# Patient Record
Sex: FEMALE | Race: WHITE | Employment: FULL TIME | URBAN - METROPOLITAN AREA
[De-identification: names, ages, dates, MRNs, and addresses within clinical notes are randomized per-mention and may not be internally consistent; named-entity substitution may affect disease eponyms.]

---

## 2017-01-02 ENCOUNTER — APPOINTMENT (OUTPATIENT)
Dept: PHYSICAL THERAPY | Age: 62
End: 2017-01-02
Attending: INTERNAL MEDICINE
Payer: COMMERCIAL

## 2017-01-03 ENCOUNTER — TELEPHONE (OUTPATIENT)
Dept: INTERNAL MEDICINE CLINIC | Facility: CLINIC | Age: 62
End: 2017-01-03

## 2017-01-03 NOTE — TELEPHONE ENCOUNTER
[facility] PT is requesting to have a referral for the pt for 2 more visits  Please advise

## 2017-01-05 ENCOUNTER — OFFICE VISIT (OUTPATIENT)
Dept: PHYSICAL THERAPY | Age: 62
End: 2017-01-05
Attending: INTERNAL MEDICINE
Payer: COMMERCIAL

## 2017-01-05 PROCEDURE — 97110 THERAPEUTIC EXERCISES: CPT

## 2017-01-05 PROCEDURE — 97112 NEUROMUSCULAR REEDUCATION: CPT

## 2017-01-05 PROCEDURE — 97530 THERAPEUTIC ACTIVITIES: CPT

## 2017-01-05 NOTE — PROGRESS NOTES
Dx:  GUSTAVO NAVA 10/14/2016        Authorized # of Visits:  12 authorized         Next MD visit: none scheduled  Fall Risk: standard         Precautions: posterior precautions            Physical therapy progress/discharge report  11 of 12 sessions completed    S seconds, demonstrating improved gait speed for improved participation in ADL such as community ambulation (met)  · Pt will be able to squat to  light objects around the house with <2/10 hip pain  (met)  · Pt will improve functional hip strength to r sheet  Hold 15 seconds x 5 reps   On L side:  R hip ER with \"clam\" AAROM x 10 2 sets     R ABD SLR AAROM x 10 2 sets In doorway  -hips shifted from L to center.  -L and R hip/knee flex maintain neutral pelvis x 15 each leg ( min B UE support) On L side, symmetry and stance time  5 min Gait traing in clinic cues for hip symmetry and stance time  5 min  TUG test: 8 seconds (WNL's)    On L side:  R hip ER with \"clam\" AAROM x 10 3 sets     R ABD SLR AAROM x 10 3 sets

## 2017-01-05 NOTE — TELEPHONE ENCOUNTER
[employer] calling to inform us that patient's appt is scheduled for today. Must have referral request entered into a pending status in order for patient to keep their appt for today.

## 2017-01-05 NOTE — TELEPHONE ENCOUNTER
Referral # Creation Date Referral Status Status Update      [id number] 01/04/2017 Pending Review 01/05/2017:Status History.                  Status Reason Referral Type Referral Reasons Referral Class     Health Plan Review Rehab Services none Internal

## 2017-01-12 ENCOUNTER — HOSPITAL ENCOUNTER (OUTPATIENT)
Dept: MAMMOGRAPHY | Facility: HOSPITAL | Age: 62
Discharge: HOME | End: 2017-01-12
Attending: INTERNAL MEDICINE
Payer: COMMERCIAL

## 2017-01-12 DIAGNOSIS — Z12.31 VISIT FOR SCREENING MAMMOGRAM: ICD-10-CM

## 2017-01-12 PROCEDURE — 77067 SCR MAMMO BI INCL CAD: CPT

## 2017-01-15 DIAGNOSIS — I10 ESSENTIAL HYPERTENSION WITH GOAL BLOOD PRESSURE LESS THAN 130/80: ICD-10-CM

## 2017-01-16 ENCOUNTER — OFFICE VISIT (OUTPATIENT)
Dept: PHYSICAL THERAPY | Age: 62
End: 2017-01-16
Attending: INTERNAL MEDICINE
Payer: COMMERCIAL

## 2017-01-16 PROCEDURE — 97112 NEUROMUSCULAR REEDUCATION: CPT

## 2017-01-16 PROCEDURE — 97530 THERAPEUTIC ACTIVITIES: CPT

## 2017-01-16 NOTE — PROGRESS NOTES
Dx:  GUSTAVO NAVA 10/14/2016        Authorized # of Visits:  12 authorized         Next MD visit: none scheduled  Fall Risk: standard         Precautions: posterior precautions            Physical therapy discharge report  12 of 12 sessions completed    Subjective compliant with comprehensive HEP to maintain progress achieved in PT (met)      Plan:d/c to HEP.       Charges:  NM re ed 1 there act 2    Total Timed Treatment: 45 min  Total Treatment Time: 45 min    Date: 11/28  Tx#: 5/ Date: 11/30  Tx#: 6/ Date: 12/6  T pelvis x 15 each leg ( min B UE support) On L side, roll under hips to mobilize to R  5 min (add HEP)    -Lumbar spine mobes grade 3,4 for L side bend, all segments  5 min In p bars, fwd and bwwd walk, cues hips neutral, slight UE assist balance, symmetric clinic cues for hip symmetry and stance time  5 min  TUG test: 8 seconds (WNL's)     On L side:  R hip ER with \"clam\" AAROM x 10 3 sets     R ABD SLR AAROM x 10 3 sets

## 2017-01-17 ENCOUNTER — OFFICE VISIT (OUTPATIENT)
Dept: INTERNAL MEDICINE CLINIC | Facility: CLINIC | Age: 62
End: 2017-01-17

## 2017-01-17 VITALS
TEMPERATURE: 98 F | WEIGHT: 226 LBS | HEIGHT: 67 IN | RESPIRATION RATE: 18 BRPM | BODY MASS INDEX: 35.47 KG/M2 | HEART RATE: 80 BPM | DIASTOLIC BLOOD PRESSURE: 80 MMHG | SYSTOLIC BLOOD PRESSURE: 136 MMHG

## 2017-01-17 DIAGNOSIS — I10 ESSENTIAL HYPERTENSION: ICD-10-CM

## 2017-01-17 DIAGNOSIS — M77.8 SHOULDER TENDINITIS, LEFT: ICD-10-CM

## 2017-01-17 DIAGNOSIS — I10 ESSENTIAL HYPERTENSION WITH GOAL BLOOD PRESSURE LESS THAN 130/80: Primary | ICD-10-CM

## 2017-01-17 PROCEDURE — 99212 OFFICE O/P EST SF 10 MIN: CPT | Performed by: INTERNAL MEDICINE

## 2017-01-17 PROCEDURE — 99214 OFFICE O/P EST MOD 30 MIN: CPT | Performed by: INTERNAL MEDICINE

## 2017-01-17 RX ORDER — TRIAMTERENE AND HYDROCHLOROTHIAZIDE 37.5; 25 MG/1; MG/1
CAPSULE ORAL
Qty: 20 CAPSULE | Refills: 2 | Status: SHIPPED | OUTPATIENT
Start: 2017-01-17 | End: 2017-04-18

## 2017-01-17 NOTE — PROGRESS NOTES
HPI:    Patient ID: Mirza Hebert is a 63year old female. Hypertension  This is a chronic problem. The current episode started more than 1 year ago. The problem has been gradually improving (has been on dyazide and amlodipine,tolerated well. bp stable,s ER, XL, (WELLBUTRIN XL) 300 MG Oral Tablet 24 Hr Take 600 mg by mouth daily. take 2 tablets by oral route once in a.m. Disp:  Rfl:    Fexofenadine HCl (ALLEGRA) 60 MG Oral Tab Take 60 mg by mouth daily.  take 1 tablet (60MG)  by ORAL route 2 times every da rebound. Musculoskeletal: She exhibits no edema. Right shoulder: Normal. She exhibits normal strength. Left shoulder: She exhibits decreased range of motion, pain and spasm. Lumbar back: She exhibits decreased range of motion.  She ex Relevant Medications     Triamterene-HCTZ 37.5-25 MG Oral Cap     Other Relevant Orders     CBC WITH DIFFERENTIAL WITH PLATELET     COMP METABOLIC PANEL (14)     LIPID PANEL     ASSAY, THYROID STIM HORMONE          Return in about 3 months (around 4/17

## 2017-01-17 NOTE — PATIENT INSTRUCTIONS
Problem List Items Addressed This Visit        Unprioritized    Essential hypertension     Blood pressure 136/80, pulse 80, temperature 98 °F (36.7 °C), temperature source Oral, resp. rate 18, height 5' 7\" (1.702 m), weight 226 lb (102.513 kg).    The bloo

## 2017-01-17 NOTE — ASSESSMENT & PLAN NOTE
Gradually worsening pain with stiffness in the left shoulder. No recent injuries to explain the pain. Patient does lay on the left side crunched up. Most likely the cause for pain at this time.   Advised to use a rolled up pillow for better support of th

## 2017-01-17 NOTE — ASSESSMENT & PLAN NOTE
Blood pressure 136/80, pulse 80, temperature 98 °F (36.7 °C), temperature source Oral, resp. rate 18, height 5' 7\" (1.702 m), weight 226 lb (102.513 kg). The blood pressure looks well controlled at this time.   Patient is currently off the amlodipine as

## 2017-01-18 RX ORDER — TRIAMTERENE AND HYDROCHLOROTHIAZIDE 37.5; 25 MG/1; MG/1
CAPSULE ORAL
Qty: 20 CAPSULE | Refills: 2 | Status: CANCELLED | OUTPATIENT
Start: 2017-01-18

## 2017-01-18 NOTE — TELEPHONE ENCOUNTER
From: Yoana Navarro  To: Melissa Schultz MD  Sent: 1/15/2017 1:52 PM CST  Subject: Medication Renewal Request    Original authorizing provider: MD Yoana Gastelum would like a refill of the following medications:  Triamterene-HCTZ 37.5-25 MG

## 2017-01-25 ENCOUNTER — APPOINTMENT (OUTPATIENT)
Dept: PHYSICAL THERAPY | Age: 62
End: 2017-01-25
Attending: INTERNAL MEDICINE
Payer: COMMERCIAL

## 2017-04-10 ENCOUNTER — LAB ENCOUNTER (OUTPATIENT)
Dept: LAB | Age: 62
End: 2017-04-10
Attending: INTERNAL MEDICINE
Payer: COMMERCIAL

## 2017-04-10 DIAGNOSIS — I10 ESSENTIAL HYPERTENSION WITH GOAL BLOOD PRESSURE LESS THAN 130/80: ICD-10-CM

## 2017-04-10 PROCEDURE — 85025 COMPLETE CBC W/AUTO DIFF WBC: CPT

## 2017-04-10 PROCEDURE — 84443 ASSAY THYROID STIM HORMONE: CPT

## 2017-04-10 PROCEDURE — 80053 COMPREHEN METABOLIC PANEL: CPT

## 2017-04-10 PROCEDURE — 36415 COLL VENOUS BLD VENIPUNCTURE: CPT

## 2017-04-10 PROCEDURE — 80061 LIPID PANEL: CPT

## 2017-04-17 ENCOUNTER — TELEPHONE (OUTPATIENT)
Dept: INTERNAL MEDICINE CLINIC | Facility: CLINIC | Age: 62
End: 2017-04-17

## 2017-04-17 DIAGNOSIS — Z98.890 HISTORY OF ARTHROPLASTY OF RIGHT HIP: Primary | ICD-10-CM

## 2017-04-17 NOTE — TELEPHONE ENCOUNTER
Pt requesting referral to Dr Carolina Grace at [facility]  Had appt today 4/17 for 6 mo f/u     FAX# [phone], Yari Koroma

## 2017-04-18 ENCOUNTER — OFFICE VISIT (OUTPATIENT)
Dept: INTERNAL MEDICINE CLINIC | Facility: CLINIC | Age: 62
End: 2017-04-18

## 2017-04-18 VITALS
DIASTOLIC BLOOD PRESSURE: 74 MMHG | SYSTOLIC BLOOD PRESSURE: 116 MMHG | BODY MASS INDEX: 35.79 KG/M2 | HEART RATE: 76 BPM | HEIGHT: 67 IN | WEIGHT: 228 LBS | RESPIRATION RATE: 16 BRPM

## 2017-04-18 DIAGNOSIS — I10 ESSENTIAL HYPERTENSION: ICD-10-CM

## 2017-04-18 DIAGNOSIS — E03.9 HYPOTHYROIDISM, UNSPECIFIED TYPE: ICD-10-CM

## 2017-04-18 DIAGNOSIS — I10 ESSENTIAL HYPERTENSION WITH GOAL BLOOD PRESSURE LESS THAN 130/80: Primary | ICD-10-CM

## 2017-04-18 PROCEDURE — 99214 OFFICE O/P EST MOD 30 MIN: CPT | Performed by: INTERNAL MEDICINE

## 2017-04-18 PROCEDURE — 99212 OFFICE O/P EST SF 10 MIN: CPT | Performed by: INTERNAL MEDICINE

## 2017-04-18 RX ORDER — AMOXICILLIN 500 MG/1
CAPSULE ORAL AS NEEDED
Refills: 3 | COMMUNITY
Start: 2017-02-03 | End: 2018-10-02

## 2017-04-18 RX ORDER — TRIAMTERENE AND HYDROCHLOROTHIAZIDE 37.5; 25 MG/1; MG/1
CAPSULE ORAL
Qty: 26 CAPSULE | Refills: 2 | Status: SHIPPED | OUTPATIENT
Start: 2017-04-18 | End: 2017-10-17 | Stop reason: ALTCHOICE

## 2017-04-18 RX ORDER — TIZANIDINE HYDROCHLORIDE 4 MG/1
CAPSULE, GELATIN COATED ORAL
Qty: 90 CAPSULE | Refills: 1 | Status: SHIPPED | OUTPATIENT
Start: 2017-04-18 | End: 2017-12-29

## 2017-04-18 RX ORDER — LEVOTHYROXINE SODIUM 0.15 MG/1
150 TABLET ORAL
Qty: 90 TABLET | Refills: 2 | Status: SHIPPED | OUTPATIENT
Start: 2017-04-18 | End: 2017-12-29

## 2017-04-18 NOTE — ASSESSMENT & PLAN NOTE
Blood pressure 116/74, pulse 76, resp. rate 16, height 5' 7\" (1.702 m), weight 228 lb (103.42 kg). Blood pressure upon recheck is 136/78. Patient has been off the amlodipine. This has been since her last surgery.   She is currently on triamterene hydro

## 2017-04-18 NOTE — ASSESSMENT & PLAN NOTE
Thyroid functions look stable on levothyroxine at 150 µg 1 tablet once daily. Continue the same dose of medications and recheck labs prior to the next office visit.

## 2017-04-18 NOTE — PATIENT INSTRUCTIONS
Problem List Items Addressed This Visit        Unprioritized    Essential hypertension     Blood pressure 116/74, pulse 76, resp. rate 16, height 5' 7\" (1.702 m), weight 228 lb (103.42 kg). Blood pressure upon recheck is 136/78.   Patient has been off th

## 2017-04-18 NOTE — PROGRESS NOTES
HPI:    Patient ID: Wiliam Pierce is a 63year old female. HPI Comments: Notes Recorded by Marva Subramanian MD on 4/10/2017 at 4:11 PM  The blood counts look normal–no anemia. The thyroid function tests look stable.   The metabolic panel shows normal suga tablet Rfl: 2   TiZANidine HCl 4 MG Oral Cap 1 cap po q hs Disp: 90 capsule Rfl: 1   ARIPiprazole (ABILIFY) 10 MG Oral Tab Take  by mouth.  take 1 tablet (10MG)  by ORAL route  every day Disp:  Rfl:    BuPROPion HCl ER, XL, (WELLBUTRIN XL) 300 MG Oral Table cranial nerve deficit. She exhibits normal muscle tone. Coordination normal.   Skin: No rash noted. No erythema. Psychiatric: She has a normal mood and affect. Her behavior is normal. Thought content normal.   Nursing note and vitals reviewed. Encounter  CBC W Differential W Platelet [E]  Comp Metabolic Panel (14) [E]  Lipid Panel [E]  TSH [E]  Urinalysis, Routine [E]    Meds This Visit:  Signed Prescriptions Disp Refills    Triamterene-HCTZ 37.5-25 MG Oral Cap 26 capsule 2      Si capsule 2

## 2017-04-21 NOTE — TELEPHONE ENCOUNTER
Referral was faxed to Jonn Amezcua [facility] at [phone], w/confirmation. Pt was called and notified.

## 2017-10-15 ENCOUNTER — LAB ENCOUNTER (OUTPATIENT)
Dept: LAB | Facility: HOSPITAL | Age: 62
End: 2017-10-15
Attending: INTERNAL MEDICINE
Payer: COMMERCIAL

## 2017-10-15 DIAGNOSIS — I10 ESSENTIAL HYPERTENSION: ICD-10-CM

## 2017-10-15 PROCEDURE — 80053 COMPREHEN METABOLIC PANEL: CPT

## 2017-10-15 PROCEDURE — 36415 COLL VENOUS BLD VENIPUNCTURE: CPT

## 2017-10-15 PROCEDURE — 81001 URINALYSIS AUTO W/SCOPE: CPT

## 2017-10-15 PROCEDURE — 80061 LIPID PANEL: CPT

## 2017-10-15 PROCEDURE — 85025 COMPLETE CBC W/AUTO DIFF WBC: CPT

## 2017-10-15 PROCEDURE — 84443 ASSAY THYROID STIM HORMONE: CPT

## 2017-10-17 ENCOUNTER — OFFICE VISIT (OUTPATIENT)
Dept: INTERNAL MEDICINE CLINIC | Facility: CLINIC | Age: 62
End: 2017-10-17

## 2017-10-17 VITALS
DIASTOLIC BLOOD PRESSURE: 75 MMHG | HEART RATE: 76 BPM | BODY MASS INDEX: 37.83 KG/M2 | HEIGHT: 67 IN | SYSTOLIC BLOOD PRESSURE: 121 MMHG | WEIGHT: 241 LBS

## 2017-10-17 DIAGNOSIS — Z78.0 MENOPAUSE: ICD-10-CM

## 2017-10-17 DIAGNOSIS — I10 ESSENTIAL HYPERTENSION: Primary | ICD-10-CM

## 2017-10-17 DIAGNOSIS — E03.9 HYPOTHYROIDISM, UNSPECIFIED TYPE: ICD-10-CM

## 2017-10-17 DIAGNOSIS — Z12.31 VISIT FOR SCREENING MAMMOGRAM: ICD-10-CM

## 2017-10-17 DIAGNOSIS — Z12.11 COLON CANCER SCREENING: ICD-10-CM

## 2017-10-17 PROCEDURE — 90686 IIV4 VACC NO PRSV 0.5 ML IM: CPT | Performed by: INTERNAL MEDICINE

## 2017-10-17 PROCEDURE — 90471 IMMUNIZATION ADMIN: CPT | Performed by: INTERNAL MEDICINE

## 2017-10-17 PROCEDURE — 99212 OFFICE O/P EST SF 10 MIN: CPT | Performed by: INTERNAL MEDICINE

## 2017-10-17 PROCEDURE — 99214 OFFICE O/P EST MOD 30 MIN: CPT | Performed by: INTERNAL MEDICINE

## 2017-10-17 NOTE — PROGRESS NOTES
Pt presents for FLU vaccine, pt name and  verified, screening done, pt administered flu vaccine IM in the left deltoid, pt tolerated injection well, no adverse reaction note  VIS provided

## 2017-10-17 NOTE — PROGRESS NOTES
HPI:    Patient ID: Debbie Stanley is a 63year old female. Written by Stephen Root MD on 10/17/2017  4:20 PM   The blood counts look normal-no anemia.    The urine test looks normal excepting for slight pus in the urine- as you are symptomatic we will Psychiatric/Behavioral: Negative.                Current Outpatient Prescriptions:  Ciprofloxacin HCl (CIPRO) 500 MG Oral Tab 1 tablet by mouth twice a day Disp: 10 tablet Rfl: 0   Levothyroxine Sodium 150 MCG Oral Tab Take 1 tablet (150 mcg total) by lb tenderness. There is no rebound. Musculoskeletal: Normal range of motion. She exhibits no edema or tenderness. Lymphadenopathy:     She has no cervical adenopathy. Neurological: She is alert and oriented to person, place, and time.  She has normal ref Disp Refills    Ciprofloxacin HCl (CIPRO) 500 MG Oral Tab 10 tablet 0      Si tablet by mouth twice a day           Imaging & Referrals:  FLULAVAL INFLUENZA VACCINE QUAD PRESERVATIVE FREE 0.5 ML  GASTRO - INTERNAL  YOLI SCREENING BILAT (CPT=77067)  XR D

## 2017-10-17 NOTE — ASSESSMENT & PLAN NOTE
Blood pressure 121/75, pulse 76, height 5' 7\" (1.702 m), weight 241 lb (109.3 kg). Blood pressure seems stable and well controlled. She has been off the amlodipine at this time.   She is currently on triamterene hydrochlorothiazide 1 capsule 2 times a

## 2017-10-17 NOTE — ASSESSMENT & PLAN NOTE
Thyroid function tests look stable at this time. Continue on levothyroxine at 150 mcg 1 tablet once daily.   Recheck labs in about 4-6 months

## 2017-10-17 NOTE — PATIENT INSTRUCTIONS
Problem List Items Addressed This Visit        Unprioritized    Essential hypertension - Primary     Blood pressure 121/75, pulse 76, height 5' 7\" (1.702 m), weight 241 lb (109.3 kg). Blood pressure seems stable and well controlled.   She has been off

## 2017-10-18 ENCOUNTER — TELEPHONE (OUTPATIENT)
Dept: OTHER | Age: 62
End: 2017-10-18

## 2017-10-18 RX ORDER — CIPROFLOXACIN 500 MG/1
TABLET, FILM COATED ORAL
Qty: 10 TABLET | Refills: 0 | Status: SHIPPED | OUTPATIENT
Start: 2017-10-18 | End: 2017-12-29

## 2017-10-18 NOTE — TELEPHONE ENCOUNTER
Pt advised of message below and verbalized understanding and agreement with the plan:    Saintclair Ivy, MD Physician Signed  Date of Service: 10/18/2017 11:38 AM         tx faxed-stop tizanidine when on cipro

## 2017-10-20 ENCOUNTER — TELEPHONE (OUTPATIENT)
Dept: GASTROENTEROLOGY | Facility: CLINIC | Age: 62
End: 2017-10-20

## 2017-10-20 DIAGNOSIS — Z12.11 COLON CANCER SCREENING: Primary | ICD-10-CM

## 2017-10-20 NOTE — TELEPHONE ENCOUNTER
Pt called to schedule 10 yr repeat colonoscopy. Pt would like to know if there is any availability the first part of December. Please call.

## 2017-10-23 NOTE — TELEPHONE ENCOUNTER
Okay to schedule screening colonoscopy, split dose MiraLAX preparation, MAC, at hospital due to BMI greater than 35.

## 2017-10-24 NOTE — TELEPHONE ENCOUNTER
Scheduled for:  Colonoscopy - [address]8075  Provider Name:  Dr. Jahaira Elkins  Date:  12/15/17  Location:  [facility]  Sedation:  MAC  Time:  12:30 pm (pt is aware to arrive at 11:30 am)  Prep:  Miralax/Gatorade, Prep instructions were given to pt over the phone, pt verbalized u

## 2017-12-15 ENCOUNTER — ANESTHESIA (OUTPATIENT)
Dept: ENDOSCOPY | Facility: HOSPITAL | Age: 62
End: 2017-12-15
Payer: COMMERCIAL

## 2017-12-15 ENCOUNTER — ANESTHESIA EVENT (OUTPATIENT)
Dept: ENDOSCOPY | Facility: HOSPITAL | Age: 62
End: 2017-12-15
Payer: COMMERCIAL

## 2017-12-15 ENCOUNTER — SURGERY (OUTPATIENT)
Age: 62
End: 2017-12-15

## 2017-12-15 ENCOUNTER — HOSPITAL ENCOUNTER (OUTPATIENT)
Facility: HOSPITAL | Age: 62
Setting detail: HOSPITAL OUTPATIENT SURGERY
Discharge: HOME | End: 2017-12-15
Attending: INTERNAL MEDICINE | Admitting: INTERNAL MEDICINE
Payer: COMMERCIAL

## 2017-12-15 DIAGNOSIS — Z12.11 COLON CANCER SCREENING: ICD-10-CM

## 2017-12-15 PROBLEM — K57.90 DIVERTICULOSIS: Status: ACTIVE | Noted: 2017-12-15

## 2017-12-15 PROBLEM — K64.8 INTERNAL HEMORRHOIDS: Status: ACTIVE | Noted: 2017-12-15

## 2017-12-15 PROCEDURE — 45378 DIAGNOSTIC COLONOSCOPY: CPT | Performed by: INTERNAL MEDICINE

## 2017-12-15 PROCEDURE — 0DJD8ZZ INSPECTION OF LOWER INTESTINAL TRACT, VIA NATURAL OR ARTIFICIAL OPENING ENDOSCOPIC: ICD-10-PCS | Performed by: INTERNAL MEDICINE

## 2017-12-15 RX ORDER — SODIUM CHLORIDE, SODIUM LACTATE, POTASSIUM CHLORIDE, CALCIUM CHLORIDE 600; 310; 30; 20 MG/100ML; MG/100ML; MG/100ML; MG/100ML
INJECTION, SOLUTION INTRAVENOUS CONTINUOUS
Status: DISCONTINUED | OUTPATIENT
Start: 2017-12-15 | End: 2017-12-15

## 2017-12-15 RX ORDER — NALOXONE HYDROCHLORIDE 0.4 MG/ML
80 INJECTION, SOLUTION INTRAMUSCULAR; INTRAVENOUS; SUBCUTANEOUS AS NEEDED
Status: DISCONTINUED | OUTPATIENT
Start: 2017-12-15 | End: 2017-12-15

## 2017-12-15 NOTE — H&P
History & Physical Examination    Patient Name: Sandro Curtis  MRN: [id number]  [facility]: [id number]  YOB: 1955    Diagnosis: colorectal screening      Prescriptions Prior to Admission:  Levothyroxine Sodium 150 MCG Oral Tab Take 1 tablet (150 mcg Disease Mother      CVA   • Depression Mother    • Hypertension Mother    • Colonic polyps [OTHER] Mother    • Depression Sister    • Hypertension Sister    • acromegaly from a pituitary tumor [OTHER] Sister         Smoking status: Never Smoker    [place]

## 2017-12-15 NOTE — BRIEF OP NOTE
Pre-Operative Diagnosis: Colon cancer screening     Post-Operative Diagnosis: Diverticulosis, internal hemorrhoids     Procedure Performed:   Procedure(s):  COLONOSCOPY    Surgeon(s) and Role:     * Eugenia De Los Santos MD - Primary    Assistant(

## 2017-12-15 NOTE — ANESTHESIA PREPROCEDURE EVALUATION
Anesthesia PreOp Note    HPI:     Wiliam Pierce is a 63year old female who presents for preoperative consultation requested by: Mel Theodore MD    Date of Surgery: 12/15/2017    Procedure(s):  COLONOSCOPY  Indication: Colon cancer screening Comment: 1998  No date: REDUCTION LEFT      Comment: 2000 Bilateral  No date: REDUCTION RIGHT  No date: SPINE SURGERY PROCEDURE UNLISTED  No date: TOTAL ABDOM HYSTERECTOMY  10/14/16: TOTAL HIP REPLACEMENT Right      Prescriptions Prior to Admission:  Priya Rodrigez or equivalent per week         Comment: MIXED DRINKS WEEKLY    Drug use: No    Sexual activity: Not on file     Other Topics Concern    Caffeine Concern Yes    Comment: 2 cups tea/coffee daily    Pt has a pacemaker No    Pt has a defibrillator No    Reacti my ability. The patient desires the anesthetic management as planned.   Reza Quiroz  12/15/2017 1:16 PM

## 2017-12-15 NOTE — ANESTHESIA POSTPROCEDURE EVALUATION
Patient: Tia Junior    Procedure Summary     Date:  12/15/17 Room / Location:  [facility] ENDOSCOPY 01 / [facility] ENDOSCOPY    Anesthesia Start:  9369 Anesthesia Stop:      Procedure:  COLONOSCOPY (N/A ) Diagnosis:       Colon cancer screening      (diverticulosis,

## 2017-12-15 NOTE — ANESTHESIA POSTPROCEDURE EVALUATION
Patient: Modesto Dunne    Procedure Summary     Date:  12/15/17 Room / Location:  [facility] ENDOSCOPY 01 / [facility] ENDOSCOPY    Anesthesia Start:  9064 Anesthesia Stop:      Procedure:  COLONOSCOPY (N/A ) Diagnosis:       Colon cancer screening      (diverticulosis,

## 2017-12-16 VITALS
HEART RATE: 81 BPM | HEIGHT: 67 IN | RESPIRATION RATE: 18 BRPM | SYSTOLIC BLOOD PRESSURE: 124 MMHG | BODY MASS INDEX: 37.67 KG/M2 | DIASTOLIC BLOOD PRESSURE: 95 MMHG | WEIGHT: 240 LBS

## 2017-12-16 NOTE — OPERATIVE REPORT
[facility]    PATIENT'S NAME: Richar Rivera   ATTENDING PHYSICIAN: Jaspal Cannon MD   OPERATING PHYSICIAN: Jaspal Cannon MD   PATIENT ACCOUNT#:   [id number]    LOCATION:  [facility] ROOM 13 [address] 14:11:13  t: 12/15/2017 20:35:51  Job [id number]  [facility]/

## 2017-12-18 ENCOUNTER — TELEPHONE (OUTPATIENT)
Dept: GASTROENTEROLOGY | Facility: CLINIC | Age: 62
End: 2017-12-18

## 2017-12-19 ENCOUNTER — APPOINTMENT (OUTPATIENT)
Dept: LAB | Facility: HOSPITAL | Age: 62
End: 2017-12-19
Attending: INTERNAL MEDICINE
Payer: COMMERCIAL

## 2017-12-19 DIAGNOSIS — I10 ESSENTIAL HYPERTENSION: ICD-10-CM

## 2017-12-19 PROCEDURE — 36415 COLL VENOUS BLD VENIPUNCTURE: CPT

## 2017-12-19 PROCEDURE — 80048 BASIC METABOLIC PNL TOTAL CA: CPT

## 2017-12-29 ENCOUNTER — OFFICE VISIT (OUTPATIENT)
Dept: INTERNAL MEDICINE CLINIC | Facility: CLINIC | Age: 62
End: 2017-12-29

## 2017-12-29 VITALS
SYSTOLIC BLOOD PRESSURE: 132 MMHG | DIASTOLIC BLOOD PRESSURE: 85 MMHG | HEIGHT: 67 IN | RESPIRATION RATE: 16 BRPM | BODY MASS INDEX: 36.88 KG/M2 | WEIGHT: 235 LBS | HEART RATE: 75 BPM

## 2017-12-29 DIAGNOSIS — G47.33 OBSTRUCTIVE SLEEP APNEA SYNDROME: ICD-10-CM

## 2017-12-29 DIAGNOSIS — E03.9 HYPOTHYROIDISM, UNSPECIFIED TYPE: ICD-10-CM

## 2017-12-29 DIAGNOSIS — Z00.00 ROUTINE GENERAL MEDICAL EXAMINATION AT A HEALTH CARE FACILITY: ICD-10-CM

## 2017-12-29 DIAGNOSIS — Z96.641 STATUS POST TOTAL REPLACEMENT OF RIGHT HIP: ICD-10-CM

## 2017-12-29 DIAGNOSIS — Z01.419 ENCOUNTER FOR GYNECOLOGICAL EXAMINATION WITHOUT ABNORMAL FINDING: ICD-10-CM

## 2017-12-29 DIAGNOSIS — F32.A DEPRESSIVE DISORDER: ICD-10-CM

## 2017-12-29 DIAGNOSIS — I10 ESSENTIAL HYPERTENSION: Primary | ICD-10-CM

## 2017-12-29 DIAGNOSIS — G47.30 INSOMNIA WITH SLEEP APNEA: ICD-10-CM

## 2017-12-29 DIAGNOSIS — N18.30 CKD (CHRONIC KIDNEY DISEASE) STAGE 3, GFR 30-59 ML/MIN (HCC): ICD-10-CM

## 2017-12-29 DIAGNOSIS — G47.00 INSOMNIA WITH SLEEP APNEA: ICD-10-CM

## 2017-12-29 DIAGNOSIS — E55.9 VITAMIN D DEFICIENCY: ICD-10-CM

## 2017-12-29 PROBLEM — K64.8 INTERNAL HEMORRHOIDS: Status: RESOLVED | Noted: 2017-12-15 | Resolved: 2017-12-29

## 2017-12-29 PROBLEM — M77.8 SHOULDER TENDINITIS: Status: RESOLVED | Noted: 2017-01-17 | Resolved: 2017-12-29

## 2017-12-29 PROCEDURE — 99212 OFFICE O/P EST SF 10 MIN: CPT | Performed by: INTERNAL MEDICINE

## 2017-12-29 PROCEDURE — 99396 PREV VISIT EST AGE 40-64: CPT | Performed by: INTERNAL MEDICINE

## 2017-12-29 PROCEDURE — 99213 OFFICE O/P EST LOW 20 MIN: CPT | Performed by: INTERNAL MEDICINE

## 2017-12-29 RX ORDER — LEVOTHYROXINE SODIUM 0.15 MG/1
150 TABLET ORAL
Qty: 90 TABLET | Refills: 2 | Status: SHIPPED | OUTPATIENT
Start: 2017-12-29 | End: 2018-04-27

## 2017-12-29 NOTE — PATIENT INSTRUCTIONS
Problem List Items Addressed This Visit        Unprioritized    CKD (chronic kidney disease) stage 3, GFR 30-59 ml/min     EGFR remains low at about 50. Blood pressures seem well controlled.   Patient is advised to drink plenty of fluids and avoid over-the surgeries and hence has some scar tissue to palpation. Follow-up mammogram has been ordered and scheduled–pending at this time. DEXA scan is pending. No cervical or inguinal lymphadenopathy. Hernial orifices intact.     Pelvic exam completed–no cervical

## 2017-12-29 NOTE — ASSESSMENT & PLAN NOTE
Normal exam.  Labs as ordered. Skin check normal.  No significant abnormal nevi. Breast exam completed–no palpable abnormalities, discharge from the nipples or axillary adenopathy.   Patient has status post bilateral breast reduction surgeries and hence h

## 2017-12-29 NOTE — PROGRESS NOTES
REASON FOR VISIT:    Juan Frost is a 63year old female who presents for an [address]. G0,P0  S/P CARMELA-CERVIX IN PLACE FOR  FIBROIDS.   Pap Smear,3 Years due on 12/29/2020     Colonoscopy,10 Years due on 12/15/2027   ADMISSION DATE: Depressive disorder     Essential hypertension     Hypothyroidism     Insomnia with sleep apnea     Morbid obesity ([facility] [address]     Vitamin D deficiency     Sleep apnea     Routine general medical examination at a health care facility     Encounter for routine gy 04/10/2017 80         Gonorrhea Screening if high risk No results found for: GONOCOCCUS    HIV Screening For all adults age 22-65, older adults at increased risk No results found for: HIV    Syphilis Screening Screen if pregnant or high risk No results f by mouth. take 1 tablet (10MG)  by ORAL route  every day Disp:  Rfl:    BuPROPion HCl ER, XL, (WELLBUTRIN XL) 300 MG Oral Tablet 24 Hr Take 600 mg by mouth daily. take 2 tablets by oral route once in a.m.   Disp:  Rfl:    Fexofenadine HCl (ALLEGRA) 60 MG O well otherwise  SKIN: denies any unusual skin lesions  EYES: denies blurred vision or double vision  HEENT: denies nasal congestion, sinus pain or ST  LUNGS: denies shortness of breath with exertion  CARDIOVASCULAR: denies chest pain on exertion  GI: denie medications. Recheck labs with the next blood draw in about 3-4 months         Depressive disorder     Major depressive disorder which has been stable on current medications–Abilify and Wellbutrin. Being managed per psychiatry.   Referral has been provide scan is pending. No cervical or inguinal lymphadenopathy. Hernial orifices intact. Pelvic exam completed–no cervical movement tenderness, adnexal palpable abnormalities. No cystocele, rectocele noted.   Patient is status post supracervical hysterectom REFERRAL TO PSYCHIATRY    Obstructive sleep apnea syndrome    Other orders  -     Levothyroxine Sodium 150 MCG Oral Tab; Take 1 tablet (150 mcg total) by mouth once daily. The patient indicates understanding of these issues and agrees to the plan.   R

## 2017-12-29 NOTE — ASSESSMENT & PLAN NOTE
Patient has been doing very well since hip surgery about a year back  She has not needed much pain medications at this time.

## 2017-12-29 NOTE — ASSESSMENT & PLAN NOTE
Blood pressure 132/85, pulse 75, resp. rate 16, height 5' 7\" (1.702 m), weight 235 lb (106.6 kg).      Wt Readings from Last 6 Encounters:  12/29/17 : 235 lb (106.6 kg)  12/15/17 : 240 lb (108.9 kg)  10/17/17 : 241 lb (109.3 kg)  04/18/17 : 228 lb (103.4 k

## 2017-12-29 NOTE — ASSESSMENT & PLAN NOTE
Thyroid function tests have been stable on levothyroxine at 150 mcg 1 tablet once daily. Recheck labs have been ordered.

## 2017-12-29 NOTE — ASSESSMENT & PLAN NOTE
Patient is status post supracervical hysterectomy. Last Pap done about 3 years back. No history of abnormal Paps. Pap repeated today.

## 2017-12-29 NOTE — ASSESSMENT & PLAN NOTE
History of sleep apnea but unable to tolerate the CPAP. Diet controlled to lose weight discussed. Lay on the side as discussed.

## 2018-01-01 LAB — HPV I/H RISK 1 DNA SPEC QL NAA+PROBE: NEGATIVE

## 2018-01-29 ENCOUNTER — HOSPITAL ENCOUNTER (OUTPATIENT)
Dept: MAMMOGRAPHY | Facility: HOSPITAL | Age: 63
Discharge: HOME | End: 2018-01-29
Attending: INTERNAL MEDICINE
Payer: COMMERCIAL

## 2018-01-29 ENCOUNTER — HOSPITAL ENCOUNTER (OUTPATIENT)
Dept: BONE DENSITY | Facility: HOSPITAL | Age: 63
Discharge: HOME | End: 2018-01-29
Attending: INTERNAL MEDICINE
Payer: COMMERCIAL

## 2018-01-29 DIAGNOSIS — Z78.0 MENOPAUSE: ICD-10-CM

## 2018-01-29 DIAGNOSIS — Z12.31 VISIT FOR SCREENING MAMMOGRAM: ICD-10-CM

## 2018-01-29 PROCEDURE — 77080 DXA BONE DENSITY AXIAL: CPT | Performed by: INTERNAL MEDICINE

## 2018-01-29 PROCEDURE — 77067 SCR MAMMO BI INCL CAD: CPT | Performed by: INTERNAL MEDICINE

## 2018-03-29 ENCOUNTER — HOSPITAL (OUTPATIENT)
Dept: OTHER | Age: 63
End: 2018-03-29
Attending: RADIOLOGY

## 2018-04-14 ENCOUNTER — LAB ENCOUNTER (OUTPATIENT)
Dept: LAB | Facility: HOSPITAL | Age: 63
End: 2018-04-14
Attending: INTERNAL MEDICINE
Payer: COMMERCIAL

## 2018-04-14 DIAGNOSIS — Z00.00 ROUTINE GENERAL MEDICAL EXAMINATION AT A HEALTH CARE FACILITY: ICD-10-CM

## 2018-04-14 PROCEDURE — 82607 VITAMIN B-12: CPT

## 2018-04-14 PROCEDURE — 80061 LIPID PANEL: CPT

## 2018-04-14 PROCEDURE — 80050 GENERAL HEALTH PANEL: CPT

## 2018-04-14 PROCEDURE — 36415 COLL VENOUS BLD VENIPUNCTURE: CPT

## 2018-04-14 PROCEDURE — 82306 VITAMIN D 25 HYDROXY: CPT

## 2018-04-14 PROCEDURE — 81001 URINALYSIS AUTO W/SCOPE: CPT

## 2018-04-14 PROCEDURE — 80053 COMPREHEN METABOLIC PANEL: CPT

## 2018-04-14 PROCEDURE — 85025 COMPLETE CBC W/AUTO DIFF WBC: CPT

## 2018-04-27 ENCOUNTER — OFFICE VISIT (OUTPATIENT)
Dept: INTERNAL MEDICINE CLINIC | Facility: CLINIC | Age: 63
End: 2018-04-27

## 2018-04-27 VITALS
TEMPERATURE: 98 F | DIASTOLIC BLOOD PRESSURE: 80 MMHG | HEART RATE: 71 BPM | WEIGHT: 243 LBS | BODY MASS INDEX: 38.14 KG/M2 | SYSTOLIC BLOOD PRESSURE: 136 MMHG | HEIGHT: 67 IN

## 2018-04-27 DIAGNOSIS — R25.1 TREMOR: ICD-10-CM

## 2018-04-27 DIAGNOSIS — E55.9 VITAMIN D DEFICIENCY: ICD-10-CM

## 2018-04-27 DIAGNOSIS — N18.30 CKD (CHRONIC KIDNEY DISEASE) STAGE 3, GFR 30-59 ML/MIN (HCC): Primary | ICD-10-CM

## 2018-04-27 DIAGNOSIS — E03.9 HYPOTHYROIDISM, UNSPECIFIED TYPE: ICD-10-CM

## 2018-04-27 DIAGNOSIS — I10 ESSENTIAL HYPERTENSION: ICD-10-CM

## 2018-04-27 PROCEDURE — 99214 OFFICE O/P EST MOD 30 MIN: CPT | Performed by: INTERNAL MEDICINE

## 2018-04-27 PROCEDURE — 99212 OFFICE O/P EST SF 10 MIN: CPT | Performed by: INTERNAL MEDICINE

## 2018-04-27 RX ORDER — LEVOTHYROXINE SODIUM 0.12 MG/1
125 TABLET ORAL
Qty: 90 TABLET | Refills: 1 | Status: SHIPPED | OUTPATIENT
Start: 2018-04-27 | End: 2018-10-02

## 2018-04-27 RX ORDER — ERGOCALCIFEROL 1.25 MG/1
50000 CAPSULE ORAL WEEKLY
Qty: 12 CAPSULE | Refills: 0 | Status: SHIPPED | OUTPATIENT
Start: 2018-04-27 | End: 2018-05-27

## 2018-04-27 NOTE — ASSESSMENT & PLAN NOTE
Gradually worsening intention tremor bilateral hands. Gait seems normal.  No hyper reflexia. Does not seem to be parkinsonian at this time. Most likely related to the use of Abilify. Advised to follow-up with neurology–Dr. Ashlie Carrasquillo –[phone].

## 2018-04-27 NOTE — ASSESSMENT & PLAN NOTE
EGFR much better at this time. Advised to continue to drink plenty of fluids and will monitor. Not on any nephrotoxic agents at this time. Advised to avoid ibuprofen like medications.

## 2018-04-27 NOTE — PATIENT INSTRUCTIONS
Problem List Items Addressed This Visit        Unprioritized    CKD (chronic kidney disease) stage 3, GFR 30-59 ml/min - Primary     EGFR much better at this time. Advised to continue to drink plenty of fluids and will monitor.   Not on any nephrotoxic age at 50,000 units once a week for the next 12 weeks and will recheck labs after.            Relevant Medications    ergocalciferol 38711 units Oral Cap

## 2018-04-27 NOTE — ASSESSMENT & PLAN NOTE
Patient has been on levothyroxine at 150 mcg once daily. Because the tremors and slightly suppressed TSH advised to reduce the levothyroxine to 125 mcg once daily, new prescription has been provided. Recheck labs in about 4 months.

## 2018-04-27 NOTE — PROGRESS NOTES
HPI:    Patient ID: Ruiz Belle is a 66year old female. Hypertension   This is a chronic problem. The current episode started more than 1 year ago.  The problem has been resolved (HAS BEEN ON DYAZIDE 2 TIMES WEEK MORE SO FOR LE SWELING,BP HAVE BEEN ST mouth once a week. Disp: 12 capsule Rfl: 0   Levothyroxine Sodium 125 MCG Oral Tab Take 1 tablet (125 mcg total) by mouth before breakfast. Disp: 90 tablet Rfl: 1   ARIPiprazole (ABILIFY) 10 MG Oral Tab Take  by mouth.  take 1 tablet (10MG)  by ORAL route Neurological: She is alert and oriented to person, place, and time. She has normal reflexes. She displays normal reflexes. No cranial nerve deficit. She exhibits normal muscle tone. Coordination normal.   Skin: No rash noted. No erythema.    Psychiatric: 125 MCG Oral Tab    Tremor     Gradually worsening intention tremor bilateral hands. Gait seems normal.  No hyper reflexia. Does not seem to be parkinsonian at this time. Most likely related to the use of Abilify.   Advised to follow-up with neurology–

## 2018-04-27 NOTE — ASSESSMENT & PLAN NOTE
Vitamin D levels remain low, restart on vitamin D at 50,000 units once a week for the next 12 weeks and will recheck labs after.

## 2018-04-27 NOTE — ASSESSMENT & PLAN NOTE
Blood pressure 136/80, pulse 71, temperature 97.9 °F (36.6 °C), temperature source Oral, height 5' 7\" (1.702 m), weight 243 lb (110.2 kg). Body mass index is 38.06 kg/m².    Wt Readings from Last 6 Encounters:  04/27/18 : 243 lb (110.2 kg)  12/29/17 :

## 2018-05-15 ENCOUNTER — OFFICE VISIT (OUTPATIENT)
Dept: NEUROLOGY | Facility: CLINIC | Age: 63
End: 2018-05-15

## 2018-05-15 VITALS
BODY MASS INDEX: 38.14 KG/M2 | DIASTOLIC BLOOD PRESSURE: 84 MMHG | WEIGHT: 243 LBS | HEART RATE: 83 BPM | SYSTOLIC BLOOD PRESSURE: 144 MMHG | HEIGHT: 67 IN

## 2018-05-15 DIAGNOSIS — R25.1 TREMOR: Primary | ICD-10-CM

## 2018-05-15 PROCEDURE — 99204 OFFICE O/P NEW MOD 45 MIN: CPT | Performed by: OTHER

## 2018-05-15 NOTE — PROGRESS NOTES
Neurology Initial Visit     Referred By: Dr. Sen ref. provider found    Chief Complaint: Patient presents with:  Tremors: referred by Dr. Heriberto Zuluaga. pt presents to clinic for B hands for 1 year, not taking meds for tremors. first time seeing a neurology. or equivalent per week   Comment: MIXED DRINKS WEEKLY        Drug use: No       Family History   Problem Relation Age of Onset   • Cancer Father      prostate   • Heart Disease Mother      CVA   • Depression Mother    • Hypertension Mother    • Colonic karena concentration  Thought process intact  Memory intact- recent and remote  Mood intact  Fund of knowledge appropriate for education and age    Language intact including: comprehension, naming, repetition, vocabulary    Cranial Nerves:  II.- Visual fields ful 04/14/2018      I have reviewed labs. Assessment   1. Tremor  Very mild tremor at this point, exam and history is somewhat contradicting. There are some features of essential tremor, but also parkinsonian tremors could be considered.   It is too early t

## 2018-06-05 ENCOUNTER — TELEPHONE (OUTPATIENT)
Dept: NEUROLOGY | Facility: CLINIC | Age: 63
End: 2018-06-05

## 2018-06-12 ENCOUNTER — HOSPITAL ENCOUNTER (OUTPATIENT)
Dept: MRI IMAGING | Facility: HOSPITAL | Age: 63
Discharge: HOME | End: 2018-06-12
Attending: Other
Payer: COMMERCIAL

## 2018-06-12 DIAGNOSIS — R25.1 TREMOR: ICD-10-CM

## 2018-06-12 PROCEDURE — 70551 MRI BRAIN STEM W/O DYE: CPT | Performed by: OTHER

## 2018-06-12 RX ORDER — DIAZEPAM 5 MG/1
TABLET ORAL
Qty: 1 TABLET | Refills: 0 | Status: SHIPPED | OUTPATIENT
Start: 2018-06-12 | End: 2018-08-16 | Stop reason: ALTCHOICE

## 2018-06-12 NOTE — TELEPHONE ENCOUNTER
Called in RX to [facility] for Diazepam 5mg tablet 1 tablet 15 minutes prior to MRI today. Called patient to inform of the above.  Left message at [phone]

## 2018-06-13 ENCOUNTER — TELEPHONE (OUTPATIENT)
Dept: NEUROLOGY | Facility: CLINIC | Age: 63
End: 2018-06-13

## 2018-06-13 NOTE — TELEPHONE ENCOUNTER
----- Message from Hetal Ross MD sent at 6/13/2018  7:34 AM CDT -----  Please let patient know that MRI brain didn't show significant abnormalities.

## 2018-08-11 ENCOUNTER — APPOINTMENT (OUTPATIENT)
Dept: LAB | Facility: HOSPITAL | Age: 63
End: 2018-08-11
Attending: INTERNAL MEDICINE
Payer: COMMERCIAL

## 2018-08-11 DIAGNOSIS — I10 ESSENTIAL HYPERTENSION: ICD-10-CM

## 2018-08-11 LAB
ALBUMIN SERPL BCP-MCNC: 4.1 G/DL (ref 3.5–4.8)
ALBUMIN/GLOB SERPL: 1.6 {RATIO} (ref 1–2)
ALP SERPL-CCNC: 65 U/L (ref 32–100)
ALT SERPL-CCNC: 25 U/L (ref 14–54)
ANION GAP SERPL CALC-SCNC: 7 MMOL/L (ref 0–18)
AST SERPL-CCNC: 25 U/L (ref 15–41)
BILIRUB SERPL-MCNC: 0.6 MG/DL (ref 0.3–1.2)
BUN SERPL-MCNC: 8 MG/DL (ref 8–20)
BUN/CREAT SERPL: 7 (ref 10–20)
CALCIUM SERPL-MCNC: 9.1 MG/DL (ref 8.5–10.5)
CHLORIDE SERPL-SCNC: 102 MMOL/L (ref 95–110)
CHOLEST SERPL-MCNC: 218 MG/DL (ref 110–200)
CO2 SERPL-SCNC: 26 MMOL/L (ref 22–32)
CREAT SERPL-MCNC: 1.15 MG/DL (ref 0.5–1.5)
GLOBULIN PLAS-MCNC: 2.5 G/DL (ref 2.5–3.7)
GLUCOSE SERPL-MCNC: 93 MG/DL (ref 70–99)
HDLC SERPL-MCNC: 96 MG/DL
LDLC SERPL CALC-MCNC: 115 MG/DL (ref 0–99)
NONHDLC SERPL-MCNC: 122 MG/DL
OSMOLALITY UR CALC.SUM OF ELEC: 278 MOSM/KG (ref 275–295)
PATIENT FASTING: YES
POTASSIUM SERPL-SCNC: 4.1 MMOL/L (ref 3.3–5.1)
PROT SERPL-MCNC: 6.6 G/DL (ref 5.9–8.4)
SODIUM SERPL-SCNC: 135 MMOL/L (ref 136–144)
TRIGL SERPL-MCNC: 37 MG/DL (ref 1–149)
TSH SERPL-ACNC: 1.54 UIU/ML (ref 0.45–5.33)

## 2018-08-11 PROCEDURE — 82306 VITAMIN D 25 HYDROXY: CPT

## 2018-08-11 PROCEDURE — 36415 COLL VENOUS BLD VENIPUNCTURE: CPT

## 2018-08-11 PROCEDURE — 80061 LIPID PANEL: CPT

## 2018-08-11 PROCEDURE — 84443 ASSAY THYROID STIM HORMONE: CPT

## 2018-08-11 PROCEDURE — 80053 COMPREHEN METABOLIC PANEL: CPT

## 2018-08-13 LAB — 25(OH)D3 SERPL-MCNC: 26.2 NG/ML

## 2018-08-16 ENCOUNTER — OFFICE VISIT (OUTPATIENT)
Dept: NEUROLOGY | Facility: CLINIC | Age: 63
End: 2018-08-16

## 2018-08-16 VITALS
WEIGHT: 245 LBS | DIASTOLIC BLOOD PRESSURE: 76 MMHG | SYSTOLIC BLOOD PRESSURE: 122 MMHG | BODY MASS INDEX: 38.45 KG/M2 | HEART RATE: 74 BPM | HEIGHT: 67 IN

## 2018-08-16 DIAGNOSIS — R25.1 TREMOR: Primary | ICD-10-CM

## 2018-08-16 PROCEDURE — 99214 OFFICE O/P EST MOD 30 MIN: CPT | Performed by: OTHER

## 2018-08-16 RX ORDER — PRIMIDONE 50 MG/1
TABLET ORAL
Qty: 90 TABLET | Refills: 1 | Status: SHIPPED | OUTPATIENT
Start: 2018-08-16 | End: 2018-09-19

## 2018-09-19 ENCOUNTER — OFFICE VISIT (OUTPATIENT)
Dept: NEUROLOGY | Facility: CLINIC | Age: 63
End: 2018-09-19

## 2018-09-19 VITALS
WEIGHT: 245 LBS | DIASTOLIC BLOOD PRESSURE: 78 MMHG | RESPIRATION RATE: 20 BRPM | HEART RATE: 58 BPM | BODY MASS INDEX: 38.45 KG/M2 | SYSTOLIC BLOOD PRESSURE: 126 MMHG | HEIGHT: 67 IN

## 2018-09-19 DIAGNOSIS — R25.1 TREMOR: Primary | ICD-10-CM

## 2018-09-19 PROCEDURE — 99213 OFFICE O/P EST LOW 20 MIN: CPT | Performed by: OTHER

## 2018-09-19 RX ORDER — PRIMIDONE 50 MG/1
TABLET ORAL
Qty: 270 TABLET | Refills: 1 | Status: SHIPPED | OUTPATIENT
Start: 2018-09-19 | End: 2019-03-26

## 2018-09-19 RX ORDER — CHOLECALCIFEROL (VITAMIN D3) 25 MCG
2500 TABLET,CHEWABLE ORAL DAILY
COMMUNITY
End: 2021-03-04

## 2018-09-19 NOTE — PROGRESS NOTES
Neurology Initial Visit     Referred By: Dr. Irvin Candelario    Chief Complaint: Patient presents with:  Tremors: Patient presents for follow up on tremors, states they have improved, taking primidone 50 mg, 3 times daily. Denied issues with medication.        HPI History:  No date: ABDOMINOPLASTY;  Bilateral  12/15/2017: COLONOSCOPY; N/A      Comment:  Procedure: COLONOSCOPY;  Surgeon: Martine Aburto MD;  Location: [facility] ENDOSCOPY  12/15/2017: COLONOSCOPY; N/A      Comment:  Performed by Rheba Babinski Take 5 mg by mouth daily. take 1 tablet (10MG)  by ORAL route  every day , Disp: , Rfl:   •  BuPROPion HCl ER, XL, (WELLBUTRIN XL) 300 MG Oral Tablet 24 Hr, Take 600 mg by mouth daily.  take 2 tablets by oral route once in a.m. , Disp: , Rfl:   •  Fexofenad 1.54 08/11/2018     Lab Results   Component Value Date    HDL 96 08/11/2018     (H) 08/11/2018    TRIG 37 08/11/2018     Lab Results   Component Value Date    HGB 12.9 04/14/2018    HCT 38.0 04/14/2018    MCV 94.2 04/14/2018    WBC 6.2 04/14/2018

## 2018-10-02 ENCOUNTER — APPOINTMENT (OUTPATIENT)
Dept: LAB | Facility: HOSPITAL | Age: 63
End: 2018-10-02
Attending: INTERNAL MEDICINE
Payer: COMMERCIAL

## 2018-10-02 ENCOUNTER — OFFICE VISIT (OUTPATIENT)
Dept: INTERNAL MEDICINE CLINIC | Facility: CLINIC | Age: 63
End: 2018-10-02

## 2018-10-02 VITALS
BODY MASS INDEX: 38.77 KG/M2 | SYSTOLIC BLOOD PRESSURE: 140 MMHG | HEART RATE: 64 BPM | WEIGHT: 247 LBS | RESPIRATION RATE: 16 BRPM | HEIGHT: 67 IN | DIASTOLIC BLOOD PRESSURE: 90 MMHG

## 2018-10-02 DIAGNOSIS — I10 ESSENTIAL HYPERTENSION: Primary | ICD-10-CM

## 2018-10-02 DIAGNOSIS — N18.30 CKD (CHRONIC KIDNEY DISEASE) STAGE 3, GFR 30-59 ML/MIN (HCC): ICD-10-CM

## 2018-10-02 DIAGNOSIS — E55.9 VITAMIN D DEFICIENCY: ICD-10-CM

## 2018-10-02 DIAGNOSIS — E03.9 HYPOTHYROIDISM, UNSPECIFIED TYPE: ICD-10-CM

## 2018-10-02 DIAGNOSIS — Q65.89 HIP DYSPLASIA: ICD-10-CM

## 2018-10-02 PROCEDURE — 99214 OFFICE O/P EST MOD 30 MIN: CPT | Performed by: INTERNAL MEDICINE

## 2018-10-02 PROCEDURE — 82306 VITAMIN D 25 HYDROXY: CPT

## 2018-10-02 PROCEDURE — 36415 COLL VENOUS BLD VENIPUNCTURE: CPT

## 2018-10-02 PROCEDURE — 80048 BASIC METABOLIC PNL TOTAL CA: CPT

## 2018-10-02 PROCEDURE — 99212 OFFICE O/P EST SF 10 MIN: CPT | Performed by: INTERNAL MEDICINE

## 2018-10-02 RX ORDER — LEVOTHYROXINE SODIUM 0.12 MG/1
125 TABLET ORAL
Qty: 90 TABLET | Refills: 2 | Status: SHIPPED | OUTPATIENT
Start: 2018-10-02 | End: 2019-06-25

## 2018-10-02 RX ORDER — AMLODIPINE BESYLATE 2.5 MG/1
2.5 TABLET ORAL DAILY
Qty: 30 TABLET | Refills: 3 | Status: SHIPPED | OUTPATIENT
Start: 2018-10-02 | End: 2018-11-17

## 2018-10-02 NOTE — ASSESSMENT & PLAN NOTE
Thyroid function tests have been stable on levothyroxine at 125 mcg daily. Recheck labs with the next blood draw.

## 2018-10-02 NOTE — ASSESSMENT & PLAN NOTE
Wt Readings from Last 6 Encounters:  10/02/18 : 247 lb (112 kg)  09/19/18 : 245 lb (111.1 kg)  08/16/18 : 245 lb (111.1 kg)  05/15/18 : 243 lb (110.2 kg)  04/27/18 : 243 lb (110.2 kg)  12/29/17 : 235 lb (106.6 kg)  Weight has gradually been going up.   [facility]

## 2018-10-02 NOTE — PROGRESS NOTES
HPI:    Patient ID: Julián Gutierrez is a 66year old female.     Viewed by Julián Gutierrez on 8/13/2018 11:36 AM   Written by Janet Mathis MD on 8/13/2018  9:59 AM   Cholesterol panel shows borderline elevation in the LDL cholesterol-please watch the diet for Endocrine: Negative. Genitourinary: Negative. Musculoskeletal: Negative. Skin: Negative. Allergic/Immunologic: Negative. Neurological: Negative. Negative for weakness and numbness. Hematological: Negative.     Psychiatric/Behavioral: Ne present. No thyromegaly present. Cardiovascular: Normal rate, regular rhythm, normal heart sounds and intact distal pulses. No murmur heard. Pulmonary/Chest: Effort normal and breath sounds normal. She has no wheezes. She has no rales.  She exhibits no next blood draw. Relevant Medications    Levothyroxine Sodium 125 MCG Oral Tab    Vitamin D deficiency     Advised to take an over-the-counter vitamin D at 2000 units daily.   Recheck labs with the next blood draw         Relevant Orders    VITAMIN

## 2018-10-02 NOTE — PATIENT INSTRUCTIONS
Problem List Items Addressed This Visit        Unprioritized    CKD (chronic kidney disease) stage 3, GFR 30-59 ml/min ([facility])     Pressure rechecked was 142/90. Patient has been off all her blood pressure medications after surgery.   Given gradual worsening INTERNAL

## 2018-10-02 NOTE — ASSESSMENT & PLAN NOTE
Advised to take an over-the-counter vitamin D at 2000 units daily.   Recheck labs with the next blood draw

## 2018-10-04 ENCOUNTER — HOSPITAL ENCOUNTER (OUTPATIENT)
Dept: ULTRASOUND IMAGING | Age: 63
Discharge: HOME | End: 2018-10-04
Attending: INTERNAL MEDICINE
Payer: COMMERCIAL

## 2018-10-04 DIAGNOSIS — I10 ESSENTIAL HYPERTENSION: ICD-10-CM

## 2018-10-04 DIAGNOSIS — N18.30 CKD (CHRONIC KIDNEY DISEASE) STAGE 3, GFR 30-59 ML/MIN (HCC): ICD-10-CM

## 2018-10-04 PROCEDURE — 76775 US EXAM ABDO BACK WALL LIM: CPT | Performed by: INTERNAL MEDICINE

## 2018-11-10 ENCOUNTER — APPOINTMENT (OUTPATIENT)
Dept: LAB | Age: 63
End: 2018-11-10
Attending: INTERNAL MEDICINE
Payer: COMMERCIAL

## 2018-11-10 DIAGNOSIS — I10 ESSENTIAL HYPERTENSION: ICD-10-CM

## 2018-11-10 PROCEDURE — 80061 LIPID PANEL: CPT

## 2018-11-10 PROCEDURE — 36415 COLL VENOUS BLD VENIPUNCTURE: CPT

## 2018-11-10 PROCEDURE — 80053 COMPREHEN METABOLIC PANEL: CPT

## 2018-11-16 ENCOUNTER — OFFICE VISIT (OUTPATIENT)
Dept: INTERNAL MEDICINE CLINIC | Facility: CLINIC | Age: 63
End: 2018-11-16

## 2018-11-16 VITALS
RESPIRATION RATE: 16 BRPM | SYSTOLIC BLOOD PRESSURE: 124 MMHG | DIASTOLIC BLOOD PRESSURE: 83 MMHG | HEIGHT: 67 IN | HEART RATE: 71 BPM | BODY MASS INDEX: 38.77 KG/M2 | WEIGHT: 247 LBS

## 2018-11-16 DIAGNOSIS — I10 ESSENTIAL HYPERTENSION: ICD-10-CM

## 2018-11-16 DIAGNOSIS — N18.30 CKD (CHRONIC KIDNEY DISEASE) STAGE 3, GFR 30-59 ML/MIN (HCC): ICD-10-CM

## 2018-11-16 DIAGNOSIS — E55.9 VITAMIN D DEFICIENCY: Primary | ICD-10-CM

## 2018-11-16 PROCEDURE — 99214 OFFICE O/P EST MOD 30 MIN: CPT | Performed by: INTERNAL MEDICINE

## 2018-11-16 PROCEDURE — 99212 OFFICE O/P EST SF 10 MIN: CPT | Performed by: INTERNAL MEDICINE

## 2018-11-16 RX ORDER — ERGOCALCIFEROL 1.25 MG/1
50000 CAPSULE ORAL WEEKLY
Qty: 12 CAPSULE | Refills: 1 | Status: SHIPPED | OUTPATIENT
Start: 2018-11-16 | End: 2018-12-16

## 2018-11-16 NOTE — PATIENT INSTRUCTIONS
Problem List Items Addressed This Visit        Unprioritized    CKD (chronic kidney disease) stage 3, GFR 30-59 ml/min ([facility])     Blood pressure 124/83, pulse 71, resp. rate 16, height 5' 7\" (1.702 m), weight 247 lb (112 kg).      Blood pressures look mu

## 2018-11-16 NOTE — ASSESSMENT & PLAN NOTE
Blood pressure 124/83, pulse 71, resp. rate 16, height 5' 7\" (1.702 m), weight 247 lb (112 kg). Blood pressures look much improved on Norvasc at 2.5 mg daily. The renal functions seem stable–better than in the past–EGFR is at 59.   Creatinine seems sl

## 2018-11-16 NOTE — PROGRESS NOTES
HPI:    Patient ID: Martín Griffiths is a 66year old female. Labs and us kidneys discussed      Hypertension   This is a chronic problem. The current episode started more than 1 year ago.  The problem has been resolved (Has been off all blood pressure medi mg by mouth daily. take 2 tablets by oral route once in a.m. Disp:  Rfl:    Fexofenadine HCl (ALLEGRA) 60 MG Oral Tab Take 60 mg by mouth daily.  take 1 tablet (60MG)  by ORAL route 2 times every day  Disp:  Rfl:    AmLODIPine Besylate 2.5 MG Oral Tab Take mood and affect. Her behavior is normal. Thought content normal.   Nursing note and vitals reviewed.              ASSESSMENT/PLAN:     Problem List Items Addressed This Visit        Unprioritized    Essential hypertension     Wt Readings from Last 6 Encount AGREES TO FOLLOW DIRECTIONS AND ADVICE    Orders Placed This Encounter      Comp Metabolic Panel (14) [E]      Microalb/Creat Ratio, Random Urine [E]      Urinalysis, Routine [E]      Meds This Visit:  Requested Prescriptions     Signed Prescriptions Disp

## 2018-11-16 NOTE — ASSESSMENT & PLAN NOTE
Started on vitamin D 50,000 units once a week for the next 6 months. Will recheck labs at the end of supplementation.

## 2018-11-16 NOTE — ASSESSMENT & PLAN NOTE
Wt Readings from Last 6 Encounters:  11/16/18 : 247 lb (112 kg)  10/02/18 : 247 lb (112 kg)  09/19/18 : 245 lb (111.1 kg)  08/16/18 : 245 lb (111.1 kg)  05/15/18 : 243 lb (110.2 kg)  04/27/18 : 243 lb (110.2 kg)    Blood pressure 124/83, pulse 71, resp.  ra

## 2018-11-17 ENCOUNTER — PATIENT MESSAGE (OUTPATIENT)
Dept: INTERNAL MEDICINE CLINIC | Facility: CLINIC | Age: 63
End: 2018-11-17

## 2018-11-17 RX ORDER — AMLODIPINE BESYLATE 2.5 MG/1
2.5 TABLET ORAL DAILY
Qty: 90 TABLET | Refills: 0 | Status: SHIPPED | OUTPATIENT
Start: 2018-11-17 | End: 2019-02-27

## 2018-11-18 NOTE — TELEPHONE ENCOUNTER
From: Kali Lebron  To: Marcus Carney MD  Sent: 11/17/2018 2:23 PM CST  Subject: Prescription Question    Can I get a 90 day prescription for amlodipine besylate instead of a 30 day prescription?    Thanks, [facility]

## 2019-02-23 ENCOUNTER — APPOINTMENT (OUTPATIENT)
Dept: LAB | Facility: HOSPITAL | Age: 64
End: 2019-02-23
Attending: INTERNAL MEDICINE
Payer: COMMERCIAL

## 2019-02-23 DIAGNOSIS — I10 ESSENTIAL HYPERTENSION: ICD-10-CM

## 2019-02-23 DIAGNOSIS — N18.30 CKD (CHRONIC KIDNEY DISEASE) STAGE 3, GFR 30-59 ML/MIN (HCC): ICD-10-CM

## 2019-02-23 LAB
ALBUMIN SERPL-MCNC: 4 G/DL (ref 3.4–5)
ALBUMIN/GLOB SERPL: 1.2 {RATIO} (ref 1–2)
ALP LIVER SERPL-CCNC: 83 U/L (ref 50–130)
ALT SERPL-CCNC: 38 U/L (ref 13–56)
ANION GAP SERPL CALC-SCNC: 8 MMOL/L (ref 0–18)
AST SERPL-CCNC: 24 U/L (ref 15–37)
BILIRUB SERPL-MCNC: 0.3 MG/DL (ref 0.1–2)
BILIRUB UR QL: NEGATIVE
BUN BLD-MCNC: 19 MG/DL (ref 7–18)
BUN/CREAT SERPL: 20.7 (ref 10–20)
CALCIUM BLD-MCNC: 8.9 MG/DL (ref 8.5–10.1)
CHLORIDE SERPL-SCNC: 100 MMOL/L (ref 98–107)
CLARITY UR: CLEAR
CO2 SERPL-SCNC: 24 MMOL/L (ref 21–32)
COLOR UR: COLORLESS
CREAT BLD-MCNC: 0.92 MG/DL (ref 0.55–1.02)
CREAT UR-SCNC: <13 MG/DL
GLOBULIN PLAS-MCNC: 3.3 G/DL (ref 2.8–4.4)
GLUCOSE BLD-MCNC: 81 MG/DL (ref 70–99)
GLUCOSE UR-MCNC: NEGATIVE MG/DL
HGB UR QL STRIP.AUTO: NEGATIVE
KETONES UR-MCNC: NEGATIVE MG/DL
LEUKOCYTE ESTERASE UR QL STRIP.AUTO: NEGATIVE
M PROTEIN MFR SERPL ELPH: 7.3 G/DL (ref 6.4–8.2)
MICROALBUMIN UR-MCNC: <0.5 MG/DL
NITRITE UR QL STRIP.AUTO: NEGATIVE
OSMOLALITY SERPL CALC.SUM OF ELEC: 275 MOSM/KG (ref 275–295)
PH UR: 7 [PH] (ref 5–8)
POTASSIUM SERPL-SCNC: 4.1 MMOL/L (ref 3.5–5.1)
PROT UR-MCNC: NEGATIVE MG/DL
SODIUM SERPL-SCNC: 132 MMOL/L (ref 136–145)
SP GR UR STRIP: 1 (ref 1–1.03)
UROBILINOGEN UR STRIP-ACNC: <2
VIT C UR-MCNC: NEGATIVE MG/DL

## 2019-02-23 PROCEDURE — 82570 ASSAY OF URINE CREATININE: CPT

## 2019-02-23 PROCEDURE — 36415 COLL VENOUS BLD VENIPUNCTURE: CPT

## 2019-02-23 PROCEDURE — 82043 UR ALBUMIN QUANTITATIVE: CPT

## 2019-02-23 PROCEDURE — 80053 COMPREHEN METABOLIC PANEL: CPT

## 2019-02-23 PROCEDURE — 81001 URINALYSIS AUTO W/SCOPE: CPT

## 2019-02-27 ENCOUNTER — OFFICE VISIT (OUTPATIENT)
Dept: INTERNAL MEDICINE CLINIC | Facility: CLINIC | Age: 64
End: 2019-02-27

## 2019-02-27 VITALS
BODY MASS INDEX: 41.61 KG/M2 | DIASTOLIC BLOOD PRESSURE: 79 MMHG | HEART RATE: 80 BPM | RESPIRATION RATE: 18 BRPM | TEMPERATURE: 98 F | SYSTOLIC BLOOD PRESSURE: 118 MMHG | WEIGHT: 265.13 LBS | HEIGHT: 67 IN

## 2019-02-27 DIAGNOSIS — N18.30 CKD (CHRONIC KIDNEY DISEASE) STAGE 3, GFR 30-59 ML/MIN (HCC): ICD-10-CM

## 2019-02-27 DIAGNOSIS — Z12.31 VISIT FOR SCREENING MAMMOGRAM: ICD-10-CM

## 2019-02-27 DIAGNOSIS — R25.1 TREMOR: ICD-10-CM

## 2019-02-27 DIAGNOSIS — I10 ESSENTIAL HYPERTENSION: Primary | ICD-10-CM

## 2019-02-27 PROCEDURE — 99214 OFFICE O/P EST MOD 30 MIN: CPT | Performed by: INTERNAL MEDICINE

## 2019-02-27 PROCEDURE — 99212 OFFICE O/P EST SF 10 MIN: CPT | Performed by: INTERNAL MEDICINE

## 2019-02-27 RX ORDER — AMLODIPINE BESYLATE 2.5 MG/1
2.5 TABLET ORAL DAILY
Qty: 90 TABLET | Refills: 1 | Status: SHIPPED | OUTPATIENT
Start: 2019-02-27 | End: 2019-06-24

## 2019-02-27 NOTE — PATIENT INSTRUCTIONS
Problem List Items Addressed This Visit        Unprioritized    CKD (chronic kidney disease) stage 3, GFR 30-59 ml/min ([facility])     Blood pressures look stable. Patient is currently on Norvasc which she has tolerated well.   Renal functions much improved–EGFR

## 2019-02-27 NOTE — ASSESSMENT & PLAN NOTE
Blood pressures look stable. Patient is currently on Norvasc which she has tolerated well. Renal functions much improved–EGFR normal and creatinine normal.  Ultrasound of the kidneys without any significant abnormalities.   Repeat labs prior to the next o

## 2019-02-27 NOTE — ASSESSMENT & PLAN NOTE
Blood pressure 118/79, pulse 80, temperature 98.2 °F (36.8 °C), temperature source Oral, resp. rate 18, height 5' 7\" (1.702 m), weight 265 lb 1.6 oz (120.2 kg), not currently breastfeeding.      Wt Readings from Last 6 Encounters:  02/27/19 : 265 lb 1.6 oz

## 2019-02-27 NOTE — PROGRESS NOTES
HPI:    Patient ID: Debbie Stanley is a 66year old female. Labs looked normal.      Hypertension   This is a chronic problem. The current episode started more than 1 year ago.  The problem has been resolved (Has been off all blood pressure medications at Allergies:   Iodides (Topical)       HIVES    Comment:IVP DYE  Sulfa Antibiotics       HIVES  Bactrim [Sulfametho*    RASH  Benadryl [Diphenhyd*    NAUSEA AND VOMITING  Radiology Contrast *        Comment:Other reaction(s): Hives/Skin Rash  Trintellix [ pulse 80, temperature 98.2 °F (36.8 °C), temperature source Oral, resp. rate 18, height 5' 7\" (1.702 m), weight 265 lb 1.6 oz (120.2 kg), not currently breastfeeding.      Wt Readings from Last 6 Encounters:  02/27/19 : 265 lb 1.6 oz (120.2 kg)  11/16/18 : - INTERNAL       [id number]

## 2019-03-02 ENCOUNTER — HOSPITAL ENCOUNTER (OUTPATIENT)
Dept: MAMMOGRAPHY | Age: 64
Discharge: HOME | End: 2019-03-02
Attending: INTERNAL MEDICINE
Payer: COMMERCIAL

## 2019-03-02 DIAGNOSIS — Z12.31 VISIT FOR SCREENING MAMMOGRAM: ICD-10-CM

## 2019-03-02 PROCEDURE — 77067 SCR MAMMO BI INCL CAD: CPT | Performed by: INTERNAL MEDICINE

## 2019-03-02 PROCEDURE — 77063 BREAST TOMOSYNTHESIS BI: CPT | Performed by: INTERNAL MEDICINE

## 2019-03-26 ENCOUNTER — OFFICE VISIT (OUTPATIENT)
Dept: NEUROLOGY | Facility: CLINIC | Age: 64
End: 2019-03-26

## 2019-03-26 VITALS — HEART RATE: 78 BPM | DIASTOLIC BLOOD PRESSURE: 76 MMHG | RESPIRATION RATE: 17 BRPM | SYSTOLIC BLOOD PRESSURE: 124 MMHG

## 2019-03-26 DIAGNOSIS — R25.1 TREMOR: Primary | ICD-10-CM

## 2019-03-26 PROCEDURE — 99213 OFFICE O/P EST LOW 20 MIN: CPT | Performed by: OTHER

## 2019-03-26 RX ORDER — PRIMIDONE 50 MG/1
TABLET ORAL
Qty: 270 TABLET | Refills: 1 | Status: SHIPPED | OUTPATIENT
Start: 2019-03-26 | End: 2019-10-01

## 2019-03-26 NOTE — PROGRESS NOTES
Neurology follow-up visit     Referred By: Dr. Carlitos Zamora    Chief Complaint: Patient presents with:  Seizure Disorder (neurologic): KFA61/78/56 Pt states that she still has the tremorsthey're a little less since the last visit.         HPI:     [place] of thyroid     hypothyroidism   • Diverticulosis 12/15/2017   • High blood pressure    • History of blood transfusion    • Internal hemorrhoids 12/15/2017   • Osteoarthritis    • Renal disorder    • Unspecified sleep apnea        Past Surgical History:   P Rfl:   •  Fexofenadine HCl (ALLEGRA) 60 MG Oral Tab, Take 60 mg by mouth daily.  take 1 tablet (60MG)  by ORAL route 2 times every day , Disp: , Rfl:       Iodides (Topical)       HIVES    Comment:IVP DYE  Sulfa Antibiotics       HIVES  Bactrim [Sulfametho* 6.2 04/14/2018     04/14/2018      Lab Results   Component Value Date    BUN 19 (H) 02/23/2019    CA 8.9 02/23/2019    ALT 38 02/23/2019    AST 24 02/23/2019    ALKPHOS 83 09/07/2016    ALB 4.0 02/23/2019     (L) 02/23/2019    K 4.1 02/23/2019

## 2019-06-03 ENCOUNTER — TELEPHONE (OUTPATIENT)
Dept: INTERNAL MEDICINE CLINIC | Facility: CLINIC | Age: 64
End: 2019-06-03

## 2019-06-03 NOTE — TELEPHONE ENCOUNTER
Patient requesting referral for Dr. Susana Mistry. Patient is scheduled with Dr. Antone Olszewski on 06/24/2019 and again in October.

## 2019-06-23 ENCOUNTER — LAB ENCOUNTER (OUTPATIENT)
Dept: LAB | Facility: HOSPITAL | Age: 64
End: 2019-06-23
Attending: INTERNAL MEDICINE
Payer: COMMERCIAL

## 2019-06-23 DIAGNOSIS — I10 ESSENTIAL HYPERTENSION: ICD-10-CM

## 2019-06-23 PROCEDURE — 85025 COMPLETE CBC W/AUTO DIFF WBC: CPT

## 2019-06-23 PROCEDURE — 36415 COLL VENOUS BLD VENIPUNCTURE: CPT

## 2019-06-23 PROCEDURE — 81003 URINALYSIS AUTO W/O SCOPE: CPT

## 2019-06-23 PROCEDURE — 80053 COMPREHEN METABOLIC PANEL: CPT

## 2019-06-23 PROCEDURE — 80061 LIPID PANEL: CPT

## 2019-06-23 PROCEDURE — 84443 ASSAY THYROID STIM HORMONE: CPT

## 2019-06-24 DIAGNOSIS — I10 ESSENTIAL HYPERTENSION: ICD-10-CM

## 2019-06-24 RX ORDER — AMLODIPINE BESYLATE 2.5 MG/1
TABLET ORAL
Qty: 90 TABLET | Refills: 1 | Status: SHIPPED | OUTPATIENT
Start: 2019-06-24 | End: 2019-10-18

## 2019-06-25 ENCOUNTER — OFFICE VISIT (OUTPATIENT)
Dept: INTERNAL MEDICINE CLINIC | Facility: CLINIC | Age: 64
End: 2019-06-25

## 2019-06-25 VITALS
BODY MASS INDEX: 42.38 KG/M2 | DIASTOLIC BLOOD PRESSURE: 88 MMHG | RESPIRATION RATE: 18 BRPM | HEART RATE: 79 BPM | HEIGHT: 67 IN | SYSTOLIC BLOOD PRESSURE: 145 MMHG | WEIGHT: 270 LBS

## 2019-06-25 DIAGNOSIS — E55.9 VITAMIN D DEFICIENCY: ICD-10-CM

## 2019-06-25 DIAGNOSIS — G89.29 CHRONIC BILATERAL LOW BACK PAIN WITHOUT SCIATICA: ICD-10-CM

## 2019-06-25 DIAGNOSIS — M16.11 UNILATERAL PRIMARY OSTEOARTHRITIS, RIGHT HIP: Chronic | ICD-10-CM

## 2019-06-25 DIAGNOSIS — I10 ESSENTIAL HYPERTENSION: ICD-10-CM

## 2019-06-25 DIAGNOSIS — M54.50 CHRONIC BILATERAL LOW BACK PAIN WITHOUT SCIATICA: ICD-10-CM

## 2019-06-25 DIAGNOSIS — E03.9 HYPOTHYROIDISM, UNSPECIFIED TYPE: ICD-10-CM

## 2019-06-25 DIAGNOSIS — Z00.00 ROUTINE GENERAL MEDICAL EXAMINATION AT A HEALTH CARE FACILITY: Primary | ICD-10-CM

## 2019-06-25 PROCEDURE — 99396 PREV VISIT EST AGE 40-64: CPT | Performed by: INTERNAL MEDICINE

## 2019-06-25 RX ORDER — BUPROPION HYDROCHLORIDE 300 MG/1
600 TABLET ORAL EVERY MORNING
COMMUNITY

## 2019-06-25 RX ORDER — LEVOTHYROXINE SODIUM 0.12 MG/1
125 TABLET ORAL
Qty: 90 TABLET | Refills: 2 | Status: SHIPPED | OUTPATIENT
Start: 2019-06-25 | End: 2020-02-06

## 2019-06-25 NOTE — ASSESSMENT & PLAN NOTE
Thyroid function test look stable on levothyroxine at 125 mcg daily.   Follow-up labs in about 6 months

## 2019-06-25 NOTE — TELEPHONE ENCOUNTER
Refill passed per [facility] protocol.   Hypertensive Medications  Protocol Criteria:  · Appointment scheduled in the past 6 months or in the next 3 months  · BMP or CMP in the past 12 months  · Creatinine result < 2  Recent Outpatient Visits

## 2019-06-25 NOTE — ASSESSMENT & PLAN NOTE
Patient does have arthritis bilateral hips and is status post right total hip arthroplasty. She has been seen by Dr. Lian Estrada for a follow-up. X-rays at the office did show some lumbar spinal arthritis. She has been recommended physical therapy.   She has

## 2019-06-25 NOTE — PROGRESS NOTES
REASON FOR VISIT:    Debbie Stanley is a 60year old female who presents for an [address]         Patient Active Problem List:     Depressive disorder     Essential hypertension     Hypothyroidism     Insomnia with sleep apnea     Morbid obes other  risk factors No results found for: A1C  Glucose (mg/dL)   Date Value   06/23/2019 95         Gonorrhea Screening if high risk No results found for: GONOCOCCUS    HIV Screening For all adults age 22-65, older adults at increased risk No results found Tab Take by mouth daily. Disp:  Rfl:    buPROPion HCl ER, XL, 300 MG Oral Tablet 24 Hr Take 300 mg by mouth daily. Take 2 pills to equal 600mg.  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 No palpable adnexal abnormalities noted. No cystocele or rectocele noted.       RECTAL: good rectal tone, stool is OB negative  MUSCULOSKELETAL: back is not tender, FROM of the back  EXTREMITIES: no cyanosis, clubbing or edema  NEURO: Oriented t ordered. Skin check normal.  No significant abnormal nevi. Breast exam completed–no palpable abnormalities, discharge from the nipples or axillary adenopathy.   Patient has status post bilateral breast reduction surgeries and hence has some scar tissue to plan.  Return in about 2 months (around 8/25/2019), or if symptoms worsen or fail to improve.   .  Diet counseling perfomed  Exercise counseling perfomed    SUGGESTED VACCINATIONS - Influenza, Pneumococcal, Zoster, Tetanus     Immunization History   Adminis

## 2019-06-25 NOTE — ASSESSMENT & PLAN NOTE
Blood pressure 145/88, pulse 79, resp. rate 18, height 5' 7\" (1.702 m), weight 270 lb (122.5 kg), not currently breastfeeding. Blood pressure borderline elevated today. Patient has been on Norvasc 2.5 mg daily. Renal functions have been stable.   Advise

## 2019-06-25 NOTE — PATIENT INSTRUCTIONS
Problem List Items Addressed This Visit        Unprioritized    Chronic bilateral low back pain     Patient does have arthritis bilateral hips and is status post right total hip arthroplasty. She has been seen by Dr. Lawrence Lagunas for a follow-up.   X-rays at the No cystocele, rectocele noted. Patient is status post supracervical hysterectomy. Rectal exam completed–brown stools, guaiac negative.     Colonoscopy due on 12/15/2027      Immunization History   Administered Date(s) Administered   • FLULAVAL 6 months &

## 2019-07-17 ENCOUNTER — TELEPHONE (OUTPATIENT)
Dept: INTERNAL MEDICINE CLINIC | Facility: CLINIC | Age: 64
End: 2019-07-17

## 2019-07-17 NOTE — TELEPHONE ENCOUNTER
[facility] called in stating that Pt would like to go to their facility. Omid Lira is asking if the current PT referral can be switched to Adelia Simmons. Please advise.

## 2019-07-22 ENCOUNTER — PATIENT MESSAGE (OUTPATIENT)
Dept: INTERNAL MEDICINE CLINIC | Facility: CLINIC | Age: 64
End: 2019-07-22

## 2019-07-22 ENCOUNTER — TELEPHONE (OUTPATIENT)
Dept: OTHER | Age: 64
End: 2019-07-22

## 2019-07-22 DIAGNOSIS — G47.33 OSA ON CPAP: Primary | ICD-10-CM

## 2019-07-22 DIAGNOSIS — Z99.89 OSA ON CPAP: Primary | ICD-10-CM

## 2019-07-22 NOTE — TELEPHONE ENCOUNTER
Dr Higginbotham=see message below and advise.    ----- Message from Suzanne.  Chayito Cardozo sent at 7/22/2019  7:10 AM CDT -----  Regarding: Non-Urgent Medical Question  Contact: [phone]  I am using my C-Pap machine and I need a new mask and would like to get fitted

## 2019-07-22 NOTE — TELEPHONE ENCOUNTER
See TE DME 7/22/19.       From: Jose Mishra  To: Radha Reynolds MD  Sent: 7/22/2019  7:10 AM CDT  Subject: Non-Urgent Medical Question    I am using my C-Pap machine and I need a new mask and would like to get fitted for a different mask than I am current

## 2019-07-25 NOTE — TELEPHONE ENCOUNTER
Referral for fitting for new face mask faxed to [facility], w/confirmation. CHAO - [facility] needs copy of progress note from the last 6 months to mention EDWAR on CPAP. Would you be willing to addend your last progress from 06/25 to include EDWAR?

## 2019-07-30 ENCOUNTER — OFFICE VISIT (OUTPATIENT)
Dept: PHYSICAL THERAPY | Age: 64
End: 2019-07-30
Attending: INTERNAL MEDICINE
Payer: COMMERCIAL

## 2019-07-30 DIAGNOSIS — M54.50 CHRONIC BILATERAL LOW BACK PAIN WITHOUT SCIATICA: ICD-10-CM

## 2019-07-30 DIAGNOSIS — G89.29 CHRONIC BILATERAL LOW BACK PAIN WITHOUT SCIATICA: ICD-10-CM

## 2019-07-30 PROCEDURE — 97110 THERAPEUTIC EXERCISES: CPT | Performed by: PHYSICAL THERAPIST

## 2019-07-30 PROCEDURE — 97161 PT EVAL LOW COMPLEX 20 MIN: CPT | Performed by: PHYSICAL THERAPIST

## 2019-07-30 NOTE — PROGRESS NOTES
BACK EVALUATION:    Referring Physician: Paris Shoulder    DX Code: M54.5, G89.29 (ICD-10-CM) - 724.2, 338.29 (ICD-9-CM) - Chronic bilateral low back pain without sciatica  Procedures:     PT DX: M54.5, G89.29 (ICD-10-CM) - 724.2, 338.29 (ICD-9-CM) - Chronic resume normal activities.   Postural Education  (Please close note by pressing F9 and then reopen by clicking on 'Edit'  before going further)   ASSESSMENT & PLAN OF CARE:     Sandro Curtis is a 60year old female referred to physical therapy with diagnosis Pt. was advised regarding the findings of this evaluation and agrees to the plan of care.    Therapeutic Exercise (15 min ea)  62715: 12 minutes  Physical Therapy Eval.  Low complexity      Current Medications:   Current Outpatient Medic

## 2019-07-31 ENCOUNTER — PATIENT MESSAGE (OUTPATIENT)
Dept: INTERNAL MEDICINE CLINIC | Facility: CLINIC | Age: 64
End: 2019-07-31

## 2019-08-02 NOTE — TELEPHONE ENCOUNTER
Upon chart review, authorization is still pending and notification sent to provider re clinical notes. Runner message sent to patient to inform her.

## 2019-08-02 NOTE — TELEPHONE ENCOUNTER
From: Yaquelin Rousseau  To: Alina Ho MD  Sent: 7/31/2019 7:40 AM CDT  Subject: Non-Urgent Medical Question    I haven't heard anything about getting a new C Pap mask since 7/22. Is it being worked on?  Thanks, [employer]

## 2019-08-06 ENCOUNTER — OFFICE VISIT (OUTPATIENT)
Dept: PHYSICAL THERAPY | Age: 64
End: 2019-08-06
Attending: INTERNAL MEDICINE
Payer: COMMERCIAL

## 2019-08-06 PROCEDURE — 97110 THERAPEUTIC EXERCISES: CPT | Performed by: PHYSICAL THERAPIST

## 2019-08-06 PROCEDURE — 97140 MANUAL THERAPY 1/> REGIONS: CPT | Performed by: PHYSICAL THERAPIST

## 2019-08-06 NOTE — PROGRESS NOTES
Dx:  M54.5, G89.29 (ICD-10-CM) - 724.2, 338.29 (ICD-9-CM) - Chronic bilateral low back pain without sciatica         Authorized # of Visits:  8         Next MD visit: none scheduled  Fall Risk: standard         Precautions: n/a           Medication Changes

## 2019-08-08 ENCOUNTER — OFFICE VISIT (OUTPATIENT)
Dept: PHYSICAL THERAPY | Age: 64
End: 2019-08-08
Attending: INTERNAL MEDICINE
Payer: COMMERCIAL

## 2019-08-08 PROCEDURE — 97140 MANUAL THERAPY 1/> REGIONS: CPT | Performed by: PHYSICAL THERAPIST

## 2019-08-08 PROCEDURE — 97110 THERAPEUTIC EXERCISES: CPT | Performed by: PHYSICAL THERAPIST

## 2019-08-08 NOTE — PROGRESS NOTES
Dx:  M54.5, G89.29 (ICD-10-CM) - 724.2, 338.29 (ICD-9-CM) - Chronic bilateral low back pain without sciatica         Authorized # of Visits:  8         Next MD visit: none scheduled  Fall Risk: standard         Precautions: n/a           Medication Changes Treatment: 44 min  Total Treatment Time: 46 min

## 2019-08-13 ENCOUNTER — TELEPHONE (OUTPATIENT)
Dept: INTERNAL MEDICINE CLINIC | Facility: CLINIC | Age: 64
End: 2019-08-13

## 2019-08-13 ENCOUNTER — OFFICE VISIT (OUTPATIENT)
Dept: PHYSICAL THERAPY | Age: 64
End: 2019-08-13
Attending: INTERNAL MEDICINE
Payer: COMMERCIAL

## 2019-08-13 DIAGNOSIS — G47.33 OSA ON CPAP: Primary | ICD-10-CM

## 2019-08-13 DIAGNOSIS — Z99.89 OSA ON CPAP: Primary | ICD-10-CM

## 2019-08-13 PROCEDURE — 97110 THERAPEUTIC EXERCISES: CPT | Performed by: PHYSICAL THERAPIST

## 2019-08-13 PROCEDURE — 97140 MANUAL THERAPY 1/> REGIONS: CPT | Performed by: PHYSICAL THERAPIST

## 2019-08-13 NOTE — TELEPHONE ENCOUNTER
Patient called back and asked her what vendor she used for cpap machine, mask?     Stated she will check at home on the machine and call me back    Thanks,    [address] [facility]

## 2019-08-13 NOTE — PROGRESS NOTES
Dx:  M54.5, G89.29 (ICD-10-CM) - 724.2, 338.29 (ICD-9-CM) - Chronic bilateral low back pain without sciatica         Authorized # of Visits:  8         Next MD visit: none scheduled  Fall Risk: standard         Precautions: n/a           Medication Changes PKB.      Goals:   to be reached in 8 visits. · Pt. will report decreased pain from 8/10 to 2/10 at worst.  · LEISURE:  Pt will be able to return to previous level of function for leisure activities  · Pt. will be able to perform ADLs with no pain.   · Pt

## 2019-08-13 NOTE — TELEPHONE ENCOUNTER
Contacted patient left message ( regarding dme referral for new cpap mask fitting.)    When patient calls back, need [facility] to fax referral request for CPT code    We do not have cpt for fitting. Unless patient has or I can ask- provider.  I contacted [facility] spok

## 2019-08-14 NOTE — TELEPHONE ENCOUNTER
Patient called stating [facility] informed her cpt codes  [phone]     Pending referral request from [facility]

## 2019-08-14 NOTE — TELEPHONE ENCOUNTER
Called patient and informed her number to [facility] not working., asked her to check with E for order /cpt code for cpap mask fitting so I can proceed with referral, and she will call me back.     [address] [facility]

## 2019-08-15 ENCOUNTER — OFFICE VISIT (OUTPATIENT)
Dept: PHYSICAL THERAPY | Age: 64
End: 2019-08-15
Attending: INTERNAL MEDICINE
Payer: COMMERCIAL

## 2019-08-15 PROCEDURE — 97110 THERAPEUTIC EXERCISES: CPT | Performed by: PHYSICAL THERAPIST

## 2019-08-15 PROCEDURE — 97140 MANUAL THERAPY 1/> REGIONS: CPT | Performed by: PHYSICAL THERAPIST

## 2019-08-15 NOTE — PROGRESS NOTES
Dx:  M54.5, G89.29 (ICD-10-CM) - 724.2, 338.29 (ICD-9-CM) - Chronic bilateral low back pain without sciatica         Authorized # of Visits:  8         Next MD visit: none scheduled  Fall Risk: standard         Precautions: n/a           Medication Changes Man knee flex mob, hip ER/  Ir mobs gr 3 x 8 min Man knee flex mob, hip ER/  Ir mobs gr 3 x 8 min Man knee flex mob, hip ER/  Ir mobs gr 3 x 8 min        Man lumbar rot mobs in ext gr 3 x 2 min, nil Man lumbar rot mobs in ext gr 3 x 4 min, nil

## 2019-08-16 NOTE — TELEPHONE ENCOUNTER
Called patient, she left message on my voicemail stating she spoke with Cristobal Martinez from [address] [place] [address] regarding referral for cpap mask.        Contacted E regarding referral request. Spoke with George Craft and transferred my to sleep compliance line, left message to return

## 2019-08-17 ENCOUNTER — LAB ENCOUNTER (OUTPATIENT)
Dept: LAB | Age: 64
End: 2019-08-17
Attending: INTERNAL MEDICINE
Payer: COMMERCIAL

## 2019-08-17 DIAGNOSIS — I10 ESSENTIAL HYPERTENSION: ICD-10-CM

## 2019-08-17 LAB
ALBUMIN SERPL-MCNC: 3.7 G/DL (ref 3.4–5)
ALBUMIN/GLOB SERPL: 1.1 {RATIO} (ref 1–2)
ALP LIVER SERPL-CCNC: 86 U/L (ref 50–130)
ALT SERPL-CCNC: 33 U/L (ref 13–56)
ANION GAP SERPL CALC-SCNC: 7 MMOL/L (ref 0–18)
AST SERPL-CCNC: 23 U/L (ref 15–37)
BASOPHILS # BLD AUTO: 0.06 X10(3) UL (ref 0–0.2)
BASOPHILS NFR BLD AUTO: 0.9 %
BILIRUB SERPL-MCNC: 0.5 MG/DL (ref 0.1–2)
BILIRUB UR QL STRIP.AUTO: NEGATIVE
BUN BLD-MCNC: 15 MG/DL (ref 7–18)
BUN/CREAT SERPL: 13.4 (ref 10–20)
CALCIUM BLD-MCNC: 8.3 MG/DL (ref 8.5–10.1)
CHLORIDE SERPL-SCNC: 103 MMOL/L (ref 98–112)
CHOLEST SMN-MCNC: 198 MG/DL (ref ?–200)
CLARITY UR REFRACT.AUTO: CLEAR
CO2 SERPL-SCNC: 27 MMOL/L (ref 21–32)
COLOR UR AUTO: YELLOW
CREAT BLD-MCNC: 1.12 MG/DL (ref 0.55–1.02)
CREAT UR-SCNC: 130 MG/DL
DEPRECATED RDW RBC AUTO: 45.5 FL (ref 35.1–46.3)
EOSINOPHIL # BLD AUTO: 0.2 X10(3) UL (ref 0–0.7)
EOSINOPHIL NFR BLD AUTO: 3 %
ERYTHROCYTE [DISTWIDTH] IN BLOOD BY AUTOMATED COUNT: 13.3 % (ref 11–15)
GLOBULIN PLAS-MCNC: 3.4 G/DL (ref 2.8–4.4)
GLUCOSE BLD-MCNC: 91 MG/DL (ref 70–99)
GLUCOSE UR STRIP.AUTO-MCNC: NEGATIVE MG/DL
HCT VFR BLD AUTO: 39.5 % (ref 35–48)
HDLC SERPL-MCNC: 93 MG/DL (ref 40–59)
HGB BLD-MCNC: 13.2 G/DL (ref 12–16)
IMM GRANULOCYTES # BLD AUTO: 0.02 X10(3) UL (ref 0–1)
IMM GRANULOCYTES NFR BLD: 0.3 %
KETONES UR STRIP.AUTO-MCNC: NEGATIVE MG/DL
LDLC SERPL CALC-MCNC: 94 MG/DL (ref ?–100)
LYMPHOCYTES # BLD AUTO: 2.26 X10(3) UL (ref 1–4)
LYMPHOCYTES NFR BLD AUTO: 33.9 %
M PROTEIN MFR SERPL ELPH: 7.1 G/DL (ref 6.4–8.2)
MCH RBC QN AUTO: 31.4 PG (ref 26–34)
MCHC RBC AUTO-ENTMCNC: 33.4 G/DL (ref 31–37)
MCV RBC AUTO: 93.8 FL (ref 80–100)
MICROALBUMIN UR-MCNC: 1.71 MG/DL
MICROALBUMIN/CREAT 24H UR-RTO: 13.2 UG/MG (ref ?–30)
MONOCYTES # BLD AUTO: 0.64 X10(3) UL (ref 0.1–1)
MONOCYTES NFR BLD AUTO: 9.6 %
NEUTROPHILS # BLD AUTO: 3.49 X10 (3) UL (ref 1.5–7.7)
NEUTROPHILS # BLD AUTO: 3.49 X10(3) UL (ref 1.5–7.7)
NEUTROPHILS NFR BLD AUTO: 52.3 %
NITRITE UR QL STRIP.AUTO: NEGATIVE
NONHDLC SERPL-MCNC: 105 MG/DL (ref ?–130)
OSMOLALITY SERPL CALC.SUM OF ELEC: 284 MOSM/KG (ref 275–295)
PH UR STRIP.AUTO: 7 [PH] (ref 4.5–8)
PLATELET # BLD AUTO: 335 10(3)UL (ref 150–450)
POTASSIUM SERPL-SCNC: 4.1 MMOL/L (ref 3.5–5.1)
PROT UR STRIP.AUTO-MCNC: NEGATIVE MG/DL
RBC # BLD AUTO: 4.21 X10(6)UL (ref 3.8–5.3)
RBC UR QL AUTO: NEGATIVE
SODIUM SERPL-SCNC: 137 MMOL/L (ref 136–145)
SP GR UR STRIP.AUTO: 1.01 (ref 1–1.03)
TRIGL SERPL-MCNC: 54 MG/DL (ref 30–149)
TSI SER-ACNC: 1.9 MIU/ML (ref 0.36–3.74)
UROBILINOGEN UR STRIP.AUTO-MCNC: <2 MG/DL
VLDLC SERPL CALC-MCNC: 11 MG/DL (ref 0–30)
WBC # BLD AUTO: 6.7 X10(3) UL (ref 4–11)

## 2019-08-17 PROCEDURE — 84443 ASSAY THYROID STIM HORMONE: CPT

## 2019-08-17 PROCEDURE — 82043 UR ALBUMIN QUANTITATIVE: CPT

## 2019-08-17 PROCEDURE — 82570 ASSAY OF URINE CREATININE: CPT

## 2019-08-17 PROCEDURE — 80053 COMPREHEN METABOLIC PANEL: CPT

## 2019-08-17 PROCEDURE — 36415 COLL VENOUS BLD VENIPUNCTURE: CPT

## 2019-08-17 PROCEDURE — 85025 COMPLETE CBC W/AUTO DIFF WBC: CPT

## 2019-08-17 PROCEDURE — 81001 URINALYSIS AUTO W/SCOPE: CPT

## 2019-08-17 PROCEDURE — 80061 LIPID PANEL: CPT

## 2019-08-19 ENCOUNTER — TELEPHONE (OUTPATIENT)
Dept: INTERNAL MEDICINE CLINIC | Facility: CLINIC | Age: 64
End: 2019-08-19

## 2019-08-19 NOTE — TELEPHONE ENCOUNTER
I called [facility] and was told that notes need to say that pt. Is starting to use CPAP again and any other information that can be provided. I left a message at the sleep center [facility] [facility] that we need sleep study  from 8-10 yrs. Ago.   Also this referral is sti

## 2019-08-19 NOTE — TELEPHONE ENCOUNTER
There are multiple messages on this patient that has gone through managed care–Lisandra riggins-  Staff please assist, please let me know what is needed if I need to do something here.

## 2019-08-19 NOTE — TELEPHONE ENCOUNTER
Deborra Jerrica calling and states she is a new pt need more documentation. Need Sleep study  Need referral  Need face to face notes.  Did receive order but need more documentation to service her      Please advise

## 2019-08-20 ENCOUNTER — OFFICE VISIT (OUTPATIENT)
Dept: PHYSICAL THERAPY | Age: 64
End: 2019-08-20
Attending: INTERNAL MEDICINE
Payer: COMMERCIAL

## 2019-08-20 PROCEDURE — 97110 THERAPEUTIC EXERCISES: CPT | Performed by: PHYSICAL THERAPIST

## 2019-08-20 PROCEDURE — 97140 MANUAL THERAPY 1/> REGIONS: CPT | Performed by: PHYSICAL THERAPIST

## 2019-08-20 NOTE — PROGRESS NOTES
Dx:  M54.5, G89.29 (ICD-10-CM) - 724.2, 338.29 (ICD-9-CM) - Chronic bilateral low back pain without sciatica         Authorized # of Visits:  8         Next MD visit: none scheduled  Fall Risk: standard         Precautions: n/a           Medication Changes 30 ea PKB x 30 ea PKB x 30 ea     Prone hip ext x 10 ea Prone hip ext x 10 ea Prone hip ext x 10 ea Prone hip ext x 10 ea Prone hip ext x 10 ea     REIL x 10:  REIL x 10, nil REIL x 10, nil REIL x 10, nil REIL x 10, nil     Man knee flex mob, hip ER/  Ir m

## 2019-08-21 NOTE — TELEPHONE ENCOUNTER
Called E spoke with Lazara Delarosa at [phone]     He is faxing referral request with cpt codes to [phone] and to Dr. Uriel Up office at [phone], [phone]    Left message with patient to return my call- carmen - managed care    Elinor Villarreal requested

## 2019-08-22 ENCOUNTER — OFFICE VISIT (OUTPATIENT)
Dept: INTERNAL MEDICINE CLINIC | Facility: CLINIC | Age: 64
End: 2019-08-22

## 2019-08-22 ENCOUNTER — APPOINTMENT (OUTPATIENT)
Dept: PHYSICAL THERAPY | Age: 64
End: 2019-08-22
Attending: INTERNAL MEDICINE
Payer: COMMERCIAL

## 2019-08-22 VITALS
WEIGHT: 269.19 LBS | SYSTOLIC BLOOD PRESSURE: 125 MMHG | BODY MASS INDEX: 42.25 KG/M2 | RESPIRATION RATE: 22 BRPM | DIASTOLIC BLOOD PRESSURE: 83 MMHG | HEIGHT: 67 IN | OXYGEN SATURATION: 98 % | HEART RATE: 77 BPM

## 2019-08-22 DIAGNOSIS — G89.29 CHRONIC BILATERAL LOW BACK PAIN WITHOUT SCIATICA: ICD-10-CM

## 2019-08-22 DIAGNOSIS — G47.33 OBSTRUCTIVE SLEEP APNEA SYNDROME: ICD-10-CM

## 2019-08-22 DIAGNOSIS — Z99.89 OSA ON CPAP: Primary | ICD-10-CM

## 2019-08-22 DIAGNOSIS — N18.30 CKD (CHRONIC KIDNEY DISEASE) STAGE 3, GFR 30-59 ML/MIN (HCC): ICD-10-CM

## 2019-08-22 DIAGNOSIS — I10 ESSENTIAL HYPERTENSION: ICD-10-CM

## 2019-08-22 DIAGNOSIS — M54.50 CHRONIC BILATERAL LOW BACK PAIN WITHOUT SCIATICA: ICD-10-CM

## 2019-08-22 DIAGNOSIS — G47.33 OSA ON CPAP: Primary | ICD-10-CM

## 2019-08-22 PROCEDURE — 99214 OFFICE O/P EST MOD 30 MIN: CPT | Performed by: INTERNAL MEDICINE

## 2019-08-22 RX ORDER — GABAPENTIN 100 MG/1
100 CAPSULE ORAL 3 TIMES DAILY
Qty: 30 CAPSULE | Refills: 3 | Status: SHIPPED | OUTPATIENT
Start: 2019-08-22 | End: 2019-08-26

## 2019-08-22 RX ORDER — LORATADINE 10 MG/1
10 TABLET ORAL NIGHTLY
COMMUNITY
End: 2021-04-16

## 2019-08-22 NOTE — TELEPHONE ENCOUNTER
Pt seen for f/u OV w/CHAO today. Referral generated and faxed (along with today's progress note, sleep study reports and demo/insurance info) to [facility] at [phone], w/confirmation.

## 2019-08-22 NOTE — ASSESSMENT & PLAN NOTE
Blood pressure 125/83, pulse 77, resp. rate 22, height 5' 7\" (1.702 m), weight 269 lb 3.2 oz (122.1 kg), SpO2 98 %, not currently breastfeeding. Stable blood pressure with chronic kidney disease–the EGFR remains low, dropped from 58-53.   Creatinine is up

## 2019-08-22 NOTE — TELEPHONE ENCOUNTER
CHAO - [facility] is requiring F2F notes to include information about pt's EDWAR and CPAP usage. Please advise if you would be willing to addend your progress note from 06/25/19 physical visit to include mention of EDWAR.

## 2019-08-22 NOTE — ASSESSMENT & PLAN NOTE
History of sleep apnea–has not been back on her CPAP as her mask has not been delivered yet. This may be contributing to the blood pressures being fluctuant as well as the kidney dysfunction. Reordered the CPAP mask.   Will follow-up after evaluation is c

## 2019-08-22 NOTE — TELEPHONE ENCOUNTER
Pt seen for f/u OV w/CHAO today. Referral generated and faxed (along with progress note, sleep study reports and demo/insurance info) to [facility] at [phone], w/confirmation.

## 2019-08-22 NOTE — TELEPHONE ENCOUNTER
Clinical site staff please f/u on this fax if it has been received. If you have not received this paperwork please contact [facility] [phone]. Thank you.  Please respond to pool: EM IM [facility] LPN/KRYSTA

## 2019-08-22 NOTE — PATIENT INSTRUCTIONS
Problem List Items Addressed This Visit        Unprioritized    Chronic bilateral low back pain     Patient has had chronic low back pain which has improved with physical therapy but she has now noticed significant numbness and pain in the toes usually wor

## 2019-08-22 NOTE — TELEPHONE ENCOUNTER
Please disregard request below to addend last progress note. Pt is scheduled to see you for f/u this afternoon.

## 2019-08-22 NOTE — TELEPHONE ENCOUNTER
[facility] [address] [place] [address] requesting referral for the following supplies:   - Full Face Mask   - Full Face Cushion   - Nasal Mask Cushion   - Nasal Pillows   - Nasal Mask Chamber   Thelma Anguiano   - Tubing  [id number]

## 2019-08-22 NOTE — PROGRESS NOTES
HPI:    Patient ID: Griffin Fleischer is a 60year old female. FINDINGS:      RIGHT KIDNEY  MEASUREMENTS:  9.5 x 4.9 x 4.0 cm  ECHOGENICITY:  Increased cortical echogenicity. HYDRONEPHROSIS:  None. CYSTS/STONES/MASSES:  None.      LEFT KIDNEY   MEASU artery disease include dyslipidemia, obesity and sedentary lifestyle. Past treatments include lifestyle changes and calcium channel blockers. The current treatment provides significant improvement. Compliance problems include diet and exercise.   Hypertensi Right Ear: External ear normal.   Left Ear: External ear normal.   Nose: Nose normal.   Mouth/Throat: Oropharynx is clear and moist. No oropharyngeal exudate. Eyes: Pupils are equal, round, and reactive to light.  Conjunctivae and EOM are normal. Right the CPAP mask. Will follow-up after evaluation is completed. Recheck labs have been ordered          CKD (chronic kidney disease) stage 3, GFR 30-59 ml/min ([facility])     Blood pressure 125/83, pulse 77, resp.  rate 22, height 5' 7\" (1.702 m), weight 269 lb 3

## 2019-08-22 NOTE — ASSESSMENT & PLAN NOTE
Patient has had chronic low back pain which has improved with physical therapy but she has now noticed significant numbness and pain in the toes usually worse at night. Symptoms worse on the right side.   She has a history of a laminectomy in the past.  La

## 2019-08-26 ENCOUNTER — TELEPHONE (OUTPATIENT)
Dept: INTERNAL MEDICINE CLINIC | Facility: CLINIC | Age: 64
End: 2019-08-26

## 2019-08-26 RX ORDER — GABAPENTIN 100 MG/1
100 CAPSULE ORAL NIGHTLY
Qty: 30 CAPSULE | Refills: 5 | Status: SHIPPED | OUTPATIENT
Start: 2019-08-26 | End: 2019-10-01

## 2019-08-26 NOTE — TELEPHONE ENCOUNTER
Murali Lemus at [facility] is calling states she needs clarification on medication   gabapentin 100 MG Oral Cap.

## 2019-08-26 NOTE — TELEPHONE ENCOUNTER
Pollo King spoke with pharmacist Sharlene Venegas and they need a clarification in the sig as pharmacist was told by patient she is to take one tab at bedtime. Please advise.

## 2019-08-28 ENCOUNTER — OFFICE VISIT (OUTPATIENT)
Dept: PHYSICAL THERAPY | Age: 64
End: 2019-08-28
Attending: INTERNAL MEDICINE
Payer: COMMERCIAL

## 2019-08-28 PROCEDURE — 97140 MANUAL THERAPY 1/> REGIONS: CPT | Performed by: PHYSICAL THERAPIST

## 2019-08-28 PROCEDURE — 97110 THERAPEUTIC EXERCISES: CPT | Performed by: PHYSICAL THERAPIST

## 2019-08-28 NOTE — PROGRESS NOTES
Dx:  M54.5, G89.29 (ICD-10-CM) - 724.2, 338.29 (ICD-9-CM) - Chronic bilateral low back pain without sciatica         Authorized # of Visits:  8         Next MD visit: none scheduled  Fall Risk: standard         Precautions: n/a           Medication Changes hip ext x 10 ea Prone hip ext x 10 ea Prone hip ext x 10 ea Prone hip ext x 10 ea Prone hip ext x 10 ea Prone hip ext 2 x 10 ea    REIL x 10:  REIL x 10, nil REIL x 10, nil REIL x 10, nil REIL x 10, nil REIL x 10, nil    Man knee flex mob, hip ER/  Ir mobs

## 2019-08-29 ENCOUNTER — TELEPHONE (OUTPATIENT)
Dept: INTERNAL MEDICINE CLINIC | Facility: CLINIC | Age: 64
End: 2019-08-29

## 2019-08-29 NOTE — TELEPHONE ENCOUNTER
Called patient back and informed her called Vicky Sin  and left message to call me back . If he is awaiting for the sleep study, that would be Dr. Skip Block clinical staff.  I do not handle that request.     There are two different fax numbers for

## 2019-08-29 NOTE — TELEPHONE ENCOUNTER
Called patient back and informed her called Pr[facility] [phone]  And left message to call me back.

## 2019-08-29 NOTE — TELEPHONE ENCOUNTER
Patient left message on my internal vm to call her back that [facility] did not get sleep study    [address] [facility]

## 2019-08-29 NOTE — TELEPHONE ENCOUNTER
Patient called , we discussed referral was approved for cpap supplies, informed her if she has any issues to call me back.     [address] [facility]

## 2019-08-29 NOTE — TELEPHONE ENCOUNTER
All required records, including prior sleep study and titration study report were faxed to [facility] at [employer] on 08/22/19 (W/CONFIRMATION). I LM for Milton at [employer] to call me back, [id number]    I also LM for pt - informing her of all of the above.

## 2019-08-30 ENCOUNTER — HOSPITAL ENCOUNTER (OUTPATIENT)
Dept: MRI IMAGING | Facility: HOSPITAL | Age: 64
Discharge: HOME | End: 2019-08-30
Attending: INTERNAL MEDICINE
Payer: COMMERCIAL

## 2019-08-30 DIAGNOSIS — M54.50 CHRONIC BILATERAL LOW BACK PAIN WITHOUT SCIATICA: ICD-10-CM

## 2019-08-30 DIAGNOSIS — G89.29 CHRONIC BILATERAL LOW BACK PAIN WITHOUT SCIATICA: ICD-10-CM

## 2019-08-30 PROCEDURE — 72148 MRI LUMBAR SPINE W/O DYE: CPT | Performed by: INTERNAL MEDICINE

## 2019-08-30 NOTE — TELEPHONE ENCOUNTER
I don't see that the sleep study has been scanned yet that medical records faxed to us. Any possibility you have with your papers?

## 2019-08-30 NOTE — TELEPHONE ENCOUNTER
Please scroll all the way to the bottom with reading messages so you get the whole picture before sending back to me. This message has been sent back to me by everybody without reading it well. Catie Reyes has taken care of this–please call them and check.

## 2019-08-30 NOTE — TELEPHONE ENCOUNTER
I called Elinor Villarreal again and I said I will refax everything again with his attention. I called pt. And informed her of this. [facility] is closing at 4 and I said I would f/u on Tues.

## 2019-08-30 NOTE — TELEPHONE ENCOUNTER
[facility] and he did not receive fax. Please fax to [phone] tried to contact clinical office, line busy or got fax number of Dr. Salazar Vega.     Routed to Dr. Salazar Vega    Thanks,  [facility]

## 2019-09-03 NOTE — ASSESSMENT & PLAN NOTE
Blood pressure 125/83, pulse 77, resp. rate 22, height 5' 7\" (1.702 m), weight 269 lb 3.2 oz (122.1 kg), SpO2 98 %, not currently breastfeeding. Blood pressure looks stable. Patient has been on Norvasc 2.5 mg daily. Renal functions have been stable.   A

## 2019-09-03 NOTE — TELEPHONE ENCOUNTER
I called Yasmine De Leon and he said he received everything and also made an appmt for pt. To be seen tomorrow. I called pt. To confirm and she said yes she did have an appmt.

## 2019-09-03 NOTE — TELEPHONE ENCOUNTER
I got another copy of sleep study and faxed everything again to [facility] and will f/u shortly to make sure he has received it and then will call pt.

## 2019-09-09 ENCOUNTER — TELEPHONE (OUTPATIENT)
Dept: INTERNAL MEDICINE CLINIC | Facility: CLINIC | Age: 64
End: 2019-09-09

## 2019-09-09 NOTE — TELEPHONE ENCOUNTER
Called patient and left message to return my call-     Following up on status of telephone encounter 8/29/19

## 2019-09-10 ENCOUNTER — TELEPHONE (OUTPATIENT)
Dept: INTERNAL MEDICINE CLINIC | Facility: CLINIC | Age: 64
End: 2019-09-10

## 2019-09-10 RX ORDER — MOMETASONE FUROATE 1 MG/G
CREAM TOPICAL
Qty: 45 G | Refills: 1 | Status: SHIPPED | OUTPATIENT
Start: 2019-09-10 | End: 2019-10-01

## 2019-09-10 NOTE — TELEPHONE ENCOUNTER
----- Message from Reddy Brian sent at 9/10/2019  3:58 PM CDT -----  Regarding: Non-Urgent Medical Question  Contact: [phone]  This rash appears on my legs when it gets humid. What is it and how do I treat it?  I've been using Aloe Vera which reliev

## 2019-09-10 NOTE — TELEPHONE ENCOUNTER
Spoke with patient ( verified) RE: MyChart symptoms below:    Patient states rash started yesterday when it started getting humid--rash is mostly on bilateral inner ankles above her socks. Denies rash on calf or thigh area.     \"It's just splotchy red--

## 2019-09-11 ENCOUNTER — TELEPHONE (OUTPATIENT)
Dept: DERMATOLOGY CLINIC | Facility: CLINIC | Age: 64
End: 2019-09-11

## 2019-09-11 NOTE — TELEPHONE ENCOUNTER
Patient contacted. Advised to see dermatology. Referral done. Local mometasone application until seen by dermatology.

## 2019-09-11 NOTE — TELEPHONE ENCOUNTER
LOV 11/12/14 pt with new onset of rash to BLE, referred by PCP, describes rash as red, splotchy and itchy. \"Worse with humidity\". Appt made for next week.

## 2019-09-11 NOTE — TELEPHONE ENCOUNTER
Pt is having a bad rash on lower legs and has been advised by pcp to be seen in derm asap. pls advise

## 2019-09-12 ENCOUNTER — OFFICE VISIT (OUTPATIENT)
Dept: PHYSICAL THERAPY | Age: 64
End: 2019-09-12
Attending: INTERNAL MEDICINE
Payer: COMMERCIAL

## 2019-09-12 PROCEDURE — 97110 THERAPEUTIC EXERCISES: CPT | Performed by: PHYSICAL THERAPIST

## 2019-09-12 PROCEDURE — 97140 MANUAL THERAPY 1/> REGIONS: CPT | Performed by: PHYSICAL THERAPIST

## 2019-09-16 ENCOUNTER — OFFICE VISIT (OUTPATIENT)
Dept: DERMATOLOGY CLINIC | Facility: CLINIC | Age: 64
End: 2019-09-16

## 2019-09-16 DIAGNOSIS — L30.9 DERMATITIS: Primary | ICD-10-CM

## 2019-09-16 PROCEDURE — 99203 OFFICE O/P NEW LOW 30 MIN: CPT | Performed by: DERMATOLOGY

## 2019-09-18 ENCOUNTER — TELEPHONE (OUTPATIENT)
Dept: INTERNAL MEDICINE CLINIC | Facility: CLINIC | Age: 64
End: 2019-09-18

## 2019-09-18 DIAGNOSIS — R25.1 TREMOR: Primary | ICD-10-CM

## 2019-09-19 NOTE — TELEPHONE ENCOUNTER
Margarita Philippe, [facility]; Jennifer Jordan MD   Caller: Unspecified Watson Jaquez,  2:46 PM)             Good Morning Dr. Sana Fung,     Referral is authorized. I sent referral to patients mychart.      Thanks,     Asia [phone]

## 2019-09-20 ENCOUNTER — APPOINTMENT (OUTPATIENT)
Dept: LAB | Age: 64
End: 2019-09-20
Attending: INTERNAL MEDICINE
Payer: COMMERCIAL

## 2019-09-20 DIAGNOSIS — I10 ESSENTIAL HYPERTENSION: ICD-10-CM

## 2019-09-20 DIAGNOSIS — N18.30 CKD (CHRONIC KIDNEY DISEASE) STAGE 3, GFR 30-59 ML/MIN (HCC): ICD-10-CM

## 2019-09-20 LAB
ANION GAP SERPL CALC-SCNC: 6 MMOL/L (ref 0–18)
BUN BLD-MCNC: 19 MG/DL (ref 7–18)
BUN/CREAT SERPL: 16.1 (ref 10–20)
CALCIUM BLD-MCNC: 8.6 MG/DL (ref 8.5–10.1)
CHLORIDE SERPL-SCNC: 106 MMOL/L (ref 98–112)
CO2 SERPL-SCNC: 26 MMOL/L (ref 21–32)
CREAT BLD-MCNC: 1.18 MG/DL (ref 0.55–1.02)
GLUCOSE BLD-MCNC: 86 MG/DL (ref 70–99)
OSMOLALITY SERPL CALC.SUM OF ELEC: 288 MOSM/KG (ref 275–295)
POTASSIUM SERPL-SCNC: 4 MMOL/L (ref 3.5–5.1)
SODIUM SERPL-SCNC: 138 MMOL/L (ref 136–145)

## 2019-09-20 PROCEDURE — 36415 COLL VENOUS BLD VENIPUNCTURE: CPT

## 2019-09-20 PROCEDURE — 80048 BASIC METABOLIC PNL TOTAL CA: CPT

## 2019-09-29 NOTE — PROGRESS NOTES
Griffin Fleischer is a 60year old female. Patient presents with:  Derm Problem: LOV 11/12/2014, C/o new rash on left leg, states itchy and no other complaints            Iodides (Topical); Sulfa Antibiotics;  Bactrim [Sulfamethoxazole W/Trimethoprim]; Alcohol/week: 1.0 - 2.0 standard drinks      Types: 1 - 2 Standard drinks or equivalent per week      Comment: MIXED DRINKS WEEKLY    Drug use:  No                  Current Outpatient Medications:  triamcinolone acetonide 0.1 % External Cream Apply topicall ABDOMINOPLASTY Bilateral    • COLONOSCOPY N/A 12/15/2017    Performed by Babita Ta MD at [address] Right 11/22/2016    Performed by Jozef Mirza MD at [address] partner: Not on file        Emotionally abused: Not on file        Physically abused: Not on file        Forced sexual activity: Not on file    Other Topics      Concerns:         Service: Not Asked        Blood Transfusions: Not Asked        Caffe exposures. No changes in soaps, detergents, skin care products. No recent travel. No else at home itching. No recent illnesses. ROS:   Denies any other systemic complaints. 10 point review of systems was completed.   Pertinent positives and negativ reviewed as well. Pathophysiology reviewed. Consider Contact allergy in differential.  Consider patch testing. Patient will let us know how they are doing over the next several weeks. Await clinical response to above therapy.         No other susupiciou

## 2019-10-01 ENCOUNTER — OFFICE VISIT (OUTPATIENT)
Dept: NEUROLOGY | Facility: CLINIC | Age: 64
End: 2019-10-01

## 2019-10-01 ENCOUNTER — LAB ENCOUNTER (OUTPATIENT)
Dept: LAB | Facility: HOSPITAL | Age: 64
End: 2019-10-01
Attending: Other
Payer: COMMERCIAL

## 2019-10-01 VITALS
WEIGHT: 270 LBS | HEIGHT: 67 IN | DIASTOLIC BLOOD PRESSURE: 88 MMHG | HEART RATE: 68 BPM | SYSTOLIC BLOOD PRESSURE: 140 MMHG | BODY MASS INDEX: 42.38 KG/M2 | RESPIRATION RATE: 16 BRPM

## 2019-10-01 DIAGNOSIS — R25.1 TREMOR: Primary | ICD-10-CM

## 2019-10-01 DIAGNOSIS — R20.2 PARESTHESIA: ICD-10-CM

## 2019-10-01 PROCEDURE — 36415 COLL VENOUS BLD VENIPUNCTURE: CPT

## 2019-10-01 PROCEDURE — 99214 OFFICE O/P EST MOD 30 MIN: CPT | Performed by: OTHER

## 2019-10-01 PROCEDURE — 82607 VITAMIN B-12: CPT | Performed by: OTHER

## 2019-10-01 PROCEDURE — 87389 HIV-1 AG W/HIV-1&-2 AB AG IA: CPT

## 2019-10-01 PROCEDURE — 86334 IMMUNOFIX E-PHORESIS SERUM: CPT

## 2019-10-01 PROCEDURE — 86038 ANTINUCLEAR ANTIBODIES: CPT

## 2019-10-01 RX ORDER — GABAPENTIN 300 MG/1
300 CAPSULE ORAL NIGHTLY
Qty: 90 CAPSULE | Refills: 3 | Status: SHIPPED | OUTPATIENT
Start: 2019-10-01 | End: 2020-04-23

## 2019-10-01 RX ORDER — PRIMIDONE 50 MG/1
TABLET ORAL
Qty: 270 TABLET | Refills: 3 | Status: SHIPPED | OUTPATIENT
Start: 2019-10-01 | End: 2020-07-07

## 2019-10-01 NOTE — PROGRESS NOTES
Neurology follow-up visit     Referred By: Dr. Levon Galvan    Chief Complaint: Patient presents with:  Seizure Disorder (neurologic): Patient here for follow up for tremors. Since LOV on 3/26/2019 patient reports 60% improvement.  States left hand tremor worse ago.  We will continue the same dose of primidone 150 mg at night. Patient came back to the follow-up in October 2019, she was mentioned development of some burning paresthesias in her feet. Especially at night.   She was started on 100 mg of gabapentin tumor) Sister          Current Outpatient Medications:   •  primidone 50 MG Oral Tab, 3 pills qhs, Disp: 270 tablet, Rfl: 3  •  gabapentin 300 MG Oral Cap, Take 1 capsule (300 mg total) by mouth nightly., Disp: 90 capsule, Rfl: 3  •  loratadine 10 MG Oral knowledge appropriate for education and age    Language intact including: comprehension, naming, repetition, vocabulary    Cranial Nerves:    VII. Face symmetric, no facial weakness  VIII. Hearing intact to whisper, tuning fork or finger rub. IX.  Pallet e given. All questions were answered. All side effects of drugs were discussed. Return to clinic in: Return in about 6 months (around 4/1/2020).     Armenta Canavan, MD

## 2019-10-03 ENCOUNTER — TELEPHONE (OUTPATIENT)
Dept: NEUROLOGY | Facility: CLINIC | Age: 64
End: 2019-10-03

## 2019-10-03 NOTE — TELEPHONE ENCOUNTER
----- Message from Carine Guzman MD sent at 10/3/2019  3:34 PM CDT -----  Please let the patient know that results of these particular lab tests so far were normal.    Thank you

## 2019-10-18 ENCOUNTER — OFFICE VISIT (OUTPATIENT)
Dept: INTERNAL MEDICINE CLINIC | Facility: CLINIC | Age: 64
End: 2019-10-18

## 2019-10-18 VITALS
DIASTOLIC BLOOD PRESSURE: 77 MMHG | RESPIRATION RATE: 18 BRPM | SYSTOLIC BLOOD PRESSURE: 124 MMHG | WEIGHT: 269 LBS | HEART RATE: 83 BPM | BODY MASS INDEX: 42.22 KG/M2 | HEIGHT: 67 IN

## 2019-10-18 DIAGNOSIS — R13.14 PHARYNGOESOPHAGEAL DYSPHAGIA: Primary | ICD-10-CM

## 2019-10-18 DIAGNOSIS — I10 ESSENTIAL HYPERTENSION: ICD-10-CM

## 2019-10-18 PROCEDURE — 99214 OFFICE O/P EST MOD 30 MIN: CPT | Performed by: INTERNAL MEDICINE

## 2019-10-18 RX ORDER — TRIAMTERENE AND HYDROCHLOROTHIAZIDE 37.5; 25 MG/1; MG/1
TABLET ORAL
Qty: 24 TABLET | Refills: 2 | Status: SHIPPED | OUTPATIENT
Start: 2019-10-18 | End: 2020-07-03

## 2019-10-18 RX ORDER — AMLODIPINE BESYLATE 2.5 MG/1
TABLET ORAL
Qty: 90 TABLET | Refills: 2 | Status: SHIPPED | OUTPATIENT
Start: 2019-10-18 | End: 2020-07-03

## 2019-10-18 NOTE — ASSESSMENT & PLAN NOTE
Blood pressure 124/77, pulse 83, resp. rate 18, height 5' 7\" (1.702 m), weight 269 lb (122 kg), not currently breastfeeding. Stable blood pressure, well controlled at this time.   She has been having some lower extremity swelling and redness most likely r

## 2019-10-18 NOTE — PROGRESS NOTES
HPI:    Patient ID: Marsha Chaney is a 60year old female. Assessment   1.  Tremor  Tremors have significantly improved, therefore we will continue with primidone at the current dose.     However since patient also developed paresthesias will check ad More with solid foods). Pertinent negatives include no neck pain or numbness. Associated symptoms comments: 1 episode of throwing up as had not drunk fluids.     Wt Readings from Last 6 Encounters:  10/18/19 : 269 lb (122 kg)  10/01/19 : 270 lb (122.5 kg) 2  Cyanocobalamin (B-12) 2500 MCG Oral Tab, Take 2,500 mcg by mouth daily. , Disp: , Rfl:   Fexofenadine HCl (ALLEGRA) 60 MG Oral Tab, Take 60 mg by mouth every morning.  , Disp: , Rfl:       Allergies:   Iodides (Topical)       HIVES    Comment:IVP DYE  [facility] reviewed. ASSESSMENT/PLAN:     Problem List Items Addressed This Visit        Unprioritized    Essential hypertension     Blood pressure 124/77, pulse 83, resp.  rate 18, height 5' 7\" (1.702 m), weight 269 lb (122 kg), not currently breastfeedi times a week on Monday and Friday       Imaging & Referrals:  [facility] - INTERNAL       #[id number]

## 2019-10-18 NOTE — PATIENT INSTRUCTIONS
Problem List Items Addressed This Visit        Unprioritized    Essential hypertension     Blood pressure 124/77, pulse 83, resp. rate 18, height 5' 7\" (1.702 m), weight 269 lb (122 kg), not currently breastfeeding.   Stable blood pressure, well controlled

## 2019-12-02 ENCOUNTER — LAB ENCOUNTER (OUTPATIENT)
Dept: LAB | Age: 64
End: 2019-12-02
Attending: INTERNAL MEDICINE
Payer: COMMERCIAL

## 2019-12-02 DIAGNOSIS — I10 ESSENTIAL HYPERTENSION: ICD-10-CM

## 2019-12-02 PROCEDURE — 85025 COMPLETE CBC W/AUTO DIFF WBC: CPT

## 2019-12-02 PROCEDURE — 36415 COLL VENOUS BLD VENIPUNCTURE: CPT

## 2019-12-02 PROCEDURE — 80053 COMPREHEN METABOLIC PANEL: CPT

## 2019-12-05 ENCOUNTER — OFFICE VISIT (OUTPATIENT)
Dept: INTERNAL MEDICINE CLINIC | Facility: CLINIC | Age: 64
End: 2019-12-05

## 2019-12-05 VITALS
WEIGHT: 275.38 LBS | RESPIRATION RATE: 24 BRPM | HEART RATE: 76 BPM | SYSTOLIC BLOOD PRESSURE: 131 MMHG | OXYGEN SATURATION: 97 % | TEMPERATURE: 98 F | BODY MASS INDEX: 43.22 KG/M2 | DIASTOLIC BLOOD PRESSURE: 82 MMHG | HEIGHT: 67 IN

## 2019-12-05 DIAGNOSIS — M48.061 SPINAL STENOSIS OF LUMBAR REGION WITHOUT NEUROGENIC CLAUDICATION: ICD-10-CM

## 2019-12-05 DIAGNOSIS — M21.531: ICD-10-CM

## 2019-12-05 DIAGNOSIS — M21.961 DEFORMITY OF RIGHT FOOT: ICD-10-CM

## 2019-12-05 DIAGNOSIS — E03.9 HYPOTHYROIDISM, UNSPECIFIED TYPE: Primary | ICD-10-CM

## 2019-12-05 DIAGNOSIS — I10 ESSENTIAL HYPERTENSION: ICD-10-CM

## 2019-12-05 PROCEDURE — 99214 OFFICE O/P EST MOD 30 MIN: CPT | Performed by: INTERNAL MEDICINE

## 2019-12-05 NOTE — ASSESSMENT & PLAN NOTE
Blood pressure 131/82, pulse 76, temperature 97.9 °F (36.6 °C), temperature source Oral, resp. rate 24, height 5' 7\" (1.702 m), weight 275 lb 6.4 oz (124.9 kg), SpO2 97 %, not currently breastfeeding.   Blood pressure stable, quite well controlled at this

## 2019-12-05 NOTE — PROGRESS NOTES
HPI:    Patient ID: Deisy Davenport is a 60year old female. Labs discussed– BUN, creatinine, EGFR much improved. Blood counts look normal.    Hypertension   This is a chronic problem. The current episode started more than 1 year ago.  The problem has pain is present in the right foot. This is a chronic problem. The current episode started more than 1 month ago. The problem occurs constantly.  The problem has been gradually worsening (c/o right heel pain and pain to ball of foot - worse since hip replace Oral Tab Take 1 tablet (125 mcg total) by mouth before breakfast. 90 tablet 2   • Cyanocobalamin (B-12) 2500 MCG Oral Tab Take 2,500 mcg by mouth daily. • Fexofenadine HCl (ALLEGRA) 60 MG Oral Tab Take 60 mg by mouth every morning.        • Pantoprazole She is alert and oriented to person, place, and time. She has normal reflexes. No cranial nerve deficit. She exhibits normal muscle tone. Coordination normal.   Skin: No rash noted. No erythema. Psychiatric: She has a normal mood and affect.  Her behavior claw foot that has been developing on the right side. She has also had associated numbness and tingling in the foot. No foot drop noted. Will have neurology evaluation to rule out lumbar radiculopathy as a cause for the foot deformity and numbness.   Julianne Casey

## 2019-12-05 NOTE — PATIENT INSTRUCTIONS
Problem List Items Addressed This Visit        Unprioritized    Deformity of right foot     Hammertoes, claw feet as well as calluses were the heads of the metatarsals of the right foot. This seems to be gradually worse after her hip surgery.   She does ha will follow-up.          Relevant Orders    ORTHOPEDIC - INTERNAL    NEURO - INTERNAL      Other Visit Diagnoses     Claw foot, acquired, right        Relevant Orders    ORTHOPEDIC - INTERNAL    NEURO - INTERNAL

## 2019-12-05 NOTE — ASSESSMENT & PLAN NOTE
Moderate to severe spinal canal stenosis as well as neuroforaminal stenosis noted on her last MRI of the lumbar spine in August 2019. She is status post laminectomy about 20 years back. No recent injuries.   Currently concerned about the claw foot that ha

## 2019-12-05 NOTE — ASSESSMENT & PLAN NOTE
Hammertoes, claw feet as well as calluses were the heads of the metatarsals of the right foot. This seems to be gradually worse after her hip surgery.   She does have a history of spinal stenosis–she has also had problems with numbness and tingling in the

## 2019-12-05 NOTE — ASSESSMENT & PLAN NOTE
Thyroid function test have been stable on levothyroxine at 125 mcg daily. Continue on the same dose of medication.

## 2019-12-11 ENCOUNTER — TELEPHONE (OUTPATIENT)
Dept: GASTROENTEROLOGY | Facility: CLINIC | Age: 64
End: 2019-12-11

## 2019-12-11 ENCOUNTER — OFFICE VISIT (OUTPATIENT)
Dept: GASTROENTEROLOGY | Facility: CLINIC | Age: 64
End: 2019-12-11

## 2019-12-11 VITALS
BODY MASS INDEX: 43.07 KG/M2 | HEART RATE: 80 BPM | WEIGHT: 274.38 LBS | SYSTOLIC BLOOD PRESSURE: 130 MMHG | DIASTOLIC BLOOD PRESSURE: 80 MMHG | HEIGHT: 67 IN

## 2019-12-11 DIAGNOSIS — R13.10 DYSPHAGIA, UNSPECIFIED TYPE: Primary | ICD-10-CM

## 2019-12-11 DIAGNOSIS — R68.81 EARLY SATIETY: ICD-10-CM

## 2019-12-11 DIAGNOSIS — R11.0 NAUSEA: ICD-10-CM

## 2019-12-11 PROCEDURE — 99244 OFF/OP CNSLTJ NEW/EST MOD 40: CPT | Performed by: INTERNAL MEDICINE

## 2019-12-11 NOTE — H&P (VIEW-ONLY)
[facility], [facility] - Gastroenterology  Clinic History and Physical     Patient presents with:  Dysphagia      HPI:   Josh Treviño is a 60year old year-old female with history of sleep apnea, depression, hypothyroidism, diverticulosis, HTN who presents Allergies, ROS:      Past Medical History:   Diagnosis Date   • Colon polyp 12/2017   • Depression    • Disorder of thyroid     hypothyroidism   • Diverticulosis 12/15/2017   • High blood pressure    • History of blood transfusion    • Internal hemorrhoids tablet 2   • primidone 50 MG Oral Tab 3 pills qhs 270 tablet 3   • gabapentin 300 MG Oral Cap Take 1 capsule (300 mg total) by mouth nightly. 90 capsule 3   • triamcinolone acetonide 0.1 % External Cream Apply topically 2 (two) times daily.  Apply bid to lo sclera appears anicteric, oropharynx clear, mucus membranes appear moist  CV: regular rate and rhythm, the extremities are warm and well perfused   Lung: Moves air well;  No labored breathing  Abdomen: soft, non-tender exam in all quadrants without rigidity biopsies  DDx is H pylori, reflux, stricture, cancer    Recommend:  -Schedule EGD w/ possible biopsy/dilation w/MAC anesthesia    ** If MAC @ [facility]:    - HOLD ACE/ARBs the night before and/or the day of the procedure(s) - N/A   - NO alcohol, recreational

## 2019-12-11 NOTE — PATIENT INSTRUCTIONS
1. Dysphagia, unspecified type  Solid food with early satiety     Recommend:  -Schedule EGD w/ possible biopsy/dilation w/MAC anesthesia     ** If MAC @ [facility]:                 - HOLD ACE/ARBs the night before and/or the day of the procedure(s) - N/A

## 2019-12-11 NOTE — TELEPHONE ENCOUNTER
Scheduled for:  Egd with possible dilation /biopsy 54088  Provider Name: Maryana Rizzo  Date:  12/13/19  Location:  [facility]  Sedation:  Mac  Time:  11:00 Am - Pt is aware to arrive at 10 Am  Prep: Npo 3 hrs before procedure prior to arrival  Meds/Allergies Reconcil

## 2019-12-11 NOTE — H&P
[facility], [facility] - Gastroenterology  Clinic History and Physical     Patient presents with:  Dysphagia      HPI:   Veronica Yeboah is a 60year old year-old female with history of sleep apnea, depression, hypothyroidism, diverticulosis, HTN who presents Allergies, ROS:      Past Medical History:   Diagnosis Date   • Colon polyp 12/2017   • Depression    • Disorder of thyroid     hypothyroidism   • Diverticulosis 12/15/2017   • High blood pressure    • History of blood transfusion    • Internal hemorrhoids tablet 2   • primidone 50 MG Oral Tab 3 pills qhs 270 tablet 3   • gabapentin 300 MG Oral Cap Take 1 capsule (300 mg total) by mouth nightly. 90 capsule 3   • triamcinolone acetonide 0.1 % External Cream Apply topically 2 (two) times daily.  Apply bid to lo sclera appears anicteric, oropharynx clear, mucus membranes appear moist  CV: regular rate and rhythm, the extremities are warm and well perfused   Lung: Moves air well;  No labored breathing  Abdomen: soft, non-tender exam in all quadrants without rigidity biopsies  DDx is H pylori, reflux, stricture, cancer    Recommend:  -Schedule EGD w/ possible biopsy/dilation w/MAC anesthesia    ** If MAC @ [facility]:    - HOLD ACE/ARBs the night before and/or the day of the procedure(s) - N/A   - NO alcohol, recreational

## 2019-12-13 ENCOUNTER — HOSPITAL ENCOUNTER (OUTPATIENT)
Facility: HOSPITAL | Age: 64
Setting detail: HOSPITAL OUTPATIENT SURGERY
Discharge: HOME | End: 2019-12-13
Attending: INTERNAL MEDICINE | Admitting: INTERNAL MEDICINE
Payer: COMMERCIAL

## 2019-12-13 ENCOUNTER — ANESTHESIA (OUTPATIENT)
Dept: ENDOSCOPY | Facility: HOSPITAL | Age: 64
End: 2019-12-13
Payer: COMMERCIAL

## 2019-12-13 ENCOUNTER — ANESTHESIA EVENT (OUTPATIENT)
Dept: ENDOSCOPY | Facility: HOSPITAL | Age: 64
End: 2019-12-13
Payer: COMMERCIAL

## 2019-12-13 ENCOUNTER — TELEPHONE (OUTPATIENT)
Dept: GASTROENTEROLOGY | Facility: CLINIC | Age: 64
End: 2019-12-13

## 2019-12-13 DIAGNOSIS — K20.90 ESOPHAGITIS: ICD-10-CM

## 2019-12-13 DIAGNOSIS — R13.10 DYSPHAGIA, UNSPECIFIED TYPE: Primary | ICD-10-CM

## 2019-12-13 DIAGNOSIS — R13.10 DYSPHAGIA, UNSPECIFIED TYPE: ICD-10-CM

## 2019-12-13 PROCEDURE — 87070 CULTURE OTHR SPECIMN AEROBIC: CPT | Performed by: INTERNAL MEDICINE

## 2019-12-13 PROCEDURE — 88312 SPECIAL STAINS GROUP 1: CPT | Performed by: INTERNAL MEDICINE

## 2019-12-13 PROCEDURE — 88305 TISSUE EXAM BY PATHOLOGIST: CPT | Performed by: INTERNAL MEDICINE

## 2019-12-13 PROCEDURE — 87205 SMEAR GRAM STAIN: CPT | Performed by: INTERNAL MEDICINE

## 2019-12-13 PROCEDURE — 0DB68ZX EXCISION OF STOMACH, VIA NATURAL OR ARTIFICIAL OPENING ENDOSCOPIC, DIAGNOSTIC: ICD-10-PCS | Performed by: INTERNAL MEDICINE

## 2019-12-13 RX ORDER — NALOXONE HYDROCHLORIDE 0.4 MG/ML
80 INJECTION, SOLUTION INTRAMUSCULAR; INTRAVENOUS; SUBCUTANEOUS AS NEEDED
Status: DISCONTINUED | OUTPATIENT
Start: 2019-12-13 | End: 2019-12-13

## 2019-12-13 RX ORDER — PANTOPRAZOLE SODIUM 40 MG/1
40 TABLET, DELAYED RELEASE ORAL
Qty: 180 TABLET | Refills: 3 | Status: SHIPPED | OUTPATIENT
Start: 2019-12-13 | End: 2019-12-27

## 2019-12-13 RX ORDER — SODIUM CHLORIDE, SODIUM LACTATE, POTASSIUM CHLORIDE, CALCIUM CHLORIDE 600; 310; 30; 20 MG/100ML; MG/100ML; MG/100ML; MG/100ML
INJECTION, SOLUTION INTRAVENOUS CONTINUOUS PRN
Status: DISCONTINUED | OUTPATIENT
Start: 2019-12-13 | End: 2019-12-13 | Stop reason: SURG

## 2019-12-13 RX ORDER — LIDOCAINE HYDROCHLORIDE 10 MG/ML
INJECTION, SOLUTION EPIDURAL; INFILTRATION; INTRACAUDAL; PERINEURAL AS NEEDED
Status: DISCONTINUED | OUTPATIENT
Start: 2019-12-13 | End: 2019-12-13 | Stop reason: SURG

## 2019-12-13 RX ORDER — SODIUM CHLORIDE, SODIUM LACTATE, POTASSIUM CHLORIDE, CALCIUM CHLORIDE 600; 310; 30; 20 MG/100ML; MG/100ML; MG/100ML; MG/100ML
INJECTION, SOLUTION INTRAVENOUS CONTINUOUS
Status: DISCONTINUED | OUTPATIENT
Start: 2019-12-13 | End: 2019-12-13

## 2019-12-13 RX ADMIN — LIDOCAINE HYDROCHLORIDE 20 MG: 10 INJECTION, SOLUTION EPIDURAL; INFILTRATION; INTRACAUDAL; PERINEURAL at 11:01:00

## 2019-12-13 RX ADMIN — SODIUM CHLORIDE, SODIUM LACTATE, POTASSIUM CHLORIDE, CALCIUM CHLORIDE: 600; 310; 30; 20 INJECTION, SOLUTION INTRAVENOUS at 11:11:00

## 2019-12-13 RX ADMIN — SODIUM CHLORIDE, SODIUM LACTATE, POTASSIUM CHLORIDE, CALCIUM CHLORIDE: 600; 310; 30; 20 INJECTION, SOLUTION INTRAVENOUS at 10:59:00

## 2019-12-13 NOTE — ANESTHESIA PREPROCEDURE EVALUATION
Anesthesia PreOp Note    HPI:     Ryan Leroy is a 60year old female who presents for preoperative consultation requested by: Maria R Montague MD    Date of Surgery: 12/13/2019    Procedure(s):  ESOPHAGOGASTRODUODENOSCOPY (EGD)  Indication: Dysphagia, u Depression    • Disorder of thyroid     hypothyroidism   • Diverticulosis 12/15/2017   • High blood pressure    • History of blood transfusion    • Internal hemorrhoids 12/15/2017   • Osteoarthritis    • Renal disorder    • Unspecified sleep apnea     CPAP Take 2,500 mcg by mouth daily. , Disp: , Rfl: , Taking  Fexofenadine HCl (ALLEGRA) 60 MG Oral Tab, Take 60 mg by mouth every morning.  , Disp: , Rfl: , 12/12/2019 at 0645  triamcinolone acetonide 0.1 % External Cream, Apply topically 2 (two) times daily.  Ap file    Lifestyle      Physical activity:        Days per week: Not on file        Minutes per session: Not on file      Stress: Not on file    Relationships      Social connections:        Talks on phone: Not on file        Gets together: Not on file 12/02/2019          Vital Signs: Body mass index is 42.91 kg/m². height is 1.702 m (5' 7\") and weight is 124.3 kg (274 lb).     12/12/19  1042   Weight: 124.3 kg (274 lb)   Height: 1.702 m (5' 7\")        Anesthesia Evaluation     Patient summary review

## 2019-12-13 NOTE — TELEPHONE ENCOUNTER
Repeat EGD with biopsies and possible dilation with MAC in 6-8 weeks at the hospital for esophagitis and dysphagia    ** If MAC @ [facility]:                 - HOLD ACE/ARBs the night before and/or the day of the procedure(s) - N/A                - NO alcohol,

## 2019-12-13 NOTE — INTERVAL H&P NOTE
Pre-op Diagnosis: Dysphagia, unspecified type    The above referenced H&P was reviewed by Ernesto Boo MD on 12/13/2019, the patient was examined and no significant changes have occurred in the patient's condition since the H&P was performed.   I discussed

## 2019-12-13 NOTE — OPERATIVE REPORT
ESOPHAGOGASTRODUODENOSCOPY (EGD) REPORT    Josh LOMBARDI 3/15/1955 Age 60year old   PCP Mehran Lincoln MD Endoscopist Nelia Castleman, MD       Date of procedure: 19    Procedure: EGD w/biopsies    Pre-operative diagnosis: dysphagia and reflux bacterial overgrowth    Impression:  1. LA grade B-C esophagitis    Recommend:  1. Start pantoprazole 40 mg BID 30 min before breakfast and 30 min before dinner  2. Await biopsies  3.  Repeat EGD in 6-8 weeks    >>>If tissue was sampled/removed and you have

## 2019-12-13 NOTE — ANESTHESIA POSTPROCEDURE EVALUATION
Patient: Denise Sommer    Procedure Summary     Date:  12/13/19 Room / Location:  [address] [place] [address] ENDOSCOPY 04 / [address] [place] [address] ENDOSCOPY    Anesthesia Start:  1612 Anesthesia Stop:  4643    Procedure:  ESOPHAGOGASTRODUODENOSCOPY (EGD) (N/A ) Diagnosis:       Dysphagia, unspe

## 2019-12-14 VITALS
HEART RATE: 71 BPM | HEIGHT: 67 IN | SYSTOLIC BLOOD PRESSURE: 127 MMHG | DIASTOLIC BLOOD PRESSURE: 86 MMHG | OXYGEN SATURATION: 99 % | WEIGHT: 274 LBS | RESPIRATION RATE: 21 BRPM | BODY MASS INDEX: 43 KG/M2

## 2019-12-17 NOTE — TELEPHONE ENCOUNTER
Scheduled for:  EGD - 38977  Provider Name:  Dr. Ash Nickerson  Date:  2/13/20  Location:  [facility]  Sedation:  MAC  Time:  10:15 am (pt is aware to arrive at 9:15 am)  Prep:  NPO after midnight, Prep instructions were given to pt over the phone, pt verbalized Sadia

## 2020-01-03 ENCOUNTER — APPOINTMENT (OUTPATIENT)
Dept: CT IMAGING | Age: 65
End: 2020-01-03
Attending: FAMILY MEDICINE
Payer: COMMERCIAL

## 2020-01-03 ENCOUNTER — HOSPITAL ENCOUNTER (INPATIENT)
Facility: HOSPITAL | Age: 65
LOS: 1 days | Discharge: HOME | DRG: 392 | End: 2020-01-04
Attending: EMERGENCY MEDICINE | Admitting: INTERNAL MEDICINE
Payer: COMMERCIAL

## 2020-01-03 ENCOUNTER — NURSE TRIAGE (OUTPATIENT)
Dept: INTERNAL MEDICINE CLINIC | Facility: CLINIC | Age: 65
End: 2020-01-03

## 2020-01-03 ENCOUNTER — HOSPITAL ENCOUNTER (OUTPATIENT)
Age: 65
Discharge: ANOTHER HOSPITAL | End: 2020-01-03
Attending: FAMILY MEDICINE
Payer: COMMERCIAL

## 2020-01-03 VITALS
SYSTOLIC BLOOD PRESSURE: 141 MMHG | TEMPERATURE: 98 F | DIASTOLIC BLOOD PRESSURE: 93 MMHG | RESPIRATION RATE: 20 BRPM | OXYGEN SATURATION: 97 % | HEART RATE: 82 BPM

## 2020-01-03 DIAGNOSIS — K57.80 PERFORATED DIVERTICULUM: ICD-10-CM

## 2020-01-03 DIAGNOSIS — K57.92 ACUTE DIVERTICULITIS: Primary | ICD-10-CM

## 2020-01-03 DIAGNOSIS — K63.1 BOWEL PERFORATION (HCC): ICD-10-CM

## 2020-01-03 LAB
#MXD IC: 1.1 X10ˆ3/UL (ref 0.1–1)
ANTIBODY SCREEN: NEGATIVE
CREAT BLD-MCNC: 1 MG/DL (ref 0.55–1.02)
GLUCOSE BLD-MCNC: 101 MG/DL (ref 70–99)
HCT VFR BLD AUTO: 35.8 % (ref 35–48)
HGB BLD-MCNC: 12 G/DL (ref 12–16)
ISTAT BUN: 12 MG/DL (ref 8–20)
ISTAT CHLORIDE: 92 MMOL/L (ref 101–111)
ISTAT HEMATOCRIT: 38 % (ref 34–50)
ISTAT IONIZED CALCIUM FOR CHEM 8: 1.13 MMOL/L (ref 1.12–1.32)
ISTAT POTASSIUM: 4 MMOL/L (ref 3.6–5.1)
ISTAT SODIUM: 130 MMOL/L (ref 136–145)
LYMPHOCYTES # BLD AUTO: 2.4 X10ˆ3/UL (ref 1–4)
LYMPHOCYTES NFR BLD AUTO: 28.6 %
MCH RBC QN AUTO: 30 PG (ref 26–34)
MCHC RBC AUTO-ENTMCNC: 33.5 G/DL (ref 31–37)
MCV RBC AUTO: 89.5 FL (ref 80–100)
MIXED CELL %: 12.9 %
NEUTROPHILS # BLD AUTO: 4.8 X10ˆ3/UL (ref 1.5–7.7)
NEUTROPHILS NFR BLD AUTO: 58.5 %
PLATELET # BLD AUTO: 333 X10ˆ3/UL (ref 150–450)
POCT BILIRUBIN URINE: NEGATIVE
POCT GLUCOSE URINE: NEGATIVE MG/DL
POCT KETONE URINE: NEGATIVE MG/DL
POCT LEUKOCYTE ESTERASE URINE: NEGATIVE
POCT NITRITE URINE: NEGATIVE
POCT PH URINE: 7 (ref 5–8)
POCT PROTEIN URINE: NEGATIVE MG/DL
POCT SPECIFIC GRAVITY URINE: 1.01
POCT URINE CLARITY: CLEAR
POCT URINE COLOR: YELLOW
POCT UROBILINOGEN URINE: 0.2 MG/DL
RBC # BLD AUTO: 4 X10ˆ6/UL (ref 3.8–5.3)
RH BLOOD TYPE: POSITIVE
WBC # BLD AUTO: 8.3 X10ˆ3/UL (ref 4–11)

## 2020-01-03 PROCEDURE — 99214 OFFICE O/P EST MOD 30 MIN: CPT

## 2020-01-03 PROCEDURE — 81002 URINALYSIS NONAUTO W/O SCOPE: CPT | Performed by: FAMILY MEDICINE

## 2020-01-03 PROCEDURE — 99204 OFFICE O/P NEW MOD 45 MIN: CPT

## 2020-01-03 PROCEDURE — 99223 1ST HOSP IP/OBS HIGH 75: CPT | Performed by: INTERNAL MEDICINE

## 2020-01-03 PROCEDURE — 36415 COLL VENOUS BLD VENIPUNCTURE: CPT

## 2020-01-03 PROCEDURE — 80047 BASIC METABLC PNL IONIZED CA: CPT

## 2020-01-03 PROCEDURE — 85025 COMPLETE CBC W/AUTO DIFF WBC: CPT | Performed by: FAMILY MEDICINE

## 2020-01-03 PROCEDURE — 81002 URINALYSIS NONAUTO W/O SCOPE: CPT

## 2020-01-03 PROCEDURE — 74176 CT ABD & PELVIS W/O CONTRAST: CPT | Performed by: FAMILY MEDICINE

## 2020-01-03 RX ORDER — SODIUM CHLORIDE 9 MG/ML
1000 INJECTION, SOLUTION INTRAVENOUS ONCE
Status: DISCONTINUED | OUTPATIENT
Start: 2020-01-03 | End: 2020-01-03

## 2020-01-03 NOTE — TELEPHONE ENCOUNTER
Action Requested: Summary for Provider     []  Critical Lab, Recommendations Needed  [] Need Additional Advice  [x]   FYI    []   Need Orders  [] Need Medications Sent to Pharmacy  []  Other     SUMMARY: Patient calling with left lower quadrant abdominal p

## 2020-01-04 VITALS
DIASTOLIC BLOOD PRESSURE: 71 MMHG | SYSTOLIC BLOOD PRESSURE: 125 MMHG | HEART RATE: 69 BPM | RESPIRATION RATE: 20 BRPM | TEMPERATURE: 98 F | OXYGEN SATURATION: 97 % | BODY MASS INDEX: 42 KG/M2 | WEIGHT: 271.19 LBS

## 2020-01-04 PROBLEM — K57.80 PERFORATED DIVERTICULUM: Status: ACTIVE | Noted: 2020-01-04

## 2020-01-04 LAB
ANION GAP SERPL CALC-SCNC: 5 MMOL/L (ref 0–18)
BUN BLD-MCNC: 11 MG/DL (ref 7–18)
BUN/CREAT SERPL: 10.9 (ref 10–20)
CALCIUM BLD-MCNC: 8.8 MG/DL (ref 8.5–10.1)
CHLORIDE SERPL-SCNC: 104 MMOL/L (ref 98–112)
CO2 SERPL-SCNC: 28 MMOL/L (ref 21–32)
CREAT BLD-MCNC: 1.01 MG/DL (ref 0.55–1.02)
DEPRECATED RDW RBC AUTO: 41.7 FL (ref 35.1–46.3)
ERYTHROCYTE [DISTWIDTH] IN BLOOD BY AUTOMATED COUNT: 12.6 % (ref 11–15)
GLUCOSE BLD-MCNC: 97 MG/DL (ref 70–99)
HCT VFR BLD AUTO: 34.2 % (ref 35–48)
HGB BLD-MCNC: 11.6 G/DL (ref 12–16)
MCH RBC QN AUTO: 30.2 PG (ref 26–34)
MCHC RBC AUTO-ENTMCNC: 33.9 G/DL (ref 31–37)
MCV RBC AUTO: 89.1 FL (ref 80–100)
OSMOLALITY SERPL CALC.SUM OF ELEC: 283 MOSM/KG (ref 275–295)
PLATELET # BLD AUTO: 322 10(3)UL (ref 150–450)
POTASSIUM SERPL-SCNC: 3.7 MMOL/L (ref 3.5–5.1)
RBC # BLD AUTO: 3.84 X10(6)UL (ref 3.8–5.3)
SODIUM SERPL-SCNC: 137 MMOL/L (ref 136–145)
WBC # BLD AUTO: 6.1 X10(3) UL (ref 4–11)

## 2020-01-04 PROCEDURE — 99232 SBSQ HOSP IP/OBS MODERATE 35: CPT | Performed by: HOSPITALIST

## 2020-01-04 PROCEDURE — 99243 OFF/OP CNSLTJ NEW/EST LOW 30: CPT | Performed by: COLON & RECTAL SURGERY

## 2020-01-04 PROCEDURE — 5A09357 ASSISTANCE WITH RESPIRATORY VENTILATION, LESS THAN 24 CONSECUTIVE HOURS, CONTINUOUS POSITIVE AIRWAY PRESSURE: ICD-10-PCS | Performed by: HOSPITALIST

## 2020-01-04 RX ORDER — METOCLOPRAMIDE HYDROCHLORIDE 5 MG/ML
10 INJECTION INTRAMUSCULAR; INTRAVENOUS EVERY 8 HOURS PRN
Status: DISCONTINUED | OUTPATIENT
Start: 2020-01-04 | End: 2020-01-04

## 2020-01-04 RX ORDER — HYDROMORPHONE HYDROCHLORIDE 1 MG/ML
0.5 INJECTION, SOLUTION INTRAMUSCULAR; INTRAVENOUS; SUBCUTANEOUS EVERY 30 MIN PRN
Status: ACTIVE | OUTPATIENT
Start: 2020-01-04 | End: 2020-01-04

## 2020-01-04 RX ORDER — ONDANSETRON 2 MG/ML
4 INJECTION INTRAMUSCULAR; INTRAVENOUS EVERY 6 HOURS PRN
Status: DISCONTINUED | OUTPATIENT
Start: 2020-01-04 | End: 2020-01-04

## 2020-01-04 RX ORDER — BUPROPION HYDROCHLORIDE 300 MG/1
300 TABLET ORAL DAILY
Status: DISCONTINUED | OUTPATIENT
Start: 2020-01-04 | End: 2020-01-04

## 2020-01-04 RX ORDER — LEVOTHYROXINE SODIUM 0.12 MG/1
125 TABLET ORAL
Status: DISCONTINUED | OUTPATIENT
Start: 2020-01-04 | End: 2020-01-04

## 2020-01-04 RX ORDER — ENOXAPARIN SODIUM 100 MG/ML
0.5 INJECTION SUBCUTANEOUS DAILY
Status: DISCONTINUED | OUTPATIENT
Start: 2020-01-04 | End: 2020-01-04

## 2020-01-04 RX ORDER — SODIUM CHLORIDE 9 MG/ML
INJECTION, SOLUTION INTRAVENOUS CONTINUOUS
Status: ACTIVE | OUTPATIENT
Start: 2020-01-04 | End: 2020-01-04

## 2020-01-04 RX ORDER — AMOXICILLIN AND CLAVULANATE POTASSIUM 875; 125 MG/1; MG/1
1 TABLET, FILM COATED ORAL 2 TIMES DAILY
Qty: 20 TABLET | Refills: 0 | Status: SHIPPED | OUTPATIENT
Start: 2020-01-04 | End: 2020-01-14

## 2020-01-04 RX ORDER — ACETAMINOPHEN 325 MG/1
650 TABLET ORAL EVERY 6 HOURS PRN
Status: DISCONTINUED | OUTPATIENT
Start: 2020-01-04 | End: 2020-01-04

## 2020-01-04 RX ORDER — PANTOPRAZOLE SODIUM 40 MG/1
40 TABLET, DELAYED RELEASE ORAL
Status: DISCONTINUED | OUTPATIENT
Start: 2020-01-04 | End: 2020-01-04

## 2020-01-04 RX ORDER — SODIUM CHLORIDE 9 MG/ML
INJECTION, SOLUTION INTRAVENOUS CONTINUOUS
Status: DISCONTINUED | OUTPATIENT
Start: 2020-01-04 | End: 2020-01-04

## 2020-01-04 RX ORDER — POTASSIUM CHLORIDE 14.9 MG/ML
20 INJECTION INTRAVENOUS ONCE
Status: COMPLETED | OUTPATIENT
Start: 2020-01-04 | End: 2020-01-04

## 2020-01-04 RX ORDER — BUPROPION HYDROCHLORIDE 300 MG/1
600 TABLET ORAL NIGHTLY
Status: DISCONTINUED | OUTPATIENT
Start: 2020-01-04 | End: 2020-01-04

## 2020-01-04 RX ORDER — PANTOPRAZOLE SODIUM 40 MG/1
40 TABLET, DELAYED RELEASE ORAL DAILY
COMMUNITY
End: 2020-12-28

## 2020-01-04 RX ORDER — MORPHINE SULFATE 4 MG/ML
2 INJECTION, SOLUTION INTRAMUSCULAR; INTRAVENOUS EVERY 2 HOUR PRN
Status: DISCONTINUED | OUTPATIENT
Start: 2020-01-04 | End: 2020-01-04

## 2020-01-04 RX ORDER — ONDANSETRON 2 MG/ML
4 INJECTION INTRAMUSCULAR; INTRAVENOUS EVERY 4 HOURS PRN
Status: DISCONTINUED | OUTPATIENT
Start: 2020-01-04 | End: 2020-01-04

## 2020-01-04 RX ORDER — MORPHINE SULFATE 4 MG/ML
4 INJECTION, SOLUTION INTRAMUSCULAR; INTRAVENOUS EVERY 2 HOUR PRN
Status: DISCONTINUED | OUTPATIENT
Start: 2020-01-04 | End: 2020-01-04

## 2020-01-04 RX ORDER — AMLODIPINE BESYLATE 2.5 MG/1
2.5 TABLET ORAL DAILY
Status: DISCONTINUED | OUTPATIENT
Start: 2020-01-04 | End: 2020-01-04

## 2020-01-04 RX ORDER — PRIMIDONE 50 MG/1
150 TABLET ORAL NIGHTLY
Status: DISCONTINUED | OUTPATIENT
Start: 2020-01-04 | End: 2020-01-04

## 2020-01-04 RX ORDER — GABAPENTIN 300 MG/1
300 CAPSULE ORAL NIGHTLY
Status: DISCONTINUED | OUTPATIENT
Start: 2020-01-04 | End: 2020-01-04

## 2020-01-04 NOTE — PLAN OF CARE
Upon assessment, VSS, afebrile. Pt denies abdominal pain currently. Pt denies chest pain/SOB. Tele monitor shows NSR. Abdomen is soft, nondistended, obese and mildly tender to LLQ. Pt states tenderness is improved. Bowel sounds present x4.  Pt reports passi needed  - Evaluate fluid balance  Outcome: Progressing  Goal: Maintains or returns to baseline bowel function  Description  INTERVENTIONS:  - Assess bowel function  - Maintain adequate hydration with IV or PO as ordered and tolerated  - Evaluate effectiven

## 2020-01-04 NOTE — PROGRESS NOTES
[facility]  Progress Note     Geneva Foil Patient Status:  Inpatient    3/15/1955 MRN [id number]   [facility] 3NW-A Attending Raffaele Martinez MD   Hosp Day # 0 PCP Jacquelyn Melchor MD     Chief Complaint: abd pain    S: Patient is ab Before breakfast   • primidone  150 mg Oral Nightly   • enoxaparin  0.5 mg/kg Subcutaneous Daily   • piperacillin-tazobactam  4.5 g Intravenous Q8H   • Pantoprazole Sodium  40 mg Oral QAM AC   • buPROPion HCl ER (XL)  600 mg Oral Nightly       ASSESSMENT /

## 2020-01-04 NOTE — CONSULTS
[facility] Pharmacy Note: Antimicrobial Weight Based Dose Adjustment for: piperacillin/tazobactam (Artis Cherry)    Johnnie Shah is a 60year old female who has been prescribed piperacillin/tazobactam (ZOSYN) 3.375 gm every once in ER    CrCl cannot be calculated (P

## 2020-01-04 NOTE — PROGRESS NOTES
[facility] Pharmacy Note: Antimicrobial Weight Based Dose Adjustment for: piperacillin/tazobactam (Juliocesar Sweetwater)    Janessa Luong is a 60year old female who has been prescribed piperacillin/tazobactam (ZOSYN) 3.375 gm every 8 hours.       CrCl cannot be calculated (P

## 2020-01-04 NOTE — ED PROVIDER NOTES
Patient Seen in: [facility] [address] [place] [address]      History   Patient presents with:  Abdominal Pain    Stated Complaint: abdominal pain 4 days    HPI    70-year-old female presents with complaints of acute left lower quadrant abdominal pain for t drinks      Types: 1 - 2 Standard drinks or equivalent per week      Frequency: 2-3 times a week      Drinks per session: 1 or 2      Binge frequency: Never      Comment: MIXED DRINKS WEEKLY    Drug use:  No             Review of Systems    Positive for sta from above the kidneys to below the urinary bladder. 2D rendering are generated on the CT scanner workstation to localize potential stones in the cranio-caudal plane. Dose reduction techniques were used.  Dose information is transmitted to the ACR (Americ chronic hyponatremia. A CT abdomen pelvis showed acute diverticulitis with the surrounding pericolonic inflammatory stranding and tiny adjacent contained perforation. Trace amount of free fluid appreciated. Finding discussed with patient at bedside.   Pa

## 2020-01-04 NOTE — ED PROVIDER NOTES
Patient Seen in: [facility] Emergency Department      History   Patient presents with:  Abdomen/Flank Pain    Stated Complaint: abd pain, perf diverticuli per IC    HPI  60year old female presents from immediate care for diverticulitis with perfora equivalent per week      Frequency: 2-3 times a week      Drinks per session: 1 or 2      Binge frequency: Never      Comment: MIXED DRINKS WEEKLY    Drug use: No             Review of Systems   All other systems reviewed and are negative.       Positive fo Neurological:      General: No focal deficit present. Mental Status: She is alert and oriented to person, place, and time. Psychiatric:         Mood and Affect: Mood normal.         Behavior: Behavior normal.         Thought Content:  Thought nader SPLEEN:  Normal.  No enlargement or focal lesion. AORTA/VASCULAR:  Normal.  No aneurysm. RETROPERITONEUM:  No enlarged adenopathy.   BOWEL/MESENTERY:  Inflammatory stranding surrounding the distal descending proximal sigmoid colon in the left anterior pel

## 2020-01-04 NOTE — H&P
[facility]  History and Physical     Veronica Edilia Patient Status:  Inpatient    3/15/1955 MRN [id number]   [facility] 3NW-A Attending Nii Gee MD   Hosp Day # 0 PCP Leo Zepeda MD     Chief Complaint:abd pain    Histor has never used smokeless tobacco. She reports current alcohol use of about 1.0 - 2.0 standard drinks of alcohol per week. She reports that she does not use drugs.     Family History:   Family History   Problem Relation Age of Onset   • Cancer Father every morning.  , Disp: , Rfl:         Review of Systems:   A comprehensive 14 point review of systems was completed. Pertinent positives and negatives noted in the HPI.     Physical Exam:    /77 (BP Location: Right arm)   Pulse 71   Temp 97.9 °F (

## 2020-01-04 NOTE — ED INITIAL ASSESSMENT (HPI)
Per patient, coming from immediate care with a diagnosis of diverticulitis and small bowel perforation. LLQ pain for 3 days 4/10. Last oral intake 1940- water and a piece of chocolate.

## 2020-01-04 NOTE — RESPIRATORY THERAPY NOTE
EDWAR Equipment Usage Summary :            Set Mode :AUTO CPAP W FLEX          Usage in Hours:4;30          90% Pressure (EPAP) : 12           90% Insp Pressure (IPAP);           AHI : 3.8          Supplemental Oxygen :      LPM

## 2020-01-04 NOTE — CONSULTS
[facility]  Report of Consultation    Estela Jensen Patient Status:  Emergency    3/15/1955 MRN [id number]   Location [address] [facility] Attending Pam Aparicio MD   [facility] Day # 0 PCP Sami Sher MD     Requesting Physici History of blood transfusion    • Internal hemorrhoids 12/15/2017   • Osteoarthritis    • Renal disorder    • Unspecified sleep apnea     CPAP     Past Surgical History:   Procedure Laterality Date   • ABDOMINOPLASTY Bilateral    • COLONOSCOPY  01/01/2007 Once    Review of Systems:  Review of Systems   Constitutional: Negative for appetite change, chills and fever. HENT: Negative for congestion, nosebleeds, postnasal drip, rhinorrhea and sore throat. Eyes: Negative for pain, discharge and itching.    Re sensory deficit or motor deficit. Skin: Skin is warm and dry. Psychiatric: She has a normal mood and affect.  Her behavior is normal.       Laboratory Data:  Recent Labs   Lab 01/03/20  1850   MCV 89.5   MCHC 33.5       Recent Labs   Lab 01/03/20  1852 may require surgical intervention with Kalin's procedure. 6. Thank you for consultation. We will continue to follow.     Time spent on counseling/coordination of care:  30 Minutes    Total time spent with patient:  39 Minutes    Blair Duran  1/3/2020 visit was spent in counseling the patient on the above listed diagnoses and treatment options.       Tiffany Parikh MD   [facility] Surgery  1/4/2020  2:20 PM

## 2020-01-04 NOTE — PROGRESS NOTES
[facility] Pharmacy Note:  Anticoagulation Weight Dose Adjustment for enoxaparin (LOVENOX)    Esdras Rueda is a 60year old female who has been prescribed enoxaparin (LOVENOX) 40 mg every 24 hours.       CrCl cannot be calculated (Patient's most recent lab res

## 2020-01-04 NOTE — PLAN OF CARE
Pt admitted to room 329, awake and alert, VSS, admission database completed, EDWAR protocol initiated, care plan discussed, pt states she saw Dr Magdalena Zamora in ER, awaiting admission orders.  Pt states abdominal pain is to LUQ and rates 3/10, denies need for inter Evaluate fluid balance  Outcome: Progressing  Goal: Maintains or returns to baseline bowel function  Description  INTERVENTIONS:  - Assess bowel function  - Maintain adequate hydration with IV or PO as ordered and tolerated  - Evaluate effectiveness of GI

## 2020-01-05 NOTE — PLAN OF CARE
NURSING DISCHARGE NOTE    Discharged Home via Wheelchair. Accompanied by Support staff  Belongings Taken by patient/family. Pt and spouse given/explained instructions. Verbalized understanding. RX for Amoxicillin picked up by pt's wife.  IV removed,

## 2020-01-06 ENCOUNTER — TELEPHONE (OUTPATIENT)
Dept: INTERNAL MEDICINE CLINIC | Facility: CLINIC | Age: 65
End: 2020-01-06

## 2020-01-06 ENCOUNTER — TELEPHONE (OUTPATIENT)
Dept: GASTROENTEROLOGY | Facility: CLINIC | Age: 65
End: 2020-01-06

## 2020-01-06 DIAGNOSIS — K57.92 ACUTE DIVERTICULITIS: Primary | ICD-10-CM

## 2020-01-06 NOTE — TELEPHONE ENCOUNTER
Dr. Doug Sim, patient is requesting a referral for Dr. Bart Segovia. Patient was seen in the ER on 01/03/2020 for Acute Diverticulitis. Referral has been pended, please advise.      Please reply to pool: EM TRIAGE SUPPORT

## 2020-01-06 NOTE — TELEPHONE ENCOUNTER
Spoke to the pt.     Date, time and location verified    Future Appointments   Date Time Provider Manasa Malik   1/9/2020  3:00 PM Nesha Henry MD MS[facility] [address] [facility]   1/22/2020  3:20 PM Gallito Rivas MD [facility] EMG Karen   1/29/20

## 2020-01-06 NOTE — TELEPHONE ENCOUNTER
Patient is requesting referral to see Dr. Ajay Parnell for a hospital follow up/ surgery consult. Please advise.

## 2020-01-06 NOTE — TELEPHONE ENCOUNTER
Patient's primary care physicians, gastroenterologist and all of the providers are at [place] [address].  She was admitted for an emergency to [facility] and hence was seen by a covering surgeon.   In the setting of an acute diverticulitis, there is no reason to go in

## 2020-01-06 NOTE — TELEPHONE ENCOUNTER
Patient was seen in the hospital for diverticulitis, patient requesting 2 week hospital follow up appointment, please call [phone].

## 2020-01-08 ENCOUNTER — PATIENT MESSAGE (OUTPATIENT)
Dept: GASTROENTEROLOGY | Facility: CLINIC | Age: 65
End: 2020-01-08

## 2020-01-08 NOTE — TELEPHONE ENCOUNTER
From: [facility]  To: Rosalba Guerin MD  Sent: 1/8/2020 10:35 AM CST  Subject: Non-Urgent Medical Question    Dr. Yvonne Antony,  I was admitted to the hospital on 1/3 for diverticulitis and released on 1/4 with instructions to eat a low fiber diet.   I have a

## 2020-01-22 ENCOUNTER — APPOINTMENT (OUTPATIENT)
Dept: LAB | Age: 65
End: 2020-01-22
Attending: Other
Payer: COMMERCIAL

## 2020-01-22 ENCOUNTER — OFFICE VISIT (OUTPATIENT)
Dept: NEUROLOGY | Facility: CLINIC | Age: 65
End: 2020-01-22

## 2020-01-22 VITALS
SYSTOLIC BLOOD PRESSURE: 132 MMHG | RESPIRATION RATE: 16 BRPM | WEIGHT: 280 LBS | DIASTOLIC BLOOD PRESSURE: 78 MMHG | HEART RATE: 74 BPM | BODY MASS INDEX: 44 KG/M2

## 2020-01-22 DIAGNOSIS — M48.061 SPINAL STENOSIS OF LUMBAR REGION WITHOUT NEUROGENIC CLAUDICATION: ICD-10-CM

## 2020-01-22 DIAGNOSIS — R20.2 PARESTHESIA OF BOTH FEET: Primary | ICD-10-CM

## 2020-01-22 DIAGNOSIS — R20.2 PARESTHESIA OF BOTH FEET: ICD-10-CM

## 2020-01-22 PROCEDURE — 36415 COLL VENOUS BLD VENIPUNCTURE: CPT

## 2020-01-22 PROCEDURE — 99244 OFF/OP CNSLTJ NEW/EST MOD 40: CPT | Performed by: OTHER

## 2020-01-22 PROCEDURE — 83036 HEMOGLOBIN GLYCOSYLATED A1C: CPT

## 2020-01-22 NOTE — PROGRESS NOTES
The patient states right foot numbness which occurs while laying down in bed. This started about a year ago. Lower back pain on and off for about a year ago. Denies difficulty bending. Denies balance issues or difficulty walking. Denies recent falls.

## 2020-01-22 NOTE — PROGRESS NOTES
HPI:    Patient ID: Janessa Luong is a 60year old female.   Referring provider: Dr Magen Martin    HPI    Carolyn Hartley is a pleasant 60year old female with history of lumbar degenerative spine disease s/p lumbar surgery, essential tremors, hypertension, hypot REPLACEMENT Right 10/14/16      Family History   Problem Relation Age of Onset   • Cancer Father         prostate   • Heart Disease Mother         CVA   • Depression Mother    • Hypertension Mother    • Other (Colonic polyps) Mother    • Depression Sister buPROPion HCl ER, XL, 300 MG Oral Tablet 24 Hr Take 600 mg by mouth daily. Take 2 pills to equal 600mg.  In the morning       • Levothyroxine Sodium 125 MCG Oral Tab Take 1 tablet (125 mcg total) by mouth before breakfast. 90 tablet 2   • Cyanocobalamin (B- muscle groups  Reflexes: symmetric and present  Coordination: Intact   Gait: Normal based and steady.  Intact tandem walk with mild difficulty           ASSESSMENT/PLAN:     (R20.2) Paresthesia of both feet  (primary encounter diagnosis)  Plan: EMG (EDWARD

## 2020-01-23 LAB
EST. AVERAGE GLUCOSE BLD GHB EST-MCNC: 108 MG/DL (ref 68–126)
HBA1C MFR BLD HPLC: 5.4 % (ref ?–5.7)

## 2020-01-29 ENCOUNTER — OFFICE VISIT (OUTPATIENT)
Dept: GASTROENTEROLOGY | Facility: CLINIC | Age: 65
End: 2020-01-29

## 2020-01-29 VITALS
HEIGHT: 67 IN | HEART RATE: 73 BPM | DIASTOLIC BLOOD PRESSURE: 81 MMHG | SYSTOLIC BLOOD PRESSURE: 129 MMHG | BODY MASS INDEX: 44.13 KG/M2 | WEIGHT: 281.19 LBS

## 2020-01-29 DIAGNOSIS — K57.21 DIVERTICULITIS OF LARGE INTESTINE WITH PERFORATION WITH BLEEDING: Primary | ICD-10-CM

## 2020-01-29 DIAGNOSIS — K21.00 REFLUX ESOPHAGITIS: ICD-10-CM

## 2020-01-29 PROCEDURE — 99214 OFFICE O/P EST MOD 30 MIN: CPT | Performed by: INTERNAL MEDICINE

## 2020-01-29 RX ORDER — PREDNISONE 50 MG/1
TABLET ORAL
Qty: 3 TABLET | Refills: 0 | Status: SHIPPED | OUTPATIENT
Start: 2020-01-29 | End: 2020-02-25 | Stop reason: ALTCHOICE

## 2020-01-29 NOTE — PATIENT INSTRUCTIONS
Jasmeet Vidales is a 60year old year-old female, patient of Dr. Maria Esther Alatorre with history of diverticulosis, sleep apnea, depression, hypothyroidism, HTN, who presents for follow up for recent diverticulitis    1.  Diverticulitis with contained perforation  Karen Gray

## 2020-01-29 NOTE — PROGRESS NOTES
[address] Gastroenterology  Clinic Follow-up Visit    No chief complaint on file.         Subjective/HPI:   Madonna Garay is a 60year old year-old female, patient of Dr. Perfecto Jordan with history of diverticulosis, sleep apnea, depression, hypothyroid · Fragments of gastric mucosa with lamina propria vascular ectasia, and focal mild inactive chronic inflammation.   · Diff Quik stain (with appropriate control reactivity) is negative for H pylori microorganisms.     In office today, pt presents for f/u f Performed by Ann Medina MD at [address] [place] [address] MAIN OR   • HIP REPLACEMENT SURGERY      10/14/16   • HIP TOTAL REPLACEMENT Right 10/14/2016    Performed by Ann Medina MD at [address] [place] [address] OR   • HYSTERECTOMY     • YOLI LOCALIZATION WIRE 1 SITE LEFT (CPT=19281) buPROPion HCl ER, XL, 300 MG Oral Tablet 24 Hr Take 600 mg by mouth daily. Take 2 pills to equal 600mg.  In the morning       • Levothyroxine Sodium 125 MCG Oral Tab Take 1 tablet (125 mcg total) by mouth before breakfast. 90 tablet 2   • Cyanocobalamin (B- affect, behavior is normal    Nursing note and vitals reviewed      Labs/Imaging:     Patient's labs and imaging were reviewed and discussed with patient today. See HPI and A&P for further details.      Component      Latest Ref Rng & Units 1/22/2020 1/4/2 1.12 - 1.32 mmol/L     ISTAT HEMATOCRIT      34 - 50 %     ISTAT GLUCOSE      70 - 99 mg/dL     ISTAT SAMPLE TYPE           ISTAT CREATININE      0.55 - 1.02 mg/dL     WBC      4.0 - 11.0 x10(3) uL  6.1   RBC      3.80 - 5.30 x10(6)uL  3.84   Hemoglobin Ketone, Urine      Negative mg/dL Negative   Specific Gravity, Urine      1.005, 1.010, 1.015, 1.020, 1.025, 1.030, >=1.030, <=1.005 1.010   Blood, Urine      Negative Trace-Intact (A)   PH, Urine      5.0 - 8.0 7.0   Protein urine      Negative mg/dL Ne days.          FINDINGS:    KIDNEYS:  No hydronephrosis, nephrolithiasis or obstructing urinary calculus  ADRENALS:  Normal.  No mass or enlargement. LIVER:  Unremarkable. BILIARY:  No biliary ductal dilatation. PANCREAS:  Unremarkable.     SPLEEN: esophagitis  Hold off until evaluation for diverticulitis      Recommend:  Pain gone and regular Bm so likely healed  Discussed CT first and then discuss colon and EGD vs surgery. Ordered CT with premedication.  Reaction to benadryl so just ordered predniso

## 2020-01-29 NOTE — TELEPHONE ENCOUNTER
Per  to cancel Pt procedure for Egd on 2/13/2020 at [facility] at 1015 Am . Send a changed request to Endo surgery scheduling for cancellation.

## 2020-02-06 ENCOUNTER — OFFICE VISIT (OUTPATIENT)
Dept: INTERNAL MEDICINE CLINIC | Facility: CLINIC | Age: 65
End: 2020-02-06

## 2020-02-06 VITALS
DIASTOLIC BLOOD PRESSURE: 78 MMHG | HEIGHT: 67 IN | OXYGEN SATURATION: 96 % | RESPIRATION RATE: 22 BRPM | BODY MASS INDEX: 44.42 KG/M2 | TEMPERATURE: 99 F | WEIGHT: 283 LBS | SYSTOLIC BLOOD PRESSURE: 130 MMHG | HEART RATE: 80 BPM

## 2020-02-06 DIAGNOSIS — K57.92 ACUTE DIVERTICULITIS: ICD-10-CM

## 2020-02-06 DIAGNOSIS — E03.9 HYPOTHYROIDISM, UNSPECIFIED TYPE: ICD-10-CM

## 2020-02-06 DIAGNOSIS — Z12.31 VISIT FOR SCREENING MAMMOGRAM: ICD-10-CM

## 2020-02-06 DIAGNOSIS — G47.33 OBSTRUCTIVE SLEEP APNEA SYNDROME: ICD-10-CM

## 2020-02-06 DIAGNOSIS — I10 ESSENTIAL HYPERTENSION: ICD-10-CM

## 2020-02-06 DIAGNOSIS — M25.572 ACUTE LEFT ANKLE PAIN: Primary | ICD-10-CM

## 2020-02-06 PROCEDURE — 1111F DSCHRG MED/CURRENT MED MERGE: CPT | Performed by: INTERNAL MEDICINE

## 2020-02-06 PROCEDURE — 99214 OFFICE O/P EST MOD 30 MIN: CPT | Performed by: INTERNAL MEDICINE

## 2020-02-06 RX ORDER — LEVOTHYROXINE SODIUM 0.12 MG/1
125 TABLET ORAL
Qty: 90 TABLET | Refills: 2 | Status: SHIPPED | OUTPATIENT
Start: 2020-02-06 | End: 2020-07-03

## 2020-02-06 NOTE — PATIENT INSTRUCTIONS
Problem List Items Addressed This Visit        Unprioritized    Acute left ankle pain - Primary     Patient with on and off episodes of foot pain on the right side.   She did have a large callus on the ball of the right foot which was extracted per Dr. Huntley Deter SCREENING BILAT (CPT=77067/31519)

## 2020-02-06 NOTE — PROGRESS NOTES
HPI:    Patient ID: Esdras Rueda is a 60year old female. Hospital F/U (01/03 - 01/04 upon presentation to ER WBC - 8.3.  CT showed acute diverticulitis left anterior pelvis involving distal descending & proximal sigmoid colon in an area of numerous all blood pressure medications at this time. No lower extremity swelling. Labs however shows continued decline in the renal functions.) since onset. The problem is controlled.  Pertinent negatives include no anxiety, palpitations, peripheral edema or swea Genitourinary: Negative. Negative for bladder incontinence. Musculoskeletal: Negative. Skin: Negative. Allergic/Immunologic: Negative. Neurological: Positive for tingling. Hematological: Negative. Psychiatric/Behavioral: Negative. Body mass index is 44.32 kg/m². PHYSICAL EXAM:   Physical Exam   Constitutional: She is oriented to person, place, and time. She appears well-developed and well-nourished.    HENT:   Right Ear: External ear normal.   Left Ear: External ear normal. time.  Okay to continue with triamterene hydrochlorothiazide 1 capsule 2 times a week. Avoid ibuprofen, Advil, Aleve like medications.          Relevant Orders    CBC WITH DIFFERENTIAL WITH PLATELET    COMP METABOLIC PANEL (14)    LIPID PANEL    ASSAY, THY UNDERSTANDS AND AGREES TO FOLLOW DIRECTIONS AND ADVICE    Orders Placed This Encounter      CBC With Differential With Platelet      Comp Metabolic Panel (14)      Lipid Panel      Assay, Thyroid Stim Hormone      Urinalysis, Routine      Meds This Visit:

## 2020-02-07 ENCOUNTER — HOSPITAL ENCOUNTER (OUTPATIENT)
Dept: GENERAL RADIOLOGY | Age: 65
Discharge: HOME | End: 2020-02-07
Attending: INTERNAL MEDICINE
Payer: COMMERCIAL

## 2020-02-07 ENCOUNTER — PATIENT MESSAGE (OUTPATIENT)
Dept: INTERNAL MEDICINE CLINIC | Facility: CLINIC | Age: 65
End: 2020-02-07

## 2020-02-07 DIAGNOSIS — M25.572 ACUTE LEFT ANKLE PAIN: ICD-10-CM

## 2020-02-07 PROCEDURE — 73600 X-RAY EXAM OF ANKLE: CPT | Performed by: INTERNAL MEDICINE

## 2020-02-14 ENCOUNTER — HOSPITAL ENCOUNTER (OUTPATIENT)
Dept: CT IMAGING | Facility: HOSPITAL | Age: 65
Discharge: HOME | End: 2020-02-14
Attending: INTERNAL MEDICINE
Payer: COMMERCIAL

## 2020-02-14 DIAGNOSIS — K57.21 DIVERTICULITIS OF LARGE INTESTINE WITH PERFORATION WITH BLEEDING: ICD-10-CM

## 2020-02-14 LAB — CREAT BLD-MCNC: 1 MG/DL (ref 0.55–1.02)

## 2020-02-14 PROCEDURE — 82565 ASSAY OF CREATININE: CPT

## 2020-02-14 PROCEDURE — 74177 CT ABD & PELVIS W/CONTRAST: CPT | Performed by: INTERNAL MEDICINE

## 2020-02-18 ENCOUNTER — PATIENT MESSAGE (OUTPATIENT)
Dept: GASTROENTEROLOGY | Facility: CLINIC | Age: 65
End: 2020-02-18

## 2020-02-18 ENCOUNTER — TELEPHONE (OUTPATIENT)
Dept: GASTROENTEROLOGY | Facility: CLINIC | Age: 65
End: 2020-02-18

## 2020-02-18 DIAGNOSIS — K20.90 ESOPHAGITIS, UNSPECIFIED: Primary | ICD-10-CM

## 2020-02-18 NOTE — TELEPHONE ENCOUNTER
From: Denise Sommer  To: Ernesto Boo MD  Sent: 2/18/2020 9:33 AM CST  Subject: Test Results Question    Dr. Sean Mendez,  Have you seen the results of my CT scan and what is the course of action as a result of the CT?   Thanks,  [facility]

## 2020-02-18 NOTE — TELEPHONE ENCOUNTER
Copied from result note documentation:    Notes recorded by Bella Hutchinson MD on 2/18/2020 at [address] to schedule EGD now that diverticulitis resolved    Original order:  Repeat EGD with biopsies and possible dilation with MAC in 6-8 weeks at the

## 2020-02-18 NOTE — TELEPHONE ENCOUNTER
Patient was notified, will await procedure scheduling staff call. Inquires about dietary restrictions. Reports she is having a hard time with a low fiber diet. Enjoys berries, nuts and seeds. Fruits and vegetables.   How restrictive does she need to be

## 2020-02-18 NOTE — TELEPHONE ENCOUNTER
Left message on patients voicemail advising restarting fiber in her diet. Nuts and seeds in moderation. Encouraged to call with questions or concerns.

## 2020-02-19 NOTE — TELEPHONE ENCOUNTER
Scheduled for:  EGD w biopsy and poss dilation [id number]  Provider Name:  Dr Ximena Waldron  Date:  04/17/2020  Location:  [facility]  Sedation:  [facility]  Time:  2:45 .(pt is aware to arrive at 1:45pm)  Prep:  NPO after midnight  Meds/Allergies Reconciled?:  Physician reviewed  D

## 2020-02-20 ENCOUNTER — PROCEDURE VISIT (OUTPATIENT)
Dept: NEUROLOGY | Facility: CLINIC | Age: 65
End: 2020-02-20

## 2020-02-20 DIAGNOSIS — R20.2 PARESTHESIA: ICD-10-CM

## 2020-02-20 PROCEDURE — 95886 MUSC TEST DONE W/N TEST COMP: CPT | Performed by: OTHER

## 2020-02-20 PROCEDURE — 95909 NRV CNDJ TST 5-6 STUDIES: CPT | Performed by: OTHER

## 2020-02-25 ENCOUNTER — TELEPHONE (OUTPATIENT)
Dept: PODIATRY CLINIC | Facility: CLINIC | Age: 65
End: 2020-02-25

## 2020-02-25 ENCOUNTER — OFFICE VISIT (OUTPATIENT)
Dept: PODIATRY CLINIC | Facility: CLINIC | Age: 65
End: 2020-02-25

## 2020-02-25 ENCOUNTER — TELEPHONE (OUTPATIENT)
Dept: INTERNAL MEDICINE CLINIC | Facility: CLINIC | Age: 65
End: 2020-02-25

## 2020-02-25 VITALS — DIASTOLIC BLOOD PRESSURE: 89 MMHG | SYSTOLIC BLOOD PRESSURE: 135 MMHG | HEART RATE: 69 BPM | RESPIRATION RATE: 16 BRPM

## 2020-02-25 DIAGNOSIS — M25.571 SINUS TARSI SYNDROME, RIGHT: Primary | ICD-10-CM

## 2020-02-25 DIAGNOSIS — M79.671 ARCH PAIN, RIGHT: ICD-10-CM

## 2020-02-25 DIAGNOSIS — M25.572 ACUTE LEFT ANKLE PAIN: Primary | ICD-10-CM

## 2020-02-25 DIAGNOSIS — M77.31 CALCANEAL SPUR OF RIGHT FOOT: ICD-10-CM

## 2020-02-25 DIAGNOSIS — M79.671 INFLAMMATORY HEEL PAIN, RIGHT: ICD-10-CM

## 2020-02-25 DIAGNOSIS — M72.2 PLANTAR FASCIITIS OF RIGHT FOOT: ICD-10-CM

## 2020-02-25 PROCEDURE — 99203 OFFICE O/P NEW LOW 30 MIN: CPT | Performed by: PODIATRIST

## 2020-02-25 PROCEDURE — 20605 DRAIN/INJ JOINT/BURSA W/O US: CPT | Performed by: PODIATRIST

## 2020-02-25 PROCEDURE — 20550 NJX 1 TENDON SHEATH/LIGAMENT: CPT | Performed by: PODIATRIST

## 2020-02-25 RX ORDER — TRIAMCINOLONE ACETONIDE 40 MG/ML
20 INJECTION, SUSPENSION INTRA-ARTICULAR; INTRAMUSCULAR ONCE
Status: COMPLETED | OUTPATIENT
Start: 2020-02-25 | End: 2020-02-25

## 2020-02-25 RX ADMIN — TRIAMCINOLONE ACETONIDE 20 MG: 40 INJECTION, SUSPENSION INTRA-ARTICULAR; INTRAMUSCULAR at 16:05:00

## 2020-02-25 NOTE — TELEPHONE ENCOUNTER
Patient is scheduled to see dr Isidoro Abreu on Monday - March 16, 2020 -  Requesting a new referral to cover poss. Additional visits with Podiatry.

## 2020-02-25 NOTE — PROGRESS NOTES
Per Dr Saqib Martin, draw up two syringes of 0.5mL of 0.5% Marcaine and 0.5mL of Kenalog 40 for injection to  right foot. KP    Patient provided education handout for cortisone injection. Patient left office prior to obtaining post injection vitals.

## 2020-02-25 NOTE — PROCEDURES
Levine, Susan. \Hospital Has a New Name and Outlook.\""  [address] [facility]  Phone- [phone]  Nerve Conduction & Electromyography Report            Patient: Elieser Caballero  Patient ID: [id number]  Sex: Female  YOB: 1955  Age: 60 Ye L. VAST MEDIALIS N None None None None N N N N   L. PERON LONGUS N None None None None N N N Reduced   R. TIB ANTERIOR N None None None None N N N N   R. GASTROCN (MED) N None None None None 1+ N N Reduced   R.  VAST MEDIALIS N None None None None N N N N

## 2020-02-28 NOTE — PROGRESS NOTES
Kenneth Vargas is a 60year old female. Patient presents with:  Consult: right foot pain. onset 2016, seems to have been after hip replacement. pain has been off and on and overall has increased in intensity.  pt wears work shoes and stands on concrete fo daily. Apply bid to lower legs as needed for rash 80 g 1   • loratadine 10 MG Oral Tab Take 10 mg by mouth nightly. • Brexpiprazole (REXULTI) 0.5 MG Oral Tab Take by mouth daily.      • buPROPion HCl ER, XL, 300 MG Oral Tablet 24 Hr Take 600 mg by mouth Social History    Socioeconomic History      Marital status:       Spouse name: Not on file      Number of children: Not on file      Years of education: Not on file      Highest education level: Not on file    Tobacco Use      Smoking status: right foot  -     triamcinolone acetonide (KENALOG-40) 40 MG/ML injection 20 mg  -     [facility] FOR MANAGED CARE AUTH    Plantar fasciitis of right foot  -     triamcinolone acetonide (KENALOG-40) 40 MG/ML injection 20 mg  -     [facility] PRO

## 2020-03-03 ENCOUNTER — TELEPHONE (OUTPATIENT)
Dept: NEUROLOGY | Facility: CLINIC | Age: 65
End: 2020-03-03

## 2020-03-03 NOTE — TELEPHONE ENCOUNTER
EMG results left on VM (ok per HIPAA consent).       ----- Message from Fritz Lam MD sent at 3/3/2020 10:35 AM CST -----  Mild sensory peripheral neuropathy consistent with patient's symptoms

## 2020-03-04 ENCOUNTER — HOSPITAL ENCOUNTER (OUTPATIENT)
Dept: MAMMOGRAPHY | Age: 65
Discharge: HOME | End: 2020-03-04
Attending: INTERNAL MEDICINE
Payer: COMMERCIAL

## 2020-03-04 DIAGNOSIS — Z12.31 VISIT FOR SCREENING MAMMOGRAM: ICD-10-CM

## 2020-03-04 PROCEDURE — 77063 BREAST TOMOSYNTHESIS BI: CPT | Performed by: INTERNAL MEDICINE

## 2020-03-04 PROCEDURE — 77067 SCR MAMMO BI INCL CAD: CPT | Performed by: INTERNAL MEDICINE

## 2020-03-05 ENCOUNTER — OFFICE VISIT (OUTPATIENT)
Dept: NEUROLOGY | Facility: CLINIC | Age: 65
End: 2020-03-05

## 2020-03-05 VITALS
DIASTOLIC BLOOD PRESSURE: 74 MMHG | SYSTOLIC BLOOD PRESSURE: 124 MMHG | WEIGHT: 283 LBS | HEIGHT: 67 IN | RESPIRATION RATE: 13 BRPM | BODY MASS INDEX: 44.42 KG/M2 | HEART RATE: 78 BPM

## 2020-03-05 DIAGNOSIS — G60.8 SENSORY PERIPHERAL NEUROPATHY: ICD-10-CM

## 2020-03-05 DIAGNOSIS — R20.2 PARESTHESIA OF BOTH FEET: ICD-10-CM

## 2020-03-05 PROCEDURE — 99213 OFFICE O/P EST LOW 20 MIN: CPT | Performed by: OTHER

## 2020-03-05 RX ORDER — GABAPENTIN 100 MG/1
100 CAPSULE ORAL 2 TIMES DAILY
Qty: 60 CAPSULE | Refills: 2 | Status: SHIPPED | OUTPATIENT
Start: 2020-03-05 | End: 2020-04-23

## 2020-03-05 NOTE — PATIENT INSTRUCTIONS
After your visit at the [facility] office  today, please direct any follow up questions or medication needs to the staff in our [place] office so that your concerns may be promptly addressed.   We are available through [employer] or at the numbers below: be picked up in office. • Please allow the office 2-3 business days to fill the prescription. • Patient must present photo ID at time of . PLEASE NOTE: PRESCRIPTIONS MUST BE PICKED UP PRIOR TO 3:00PM MONDAY-FRIDAY    Scheduling Tests:     If your submitting forms to office staff. • Form completion may require an additional fee. • A signed Release of Information (KEESHA) must be on file before forms may be submitted. When dropping off forms, please ask the  for this paper.    • Failure

## 2020-03-05 NOTE — PROGRESS NOTES
HPI:    Patient ID: Jana Powers is a 60year old female. Referring provider: Dr Paul Stack    HPI      Patient presented for follow up. Tingling and numbness in toes remains the same. No pain reported today.   EMG and NCS completed revealed bilateral damon SURGERY      10/14/16   • HIP TOTAL REPLACEMENT Right 10/14/2016    Performed by Rush Lindo MD at [address] [facility] MAIN OR   • HYSTERECTOMY     • YOLI LOCALIZATION WIRE 1 SITE LEFT (CPT=19281)      1998   • REDUCTION LEFT      2000 Bilateral   • REDUCTION RIGHT TABLET BY MOUTH ONE TIME A DAY 90 tablet 2   • Triamterene-HCTZ 37.5-25 MG Oral Tab 1 tablet 2 times a week on Monday and Friday 24 tablet 2   • primidone 50 MG Oral Tab 3 pills qhs 270 tablet 3   • gabapentin 300 MG Oral Cap Take 1 capsule (300 mg total) fields intact. V: Normal facial sensation   VII: Face is symmetric with normal strength. VIII: Normal hearing bilaterally. IX, X: Symmetric palate elevation. Uvula in midline. XI: Normal sternocleidomastoid and trapezius strength.    XII: Tongue is

## 2020-03-13 ENCOUNTER — HOSPITAL ENCOUNTER (OUTPATIENT)
Dept: MAMMOGRAPHY | Facility: HOSPITAL | Age: 65
Discharge: HOME | End: 2020-03-13
Attending: INTERNAL MEDICINE
Payer: COMMERCIAL

## 2020-03-13 DIAGNOSIS — R92.2 INCONCLUSIVE MAMMOGRAM: ICD-10-CM

## 2020-03-13 PROCEDURE — 76642 ULTRASOUND BREAST LIMITED: CPT | Performed by: INTERNAL MEDICINE

## 2020-03-13 PROCEDURE — 77065 DX MAMMO INCL CAD UNI: CPT | Performed by: INTERNAL MEDICINE

## 2020-03-13 PROCEDURE — 77061 BREAST TOMOSYNTHESIS UNI: CPT | Performed by: INTERNAL MEDICINE

## 2020-03-16 ENCOUNTER — TELEPHONE (OUTPATIENT)
Dept: PODIATRY CLINIC | Facility: CLINIC | Age: 65
End: 2020-03-16

## 2020-03-16 ENCOUNTER — OFFICE VISIT (OUTPATIENT)
Dept: PODIATRY CLINIC | Facility: CLINIC | Age: 65
End: 2020-03-16

## 2020-03-16 DIAGNOSIS — M77.31 CALCANEAL SPUR OF RIGHT FOOT: Primary | ICD-10-CM

## 2020-03-16 DIAGNOSIS — M79.671 ARCH PAIN, RIGHT: ICD-10-CM

## 2020-03-16 DIAGNOSIS — M72.2 PLANTAR FASCIITIS OF RIGHT FOOT: ICD-10-CM

## 2020-03-16 DIAGNOSIS — M79.671 INFLAMMATORY HEEL PAIN, RIGHT: ICD-10-CM

## 2020-03-16 DIAGNOSIS — M25.571 SINUS TARSI SYNDROME, RIGHT: ICD-10-CM

## 2020-03-16 PROCEDURE — 29799 UNLISTED PX CASTING/STRPG: CPT | Performed by: PODIATRIST

## 2020-03-16 PROCEDURE — L3000 FT INSERT UCB BERKELEY SHELL: HCPCS | Performed by: PODIATRIST

## 2020-03-17 ENCOUNTER — TELEPHONE (OUTPATIENT)
Dept: INTERNAL MEDICINE CLINIC | Facility: CLINIC | Age: 65
End: 2020-03-17

## 2020-03-17 DIAGNOSIS — G47.33 OBSTRUCTIVE SLEEP APNEA SYNDROME: Primary | ICD-10-CM

## 2020-03-17 NOTE — PROGRESS NOTES
Lory Nation is a 63year old female. Patient presents with: Follow - Up: LOV 2/25/20. right foot and ankle - no pain, cortisone injections at LOV  Orthotic Status: pt in office to have orthotics casted.  referral authorized 2/25/20-2/24/21        HPI: Oral Tab Take 60 mg by mouth every morning.           Past Medical History:   Diagnosis Date   • CKD (chronic kidney disease)    • Colon polyp 12/2017   • Depression    • Disorder of thyroid     hypothyroidism   • Diverticulosis 12/15/2017   • High blood pr Standard drinks or equivalent per week        Frequency: 2-3 times a week        Drinks per session: 1 or 2        Binge frequency: Never        Comment: MIXED DRINKS WEEKLY      Drug use: No    Other Topics      Concerns:        Caffeine Concern:  Yes

## 2020-03-23 NOTE — TELEPHONE ENCOUNTER
Michael Jordan from [facility] stated the form he received the on 3/18 was an order. He needs a referral.    Please advise and refax referral to #[phone]. Thank you.

## 2020-03-23 NOTE — TELEPHONE ENCOUNTER
I tried to call Melisa Davis at number provided and they don't know who Melisa Davis is.   I spoke with Joellen and she said they have the referral.   I said good and if they had any other questions to give us a call

## 2020-04-10 ENCOUNTER — TELEPHONE (OUTPATIENT)
Dept: GASTROENTEROLOGY | Facility: CLINIC | Age: 65
End: 2020-04-10

## 2020-04-10 DIAGNOSIS — R13.10 DYSPHAGIA, UNSPECIFIED TYPE: ICD-10-CM

## 2020-04-10 DIAGNOSIS — K20.90 ESOPHAGITIS: Primary | ICD-10-CM

## 2020-04-10 NOTE — TELEPHONE ENCOUNTER
Patient states she received a call from the hospital cancelling her procedure on 04/17/2020. Procedure cancelled in Epic. Surgical case change sent to Endo. Please assist patient in rescheduling . Thank you.

## 2020-04-10 NOTE — TELEPHONE ENCOUNTER
Scheduled for:  EGD w/poss Dil and biopsy [id number]  Provider Name:  Dr. Carolyn Velasco  Date:  6/26/20  Location:    [facility]  Sedation:  MAC  Time:  1330 (pt is aware to arrive at 1230)   Prep:  Nothing to eat after midnight   Nothing to drink after 1000 the day of the pr

## 2020-04-20 ENCOUNTER — LAB ENCOUNTER (OUTPATIENT)
Dept: LAB | Age: 65
End: 2020-04-20
Attending: INTERNAL MEDICINE
Payer: COMMERCIAL

## 2020-04-20 DIAGNOSIS — I10 ESSENTIAL HYPERTENSION: ICD-10-CM

## 2020-04-20 PROCEDURE — 81003 URINALYSIS AUTO W/O SCOPE: CPT

## 2020-04-20 PROCEDURE — 84443 ASSAY THYROID STIM HORMONE: CPT

## 2020-04-20 PROCEDURE — 36415 COLL VENOUS BLD VENIPUNCTURE: CPT

## 2020-04-20 PROCEDURE — 80053 COMPREHEN METABOLIC PANEL: CPT

## 2020-04-20 PROCEDURE — 80061 LIPID PANEL: CPT

## 2020-04-20 PROCEDURE — 85025 COMPLETE CBC W/AUTO DIFF WBC: CPT

## 2020-04-23 ENCOUNTER — TELEMEDICINE (OUTPATIENT)
Dept: INTERNAL MEDICINE CLINIC | Facility: CLINIC | Age: 65
End: 2020-04-23

## 2020-04-23 DIAGNOSIS — M48.061 SPINAL STENOSIS OF LUMBAR REGION WITHOUT NEUROGENIC CLAUDICATION: ICD-10-CM

## 2020-04-23 DIAGNOSIS — I10 ESSENTIAL HYPERTENSION: ICD-10-CM

## 2020-04-23 DIAGNOSIS — G60.8 SENSORY PERIPHERAL NEUROPATHY: ICD-10-CM

## 2020-04-23 DIAGNOSIS — E03.9 HYPOTHYROIDISM, UNSPECIFIED TYPE: Primary | ICD-10-CM

## 2020-04-23 PROCEDURE — 99214 OFFICE O/P EST MOD 30 MIN: CPT | Performed by: INTERNAL MEDICINE

## 2020-04-23 RX ORDER — GABAPENTIN 300 MG/1
CAPSULE ORAL
Qty: 270 CAPSULE | Refills: 3 | Status: SHIPPED | OUTPATIENT
Start: 2020-04-23 | End: 2020-07-07

## 2020-04-23 NOTE — ASSESSMENT & PLAN NOTE
Patient does not have blood pressure monitor at home. Pressures look stable on her office visits but she has significant lower extremity edema. She is advised to continue on her triamterene 1-2 times a week.   She is on low-dose amlodipine at 2.5 mg daily

## 2020-04-23 NOTE — ASSESSMENT & PLAN NOTE
Patient with moderate to severe spinal canal stenosis as well as neuroforaminal stenosis noted on her last MRI in August 2019. She did have a history of a foot drop which resolved after her back surgery–laminectomy about 20 years back.   She is concerned a

## 2020-04-23 NOTE — ASSESSMENT & PLAN NOTE
Thyroid function tests have been stable on levothyroxine at 125 mcg daily. Recheck labs have been ordered.

## 2020-04-23 NOTE — PATIENT INSTRUCTIONS
Problem List Items Addressed This Visit        Unprioritized    Essential hypertension     Patient does not have blood pressure monitor at home. Pressures look stable on her office visits but she has significant lower extremity edema.   She is advised to c moderate to severe spinal canal stenosis as well as neuroforaminal stenosis noted on her last MRI in August 2019. She did have a history of a foot drop which resolved after her back surgery–laminectomy about 20 years back.   She is concerned about the claw

## 2020-04-23 NOTE — ASSESSMENT & PLAN NOTE
Patient with persistent bilateral feet numbness and tingling. MRI of the lumbar spine did show moderate to severe bilateral subarticular zone neuroforaminal narrowing. Patient has had laminectomy at these levels–L4-5 and L5-S1.   Patient tells me that she

## 2020-04-23 NOTE — PROGRESS NOTES
HPI:    Patient ID: Mame Julian is a 63year old female. Virtual/Telephone Check-In    Mame Julian verbally consents to a Virtual/ videoCheck-In service on 04/23/20.   Patient understands and accepts financial responsibility for any deductible, c peroneal motor response was normal in distal latency, low   in amplitude and normal in conduction velocity.  The L tibial and   L peroneal motor responses were normal. The R tibial motor   response was normal in distal latency, conduction velocity and no 1. Mild to moderate central canal stenosis at L2-3 and L3-4 secondary to degenerative disc bulge and endplate osteophyte complex. 2. L4-5 and L5-S1 demonstrate moderate to severe bilateral subarticular zone neural foraminal narrowing.   Patient ha Negative for bowel incontinence. Endocrine: Negative. Genitourinary: Negative. Negative for bladder incontinence. Musculoskeletal: Positive for arthralgias, back pain and gait problem. Skin: Negative. Allergic/Immunologic: Negative.     Neurolo Constitutional: She is oriented to person, place, and time. She appears well-developed and well-nourished. No distress. Pulmonary/Chest: Effort normal and breath sounds normal.   Abdominal: She exhibits no distension. There is no tenderness.    Musculos middle of the day without much improvement in symptoms. Advised to increase the gabapentin to 300 mg 3 times daily and the prescription has been provided. She is also been advised to follow-up with neurosurgery for an evaluation.   Would need to rule out

## 2020-04-24 ENCOUNTER — TELEPHONE (OUTPATIENT)
Dept: SURGERY | Facility: CLINIC | Age: 65
End: 2020-04-24

## 2020-04-24 ENCOUNTER — TELEPHONE (OUTPATIENT)
Dept: PODIATRY CLINIC | Facility: CLINIC | Age: 65
End: 2020-04-24

## 2020-04-24 NOTE — TELEPHONE ENCOUNTER
Pt can have tele or video visit unless she prefers to have in office appt. This is not an emergency, so we can schedule based on patient preference.

## 2020-04-27 NOTE — TELEPHONE ENCOUNTER
S/w pt and informed her that the orthotic company is currently closed with the COVID-19 pandemic and the processing of the orthotics will take longer than anticipated. Advised her we would notify her once we get the orthotics.

## 2020-04-29 ENCOUNTER — TELEMEDICINE (OUTPATIENT)
Dept: SURGERY | Facility: CLINIC | Age: 65
End: 2020-04-29

## 2020-04-29 DIAGNOSIS — M48.062 LUMBAR STENOSIS WITH NEUROGENIC CLAUDICATION: Primary | ICD-10-CM

## 2020-04-29 DIAGNOSIS — Z98.890 S/P LUMBAR LAMINECTOMY: ICD-10-CM

## 2020-04-29 PROCEDURE — 99242 OFF/OP CONSLTJ NEW/EST SF 20: CPT | Performed by: PHYSICIAN ASSISTANT

## 2020-04-29 NOTE — H&P
Neurosurgery [place] Consultation  2020    Justyna Wilkerson PCP:  Jl Faith MD    3/15/1955 MRN [id number]       CHIEF COMPLAINT:  No chief complaint on file. This visit is conducted using Telemedicine with live, interactive video and audio. Medication Sig Dispense Refill   • gabapentin 300 MG Oral Cap 1 capsule 3 times daily 270 capsule 3   • Levothyroxine Sodium 125 MCG Oral Tab Take 1 tablet (125 mcg total) by mouth before breakfast. 90 tablet 2   • Pantoprazole Sodium 40 MG Oral Tab EC T TOTAL REPLACEMENT Right 10/14/2016    Performed by Brandon Coffey MD at [facility] MAIN OR   • HYSTERECTOMY     • YOLI LOCALIZATION WIRE 1 SITE LEFT (CPT=19281)      1998   • REDUCTION LEFT      2000 Bilateral   • REDUCTION RIGHT     • SPINE SURGERY PROCEDURE UN gabapentin (300mg TID). Recommend lumbar ESIs. Referral placed to see pain management for this. Will obtain full set of lumbar xrays to evaluate for instability.   We discussed different surgical options, including laminectomy, hemilaminotomies, and poss

## 2020-05-01 ENCOUNTER — HOSPITAL ENCOUNTER (OUTPATIENT)
Dept: GENERAL RADIOLOGY | Age: 65
Discharge: HOME | End: 2020-05-01
Attending: PHYSICIAN ASSISTANT
Payer: COMMERCIAL

## 2020-05-01 ENCOUNTER — TELEPHONE (OUTPATIENT)
Dept: SURGERY | Facility: CLINIC | Age: 65
End: 2020-05-01

## 2020-05-01 DIAGNOSIS — Z98.890 S/P LUMBAR LAMINECTOMY: ICD-10-CM

## 2020-05-01 DIAGNOSIS — M48.062 LUMBAR STENOSIS WITH NEUROGENIC CLAUDICATION: ICD-10-CM

## 2020-05-01 PROCEDURE — 72114 X-RAY EXAM L-S SPINE BENDING: CPT | Performed by: PHYSICIAN ASSISTANT

## 2020-05-01 NOTE — TELEPHONE ENCOUNTER
Spoke with Mohit Boss from Radiology who requested clarification of provider's X-ray order. Called [facility] [place], [place] who confirmed that she would like images to include standard series including flexion and extension which equals to 7 views.     Returned call to Bj Barone

## 2020-05-09 ENCOUNTER — PATIENT MESSAGE (OUTPATIENT)
Dept: SURGERY | Facility: CLINIC | Age: 65
End: 2020-05-09

## 2020-05-11 ENCOUNTER — TELEPHONE (OUTPATIENT)
Dept: SURGERY | Facility: CLINIC | Age: 65
End: 2020-05-11

## 2020-05-11 ENCOUNTER — TELEPHONE (OUTPATIENT)
Dept: INTERNAL MEDICINE CLINIC | Facility: CLINIC | Age: 65
End: 2020-05-11

## 2020-05-11 DIAGNOSIS — M72.2 PLANTAR FASCIITIS: Primary | ICD-10-CM

## 2020-05-11 NOTE — TELEPHONE ENCOUNTER
Called pt and asked what she needed the referral to see Dr. Pattie Marroquin  Pt states that she has planter fasciatus and needs orthodics which are in and she needs to go in for those but needs a referral

## 2020-05-11 NOTE — TELEPHONE ENCOUNTER
At [address] [place] [address] on 4/29/20 with HOSEA Alvarado:    ASSESSMENT and PLAN:  1. Lumbar stenosis with neurogenic claudication. 2.  S/p L4 and L5 laminectomies with Dr. Jo Ann Juares at [facility] 20 years ago.     \"Reviewed imaging with the patient.

## 2020-05-11 NOTE — TELEPHONE ENCOUNTER
From: Joe Terrazas  To: Lisy Estrella PA-C  Sent: 5/9/2020 7:20 AM CDT  Subject: Visit Follow-up Question    Jae Burciaga,    Have you had a chance to look at my X-rays?  The pain has remained the same, but my neuropathy has gotten worse, I now feel it i

## 2020-05-15 ENCOUNTER — TELEMEDICINE (OUTPATIENT)
Dept: PAIN CLINIC | Facility: CLINIC | Age: 65
End: 2020-05-15

## 2020-05-15 DIAGNOSIS — M48.061 SPINAL STENOSIS OF LUMBAR REGION WITHOUT NEUROGENIC CLAUDICATION: Primary | ICD-10-CM

## 2020-05-15 PROCEDURE — 99203 OFFICE O/P NEW LOW 30 MIN: CPT | Performed by: ANESTHESIOLOGY

## 2020-05-15 NOTE — H&P
Telehealth outside of [address] Verbal Consent   I conducted a telehealth visit with Steff Abraham today, 05/15/20, which was completed using two-way, real-time interactive audio and video communication.  This has been done in good joaquín to pro is particularly bad at night. She is currently taking gabapentin with moderate improvement. She does not tolerate NSAIDs due to chronic kidney disease. She denies any chills, fever or weakness. She denies any bladder or bowel incontinence.       Past M ENDOSCOPY   • ESOPHAGOGASTRODUODENOSCOPY (EGD) N/A 12/13/2019    Performed by Jef Javed MD at [address] [place] [address] Right 11/22/2016    Performed by Joycelyn Manuel MD at [address] [facility]   • [address] [place] [address]      10/14/1 Examination of the lower back:   Observed in the sitting position with no discomfort. Able to get up without assistance. Gait is preserved.     Radiology diagnostic studies:   Lumbar MRI from August 2019 reviewed with evidence of moderate central [place]

## 2020-05-18 ENCOUNTER — TELEPHONE (OUTPATIENT)
Dept: PAIN CLINIC | Facility: CLINIC | Age: 65
End: 2020-05-18

## 2020-05-18 ENCOUNTER — TELEPHONE (OUTPATIENT)
Dept: GASTROENTEROLOGY | Facility: CLINIC | Age: 65
End: 2020-05-18

## 2020-05-18 DIAGNOSIS — R13.10 DYSPHAGIA, UNSPECIFIED TYPE: ICD-10-CM

## 2020-05-18 DIAGNOSIS — K20.90 ESOPHAGITIS, UNSPECIFIED: Primary | ICD-10-CM

## 2020-05-18 NOTE — TELEPHONE ENCOUNTER
Scheduled for:  EGD w/poss DIL and biopsy - [id number]  Provider Name:  Dr. Kapil Montemayor  Date:  FROM - 6/26/20           TO - 5/22/20  Location:   [facility]  Sedation:  MAC  Time:  FROM - 1:30 pm          TO - 8:00 am (pt is aware to arrive at 7:00 am)   Prep:  NPO after m

## 2020-05-18 NOTE — TELEPHONE ENCOUNTER
Medical clearance needed- no  Implanted cardiac device/SCS/PNS: n/a    Pt seen in OV today by Dr. Nadia Suárez and recommended for LESI (X 1). Please begin PA process for procedure(s). Laterality: n/a  Level(s): n/a    Pt informed callback will be given when PA feedback received and procedure date to be scheduled after approval.  All procedural instructions reviewed, procedure line number provided. Pt verbalized understanding and agreeable to plan. Copy of instructions provided.

## 2020-05-18 NOTE — TELEPHONE ENCOUNTER
Contacted pt to offer her earlier appt time of 5/21 or 5/22 with Dr. Garrison Essex. She is agreeable to 5/22 and understands that she will receive a call with her appt time.

## 2020-05-20 ENCOUNTER — LAB ENCOUNTER (OUTPATIENT)
Dept: LAB | Facility: HOSPITAL | Age: 65
End: 2020-05-20
Attending: INTERNAL MEDICINE
Payer: COMMERCIAL

## 2020-05-20 DIAGNOSIS — K57.92 ACUTE DIVERTICULITIS: ICD-10-CM

## 2020-05-20 DIAGNOSIS — Z01.818 PRE-OP TESTING: ICD-10-CM

## 2020-05-20 DIAGNOSIS — K57.80 PERFORATED DIVERTICULUM: ICD-10-CM

## 2020-05-20 NOTE — TELEPHONE ENCOUNTER
[facility] referral #[id number] initiated for coverage of LESI ([id number]), clinical notes and imaging reports attached directly to referral. Request pending review.

## 2020-05-22 ENCOUNTER — ANESTHESIA (OUTPATIENT)
Dept: ENDOSCOPY | Facility: HOSPITAL | Age: 65
End: 2020-05-22
Payer: COMMERCIAL

## 2020-05-22 ENCOUNTER — HOSPITAL ENCOUNTER (OUTPATIENT)
Facility: HOSPITAL | Age: 65
Setting detail: HOSPITAL OUTPATIENT SURGERY
Discharge: HOME | End: 2020-05-22
Attending: INTERNAL MEDICINE | Admitting: INTERNAL MEDICINE
Payer: COMMERCIAL

## 2020-05-22 ENCOUNTER — ANESTHESIA EVENT (OUTPATIENT)
Dept: ENDOSCOPY | Facility: HOSPITAL | Age: 65
End: 2020-05-22
Payer: COMMERCIAL

## 2020-05-22 DIAGNOSIS — R13.10 DYSPHAGIA, UNSPECIFIED TYPE: ICD-10-CM

## 2020-05-22 DIAGNOSIS — K57.92 ACUTE DIVERTICULITIS: Primary | ICD-10-CM

## 2020-05-22 DIAGNOSIS — K57.80 PERFORATED DIVERTICULUM: ICD-10-CM

## 2020-05-22 DIAGNOSIS — Z01.818 PRE-OP TESTING: ICD-10-CM

## 2020-05-22 DIAGNOSIS — K20.90 ESOPHAGITIS: ICD-10-CM

## 2020-05-22 PROCEDURE — 0DB48ZX EXCISION OF ESOPHAGOGASTRIC JUNCTION, VIA NATURAL OR ARTIFICIAL OPENING ENDOSCOPIC, DIAGNOSTIC: ICD-10-PCS | Performed by: INTERNAL MEDICINE

## 2020-05-22 PROCEDURE — 43239 EGD BIOPSY SINGLE/MULTIPLE: CPT | Performed by: INTERNAL MEDICINE

## 2020-05-22 RX ORDER — GLYCOPYRROLATE 0.2 MG/ML
INJECTION, SOLUTION INTRAMUSCULAR; INTRAVENOUS AS NEEDED
Status: DISCONTINUED | OUTPATIENT
Start: 2020-05-22 | End: 2020-05-22 | Stop reason: SURG

## 2020-05-22 RX ORDER — SODIUM CHLORIDE, SODIUM LACTATE, POTASSIUM CHLORIDE, CALCIUM CHLORIDE 600; 310; 30; 20 MG/100ML; MG/100ML; MG/100ML; MG/100ML
INJECTION, SOLUTION INTRAVENOUS CONTINUOUS
Status: DISCONTINUED | OUTPATIENT
Start: 2020-05-22 | End: 2020-05-22

## 2020-05-22 RX ORDER — NALOXONE HYDROCHLORIDE 0.4 MG/ML
80 INJECTION, SOLUTION INTRAMUSCULAR; INTRAVENOUS; SUBCUTANEOUS AS NEEDED
Status: DISCONTINUED | OUTPATIENT
Start: 2020-05-22 | End: 2020-05-22

## 2020-05-22 RX ORDER — LIDOCAINE HYDROCHLORIDE 10 MG/ML
INJECTION, SOLUTION EPIDURAL; INFILTRATION; INTRACAUDAL; PERINEURAL AS NEEDED
Status: DISCONTINUED | OUTPATIENT
Start: 2020-05-22 | End: 2020-05-22 | Stop reason: SURG

## 2020-05-22 RX ADMIN — GLYCOPYRROLATE 0.2 MG: 0.2 INJECTION, SOLUTION INTRAMUSCULAR; INTRAVENOUS at 08:06:00

## 2020-05-22 RX ADMIN — LIDOCAINE HYDROCHLORIDE 70 MG: 10 INJECTION, SOLUTION EPIDURAL; INFILTRATION; INTRACAUDAL; PERINEURAL at 08:06:00

## 2020-05-22 RX ADMIN — SODIUM CHLORIDE, SODIUM LACTATE, POTASSIUM CHLORIDE, CALCIUM CHLORIDE: 600; 310; 30; 20 INJECTION, SOLUTION INTRAVENOUS at 08:23:00

## 2020-05-22 NOTE — ANESTHESIA POSTPROCEDURE EVALUATION
Patient: Johann Benjamin    Procedure Summary     Date:  05/22/20 Room / Location:  [facility] ENDOSCOPY 04 / [facility] ENDOSCOPY    Anesthesia Start:  0804 Anesthesia Stop:  2794    Procedure:  ESOPHAGOGASTRODUODENOSCOPY (EGD) (N/A ) Diagnosis:       Esophagitis

## 2020-05-22 NOTE — H&P
Pre Procedure History & Physical Examination    Patient Name: Mame Julian  MRN: [id number]  CSN: [id number]  YOB: 1955    Diagnosis: follow up for esophagitis    gabapentin 300 MG Oral Cap, 1 capsule 3 times daily, Disp: 270 capsule, Rfl Diagnosis Date   • CKD (chronic kidney disease)    • Colon polyp 12/2017   • Depression    • Disorder of thyroid     hypothyroidism   • Diverticulosis 12/15/2017   • High blood pressure    • History of blood transfusion    • Internal hemorrhoids 12/15/20 breath  CARDIOVASCULAR:  negative for crushing sub-sternal chest pain  GASTROINTESTINAL:  see HPI  GENITOURINARY:  negative for dysuria or gross hematuria  INTEGUMENT/BREAST:  SKIN:  negative for jaundice   ALLERGIC/IMMUNOLOGIC:  negative for hay fever  EN

## 2020-05-22 NOTE — ANESTHESIA PREPROCEDURE EVALUATION
Anesthesia PreOp Note    HPI:     Kenneth Vargas is a 63year old female who presents for preoperative consultation requested by: Leticia Kingston MD    Date of Surgery: 5/22/2020    Procedure(s):  ESOPHAGOGASTRODUODENOSCOPY (EGD)  Indication: Esophagitis, Hypothyroidism         Date Noted: 05/22/2008      Morbid obesity ([facility] [address]         Date Noted: 03/15/2005        Past Medical History:   Diagnosis Date   • CKD (chronic kidney disease)    • Colon polyp 12/2017   • Depression    • Disorder of thyroid     hyp Brexpiprazole (REXULTI) 0.5 MG Oral Tab, Take by mouth daily. , Disp: , Rfl: , 5/22/2020  buPROPion HCl ER, XL, 300 MG Oral Tablet 24 Hr, Take 600 mg by mouth daily. Take 2 pills to equal 600mg.  In the morning  , Disp: , Rfl: , 5/22/2020  Cyanocobalamin (B- Smokeless tobacco: Never Used    Substance and Sexual Activity      Alcohol use:  Yes        Alcohol/week: 1.0 - 2.0 standard drinks        Types: 1 - 2 Standard drinks or equivalent per week        Frequency: 2-4 times a month        Drinks per session RBC 4.23 04/20/2020    HGB 12.8 04/20/2020    HCT 40.2 04/20/2020    MCV 95.0 04/20/2020    MCH 30.3 04/20/2020    MCHC 31.8 04/20/2020    RDW 14.6 04/20/2020    .0 04/20/2020     Lab Results   Component Value Date     (L) 04/20/2020    K 4.1

## 2020-05-22 NOTE — OPERATIVE REPORT
ESOPHAGOGASTRODUODENOSCOPY (EGD) REPORT    Hermiloantony Aguirre    MARIA C 3/15/1955 Age 63year old   PCP Ora Bruner MD Endoscopist Louise Tsai MD       Date of procedure: 20    Procedure: EGD w/biopsies    Pre-operative diagnosis: dysphagia and reflux, sampled/removed and you have not received your pathology results either by phone or letter within 2 weeks, please call our office at [phone].     Specimens:  Esophageal biopsies

## 2020-05-23 VITALS
OXYGEN SATURATION: 99 % | RESPIRATION RATE: 11 BRPM | SYSTOLIC BLOOD PRESSURE: 127 MMHG | HEIGHT: 66 IN | DIASTOLIC BLOOD PRESSURE: 93 MMHG | BODY MASS INDEX: 45 KG/M2 | WEIGHT: 280 LBS | TEMPERATURE: 98 F | HEART RATE: 79 BPM

## 2020-05-26 NOTE — TELEPHONE ENCOUNTER
Prior authorization request completed for: Lumbar Epidural Steroid Injection   Authorization #[id number]  Authorization dates: 05/20/2020-08/18/2020  CPT codes approved: 72201  Number of visits/dates of service approved: 1  Physician: Dr. Yobani Chew

## 2020-06-01 ENCOUNTER — OFFICE VISIT (OUTPATIENT)
Dept: PODIATRY CLINIC | Facility: CLINIC | Age: 65
End: 2020-06-01

## 2020-06-01 DIAGNOSIS — M79.671 ARCH PAIN, RIGHT: ICD-10-CM

## 2020-06-01 DIAGNOSIS — M72.2 PLANTAR FASCIITIS OF RIGHT FOOT: ICD-10-CM

## 2020-06-01 DIAGNOSIS — M79.671 INFLAMMATORY HEEL PAIN, RIGHT: ICD-10-CM

## 2020-06-01 DIAGNOSIS — M25.571 SINUS TARSI SYNDROME, RIGHT: ICD-10-CM

## 2020-06-01 DIAGNOSIS — M77.31 CALCANEAL SPUR OF RIGHT FOOT: Primary | ICD-10-CM

## 2020-06-01 PROCEDURE — 99212 OFFICE O/P EST SF 10 MIN: CPT | Performed by: PODIATRIST

## 2020-06-02 ENCOUNTER — LAB ENCOUNTER (OUTPATIENT)
Dept: LAB | Facility: HOSPITAL | Age: 65
End: 2020-06-02
Attending: ANESTHESIOLOGY
Payer: COMMERCIAL

## 2020-06-02 DIAGNOSIS — Z01.812 PRE-PROCEDURE LAB EXAM: Primary | ICD-10-CM

## 2020-06-04 ENCOUNTER — HOSPITAL ENCOUNTER (OUTPATIENT)
Facility: HOSPITAL | Age: 65
Setting detail: HOSPITAL OUTPATIENT SURGERY
Discharge: HOME | End: 2020-06-04
Attending: ANESTHESIOLOGY | Admitting: ANESTHESIOLOGY
Payer: COMMERCIAL

## 2020-06-04 ENCOUNTER — APPOINTMENT (OUTPATIENT)
Dept: GENERAL RADIOLOGY | Facility: HOSPITAL | Age: 65
End: 2020-06-04
Attending: ANESTHESIOLOGY
Payer: COMMERCIAL

## 2020-06-04 VITALS
OXYGEN SATURATION: 97 % | HEART RATE: 75 BPM | DIASTOLIC BLOOD PRESSURE: 88 MMHG | RESPIRATION RATE: 18 BRPM | TEMPERATURE: 97 F | SYSTOLIC BLOOD PRESSURE: 135 MMHG

## 2020-06-04 DIAGNOSIS — M48.061 SPINAL STENOSIS OF LUMBAR REGION WITHOUT NEUROGENIC CLAUDICATION: ICD-10-CM

## 2020-06-04 DIAGNOSIS — Z01.818 PRE-OP TESTING: Primary | ICD-10-CM

## 2020-06-04 PROCEDURE — 3E0R3BZ INTRODUCTION OF ANESTHETIC AGENT INTO SPINAL CANAL, PERCUTANEOUS APPROACH: ICD-10-PCS | Performed by: ANESTHESIOLOGY

## 2020-06-04 PROCEDURE — 3E0R33Z INTRODUCTION OF ANTI-INFLAMMATORY INTO SPINAL CANAL, PERCUTANEOUS APPROACH: ICD-10-PCS | Performed by: ANESTHESIOLOGY

## 2020-06-04 PROCEDURE — B01B1ZZ FLUOROSCOPY OF SPINAL CORD USING LOW OSMOLAR CONTRAST: ICD-10-PCS | Performed by: ANESTHESIOLOGY

## 2020-06-04 RX ORDER — CHOLECALCIFEROL (VITAMIN D3) 50 MCG
2000 TABLET ORAL
COMMUNITY
End: 2021-03-04 | Stop reason: DRUGHIGH

## 2020-06-04 RX ORDER — SODIUM CHLORIDE, SODIUM LACTATE, POTASSIUM CHLORIDE, CALCIUM CHLORIDE 600; 310; 30; 20 MG/100ML; MG/100ML; MG/100ML; MG/100ML
100 INJECTION, SOLUTION INTRAVENOUS CONTINUOUS
Status: DISCONTINUED | OUTPATIENT
Start: 2020-06-04 | End: 2020-06-04

## 2020-06-04 RX ORDER — DEXAMETHASONE SODIUM PHOSPHATE 10 MG/ML
INJECTION, SOLUTION INTRAMUSCULAR; INTRAVENOUS AS NEEDED
Status: DISCONTINUED | OUTPATIENT
Start: 2020-06-04 | End: 2020-06-04

## 2020-06-04 RX ORDER — ONDANSETRON 2 MG/ML
4 INJECTION INTRAMUSCULAR; INTRAVENOUS ONCE AS NEEDED
Status: DISCONTINUED | OUTPATIENT
Start: 2020-06-04 | End: 2020-06-04

## 2020-06-04 RX ORDER — 0.9 % SODIUM CHLORIDE 0.9 %
VIAL (ML) INJECTION AS NEEDED
Status: DISCONTINUED | OUTPATIENT
Start: 2020-06-04 | End: 2020-06-04

## 2020-06-04 RX ORDER — LIDOCAINE HYDROCHLORIDE 10 MG/ML
INJECTION, SOLUTION EPIDURAL; INFILTRATION; INTRACAUDAL; PERINEURAL AS NEEDED
Status: DISCONTINUED | OUTPATIENT
Start: 2020-06-04 | End: 2020-06-04

## 2020-06-04 NOTE — H&P
History & Physical Examination    Patient Name: Mame Julian  MRN: [id number]  [facility]: [id number]  YOB: 1955    Pre-Operative Diagnosis:  Spinal stenosis of lumbar region without neurogenic claudication [M48.061]    Present Illness: Patient w Once PRN        Allergies:    Iodides (Topical)       HIVES    Comment:IVP DYE  Radiology Contrast *    HIVES    Comment:Other reaction(s): Hives/Skin Rash  Sulfa Antibiotics       HIVES  Benadryl [Diphenhyd*    NAUSEA AND VOMITING  Bactrim [Sulfametho* tobacco: Never Used    Alcohol use:  Yes      Alcohol/week: 1.0 - 2.0 standard drinks      Types: 1 - 2 Standard drinks or equivalent per week      Frequency: 2-4 times a month      Drinks per session: 1 or 2      Binge frequency: Never      Comment: MIXED

## 2020-06-04 NOTE — OPERATIVE REPORT
[facility]  Operative Report  2020     Ama Calderon Patient Status:  Hospital Outpatient Surgery    3/15/1955 MRN [id number]   [facility] ENDOSCOPY Attending Donnie Spangler MD   Hosp Day # 0 PCP Conchita Muro MD     Indication: Neomia Hippo very well without significant immediate complication. The patient's back was cleaned and sterile dressing was applied. The patient was discharged with an instruction to a responsible adult after discharge criteria were met.   The patient was advised to co

## 2020-06-05 NOTE — PROGRESS NOTES
Aron Arce is a 63year old female. Patient presents with:  Orthotic Status: Pt here to  orthotics. Foot Pain: Right -- Rates pain 5/10. HPI:   Patient returns the clinic to  her orthotic devices.   At today's visit reviewed nu High blood pressure    • History of blood transfusion    • Internal hemorrhoids 12/15/2017   • Osteoarthritis    • Renal disorder    • Unspecified sleep apnea     CPAP      Past Surgical History:   Procedure Laterality Date   • ABDOMINOPLASTY Bilateral week        Frequency: 2-4 times a month        Drinks per session: 1 or 2        Binge frequency: Never        Comment: MIXED DRINKS WEEKLY      Drug use: No    Other Topics      Concerns:        Caffeine Concern: Yes          3 cups tea/coffee daily

## 2020-06-15 ENCOUNTER — TELEPHONE (OUTPATIENT)
Dept: INTERNAL MEDICINE CLINIC | Facility: CLINIC | Age: 65
End: 2020-06-15

## 2020-06-15 DIAGNOSIS — Q65.89 HIP DYSPLASIA: ICD-10-CM

## 2020-06-15 DIAGNOSIS — M16.11 UNILATERAL PRIMARY OSTEOARTHRITIS, RIGHT HIP: Primary | ICD-10-CM

## 2020-06-15 NOTE — TELEPHONE ENCOUNTER
Patient needs a referral for her orthopedic specialist Dr. Antoinette Araujo for June 23rd for annual follow up from hip surgery.

## 2020-06-16 NOTE — TELEPHONE ENCOUNTER
From  [facility] To  Qi Mcfarlane and Delivered  6/15/2020  2:08 PM   Last Read in [address]  6/15/2020  2:56 PM by Estela Styles

## 2020-06-26 ENCOUNTER — LAB ENCOUNTER (OUTPATIENT)
Dept: LAB | Age: 65
End: 2020-06-26
Attending: INTERNAL MEDICINE
Payer: COMMERCIAL

## 2020-06-26 DIAGNOSIS — I10 ESSENTIAL HYPERTENSION: ICD-10-CM

## 2020-06-26 DIAGNOSIS — G60.8 SENSORY PERIPHERAL NEUROPATHY: ICD-10-CM

## 2020-06-26 PROCEDURE — 84443 ASSAY THYROID STIM HORMONE: CPT

## 2020-06-26 PROCEDURE — 83655 ASSAY OF LEAD: CPT

## 2020-06-26 PROCEDURE — 80053 COMPREHEN METABOLIC PANEL: CPT

## 2020-06-26 PROCEDURE — 83825 ASSAY OF MERCURY: CPT

## 2020-06-26 PROCEDURE — 85025 COMPLETE CBC W/AUTO DIFF WBC: CPT

## 2020-06-26 PROCEDURE — 80061 LIPID PANEL: CPT

## 2020-06-26 PROCEDURE — 82784 ASSAY IGA/IGD/IGG/IGM EACH: CPT

## 2020-06-26 PROCEDURE — 85652 RBC SED RATE AUTOMATED: CPT

## 2020-06-26 PROCEDURE — 36415 COLL VENOUS BLD VENIPUNCTURE: CPT

## 2020-06-26 PROCEDURE — 84165 PROTEIN E-PHORESIS SERUM: CPT

## 2020-06-26 PROCEDURE — 86334 IMMUNOFIX E-PHORESIS SERUM: CPT

## 2020-06-30 ENCOUNTER — APPOINTMENT (OUTPATIENT)
Dept: LAB | Age: 65
End: 2020-06-30
Attending: INTERNAL MEDICINE
Payer: COMMERCIAL

## 2020-06-30 DIAGNOSIS — I10 ESSENTIAL HYPERTENSION: ICD-10-CM

## 2020-06-30 DIAGNOSIS — G60.8 SENSORY PERIPHERAL NEUROPATHY: ICD-10-CM

## 2020-06-30 PROCEDURE — 83825 ASSAY OF MERCURY: CPT

## 2020-06-30 PROCEDURE — 36415 COLL VENOUS BLD VENIPUNCTURE: CPT

## 2020-07-02 LAB — MERCURY, BLOOD: <2.5 UG/L

## 2020-07-03 ENCOUNTER — OFFICE VISIT (OUTPATIENT)
Dept: INTERNAL MEDICINE CLINIC | Facility: CLINIC | Age: 65
End: 2020-07-03

## 2020-07-03 VITALS
DIASTOLIC BLOOD PRESSURE: 81 MMHG | WEIGHT: 293 LBS | HEART RATE: 87 BPM | HEIGHT: 66 IN | SYSTOLIC BLOOD PRESSURE: 137 MMHG | BODY MASS INDEX: 47.09 KG/M2 | RESPIRATION RATE: 18 BRPM

## 2020-07-03 DIAGNOSIS — E03.9 HYPOTHYROIDISM, UNSPECIFIED TYPE: ICD-10-CM

## 2020-07-03 DIAGNOSIS — I10 ESSENTIAL HYPERTENSION: ICD-10-CM

## 2020-07-03 DIAGNOSIS — Z01.419 ENCOUNTER FOR GYNECOLOGICAL EXAMINATION WITHOUT ABNORMAL FINDING: ICD-10-CM

## 2020-07-03 DIAGNOSIS — G60.8 SENSORY PERIPHERAL NEUROPATHY: ICD-10-CM

## 2020-07-03 DIAGNOSIS — Z00.00 ROUTINE GENERAL MEDICAL EXAMINATION AT A HEALTH CARE FACILITY: ICD-10-CM

## 2020-07-03 DIAGNOSIS — E66.01 MORBID OBESITY (HCC): Primary | ICD-10-CM

## 2020-07-03 PROCEDURE — 99396 PREV VISIT EST AGE 40-64: CPT | Performed by: INTERNAL MEDICINE

## 2020-07-03 PROCEDURE — 99213 OFFICE O/P EST LOW 20 MIN: CPT | Performed by: INTERNAL MEDICINE

## 2020-07-03 RX ORDER — TRIAMTERENE AND HYDROCHLOROTHIAZIDE 37.5; 25 MG/1; MG/1
TABLET ORAL
Qty: 24 TABLET | Refills: 2 | Status: SHIPPED | OUTPATIENT
Start: 2020-07-03 | End: 2021-04-23

## 2020-07-03 RX ORDER — AMLODIPINE BESYLATE 2.5 MG/1
TABLET ORAL
Qty: 90 TABLET | Refills: 2 | Status: SHIPPED | OUTPATIENT
Start: 2020-07-03 | End: 2021-06-11

## 2020-07-03 RX ORDER — LEVOTHYROXINE SODIUM 0.12 MG/1
125 TABLET ORAL
Qty: 90 TABLET | Refills: 2 | Status: SHIPPED | OUTPATIENT
Start: 2020-07-03 | End: 2021-04-21

## 2020-07-03 NOTE — PROGRESS NOTES
REASON FOR VISIT:    Lottie Dee is a 63year old female who presents for an [address]     Immunization History   Administered Date(s) Administered   • FLULAVAL 6 months & older 0.5 ml Prefilled syringe ([id number]) 10/17/2017   • Fluvirin, radiculopathy, this has been treated with surgery about 20 years back. Initial foot drop has resolved. Does have some clawing in the right lower extremity. Has been seen by orthopedics as well as by podiatry and neurology. EMG has been completed.   Re Domestic Abuse: No     CAGE:     Cut : No    Annoyed : No    Guilty : No    Eye Opener : No    Scoring  Total Score: 0     Depression Screening (PHQ-2/PHQ-9): Over the LAST 2 WEEKS                      PREVENTATIVE SERVICES  IN MONITORING Internal Lab or Procedure External Lab or Procedure   Annual Monitoring of Persistent     Medications (ACE/ARB, digoxin, diuretics)    Potassium  Annually Potassium (mmol/L)   Date Value   06/26/2020 4.3     POTASSIUM (P) (mmol/L)   Date Value 270 tablet 3   • loratadine 10 MG Oral Tab Take 10 mg by mouth nightly. • Brexpiprazole (REXULTI) 0.5 MG Oral Tab Take by mouth daily. • buPROPion HCl ER, XL, 300 MG Oral Tablet 24 Hr Take 600 mg by mouth daily. Take 2 pills to equal 600mg.  In the Relation Age of Onset   • Cancer Father         prostate   • Heart Disease Mother         CVA   • Depression Mother    • Hypertension Mother    • Other (Colonic polyps) Mother    • Depression Sister    • Hypertension Sister    • Other (acromegaly from a pi dominant or suspicious mass  LUNGS: clear to auscultation  CARDIO: RRR without murmur  GI: good BS's, no masses, HSM or tenderness  Genitourinary:    External Gyn:  Pubic hair is normally distributed.  External genitalia is unremarkable.  Glands do appear t Hypothyroidism     Thyroid function test have been stable on levothyroxine at 125 mcg daily. Continue same dose of medication, follow-up labs in about 4 to 5 months.          Relevant Medications    Levothyroxine Sodium 125 MCG Oral Tab    Morbid obesity ( neuropathy     Patient has been seen by neurology/neurosurgery/orthopedic/podiatry. Labs lead and mercury levels normal.  Thyroid function test look normal.  Hemoglobin A1c looks normal.  Patient does not drink much alcohol at all.   She does have lumbar s perfomed    SUGGESTED VACCINATIONS - Influenza, Pneumococcal, Zoster, Tetanus     Immunization History   Administered Date(s) Administered   • FLULAVAL 6 months & older 0.5 ml Prefilled syringe ([id number]) 10/17/2017   • Fluvirin, 3 Years & >, Im 09/15/2016

## 2020-07-03 NOTE — ASSESSMENT & PLAN NOTE
Blood pressure 137/81, pulse 87, resp. rate 18, height 5' 6\" (1.676 m), weight (!) 301 lb 14.4 oz (136.9 kg), not currently breastfeeding.   Blood pressure looks stable, well controlled at this time on triamterene hydrochlorothiazide 37.5/20 5-1 capsule 1-

## 2020-07-03 NOTE — ASSESSMENT & PLAN NOTE
Body mass index is 48.73 kg/m².    Wt Readings from Last 6 Encounters:  07/03/20 : (!) 301 lb 14.4 oz (136.9 kg)  05/22/20 : 280 lb (127 kg)  03/05/20 : 283 lb (128.4 kg)  02/06/20 : 283 lb (128.4 kg)  01/29/20 : 281 lb 3.2 oz (127.6 kg)  01/22/20 : 280 lb

## 2020-07-03 NOTE — PATIENT INSTRUCTIONS
Problem List Items Addressed This Visit        Unprioritized    Encounter for routine gynecological examination     Pap completed today. Essential hypertension     Blood pressure 137/81, pulse 87, resp.  rate 18, height 5' 6\" (1.676 m), weight (!) surgeries and hence has some scar tissue to palpation. Mammogram looks stable. dexa Scan due in 2021  No cervical or inguinal lymphadenopathy. Hernial orifices intact.   Pelvic exam completed–no cervical movement tenderness, adnexal palpable abnormaliti

## 2020-07-03 NOTE — ASSESSMENT & PLAN NOTE
Patient has been seen by neurology/neurosurgery/orthopedic/podiatry. Labs lead and mercury levels normal.  Thyroid function test look normal.  Hemoglobin A1c looks normal.  Patient does not drink much alcohol at all.   She does have lumbar spinal degenerat

## 2020-07-03 NOTE — ASSESSMENT & PLAN NOTE
Thyroid function test have been stable on levothyroxine at 125 mcg daily. Continue same dose of medication, follow-up labs in about 4 to 5 months.

## 2020-07-06 LAB — HPV I/H RISK 1 DNA SPEC QL NAA+PROBE: NEGATIVE

## 2020-07-07 ENCOUNTER — OFFICE VISIT (OUTPATIENT)
Dept: NEUROLOGY | Facility: CLINIC | Age: 65
End: 2020-07-07

## 2020-07-07 VITALS — BODY MASS INDEX: 47.09 KG/M2 | WEIGHT: 293 LBS | HEIGHT: 66 IN

## 2020-07-07 DIAGNOSIS — G60.8 SENSORY PERIPHERAL NEUROPATHY: Primary | ICD-10-CM

## 2020-07-07 DIAGNOSIS — R25.1 TREMOR: ICD-10-CM

## 2020-07-07 PROCEDURE — 99214 OFFICE O/P EST MOD 30 MIN: CPT | Performed by: OTHER

## 2020-07-07 RX ORDER — PRIMIDONE 50 MG/1
TABLET ORAL
Qty: 270 TABLET | Refills: 3 | Status: SHIPPED | OUTPATIENT
Start: 2020-07-07 | End: 2021-03-04

## 2020-07-07 RX ORDER — GABAPENTIN 400 MG/1
CAPSULE ORAL
Qty: 270 CAPSULE | Refills: 3 | Status: SHIPPED | OUTPATIENT
Start: 2020-07-07 | End: 2020-08-17

## 2020-07-07 NOTE — PROGRESS NOTES
Neurology follow-up visit     Referred By: Dr. Mena Bliss    Chief Complaint: Patient presents with:  Neurologic Problem: LOV: 10/1/19. F/U On tremors. Patient states they are much improved.        HPI:     Geneva Fraser is a 63year old female, who presen October 2019, she was mentioned development of some burning paresthesias in her feet. Especially at night. She was started on 100 mg of gabapentin and that she was helpful but also she noted that her tremors were even better with start of gabapentin.   Sh at [facility] MAIN OR   • HYSTERECTOMY     • LUMBAR INTERLAMINAR EPIDURAL INJECTION N/A 6/4/2020    Performed by Jose Wood MD at [facility] ENDOSCOPY   • YOLI LOCALIZATION WIRE 1 SITE LEFT (CPT=19281)      1998   • REDUCTION LEFT      2000 Bilateral   • REDUCTION RIGHT buPROPion HCl ER, XL, 300 MG Oral Tablet 24 Hr, Take 600 mg by mouth daily. Take 2 pills to equal 600mg. In the morning  , Disp: , Rfl:   •  Cyanocobalamin (B-12) 2500 MCG Oral Tab, Take 2,500 mcg by mouth daily. , Disp: , Rfl:       Iodides (Topical) Component Value Date    HGB 12.5 06/26/2020    HCT 37.7 06/26/2020    MCV 92.2 06/26/2020    WBC 6.4 06/26/2020    .0 06/26/2020      Lab Results   Component Value Date    BUN 17 06/26/2020    CA 9.1 06/26/2020    ALT 47 06/26/2020    AST 29 06/26

## 2020-07-09 ENCOUNTER — HOSPITAL ENCOUNTER (OUTPATIENT)
Dept: MRI IMAGING | Facility: HOSPITAL | Age: 65
Discharge: HOME | End: 2020-07-09
Attending: INTERNAL MEDICINE
Payer: COMMERCIAL

## 2020-07-09 DIAGNOSIS — G60.8 SENSORY PERIPHERAL NEUROPATHY: ICD-10-CM

## 2020-07-09 PROCEDURE — 72156 MRI NECK SPINE W/O & W/DYE: CPT | Performed by: INTERNAL MEDICINE

## 2020-07-09 PROCEDURE — A9575 INJ GADOTERATE MEGLUMI 0.1ML: HCPCS | Performed by: INTERNAL MEDICINE

## 2020-08-17 ENCOUNTER — OFFICE VISIT (OUTPATIENT)
Dept: INTERNAL MEDICINE CLINIC | Facility: CLINIC | Age: 65
End: 2020-08-17

## 2020-08-17 VITALS
HEIGHT: 66 IN | WEIGHT: 293 LBS | RESPIRATION RATE: 18 BRPM | BODY MASS INDEX: 47.09 KG/M2 | HEART RATE: 84 BPM | DIASTOLIC BLOOD PRESSURE: 84 MMHG | SYSTOLIC BLOOD PRESSURE: 127 MMHG

## 2020-08-17 DIAGNOSIS — G89.29 CHRONIC PAIN OF RIGHT ANKLE: ICD-10-CM

## 2020-08-17 DIAGNOSIS — G60.8 SENSORY PERIPHERAL NEUROPATHY: ICD-10-CM

## 2020-08-17 DIAGNOSIS — M25.571 CHRONIC PAIN OF RIGHT ANKLE: ICD-10-CM

## 2020-08-17 DIAGNOSIS — F32.A DEPRESSIVE DISORDER: ICD-10-CM

## 2020-08-17 DIAGNOSIS — I10 ESSENTIAL HYPERTENSION: Primary | ICD-10-CM

## 2020-08-17 PROCEDURE — 3074F SYST BP LT 130 MM HG: CPT | Performed by: INTERNAL MEDICINE

## 2020-08-17 PROCEDURE — 3079F DIAST BP 80-89 MM HG: CPT | Performed by: INTERNAL MEDICINE

## 2020-08-17 PROCEDURE — 99214 OFFICE O/P EST MOD 30 MIN: CPT | Performed by: INTERNAL MEDICINE

## 2020-08-17 PROCEDURE — 3008F BODY MASS INDEX DOCD: CPT | Performed by: INTERNAL MEDICINE

## 2020-08-17 RX ORDER — DULOXETIN HYDROCHLORIDE 30 MG/1
30 CAPSULE, DELAYED RELEASE ORAL DAILY
Qty: 30 CAPSULE | Refills: 3 | Status: SHIPPED | OUTPATIENT
Start: 2020-08-17 | End: 2020-09-11

## 2020-08-17 NOTE — ASSESSMENT & PLAN NOTE
Patient has been evaluated per neurology, neurosurgery, orthopedics and podiatry. All labs for neuropathy including hemoglobin A1c and leg as well as monthly levels look normal.  MGUS evaluation negative for monoclonal proteinemia.   Sed rate remains borde

## 2020-08-17 NOTE — PROGRESS NOTES
HPI:    Patient ID: Mame Julian is a 63year old female. Mri cerv spine    CERVICAL DISC LEVELS:  C2-C3:  No significant disc/facet abnormality, spinal stenosis, or foraminal narrowing.   C3-C4:  Diffuse disc osteophyte complex with central broad-ba a chronic problem. The current episode started more than 1 month ago. The problem occurs constantly. The problem has been gradually worsening (c/o right heel pain and pain to ball of foot - worse since hip replacement in 2016. )).  The quality of the pain i needed for rash (Patient not taking: Reported on 8/17/2020 ) 80 g 1     Allergies:   Iodides (Topical)       HIVES    Comment:IVP DYE  Radiology Contrast *    HIVES    Comment:Other reaction(s): Hives/Skin Rash  Sulfa Antibiotics       HIVES  Benadryl [Diph Depressive disorder     Major depressive disorder for which she has been on Rexulti, bupropion.   She had been on Cymbalta in the past.  Would like to restart on Cymbalta at this time for management of peripheral neuropathy but is advised to discuss this have been discussed. We will follow-up after completion. Consider changing from gabapentin to Cymbalta 30 mg daily. Patient is on [address] and bupropion per psychiatry so advised to discuss this with psychiatry prior to starting.   Prescription has been

## 2020-08-17 NOTE — PATIENT INSTRUCTIONS
Problem List Items Addressed This Visit        Unprioritized    Chronic pain of right ankle     Gradually worsening pain in the ankle, forefoot along the arch and outer aspect of the ankle. Increasing difficulty with walking.   This has been gradually wors disease with multilevel spinal stenosis, moderate to severe. MRI of the C-spine did not show any significant abnormalities. EMG, NCS confirms bilateral sural peripheral neuropathy.   After evaluation per neurology x2 with diagnosis of possible idiopathic

## 2020-08-17 NOTE — ASSESSMENT & PLAN NOTE
Blood pressure 127/84, pulse 84, resp. rate 18, height 5' 6\" (1.676 m), weight (!) 301 lb (136.5 kg), not currently breastfeeding.   Blood pressures look stable on triamterene hydrochlorothiazide 1 capsule 1-2 times a week, occasionally 3 times a week for

## 2020-08-17 NOTE — ASSESSMENT & PLAN NOTE
Gradually worsening pain in the ankle, forefoot along the arch and outer aspect of the ankle. Increasing difficulty with walking. This has been gradually worse over the past 2 years.   She has had to curtail her walking activities which has resulted in we

## 2020-08-17 NOTE — ASSESSMENT & PLAN NOTE
Major depressive disorder for which she has been on Rexulti, bupropion.   She had been on Cymbalta in the past.  Would like to restart on Cymbalta at this time for management of peripheral neuropathy but is advised to discuss this with her psychiatrist charly

## 2020-08-19 ENCOUNTER — HOSPITAL ENCOUNTER (OUTPATIENT)
Dept: MRI IMAGING | Age: 65
Discharge: HOME | End: 2020-08-19
Attending: INTERNAL MEDICINE
Payer: COMMERCIAL

## 2020-08-19 DIAGNOSIS — M25.571 CHRONIC PAIN OF RIGHT ANKLE: ICD-10-CM

## 2020-08-19 DIAGNOSIS — G89.29 CHRONIC PAIN OF RIGHT ANKLE: ICD-10-CM

## 2020-08-19 PROCEDURE — 73721 MRI JNT OF LWR EXTRE W/O DYE: CPT | Performed by: INTERNAL MEDICINE

## 2020-08-19 PROCEDURE — 73718 MRI LOWER EXTREMITY W/O DYE: CPT | Performed by: INTERNAL MEDICINE

## 2020-08-20 ENCOUNTER — PATIENT MESSAGE (OUTPATIENT)
Dept: INTERNAL MEDICINE CLINIC | Facility: CLINIC | Age: 65
End: 2020-08-20

## 2020-08-20 NOTE — TELEPHONE ENCOUNTER
From: Alfonso Diane  To: Eleuterio Lagunas MD  Sent: 8/20/2020 7:17 AM CDT  Subject: Referral Request    Dr. Savanna Becerril,  Please give me a referral for Dr. Dallas Grace.   Thanks,  [facility]

## 2020-08-25 ENCOUNTER — OFFICE VISIT (OUTPATIENT)
Dept: PODIATRY CLINIC | Facility: CLINIC | Age: 65
End: 2020-08-25

## 2020-08-25 DIAGNOSIS — M72.2 PLANTAR FASCIITIS OF RIGHT FOOT: ICD-10-CM

## 2020-08-25 DIAGNOSIS — M79.671 ARCH PAIN, RIGHT: ICD-10-CM

## 2020-08-25 DIAGNOSIS — M77.31 CALCANEAL SPUR OF RIGHT FOOT: ICD-10-CM

## 2020-08-25 DIAGNOSIS — M19.071 ARTHROSIS OF MIDFOOT, RIGHT: ICD-10-CM

## 2020-08-25 DIAGNOSIS — M79.671 INFLAMMATORY HEEL PAIN, RIGHT: ICD-10-CM

## 2020-08-25 DIAGNOSIS — M25.571 SINUS TARSI SYNDROME, RIGHT: ICD-10-CM

## 2020-08-25 DIAGNOSIS — G60.8 SENSORY PERIPHERAL NEUROPATHY: Primary | ICD-10-CM

## 2020-08-25 PROCEDURE — 99213 OFFICE O/P EST LOW 20 MIN: CPT | Performed by: PODIATRIST

## 2020-08-29 NOTE — PROGRESS NOTES
Janessa Luong is a 63year old female. Patient presents with:   Ankle Pain: Right ankle and foot f/u and MRI results  - orthotics picked up in 6/2020 - states she still has some pain rated as 8/10 by the end of the day         HPI:   Patient returns to r Oral Tablet 24 Hr Take 600 mg by mouth daily. Take 2 pills to equal 600mg. In the morning       • Cyanocobalamin (B-12) 2500 MCG Oral Tab Take 2,500 mcg by mouth daily.         Past Medical History:   Diagnosis Date   • CKD (chronic kidney disease)    • Col file      Number of children: Not on file      Years of education: Not on file      Highest education level: Not on file    Tobacco Use      Smoking status: Never Smoker      Smokeless tobacco: Never Used    Substance and Sexual Activity      Alcohol use: right    Inflammatory heel pain, right    Arthrosis of midfoot, right        Plan: Today I recommended the patient wear her orthotics now that she has a shoe that she can tolerate them and break them and gradually.   She understood I should probably see her

## 2020-09-10 ENCOUNTER — PATIENT MESSAGE (OUTPATIENT)
Dept: INTERNAL MEDICINE CLINIC | Facility: CLINIC | Age: 65
End: 2020-09-10

## 2020-09-11 RX ORDER — DULOXETIN HYDROCHLORIDE 30 MG/1
30 CAPSULE, DELAYED RELEASE ORAL DAILY
Qty: 90 CAPSULE | Refills: 0 | Status: SHIPPED | OUTPATIENT
Start: 2020-09-11 | End: 2020-12-14

## 2020-09-11 NOTE — TELEPHONE ENCOUNTER
From: Jamie Ramos  To: Joe Oconnor MD  Sent: 9/10/2020 5:53 PM CDT  Subject: Prescription Question    Hi Dr. Milton Sidhu approved the use of Cymbalta for my neuropathy. I have been using the Cymbalta and it is working.  You gave me a pres

## 2020-09-16 ENCOUNTER — PATIENT MESSAGE (OUTPATIENT)
Dept: GASTROENTEROLOGY | Facility: CLINIC | Age: 65
End: 2020-09-16

## 2020-09-16 ENCOUNTER — TELEPHONE (OUTPATIENT)
Dept: GASTROENTEROLOGY | Facility: CLINIC | Age: 65
End: 2020-09-16

## 2020-09-16 DIAGNOSIS — K57.21 DIVERTICULITIS OF LARGE INTESTINE WITH PERFORATION AND BLEEDING WITHOUT ABSCESS: Primary | ICD-10-CM

## 2020-09-16 RX ORDER — PREDNISONE 50 MG/1
TABLET ORAL
Qty: 3 TABLET | Refills: 0 | Status: SHIPPED | OUTPATIENT
Start: 2020-09-16 | End: 2020-10-02

## 2020-09-16 NOTE — TELEPHONE ENCOUNTER
Patient states has LLQ pain (pain level 2 ) and constipation x several days. Patient had a small bowel movement this morning but still feels constipated. Denies nausea,vomiting,fever,weakness or rectal bleeding.   Patient feels she is having a diverticulit

## 2020-09-16 NOTE — TELEPHONE ENCOUNTER
Discussed imaging vs treatment  Patient traveling and concerned she would have problems  Plan CT with contrast to confirm no active diverticulitis    Will need premedication  Stat given traveling

## 2020-09-16 NOTE — TELEPHONE ENCOUNTER
Patient called in to speak directly to RN. She states she is having a diverticulitis flare up. RN not available.  Please advise thank you

## 2020-09-16 NOTE — IMAGING NOTE
Call to pt, name,  and allergy verified. Pt has prednisone for 13 hour prep for contrast allergy. Reviewed instructions for 13 hour prep with prednisone at 13, 7 and 1 hour prior and benadryl 50 mg po 1 hour prior.   Pt sts she does not want to take th

## 2020-09-16 NOTE — TELEPHONE ENCOUNTER
I spoke to the pt and let her know her CT scan is approved. I spoke to [facility] and we did the STAT approval    I also informed her the prednisone was sent to her pharmacy to take prior to the CT scan due to allergy to radiology contrast dyes.  She w

## 2020-09-17 ENCOUNTER — TELEPHONE (OUTPATIENT)
Dept: INTERNAL MEDICINE CLINIC | Facility: CLINIC | Age: 65
End: 2020-09-17

## 2020-09-17 DIAGNOSIS — G47.33 OSA ON CPAP: Primary | ICD-10-CM

## 2020-09-17 DIAGNOSIS — Z99.89 OSA ON CPAP: Primary | ICD-10-CM

## 2020-09-17 NOTE — TELEPHONE ENCOUNTER
Patient can do miralax prep until she starts having BM    BOWEL CLEANSE INSTRUCTIONS:  1. Pick a day you will be at home, close to a toilet.   2. Have a some juice for breakfast.   3. Around 10AM start drinking the solution prescribed (for trilyte, drink 1

## 2020-09-17 NOTE — TELEPHONE ENCOUNTER
From: Aron Arce  To: Josey Bishop MD  Sent: 9/16/2020 7:45 PM CDT  Subject: Non-Urgent Medical Question    Hi Dr Shannen Olivier,    I can't get the CT until Saturday morning.  Is a colonoscopy a possibility, I know that a colonoscopy will take a couple of day

## 2020-09-17 NOTE — TELEPHONE ENCOUNTER
Received fax from Lurdes CARDOZA at [address] [place] [address] requesting new authorized referral for the following supplies:     - CPAP FULL FACE MASK     - REPLACEMENT FACEMASK INTERFA (qty-3)     - REPLACEMENT NASAL CUSHION (qty-6)     - REPLACEMENT NASAL PILLOWS

## 2020-09-18 ENCOUNTER — TELEPHONE (OUTPATIENT)
Dept: GASTROENTEROLOGY | Facility: CLINIC | Age: 65
End: 2020-09-18

## 2020-09-18 NOTE — TELEPHONE ENCOUNTER
I spoke to Amy Newell over at [address] [place] [address] and let her know Dr Cruz Leal only wanted IV constrast

## 2020-09-18 NOTE — TELEPHONE ENCOUNTER
Dr Fareed Pascual is inquiring whether or not you want the CT scan performed with oral contrast as well as IV contrast. The note specifies IV contrast but says nothing about oral contrast    CT @ [facility] [phone]

## 2020-09-19 ENCOUNTER — HOSPITAL ENCOUNTER (OUTPATIENT)
Dept: CT IMAGING | Facility: HOSPITAL | Age: 65
Discharge: HOME | End: 2020-09-19
Attending: INTERNAL MEDICINE
Payer: COMMERCIAL

## 2020-09-19 DIAGNOSIS — K57.21 DIVERTICULITIS OF LARGE INTESTINE WITH PERFORATION AND BLEEDING WITHOUT ABSCESS: ICD-10-CM

## 2020-09-19 LAB — CREAT BLD-MCNC: 1.1 MG/DL (ref 0.55–1.02)

## 2020-09-19 PROCEDURE — 74177 CT ABD & PELVIS W/CONTRAST: CPT | Performed by: INTERNAL MEDICINE

## 2020-09-19 PROCEDURE — 82565 ASSAY OF CREATININE: CPT

## 2020-09-22 ENCOUNTER — TELEPHONE (OUTPATIENT)
Dept: GASTROENTEROLOGY | Facility: CLINIC | Age: 65
End: 2020-09-22

## 2020-09-22 NOTE — TELEPHONE ENCOUNTER
----- Message from Omar Jacinto MD sent at 9/19/2020 10:17 AM CDT -----  Reviewed  Follow up Monday to see if she is feeling better

## 2020-09-22 NOTE — TELEPHONE ENCOUNTER
I spoke to the pt and I advised her of Dr Celia Armstrong recommendations below.     She verbalizes understanding

## 2020-09-22 NOTE — TELEPHONE ENCOUNTER
Pt is having 2-3 bowel movements/day. They can be loose or solid. No blood noted    She is still having LLQ pain    She is going to try the bowel cleanse tomorrow    Wondering if Cymbalta is causing her side effects.     She was prescribed this for her neur

## 2020-09-22 NOTE — TELEPHONE ENCOUNTER
Yes cymbalta can give gi upset and constipation  If it is helping her then she can just take something to keep her regular like miralax or colace.

## 2020-09-28 ENCOUNTER — LAB ENCOUNTER (OUTPATIENT)
Dept: LAB | Age: 65
End: 2020-09-28
Attending: INTERNAL MEDICINE
Payer: COMMERCIAL

## 2020-09-28 DIAGNOSIS — G60.8 SENSORY PERIPHERAL NEUROPATHY: ICD-10-CM

## 2020-09-28 DIAGNOSIS — I10 ESSENTIAL HYPERTENSION: ICD-10-CM

## 2020-09-28 PROCEDURE — 81001 URINALYSIS AUTO W/SCOPE: CPT

## 2020-09-28 PROCEDURE — 85652 RBC SED RATE AUTOMATED: CPT

## 2020-09-28 PROCEDURE — 85025 COMPLETE CBC W/AUTO DIFF WBC: CPT

## 2020-09-28 PROCEDURE — 84443 ASSAY THYROID STIM HORMONE: CPT

## 2020-09-28 PROCEDURE — 80053 COMPREHEN METABOLIC PANEL: CPT

## 2020-09-28 PROCEDURE — 36415 COLL VENOUS BLD VENIPUNCTURE: CPT

## 2020-10-02 ENCOUNTER — OFFICE VISIT (OUTPATIENT)
Dept: INTERNAL MEDICINE CLINIC | Facility: CLINIC | Age: 65
End: 2020-10-02

## 2020-10-02 VITALS
DIASTOLIC BLOOD PRESSURE: 80 MMHG | RESPIRATION RATE: 18 BRPM | HEIGHT: 66 IN | WEIGHT: 293 LBS | BODY MASS INDEX: 47.09 KG/M2 | SYSTOLIC BLOOD PRESSURE: 130 MMHG | HEART RATE: 80 BPM

## 2020-10-02 DIAGNOSIS — M25.571 CHRONIC PAIN OF RIGHT ANKLE: ICD-10-CM

## 2020-10-02 DIAGNOSIS — I10 ESSENTIAL HYPERTENSION: Primary | ICD-10-CM

## 2020-10-02 DIAGNOSIS — E66.01 MORBID OBESITY (HCC): ICD-10-CM

## 2020-10-02 DIAGNOSIS — G89.29 CHRONIC PAIN OF RIGHT ANKLE: ICD-10-CM

## 2020-10-02 DIAGNOSIS — N18.32 STAGE 3B CHRONIC KIDNEY DISEASE (HCC): ICD-10-CM

## 2020-10-02 PROCEDURE — 99214 OFFICE O/P EST MOD 30 MIN: CPT | Performed by: INTERNAL MEDICINE

## 2020-10-02 PROCEDURE — 3008F BODY MASS INDEX DOCD: CPT | Performed by: INTERNAL MEDICINE

## 2020-10-02 PROCEDURE — 3075F SYST BP GE 130 - 139MM HG: CPT | Performed by: INTERNAL MEDICINE

## 2020-10-02 PROCEDURE — 3079F DIAST BP 80-89 MM HG: CPT | Performed by: INTERNAL MEDICINE

## 2020-10-02 RX ORDER — DEXTROAMPHETAMINE SACCHARATE, AMPHETAMINE ASPARTATE, DEXTROAMPHETAMINE SULFATE AND AMPHETAMINE SULFATE 2.5; 2.5; 2.5; 2.5 MG/1; MG/1; MG/1; MG/1
10 TABLET ORAL DAILY
Qty: 30 TABLET | Refills: 0 | Status: SHIPPED | OUTPATIENT
Start: 2020-11-02 | End: 2021-01-12

## 2020-10-02 RX ORDER — GABAPENTIN 400 MG/1
400 CAPSULE ORAL NIGHTLY
COMMUNITY
End: 2021-03-04

## 2020-10-02 RX ORDER — DEXTROAMPHETAMINE SACCHARATE, AMPHETAMINE ASPARTATE, DEXTROAMPHETAMINE SULFATE AND AMPHETAMINE SULFATE 2.5; 2.5; 2.5; 2.5 MG/1; MG/1; MG/1; MG/1
10 TABLET ORAL DAILY
Qty: 30 TABLET | Refills: 0 | Status: SHIPPED | OUTPATIENT
Start: 2020-12-03 | End: 2021-01-12

## 2020-10-02 RX ORDER — DEXTROAMPHETAMINE SACCHARATE, AMPHETAMINE ASPARTATE, DEXTROAMPHETAMINE SULFATE AND AMPHETAMINE SULFATE 2.5; 2.5; 2.5; 2.5 MG/1; MG/1; MG/1; MG/1
10 TABLET ORAL DAILY
Qty: 30 TABLET | Refills: 0 | Status: SHIPPED | OUTPATIENT
Start: 2020-10-02 | End: 2021-01-12

## 2020-10-02 NOTE — ASSESSMENT & PLAN NOTE
Blood pressure 130/80, pulse 80, resp. rate 18, height 5' 6\" (1.676 m), weight 297 lb (134.7 kg), not currently breastfeeding. Renal functions remain low, about 40 at this time. Adequate hydration.   Patient is not on any ACE inhibitor's of ACE receptor

## 2020-10-02 NOTE — ASSESSMENT & PLAN NOTE
Body mass index is 47.94 kg/m².    Wt Readings from Last 6 Encounters:  10/02/20 : 297 lb (134.7 kg)  08/17/20 : (!) 301 lb (136.5 kg)  07/07/20 : 300 lb (136.1 kg)  07/03/20 : (!) 301 lb 14.4 oz (136.9 kg)  05/22/20 : 280 lb (127 kg)  03/05/20 : 283 lb (12

## 2020-10-02 NOTE — ASSESSMENT & PLAN NOTE
Blood pressure 130/80, pulse 80, resp. rate 18, height 5' 6\" (1.676 m), weight 297 lb (134.7 kg), not currently breastfeeding. Blood pressure upon recheck much improved.   Patient is currently on triamterene hydrochlorothiazide 1 capsule 2 times a week,

## 2020-10-02 NOTE — ASSESSMENT & PLAN NOTE
Pt with intermittent pain in the ankle, forefoot along the arch and outer aspect of the ankle. Difficulty with walking. Has been seen by Dr. Lee Ann White and has been given orthotics. Has been advised that this is secondary to osteoarthritis.     Due to the i

## 2020-10-02 NOTE — PROGRESS NOTES
HPI:    Patient ID: Ama Calderon is a 63year old female. Written by Landry Mart MD on 9/29/2020  5:51 AM  The kidney functions are much lower than what it has been in the past with the filtration rate dropping down to 41.    Please ensure that yo associated enthesophyte. 5. Ligamentous to and tendinous structures in the foot and ankle are intact. Dictated by (CST): Hazel Washington MD on 8/19/2020 at 5:19 PM     . The symptoms are aggravated by activity.  She has tried acetaminophen, heat, mo mouth daily. 30 tablet 0   • [START ON 12/3/2020] amphetamine-dextroamphetamine (ADDERALL) 10 MG Oral Tab Take 1 tablet (10 mg total) by mouth daily. 30 tablet 0   • DULoxetine HCl 30 MG Oral Cap DR Particles Take 1 capsule (30 mg total) by mouth daily.  [address] equal, round, and reactive to light. Conjunctivae and EOM are normal. Right eye exhibits no discharge. Left eye exhibits no discharge. Neck: Normal range of motion. Neck supple. No JVD present. No thyromegaly present.    Cardiovascular: Normal rate, regul kg)  07/07/20 : 300 lb (136.1 kg)  07/03/20 : (!) 301 lb 14.4 oz (136.9 kg)  05/22/20 : 280 lb (127 kg)  03/05/20 : 283 lb (128.4 kg)     Patient has been watching her diet and lost weight.   Given significant ankle arthritis and need to rapidly lose weight Refills   • amphetamine-dextroamphetamine (ADDERALL) 10 MG Oral Tab 30 tablet 0     Sig: Take 1 tablet (10 mg total) by mouth daily.    • amphetamine-dextroamphetamine (ADDERALL) 10 MG Oral Tab 30 tablet 0     Sig: Take 1 tablet (10 mg total) by mouth daily

## 2020-10-21 ENCOUNTER — TELEPHONE (OUTPATIENT)
Dept: INTERNAL MEDICINE CLINIC | Facility: CLINIC | Age: 65
End: 2020-10-21

## 2020-10-21 DIAGNOSIS — M72.2 PLANTAR FASCIITIS: Primary | ICD-10-CM

## 2020-10-21 DIAGNOSIS — M19.071 OSTEOARTHRITIS OF RIGHT FOOT, UNSPECIFIED OSTEOARTHRITIS TYPE: ICD-10-CM

## 2020-10-21 NOTE — TELEPHONE ENCOUNTER
Patient is requesting a new referral to continue seeing Dr. Ewa Lane, podiatrist. Patient has a scheduled appointment on 10/27.

## 2020-10-26 NOTE — TELEPHONE ENCOUNTER
Patient checking status on her referral. Patient was advised pending approval and a message will be sent. Patient states she is scheduled to see Dr. Darlin Harris tomorrow 10/27. Please advise.

## 2020-10-27 ENCOUNTER — TELEPHONE (OUTPATIENT)
Dept: PODIATRY CLINIC | Facility: CLINIC | Age: 65
End: 2020-10-27

## 2020-10-27 ENCOUNTER — OFFICE VISIT (OUTPATIENT)
Dept: PODIATRY CLINIC | Facility: CLINIC | Age: 65
End: 2020-10-27

## 2020-10-27 DIAGNOSIS — M72.2 PLANTAR FASCIITIS OF RIGHT FOOT: ICD-10-CM

## 2020-10-27 DIAGNOSIS — M77.42 METATARSALGIA OF BOTH FEET: ICD-10-CM

## 2020-10-27 DIAGNOSIS — M79.671 INFLAMMATORY HEEL PAIN, RIGHT: ICD-10-CM

## 2020-10-27 DIAGNOSIS — M79.671 ARCH PAIN, RIGHT: Primary | ICD-10-CM

## 2020-10-27 DIAGNOSIS — M19.071 ARTHROSIS OF MIDFOOT, RIGHT: ICD-10-CM

## 2020-10-27 DIAGNOSIS — L84 CALLUS OF FOOT: ICD-10-CM

## 2020-10-27 DIAGNOSIS — M77.41 METATARSALGIA OF BOTH FEET: ICD-10-CM

## 2020-10-27 PROCEDURE — 99213 OFFICE O/P EST LOW 20 MIN: CPT | Performed by: PODIATRIST

## 2020-10-27 NOTE — PROGRESS NOTES
Josh Treviño is a 63year old female. Patient presents with:   Foot Pain: Right - she still has pain in her foot after she started with her orthotics in June - she has painful callusses also - rates pain as 8/10 on and off         HPI:   Patient returns daily. Apply bid to lower legs as needed for rash 80 g 1   • loratadine 10 MG Oral Tab Take 10 mg by mouth nightly. • Brexpiprazole (REXULTI) 0.5 MG Oral Tab Take by mouth daily.      • buPROPion HCl ER, XL, 300 MG Oral Tablet 24 Hr Take 600 mg by mouth polyps) Mother    • Depression Sister    • Hypertension Sister    • Other (acromegaly from a pituitary tumor) Sister       Social History    Socioeconomic History      Marital status:       Spouse name: Not on file      Number of children: Not on fi tubercles. This is bilateral with the right being worse than the left.   ASSESSMENT AND PLAN:   Diagnoses and all orders for this visit:    Arch pain, right  -     OP REFERRAL TO [facility]    Plantar fasciitis of right foot  -     OP

## 2020-10-30 NOTE — PROGRESS NOTES
Discharge note  Dx: M54.5, G89.29 (ICD-10-CM) - 724.2, 338.29 (ICD-9-CM) - Chronic bilateral low back pain without sciatica         Authorized # of Visits:  8         Next MD visit: none scheduled - Esa Juárez  Fall Risk: standard         Precautions: n/ [place] [facility] is closed sidelying clam 2x10 ea sidelying clam 2x10 ea sidelying clam 2x10 ea sidelying clam 2x10 ea sidelying clam 2x10 ea sidelying clam 3x10 ea    PKB x 30 ea PKB x 30 ea PKB x 30 ea PKB x 30 ea PKB x 30 ea PKB x 30 ea PKB x 30 ea   Prone hip ext x 10 ea Prone Total Timed Treatment: 46 min  Total Treatment Time: 50 min

## 2020-11-04 ENCOUNTER — HOSPITAL ENCOUNTER (EMERGENCY)
Dept: GENERAL RADIOLOGY | Facility: HOSPITAL | Age: 65
End: 2020-11-04
Attending: EMERGENCY MEDICINE
Payer: COMMERCIAL

## 2020-11-04 ENCOUNTER — HOSPITAL ENCOUNTER (EMERGENCY)
Facility: HOSPITAL | Age: 65
Discharge: HOME | End: 2020-11-04
Attending: EMERGENCY MEDICINE
Payer: COMMERCIAL

## 2020-11-04 VITALS
TEMPERATURE: 99 F | OXYGEN SATURATION: 98 % | WEIGHT: 290 LBS | BODY MASS INDEX: 43.95 KG/M2 | HEART RATE: 69 BPM | RESPIRATION RATE: 20 BRPM | SYSTOLIC BLOOD PRESSURE: 137 MMHG | HEIGHT: 68 IN | DIASTOLIC BLOOD PRESSURE: 70 MMHG

## 2020-11-04 DIAGNOSIS — S52.002A CLOSED FRACTURE OF PROXIMAL END OF LEFT ULNA, UNSPECIFIED FRACTURE MORPHOLOGY, INITIAL ENCOUNTER: ICD-10-CM

## 2020-11-04 DIAGNOSIS — S52.182A: Primary | ICD-10-CM

## 2020-11-04 PROCEDURE — 96374 THER/PROPH/DIAG INJ IV PUSH: CPT

## 2020-11-04 PROCEDURE — 99284 EMERGENCY DEPT VISIT MOD MDM: CPT

## 2020-11-04 PROCEDURE — 73090 X-RAY EXAM OF FOREARM: CPT | Performed by: EMERGENCY MEDICINE

## 2020-11-04 RX ORDER — MORPHINE SULFATE 4 MG/ML
4 INJECTION, SOLUTION INTRAMUSCULAR; INTRAVENOUS ONCE
Status: COMPLETED | OUTPATIENT
Start: 2020-11-04 | End: 2020-11-04

## 2020-11-04 RX ORDER — HYDROCODONE BITARTRATE AND ACETAMINOPHEN 5; 325 MG/1; MG/1
1-2 TABLET ORAL EVERY 6 HOURS PRN
Qty: 10 TABLET | Refills: 0 | Status: ON HOLD | OUTPATIENT
Start: 2020-11-04 | End: 2020-11-07

## 2020-11-04 NOTE — ED INITIAL ASSESSMENT (HPI)
Pt to ED via EMS post fall, pt tripped on a dog gate. Denies hitting head. Denies neck pain. L elbow deformity.  +CMS

## 2020-11-04 NOTE — ED NOTES
Pt currently in [facility]. Informed on eta for room. L arm immobilized per EMS. +CMS to L hand. Arm repositioned for comfort.

## 2020-11-05 ENCOUNTER — TELEPHONE (OUTPATIENT)
Dept: ORTHOPEDICS CLINIC | Facility: CLINIC | Age: 65
End: 2020-11-05

## 2020-11-05 ENCOUNTER — HOSPITAL ENCOUNTER (OUTPATIENT)
Dept: CT IMAGING | Facility: HOSPITAL | Age: 65
Discharge: HOME | End: 2020-11-05
Attending: ORTHOPAEDIC SURGERY
Payer: COMMERCIAL

## 2020-11-05 ENCOUNTER — TELEPHONE (OUTPATIENT)
Dept: CASE MANAGEMENT | Age: 65
End: 2020-11-05

## 2020-11-05 ENCOUNTER — OFFICE VISIT (OUTPATIENT)
Dept: ORTHOPEDICS CLINIC | Facility: CLINIC | Age: 65
End: 2020-11-05

## 2020-11-05 ENCOUNTER — HOSPITAL ENCOUNTER (OUTPATIENT)
Dept: CT IMAGING | Facility: HOSPITAL | Age: 65
Discharge: HOME | End: 2020-11-05
Attending: PHYSICIAN ASSISTANT
Payer: COMMERCIAL

## 2020-11-05 VITALS — WEIGHT: 293 LBS | BODY MASS INDEX: 47.09 KG/M2 | HEIGHT: 66 IN

## 2020-11-05 DIAGNOSIS — S52.002A: Primary | ICD-10-CM

## 2020-11-05 DIAGNOSIS — S52.92XA CLOSED FRACTURE OF LEFT RADIUS AND ULNA, INITIAL ENCOUNTER: ICD-10-CM

## 2020-11-05 DIAGNOSIS — S52.92XA CLOSED FRACTURE OF LEFT RADIUS AND ULNA, INITIAL ENCOUNTER: Primary | ICD-10-CM

## 2020-11-05 DIAGNOSIS — S52.102A: Primary | ICD-10-CM

## 2020-11-05 DIAGNOSIS — S52.202A CLOSED FRACTURE OF LEFT RADIUS AND ULNA, INITIAL ENCOUNTER: ICD-10-CM

## 2020-11-05 DIAGNOSIS — S52.202A CLOSED FRACTURE OF LEFT RADIUS AND ULNA, INITIAL ENCOUNTER: Primary | ICD-10-CM

## 2020-11-05 DIAGNOSIS — S52.90XS: Primary | ICD-10-CM

## 2020-11-05 PROCEDURE — 73200 CT UPPER EXTREMITY W/O DYE: CPT | Performed by: PHYSICIAN ASSISTANT

## 2020-11-05 PROCEDURE — 3008F BODY MASS INDEX DOCD: CPT | Performed by: ORTHOPAEDIC SURGERY

## 2020-11-05 PROCEDURE — 99244 OFF/OP CNSLTJ NEW/EST MOD 40: CPT | Performed by: ORTHOPAEDIC SURGERY

## 2020-11-05 NOTE — TELEPHONE ENCOUNTER
Dr. Larry Chavis called for a referral to see Dr. Raffy Belle. She has an appointment today 11/5/20 at 3:30 for a fracture. See yesterday's, 11/4/20, ED visit.     Thank you  Nicholas Miller

## 2020-11-05 NOTE — ED NOTES
Round on pt. Pt sts pain remains the same. Family at bedside.  Pt updated on poc   Immobilizer removed per MD. Ice pack applied to L elbow

## 2020-11-05 NOTE — ED PROVIDER NOTES
Patient Seen in: [facility] Emergency Department      History   Patient presents with:  Fall    Stated Complaint: Fall, L elbow deformity, +CMS    HPI    Patient presents after mechanical fall.   She fell onto her left elbow, has been in excruciating History    Tobacco Use      Smoking status: Never Smoker      Smokeless tobacco: Never Used    Alcohol use:  Yes      Alcohol/week: 1.0 - 2.0 standard drinks      Types: 1 - 2 Standard drinks or equivalent per week      Frequency: 2-4 times a month      Dri Date: 11/4/2020  PROCEDURE:  XR ELBOW, COMPLETE (MIN 3 VIEWS), LEFT (CPT=73080)  TECHNIQUE:  Three views were obtained.   COMPARISON:  [facility] , XR, XR FOREARM (2 VIEWS), LEFT (CPT=73090), 11/04/2020, 5:47 PM.  INDICATIONS:  Fall, L elbow deformity, +CMS  PA on-call. Agrees that the patient is okay for discharge home today in the long-arm splint with close follow-up. Patient was placed in a long-arm posterior splint to stabilize the fracture of the proximal radius and ulna.     The patient's splint was

## 2020-11-05 NOTE — TELEPHONE ENCOUNTER
Per Dr Dusty Rueda, schedule pt for surgery for tomorrow, 11/06/20, at 3:00pm. Sx form completed and faxed to [facility] and [facility] [address]  along with demographics. Referral placed for surgery under critical. Spoke to ASYA and Robel in the OR.  Per ASYA, they will call pt varinder

## 2020-11-05 NOTE — TELEPHONE ENCOUNTER
CT Scan near ER stated that the CT scan would take hours.  Advised to goto the CT scan in the [facility] that is open until 7pm. The patient must arrive by 6pm. The CT scan new ER stated that the [facility] is open and will be able to get patient in [place]

## 2020-11-06 ENCOUNTER — APPOINTMENT (OUTPATIENT)
Dept: LAB | Age: 65
End: 2020-11-06
Attending: ORTHOPAEDIC SURGERY
Payer: COMMERCIAL

## 2020-11-06 ENCOUNTER — TELEPHONE (OUTPATIENT)
Dept: ORTHOPEDICS CLINIC | Facility: CLINIC | Age: 65
End: 2020-11-06

## 2020-11-06 ENCOUNTER — ANESTHESIA (OUTPATIENT)
Dept: SURGERY | Facility: HOSPITAL | Age: 65
End: 2020-11-06
Payer: COMMERCIAL

## 2020-11-06 ENCOUNTER — HOSPITAL ENCOUNTER (OUTPATIENT)
Facility: HOSPITAL | Age: 65
Setting detail: OBSERVATION
Discharge: HOME | End: 2020-11-07
Attending: ORTHOPAEDIC SURGERY | Admitting: ORTHOPAEDIC SURGERY
Payer: COMMERCIAL

## 2020-11-06 ENCOUNTER — APPOINTMENT (OUTPATIENT)
Dept: GENERAL RADIOLOGY | Facility: HOSPITAL | Age: 65
End: 2020-11-06
Attending: ORTHOPAEDIC SURGERY
Payer: COMMERCIAL

## 2020-11-06 ENCOUNTER — ANESTHESIA EVENT (OUTPATIENT)
Dept: SURGERY | Facility: HOSPITAL | Age: 65
End: 2020-11-06
Payer: COMMERCIAL

## 2020-11-06 DIAGNOSIS — Z01.818 PREOP TESTING: ICD-10-CM

## 2020-11-06 DIAGNOSIS — Z01.818 PREOP TESTING: Primary | ICD-10-CM

## 2020-11-06 PROCEDURE — 64415 NJX AA&/STRD BRCH PLXS IMG: CPT | Performed by: ORTHOPAEDIC SURGERY

## 2020-11-06 PROCEDURE — 0PSJ04Z REPOSITION LEFT RADIUS WITH INTERNAL FIXATION DEVICE, OPEN APPROACH: ICD-10-PCS | Performed by: ORTHOPAEDIC SURGERY

## 2020-11-06 PROCEDURE — 94660 CPAP INITIATION&MGMT: CPT

## 2020-11-06 PROCEDURE — 76000 FLUOROSCOPY <1 HR PHYS/QHP: CPT | Performed by: ORTHOPAEDIC SURGERY

## 2020-11-06 PROCEDURE — 3E0T3BZ INTRODUCTION OF ANESTHETIC AGENT INTO PERIPHERAL NERVES AND PLEXI, PERCUTANEOUS APPROACH: ICD-10-PCS | Performed by: ANESTHESIOLOGY

## 2020-11-06 PROCEDURE — 0PSL04Z REPOSITION LEFT ULNA WITH INTERNAL FIXATION DEVICE, OPEN APPROACH: ICD-10-PCS | Performed by: ORTHOPAEDIC SURGERY

## 2020-11-06 PROCEDURE — 76942 ECHO GUIDE FOR BIOPSY: CPT | Performed by: ANESTHESIOLOGY

## 2020-11-06 PROCEDURE — 76942 ECHO GUIDE FOR BIOPSY: CPT | Performed by: ORTHOPAEDIC SURGERY

## 2020-11-06 DEVICE — IMPLANTABLE DEVICE: Type: IMPLANTABLE DEVICE | Site: ELBOW | Status: FUNCTIONAL

## 2020-11-06 DEVICE — 2.7MM X 18MM LOCKING HEXALOBE SCREW
Type: IMPLANTABLE DEVICE | Site: ELBOW | Status: FUNCTIONAL
Brand: ACUMED

## 2020-11-06 DEVICE — 3.5MM X 16MM LOCKING HEXALOBE SCREW
Type: IMPLANTABLE DEVICE | Site: ELBOW | Status: FUNCTIONAL
Brand: ACUMED

## 2020-11-06 DEVICE — 3.5MM X 20MM NON-LOCKING HEXALOBE SCREW
Type: IMPLANTABLE DEVICE | Site: ELBOW | Status: FUNCTIONAL
Brand: ACUMED

## 2020-11-06 DEVICE — 2.7MM X 14MM LOCKING HEXALOBE SCREW
Type: IMPLANTABLE DEVICE | Site: ELBOW | Status: FUNCTIONAL
Brand: ACUMED

## 2020-11-06 DEVICE — 2.3MM X 16MM LOCKING CORTICAL SCREW
Type: IMPLANTABLE DEVICE | Site: ELBOW | Status: FUNCTIONAL
Brand: ACUMED

## 2020-11-06 DEVICE — 2.3MM X 18MM LOCKING CORTICAL SCREW
Type: IMPLANTABLE DEVICE | Site: ELBOW | Status: FUNCTIONAL
Brand: ACUMED

## 2020-11-06 DEVICE — 3.5MM X 45MM LOCKING HEXALOBE SCREW
Type: IMPLANTABLE DEVICE | Site: ELBOW | Status: FUNCTIONAL
Brand: ACUMED

## 2020-11-06 DEVICE — 3.5MM X 26MM NON-LOCKING HEXALOBE SCREW
Type: IMPLANTABLE DEVICE | Site: ELBOW | Status: FUNCTIONAL
Brand: ACUMED

## 2020-11-06 DEVICE — 3.5MM X 22MM LOCKING HEXALOBE SCREW
Type: IMPLANTABLE DEVICE | Site: ELBOW | Status: FUNCTIONAL
Brand: ACUMED

## 2020-11-06 DEVICE — 2.3MM X 12MM LOCKING CORTICAL SCREW
Type: IMPLANTABLE DEVICE | Site: ELBOW | Status: FUNCTIONAL
Brand: ACUMED

## 2020-11-06 DEVICE — 3.5MM X 16MM NON-LOCKING HEXALOBE SCREW
Type: IMPLANTABLE DEVICE | Site: ELBOW | Status: FUNCTIONAL
Brand: ACUMED

## 2020-11-06 DEVICE — 3.5MM X 20MM LOCKING HEXALOBE SCREW
Type: IMPLANTABLE DEVICE | Site: ELBOW | Status: FUNCTIONAL
Brand: ACUMED

## 2020-11-06 RX ORDER — ACETAMINOPHEN 500 MG
1000 TABLET ORAL EVERY 8 HOURS
Status: DISCONTINUED | OUTPATIENT
Start: 2020-11-06 | End: 2020-11-07

## 2020-11-06 RX ORDER — GABAPENTIN 400 MG/1
400 CAPSULE ORAL NIGHTLY
Status: DISCONTINUED | OUTPATIENT
Start: 2020-11-06 | End: 2020-11-07

## 2020-11-06 RX ORDER — ROCURONIUM BROMIDE 10 MG/ML
INJECTION, SOLUTION INTRAVENOUS AS NEEDED
Status: DISCONTINUED | OUTPATIENT
Start: 2020-11-06 | End: 2020-11-06 | Stop reason: SURG

## 2020-11-06 RX ORDER — PRIMIDONE 50 MG/1
50 TABLET ORAL NIGHTLY
Status: DISCONTINUED | OUTPATIENT
Start: 2020-11-06 | End: 2020-11-07

## 2020-11-06 RX ORDER — MORPHINE SULFATE 10 MG/ML
6 INJECTION, SOLUTION INTRAMUSCULAR; INTRAVENOUS EVERY 10 MIN PRN
Status: DISCONTINUED | OUTPATIENT
Start: 2020-11-06 | End: 2020-11-06 | Stop reason: HOSPADM

## 2020-11-06 RX ORDER — HYDROCODONE BITARTRATE AND ACETAMINOPHEN 5; 325 MG/1; MG/1
2 TABLET ORAL AS NEEDED
Status: DISCONTINUED | OUTPATIENT
Start: 2020-11-06 | End: 2020-11-06 | Stop reason: HOSPADM

## 2020-11-06 RX ORDER — ACETAMINOPHEN 500 MG
1000 TABLET ORAL ONCE
Status: COMPLETED | OUTPATIENT
Start: 2020-11-06 | End: 2020-11-06

## 2020-11-06 RX ORDER — AMLODIPINE BESYLATE 2.5 MG/1
2.5 TABLET ORAL DAILY
Status: DISCONTINUED | OUTPATIENT
Start: 2020-11-07 | End: 2020-11-07

## 2020-11-06 RX ORDER — HYDROMORPHONE HYDROCHLORIDE 1 MG/ML
0.2 INJECTION, SOLUTION INTRAMUSCULAR; INTRAVENOUS; SUBCUTANEOUS EVERY 5 MIN PRN
Status: DISCONTINUED | OUTPATIENT
Start: 2020-11-06 | End: 2020-11-06 | Stop reason: HOSPADM

## 2020-11-06 RX ORDER — PROCHLORPERAZINE EDISYLATE 5 MG/ML
5 INJECTION INTRAMUSCULAR; INTRAVENOUS ONCE AS NEEDED
Status: DISCONTINUED | OUTPATIENT
Start: 2020-11-06 | End: 2020-11-06 | Stop reason: HOSPADM

## 2020-11-06 RX ORDER — DULOXETIN HYDROCHLORIDE 30 MG/1
30 CAPSULE, DELAYED RELEASE ORAL DAILY
Status: DISCONTINUED | OUTPATIENT
Start: 2020-11-07 | End: 2020-11-07

## 2020-11-06 RX ORDER — NALOXONE HYDROCHLORIDE 0.4 MG/ML
80 INJECTION, SOLUTION INTRAMUSCULAR; INTRAVENOUS; SUBCUTANEOUS AS NEEDED
Status: DISCONTINUED | OUTPATIENT
Start: 2020-11-06 | End: 2020-11-06 | Stop reason: HOSPADM

## 2020-11-06 RX ORDER — MORPHINE SULFATE 4 MG/ML
6 INJECTION, SOLUTION INTRAMUSCULAR; INTRAVENOUS EVERY 2 HOUR PRN
Status: DISCONTINUED | OUTPATIENT
Start: 2020-11-06 | End: 2020-11-07

## 2020-11-06 RX ORDER — MIDAZOLAM HYDROCHLORIDE 1 MG/ML
INJECTION INTRAMUSCULAR; INTRAVENOUS AS NEEDED
Status: DISCONTINUED | OUTPATIENT
Start: 2020-11-06 | End: 2020-11-06 | Stop reason: SURG

## 2020-11-06 RX ORDER — NEOSTIGMINE METHYLSULFATE 1 MG/ML
INJECTION INTRAVENOUS AS NEEDED
Status: DISCONTINUED | OUTPATIENT
Start: 2020-11-06 | End: 2020-11-06 | Stop reason: SURG

## 2020-11-06 RX ORDER — CETIRIZINE HYDROCHLORIDE 10 MG/1
10 TABLET ORAL DAILY
Status: DISCONTINUED | OUTPATIENT
Start: 2020-11-06 | End: 2020-11-07

## 2020-11-06 RX ORDER — OXYCODONE HYDROCHLORIDE 5 MG/1
5 TABLET ORAL EVERY 4 HOURS PRN
Status: DISCONTINUED | OUTPATIENT
Start: 2020-11-06 | End: 2020-11-07

## 2020-11-06 RX ORDER — DEXTROAMPHETAMINE SACCHARATE, AMPHETAMINE ASPARTATE, DEXTROAMPHETAMINE SULFATE AND AMPHETAMINE SULFATE 2.5; 2.5; 2.5; 2.5 MG/1; MG/1; MG/1; MG/1
10 TABLET ORAL DAILY
Status: DISCONTINUED | OUTPATIENT
Start: 2020-11-06 | End: 2020-11-07

## 2020-11-06 RX ORDER — SODIUM PHOSPHATE, DIBASIC AND SODIUM PHOSPHATE, MONOBASIC 7; 19 G/133ML; G/133ML
1 ENEMA RECTAL ONCE AS NEEDED
Status: DISCONTINUED | OUTPATIENT
Start: 2020-11-06 | End: 2020-11-07

## 2020-11-06 RX ORDER — CHOLECALCIFEROL (VITAMIN D3) 125 MCG
2500 CAPSULE ORAL DAILY
Status: DISCONTINUED | OUTPATIENT
Start: 2020-11-07 | End: 2020-11-07

## 2020-11-06 RX ORDER — ONDANSETRON 2 MG/ML
4 INJECTION INTRAMUSCULAR; INTRAVENOUS EVERY 6 HOURS PRN
Status: DISCONTINUED | OUTPATIENT
Start: 2020-11-06 | End: 2020-11-07

## 2020-11-06 RX ORDER — MORPHINE SULFATE 4 MG/ML
4 INJECTION, SOLUTION INTRAMUSCULAR; INTRAVENOUS EVERY 10 MIN PRN
Status: DISCONTINUED | OUTPATIENT
Start: 2020-11-06 | End: 2020-11-06 | Stop reason: HOSPADM

## 2020-11-06 RX ORDER — DEXAMETHASONE SODIUM PHOSPHATE 10 MG/ML
INJECTION, SOLUTION INTRAMUSCULAR; INTRAVENOUS
Status: DISCONTINUED | OUTPATIENT
Start: 2020-11-06 | End: 2020-11-06 | Stop reason: SURG

## 2020-11-06 RX ORDER — ONDANSETRON 2 MG/ML
INJECTION INTRAMUSCULAR; INTRAVENOUS AS NEEDED
Status: DISCONTINUED | OUTPATIENT
Start: 2020-11-06 | End: 2020-11-06 | Stop reason: SURG

## 2020-11-06 RX ORDER — DOCUSATE SODIUM 100 MG/1
100 CAPSULE, LIQUID FILLED ORAL 2 TIMES DAILY
Status: DISCONTINUED | OUTPATIENT
Start: 2020-11-06 | End: 2020-11-07

## 2020-11-06 RX ORDER — TRIAMTERENE AND HYDROCHLOROTHIAZIDE 75; 50 MG/1; MG/1
0.5 TABLET ORAL
Status: DISCONTINUED | OUTPATIENT
Start: 2020-11-09 | End: 2020-11-07

## 2020-11-06 RX ORDER — ONDANSETRON 2 MG/ML
4 INJECTION INTRAMUSCULAR; INTRAVENOUS ONCE AS NEEDED
Status: DISCONTINUED | OUTPATIENT
Start: 2020-11-06 | End: 2020-11-06 | Stop reason: HOSPADM

## 2020-11-06 RX ORDER — MORPHINE SULFATE 2 MG/ML
2 INJECTION, SOLUTION INTRAMUSCULAR; INTRAVENOUS EVERY 2 HOUR PRN
Status: DISCONTINUED | OUTPATIENT
Start: 2020-11-06 | End: 2020-11-07

## 2020-11-06 RX ORDER — MORPHINE SULFATE 4 MG/ML
4 INJECTION, SOLUTION INTRAMUSCULAR; INTRAVENOUS EVERY 2 HOUR PRN
Status: DISCONTINUED | OUTPATIENT
Start: 2020-11-06 | End: 2020-11-07

## 2020-11-06 RX ORDER — MORPHINE SULFATE 15 MG/1
15 TABLET ORAL EVERY 4 HOURS PRN
Status: DISCONTINUED | OUTPATIENT
Start: 2020-11-06 | End: 2020-11-07

## 2020-11-06 RX ORDER — BISACODYL 10 MG
10 SUPPOSITORY, RECTAL RECTAL
Status: DISCONTINUED | OUTPATIENT
Start: 2020-11-06 | End: 2020-11-07

## 2020-11-06 RX ORDER — GLYCOPYRROLATE 0.2 MG/ML
INJECTION, SOLUTION INTRAMUSCULAR; INTRAVENOUS AS NEEDED
Status: DISCONTINUED | OUTPATIENT
Start: 2020-11-06 | End: 2020-11-06 | Stop reason: SURG

## 2020-11-06 RX ORDER — MORPHINE SULFATE 2 MG/ML
1 INJECTION, SOLUTION INTRAMUSCULAR; INTRAVENOUS EVERY 2 HOUR PRN
Status: DISCONTINUED | OUTPATIENT
Start: 2020-11-06 | End: 2020-11-07

## 2020-11-06 RX ORDER — LIDOCAINE HYDROCHLORIDE 10 MG/ML
INJECTION, SOLUTION EPIDURAL; INFILTRATION; INTRACAUDAL; PERINEURAL AS NEEDED
Status: DISCONTINUED | OUTPATIENT
Start: 2020-11-06 | End: 2020-11-06 | Stop reason: SURG

## 2020-11-06 RX ORDER — PANTOPRAZOLE SODIUM 40 MG/1
40 TABLET, DELAYED RELEASE ORAL DAILY
Status: DISCONTINUED | OUTPATIENT
Start: 2020-11-07 | End: 2020-11-07

## 2020-11-06 RX ORDER — OXYCODONE HYDROCHLORIDE 5 MG/1
10 TABLET ORAL EVERY 4 HOURS PRN
Status: DISCONTINUED | OUTPATIENT
Start: 2020-11-06 | End: 2020-11-07

## 2020-11-06 RX ORDER — HYDROMORPHONE HYDROCHLORIDE 1 MG/ML
0.6 INJECTION, SOLUTION INTRAMUSCULAR; INTRAVENOUS; SUBCUTANEOUS EVERY 5 MIN PRN
Status: DISCONTINUED | OUTPATIENT
Start: 2020-11-06 | End: 2020-11-06 | Stop reason: HOSPADM

## 2020-11-06 RX ORDER — FAMOTIDINE 20 MG/1
20 TABLET ORAL ONCE
Status: DISCONTINUED | OUTPATIENT
Start: 2020-11-06 | End: 2020-11-06 | Stop reason: HOSPADM

## 2020-11-06 RX ORDER — SODIUM CHLORIDE, SODIUM LACTATE, POTASSIUM CHLORIDE, CALCIUM CHLORIDE 600; 310; 30; 20 MG/100ML; MG/100ML; MG/100ML; MG/100ML
INJECTION, SOLUTION INTRAVENOUS CONTINUOUS
Status: DISCONTINUED | OUTPATIENT
Start: 2020-11-06 | End: 2020-11-06 | Stop reason: HOSPADM

## 2020-11-06 RX ORDER — HYDROMORPHONE HYDROCHLORIDE 1 MG/ML
0.4 INJECTION, SOLUTION INTRAMUSCULAR; INTRAVENOUS; SUBCUTANEOUS EVERY 5 MIN PRN
Status: DISCONTINUED | OUTPATIENT
Start: 2020-11-06 | End: 2020-11-06 | Stop reason: HOSPADM

## 2020-11-06 RX ORDER — HYDROCODONE BITARTRATE AND ACETAMINOPHEN 5; 325 MG/1; MG/1
1 TABLET ORAL AS NEEDED
Status: DISCONTINUED | OUTPATIENT
Start: 2020-11-06 | End: 2020-11-06 | Stop reason: HOSPADM

## 2020-11-06 RX ORDER — LEVOTHYROXINE SODIUM 0.12 MG/1
125 TABLET ORAL
Status: DISCONTINUED | OUTPATIENT
Start: 2020-11-07 | End: 2020-11-07

## 2020-11-06 RX ORDER — DEXAMETHASONE SODIUM PHOSPHATE 4 MG/ML
VIAL (ML) INJECTION AS NEEDED
Status: DISCONTINUED | OUTPATIENT
Start: 2020-11-06 | End: 2020-11-06 | Stop reason: SURG

## 2020-11-06 RX ORDER — ROPIVACAINE HYDROCHLORIDE 5 MG/ML
INJECTION, SOLUTION EPIDURAL; INFILTRATION; PERINEURAL
Status: DISCONTINUED | OUTPATIENT
Start: 2020-11-06 | End: 2020-11-06 | Stop reason: SURG

## 2020-11-06 RX ORDER — HALOPERIDOL 5 MG/ML
0.25 INJECTION INTRAMUSCULAR ONCE AS NEEDED
Status: DISCONTINUED | OUTPATIENT
Start: 2020-11-06 | End: 2020-11-06 | Stop reason: HOSPADM

## 2020-11-06 RX ORDER — SODIUM CHLORIDE, SODIUM LACTATE, POTASSIUM CHLORIDE, CALCIUM CHLORIDE 600; 310; 30; 20 MG/100ML; MG/100ML; MG/100ML; MG/100ML
INJECTION, SOLUTION INTRAVENOUS CONTINUOUS
Status: DISCONTINUED | OUTPATIENT
Start: 2020-11-06 | End: 2020-11-07

## 2020-11-06 RX ORDER — MORPHINE SULFATE 4 MG/ML
2 INJECTION, SOLUTION INTRAMUSCULAR; INTRAVENOUS EVERY 10 MIN PRN
Status: DISCONTINUED | OUTPATIENT
Start: 2020-11-06 | End: 2020-11-06 | Stop reason: HOSPADM

## 2020-11-06 RX ORDER — HYDROCODONE BITARTRATE AND ACETAMINOPHEN 7.5; 325 MG/1; MG/1
1 TABLET ORAL EVERY 4 HOURS PRN
Status: DISCONTINUED | OUTPATIENT
Start: 2020-11-06 | End: 2020-11-07

## 2020-11-06 RX ORDER — POLYETHYLENE GLYCOL 3350 17 G/17G
17 POWDER, FOR SOLUTION ORAL DAILY PRN
Status: DISCONTINUED | OUTPATIENT
Start: 2020-11-06 | End: 2020-11-07

## 2020-11-06 RX ORDER — BUPROPION HYDROCHLORIDE 300 MG/1
600 TABLET ORAL DAILY
Status: DISCONTINUED | OUTPATIENT
Start: 2020-11-07 | End: 2020-11-07

## 2020-11-06 RX ADMIN — GLYCOPYRROLATE 0.6 MG: 0.2 INJECTION, SOLUTION INTRAMUSCULAR; INTRAVENOUS at 18:16:00

## 2020-11-06 RX ADMIN — MIDAZOLAM HYDROCHLORIDE 2 MG: 1 INJECTION INTRAMUSCULAR; INTRAVENOUS at 15:14:00

## 2020-11-06 RX ADMIN — ONDANSETRON 4 MG: 2 INJECTION INTRAMUSCULAR; INTRAVENOUS at 16:54:00

## 2020-11-06 RX ADMIN — ROPIVACAINE HYDROCHLORIDE 30 ML: 5 INJECTION, SOLUTION EPIDURAL; INFILTRATION; PERINEURAL at 21:41:00

## 2020-11-06 RX ADMIN — SODIUM CHLORIDE, SODIUM LACTATE, POTASSIUM CHLORIDE, CALCIUM CHLORIDE: 600; 310; 30; 20 INJECTION, SOLUTION INTRAVENOUS at 18:05:00

## 2020-11-06 RX ADMIN — ROCURONIUM BROMIDE 50 MG: 10 INJECTION, SOLUTION INTRAVENOUS at 15:20:00

## 2020-11-06 RX ADMIN — LIDOCAINE HYDROCHLORIDE 50 MG: 10 INJECTION, SOLUTION EPIDURAL; INFILTRATION; INTRACAUDAL; PERINEURAL at 15:19:00

## 2020-11-06 RX ADMIN — DEXAMETHASONE SODIUM PHOSPHATE 4 MG: 4 MG/ML VIAL (ML) INJECTION at 16:48:00

## 2020-11-06 RX ADMIN — NEOSTIGMINE METHYLSULFATE 5 MG: 1 INJECTION INTRAVENOUS at 18:16:00

## 2020-11-06 RX ADMIN — DEXAMETHASONE SODIUM PHOSPHATE 10 MG: 10 INJECTION, SOLUTION INTRAMUSCULAR; INTRAVENOUS at 21:41:00

## 2020-11-06 RX ADMIN — SODIUM CHLORIDE, SODIUM LACTATE, POTASSIUM CHLORIDE, CALCIUM CHLORIDE: 600; 310; 30; 20 INJECTION, SOLUTION INTRAVENOUS at 18:32:00

## 2020-11-06 NOTE — ANESTHESIA PREPROCEDURE EVALUATION
Anesthesia PreOp Note    HPI:     Chioma Bernal is a 63year old female who presents for preoperative consultation requested by: Bay Grgigs MD    Date of Surgery: 11/6/2020    Procedure(s):  ELBOW OPEN REDUCTION INTERNAL FIXATION  Indication: Cl 02/12/2013      Sleep apnea         Date Noted: 01/23/2010      Insomnia with sleep apnea         Date Noted: 05/27/2009      Hypothyroidism         Date Noted: 05/22/2008      Morbid obesity [facility])         Date Noted: 03/15/2005        Past Medical History at 2200    •  amphetamine-dextroamphetamine (ADDERALL) 10 MG Oral Tab, Take 1 tablet (10 mg total) by mouth daily. , Disp: 30 tablet, Rfl: 0, 11/5/2020 at 0700    •  DULoxetine HCl 30 MG Oral Cap DR Particles, Take 1 capsule (30 mg total) by mouth daily. , D 1417    •  famoTIDine (PEPCID) tab 20 mg, 20 mg, Oral, Once, Chris Cody MD    •  ceFAZolin in NS (ANCEF) 3g/100mL IVPB premix, 3 g, Intravenous, Once, Latrice Cerda PA-C    No current [facility]-ordered outpatient medications on file.         Bactrim [ Gets together: Not on file        Attends [employer] service: Not on file        Active member of club or organization: Not on file        Attends meetings of clubs or organizations: Not on file        Relationship status: Not on file      Intimate part her pulse is 91. Her respiration is 18 and oxygen saturation is 96%.     11/05/20  1836 11/06/20  1348   BP:  (!) 179/99   Pulse:  91   Resp:  18   Temp:  98.8 °F (37.1 °C)   TempSrc:  Oral   SpO2:  96%   Weight: 133.8 kg (295 lb) 132.5 kg (292 lb)   Height

## 2020-11-06 NOTE — PROGRESS NOTES
NURSING INTAKE COMMENTS: Patient presents with:  Consult: Fracture of L Radius and Ulna at elbow - Needs sick leave form filled out -  XR 11/3/2020 - 7/10 Pain      HPI: This 63year old female presents today with complaints of left elbow and forearm pain. Current Outpatient Medications   Medication Sig Dispense Refill   • HYDROcodone-acetaminophen 5-325 MG Oral Tab Take 1-2 tablets by mouth every 6 (six) hours as needed for Pain. 10 tablet 0   • gabapentin 400 MG Oral Cap Take 400 mg by mouth nightly. 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 Splint in good position and in good condition. She moves the fingers freely.   Left shoul HISTORY: (As transcribed by Technologist)  Patient fell, injuring her left elbow. FINDINGS:  Displaced intra-articular fracture of proximal radius with anterior and medial displacement of the distal segment.   Oblique fracture of proximal ulna with angul

## 2020-11-06 NOTE — ANESTHESIA PROCEDURE NOTES
Airway  Date/Time: 11/6/2020 3:22 PM  Urgency: Elective    Airway not difficult    General Information and Staff    Patient location during procedure: OR  Anesthesiologist: Monica Feliciano MD  Resident/CRNA: Lula Galvan CRNA  Performed: CRNA     In

## 2020-11-06 NOTE — H&P
ORTHO SURGERY H&P  Geneva Fraser is a 63year old female. MRN is [id number]. CC: Left elbow pain     HPI: This 82-year-old female injured her left elbow on 11/3/2020 after she fell over a dog gate. She landed on her left elbow.   Had immediate pain, ABDOM HYSTERECTOMY     • TOTAL HIP REPLACEMENT Right 10/14/16   • UPPER GI ENDOSCOPY,EXAM         Social History: Social History    Socioeconomic History      Marital status:       Spouse name: Not on file      Number of children: Not on file      Y Stress Concern: Not Asked        Weight Concern: Not Asked        Special Diet: Not Asked        Back Care: Not Asked        Exercise: No        Bike Helmet: Not Asked        Seat Belt: Not Asked        Self-Exams: Not Asked        [place]: before breakfast. 90 tablet 2   • Cholecalciferol (VITAMIN D) 50 MCG (2000 UT) Oral Tab Take 2,000 mcg by mouth. • Pantoprazole Sodium 40 MG Oral Tab EC Take 40 mg by mouth daily.        • triamcinolone acetonide 0.1 % External Cream Apply topically 2 ( CREATSERUM 1.37 (H) 09/28/2020    ANIONGAP 4 09/28/2020    GFR 64 04/10/2017    GFRNAA 41 (L) 09/28/2020    GFRAA 47 (L) 09/28/2020    CA 8.8 09/28/2020    OSMOCALC 268 (L) 09/28/2020    ALKPHO 87 09/28/2020    AST 14 (L) 09/28/2020    ALT 30 09/28/2020 in partial apposition. This fracture does not clearly involve the articular surface. Intra-articular  fracture of proximal radius with anterior and medial displacement of articular fragment. No dislocation.             CONCLUSION:  Displaced fractures of exposure control for dose reduction was used. Adjustment of the mA and/or kV was done based on the patient's size. Use of iterative reconstruction technique for dose reduction was used.   Dose information is transmitted to the [facility] ([address] [facility] were discussed including but not limited to infection, joint stiffness, failure of the procedure, mechanical symptoms related to the surgery, blood clot, neurovascular injury, anesthetic complications, and loss of life or limb.  In addition, the patient has

## 2020-11-06 NOTE — TELEPHONE ENCOUNTER
Type of surgery:  Left proximal ulna open reduction internal fixation proximal radius ORIF possible head replacement   Date: 11/6/20  Location:[facility]  Medical Clearance: no     *Medical:      *Dental:      *Other:  Prior Authorization Status: done-authorized

## 2020-11-06 NOTE — PROGRESS NOTES
[facility] Note: Antimicrobial Weight Based Dose Adjustment for: cefazolin (ANCEF)     Kenneth Vargas is a 63year old patient who has been prescribed cefazolin (ANCEF) 2 g IVPB on call.       Wt Readings from Last 6 Encounters:  11/06/20 : 132.5 kg (29

## 2020-11-07 ENCOUNTER — PATIENT MESSAGE (OUTPATIENT)
Dept: INTERNAL MEDICINE CLINIC | Facility: CLINIC | Age: 65
End: 2020-11-07

## 2020-11-07 VITALS
DIASTOLIC BLOOD PRESSURE: 68 MMHG | TEMPERATURE: 99 F | HEART RATE: 81 BPM | BODY MASS INDEX: 46.93 KG/M2 | HEIGHT: 66 IN | WEIGHT: 292 LBS | OXYGEN SATURATION: 93 % | RESPIRATION RATE: 18 BRPM | SYSTOLIC BLOOD PRESSURE: 136 MMHG

## 2020-11-07 PROCEDURE — 97162 PT EVAL MOD COMPLEX 30 MIN: CPT

## 2020-11-07 PROCEDURE — 97535 SELF CARE MNGMENT TRAINING: CPT

## 2020-11-07 PROCEDURE — 97530 THERAPEUTIC ACTIVITIES: CPT

## 2020-11-07 PROCEDURE — 85018 HEMOGLOBIN: CPT | Performed by: ORTHOPAEDIC SURGERY

## 2020-11-07 PROCEDURE — 97166 OT EVAL MOD COMPLEX 45 MIN: CPT

## 2020-11-07 PROCEDURE — 97116 GAIT TRAINING THERAPY: CPT

## 2020-11-07 PROCEDURE — 3E0T3BZ INTRODUCTION OF ANESTHETIC AGENT INTO PERIPHERAL NERVES AND PLEXI, PERCUTANEOUS APPROACH: ICD-10-PCS | Performed by: ANESTHESIOLOGY

## 2020-11-07 RX ORDER — OXYCODONE HYDROCHLORIDE 10 MG/1
10 TABLET ORAL EVERY 4 HOURS PRN
Qty: 30 TABLET | Refills: 0 | Status: SHIPPED | OUTPATIENT
Start: 2020-11-07 | End: 2020-11-20

## 2020-11-07 RX ORDER — POLYETHYLENE GLYCOL 3350 17 G/17G
17 POWDER, FOR SOLUTION ORAL DAILY PRN
Qty: 170 G | Refills: 0 | Status: SHIPPED | OUTPATIENT
Start: 2020-11-07 | End: 2020-12-30 | Stop reason: ALTCHOICE

## 2020-11-07 RX ORDER — HYDROCODONE BITARTRATE AND ACETAMINOPHEN 5; 325 MG/1; MG/1
1-2 TABLET ORAL EVERY 6 HOURS PRN
Qty: 10 TABLET | Refills: 0 | Status: SHIPPED | COMMUNITY
Start: 2020-11-07 | End: 2020-11-30

## 2020-11-07 NOTE — PROGRESS NOTES
[facility]  Anesthesiology Epidural Follow-up Note  2020    Patient name: Steff Abraham 63year old female  : 3/15/1955  MRN: [id number]    Diagnosis: @KAT@    S/P: orif left ulna    Pain treatment modality: Interscalene block

## 2020-11-07 NOTE — PROGRESS NOTES
Subjective: No complaints. Pain controlled. L hand remains numb. Had postoperative IS block due to postoperative pain.     Objective:    Patient Vitals for the past 24 hrs:   BP Temp Temp src Pulse Resp SpO2 Height Weight   11/07/20 0516 136/68 98.6 °F (3 Dale Joyner MD

## 2020-11-07 NOTE — PLAN OF CARE
Received pt from PACU Aox4, no complaints of pain, spinal block placed in PACU following uncontrolled pain post surgery, ambulates with standby, NC and CPAP for sleep, VSS, pt has voided, plans to D/C home with wife, will continue to monitor.        Problem Consider post-discharge preferences of patient/family/discharge partner  - Complete POLST form as appropriate  - Assess patient's ability to be responsible for managing their own health  - Refer to Case Management Department for coordinating discharge plan

## 2020-11-07 NOTE — PHYSICAL THERAPY NOTE
PHYSICAL THERAPY EVALUATION - INPATIENT    Room Number: 408/408-A  Evaluation Date: 11/7/2020  Presenting Problem: left radius, ulna ORIF  Physician Order: PT Eval and Treat    Problem List  Active Problems:    * No active hospital problems.  *      Past Regularly Uses: None    Prior Level of [place]: Lives with spouse in 2 level home (12 int steps). Has SPC from old [facility]. Works as . Prior to injury, indep with all ADLs. SUBJECTIVE  Feeling better.      OBJECTIVE  Precautions: L wore gloves, mask and goggles for PPE. Discussed NWB status, keeping arm elevated, moving fingers, and keeping arm elevated. Sit to stand with supervision no AD. Walked 2x200 ft without AD supervision.  Performed 12 steps with one rail and supervision ass

## 2020-11-07 NOTE — OPERATIVE REPORT
[facility]    PATIENT'S NAME: Flaquito MADISONN   ATTENDING PHYSICIAN: Chris Kowalski MD   OPERATING PHYSICIAN: Chris Kowalski MD   PATIENT ACCOUNT#:   [id number]    LOCATION:  [address] [place] [address] #:   [id number]       DATE OF B draped in a sterile fashion. The extremity was exsanguinated, and the tourniquet was inflated to 250 mmHg. A posterior approach to the elbow was then performed through a 10 to 15 cm incision centered over the olecranon.   Sharp dissection was carried out the Jose tubercle on the radius. Sharp dissection was carried out down through extensor carpi radialis longus and brevis. The lateral capsule and annular ligament were then incised longitudinally, exposing the radial head fracture.   The fracture was fa closed full-thickness with 2-0 Vicryl sutures, skin staples, and 2-0 nylon sutures at the olecranon process. A sterile dressing was applied, followed by a posterior mold splint. The patient's anesthesia was reversed.   She was extubated and taken to the

## 2020-11-07 NOTE — OCCUPATIONAL THERAPY NOTE
OCCUPATIONAL THERAPY EVALUATION - INPATIENT      Room Number: 408/408-A  Evaluation Date: 11/7/2020  Type of Evaluation: Initial  Presenting Problem: (R prox radius & ulna fx with ORIF )    Physician Order: IP Consult to Occupational Therapy  Reason for Th pressure    • History of blood transfusion 2000    Good Robert   • Internal hemorrhoids 12/15/2017   • Neuropathy     kike feet   • Osteoarthritis    • Renal disorder    • Unspecified sleep apnea     CPAP       Past Surgical History  Past Surgical History:   P risk    WEIGHT BEARING RESTRICTION  Weight Bearing Restriction: L upper extremity     L Upper Extremity: Non-Weight Bearing          PAIN ASSESSMENT  Ratin  Location: (LUE )  Management Techniques: Repositioning;Relaxation(Did not need pain medication of session/findings; All patient questions and concerns addressed    Patient self-stated goal is: Return home, recover from surgery     Pt will not require skilled OT and will be discharged from OT at this time. RN updated.      [place], OTR/L 11/7/2020

## 2020-11-07 NOTE — ADDENDUM NOTE
Addendum  created 11/06/20 2146 by Mo Paulino MD    Child order released for a procedure order, Clinical Note Signed, Intraprocedure Blocks edited

## 2020-11-07 NOTE — PLAN OF CARE
Problem: SAFETY ADULT - FALL  Goal: Free from fall injury  Description: INTERVENTIONS:  - Assess pt frequently for physical needs  - Identify cognitive and physical deficits and behaviors that affect risk of falls.   - Advance fall precautions as indica

## 2020-11-07 NOTE — ANESTHESIA POSTPROCEDURE EVALUATION
Patient: Antione Corley    Procedure Summary     Date: 11/06/20 Room / Location: [facility] OR  / [facility] OR    Anesthesia Start: 8968 Anesthesia Stop: 3129    Procedure: ELBOW OPEN REDUCTION INTERNAL FIXATION (Left Elbow) Diagnosis:       Closed fractu

## 2020-11-07 NOTE — ANESTHESIA PROCEDURE NOTES
Peripheral Block  Performed by: Shayy Wilkins MD  Authorized by: Leann Simpson MD       General Information and Staff    Start Time:  11/6/2020 8:15 PM  End Time:  11/6/2020 8:20 PM  Anesthesiologist:  Shayy Wilkins MD  Performed by:   Anesthesio

## 2020-11-09 NOTE — TELEPHONE ENCOUNTER
From: Lottie Dee  To: Cecilai Rodriguez MD  Sent: 11/7/2020 10:27 AM CST  Subject: Referral Request    Hi Dr. Salazar Vega,    I need a referral to  Dr. Nubia Patel for follow-up appointments.     Thanks,    [facility]

## 2020-11-10 NOTE — TELEPHONE ENCOUNTER
Werner message viewed by patient.      Mireille Martínez MD 22 hours ago (9:54 AM)        Thanks for letting me know about the referral.

## 2020-11-13 ENCOUNTER — OFFICE VISIT (OUTPATIENT)
Dept: ORTHOPEDICS CLINIC | Facility: CLINIC | Age: 65
End: 2020-11-13

## 2020-11-13 ENCOUNTER — HOSPITAL ENCOUNTER (OUTPATIENT)
Dept: GENERAL RADIOLOGY | Facility: HOSPITAL | Age: 65
Discharge: HOME | End: 2020-11-13
Attending: ORTHOPAEDIC SURGERY
Payer: COMMERCIAL

## 2020-11-13 VITALS — HEIGHT: 66 IN | WEIGHT: 293 LBS | BODY MASS INDEX: 47.09 KG/M2

## 2020-11-13 DIAGNOSIS — S52.92XA CLOSED FRACTURE OF LEFT RADIUS AND ULNA, INITIAL ENCOUNTER: ICD-10-CM

## 2020-11-13 DIAGNOSIS — S52.202A CLOSED FRACTURE OF LEFT RADIUS AND ULNA, INITIAL ENCOUNTER: ICD-10-CM

## 2020-11-13 DIAGNOSIS — T14.8XXA FRACTURE: ICD-10-CM

## 2020-11-13 DIAGNOSIS — Z47.89 ORTHOPEDIC AFTERCARE: ICD-10-CM

## 2020-11-13 DIAGNOSIS — T14.8XXA FRACTURE: Primary | ICD-10-CM

## 2020-11-13 PROCEDURE — 1111F DSCHRG MED/CURRENT MED MERGE: CPT | Performed by: PHYSICIAN ASSISTANT

## 2020-11-13 PROCEDURE — 73070 X-RAY EXAM OF ELBOW: CPT | Performed by: ORTHOPAEDIC SURGERY

## 2020-11-13 PROCEDURE — 3008F BODY MASS INDEX DOCD: CPT | Performed by: PHYSICIAN ASSISTANT

## 2020-11-13 PROCEDURE — 99024 POSTOP FOLLOW-UP VISIT: CPT | Performed by: ORTHOPAEDIC SURGERY

## 2020-11-13 PROCEDURE — 29105 APPLICATION LONG ARM SPLINT: CPT | Performed by: PHYSICIAN ASSISTANT

## 2020-11-13 RX ORDER — HYDROCODONE BITARTRATE AND ACETAMINOPHEN 5; 325 MG/1; MG/1
1 TABLET ORAL EVERY 6 HOURS PRN
Qty: 20 TABLET | Refills: 0 | Status: SHIPPED | OUTPATIENT
Start: 2020-11-13 | End: 2020-11-30

## 2020-11-13 RX ORDER — CEPHALEXIN 250 MG/1
500 CAPSULE ORAL 2 TIMES DAILY
Qty: 20 CAPSULE | Refills: 0 | Status: SHIPPED | OUTPATIENT
Start: 2020-11-13 | End: 2020-11-18

## 2020-11-13 NOTE — PROGRESS NOTES
NURSING INTAKE COMMENTS: Patient presents with: Follow - Up: L. Radius and Ulna Fracture - Fingers bothering patient -  5/10 Pain. HPI: This 63year old female presents today with complaints of 1 week status post ORIF left proximal radius and ulna. HYSTERECTOMY     • TOTAL HIP REPLACEMENT Right 10/14/16   • UPPER GI ENDOSCOPY,EXAM       Current Outpatient Medications   Medication Sig Dispense Refill   • HYDROcodone-acetaminophen 5-325 MG Oral Tab Take 1 tablet by mouth every 6 (six) hours as needed f 0.5 MG Oral Tab Take by mouth daily. • buPROPion HCl ER, XL, 300 MG Oral Tablet 24 Hr Take 600 mg by mouth daily. Take 2 pills to equal 600mg. In the morning       • Cyanocobalamin (B-12) 2500 MCG Oral Tab Take 2,500 mcg by mouth daily.          Bactrim urinating  MUSCULOSKELETAL: denies musculoskeletal complaints other than in HPI  NEURO: denies numbness, tingling, weakness, balance issues, dizziness, memory loss  PSYCHIATRIC: denies Hx of depression, anxiety, other psychiatric disorders  HEMATOLOGIC: de Suboptimal positioning due to deformity.    Dictated by (CST): Qiana Collins MD on 11/04/2020 at 6:11 PM     Finalized by (CST): Qiana Collins MD on 11/04/2020 at 6:14 PM       Xr Forearm (2 Views), Left (cpt=73090)    Result Date: 11/4/2020  PROCEDURE:  XR which includes the Dose Index Registry. FINDINGS:  BONES: There is a transverse minimally common fracture at the proximal metadiaphysis of the ulna with apex dorsal angulation of the fracture site. Coronoid process is avulsed.   There is a moderately comm BONES: Postoperative changes are again noted from a recent ORIF of a left radial head/neck fracture and left olecranon process fracture. There is near anatomic alignment at the fracture sites.   A posterior compression plate is again seen over the left darío proximal ulna and radial head. Dorsal ulnar plate transfixing comminuted proximal ulnar fracture in anatomic position. ORIF procedure with radial head plate and multiple screws transfixing the comminuted radial head fracture         CONCLUSION:  1.  Fluor

## 2020-11-20 ENCOUNTER — TELEPHONE (OUTPATIENT)
Dept: ORTHOPEDICS CLINIC | Facility: CLINIC | Age: 65
End: 2020-11-20

## 2020-11-20 ENCOUNTER — OFFICE VISIT (OUTPATIENT)
Dept: ORTHOPEDICS CLINIC | Facility: CLINIC | Age: 65
End: 2020-11-20

## 2020-11-20 ENCOUNTER — HOSPITAL ENCOUNTER (OUTPATIENT)
Dept: GENERAL RADIOLOGY | Facility: HOSPITAL | Age: 65
Discharge: HOME | End: 2020-11-20
Attending: ORTHOPAEDIC SURGERY
Payer: COMMERCIAL

## 2020-11-20 DIAGNOSIS — Z47.89 ORTHOPEDIC AFTERCARE: Primary | ICD-10-CM

## 2020-11-20 DIAGNOSIS — Z47.89 ORTHOPEDIC AFTERCARE: ICD-10-CM

## 2020-11-20 DIAGNOSIS — S52.202A CLOSED FRACTURE OF LEFT RADIUS AND ULNA, INITIAL ENCOUNTER: ICD-10-CM

## 2020-11-20 DIAGNOSIS — S52.92XA CLOSED FRACTURE OF LEFT RADIUS AND ULNA, INITIAL ENCOUNTER: ICD-10-CM

## 2020-11-20 PROCEDURE — 73080 X-RAY EXAM OF ELBOW: CPT | Performed by: ORTHOPAEDIC SURGERY

## 2020-11-20 PROCEDURE — 99024 POSTOP FOLLOW-UP VISIT: CPT | Performed by: ORTHOPAEDIC SURGERY

## 2020-11-20 NOTE — TELEPHONE ENCOUNTER
Pt came for  [facility] for Dr Susan Rand to complete Certification of [address] form. Pt signed Brendon Hdez and would like to be billed the $25 fee to [facility]. Scanned to KEESHA and brought original to KEESHA at [facility].

## 2020-11-20 NOTE — PROGRESS NOTES
NURSING INTAKE COMMENTS: Patient presents with:  Post-Op: s/p ORIF lefft proximal radius and ulna -- Sx on 11/06/20. Rates pain 2/10 mostly in left hand. States left pinky feels numbness. Denies any fever. Right handed.        HPI: This 63year old female p • REDUCTION RIGHT     • SPINE SURGERY PROCEDURE UNLISTED     • TOTAL ABDOM HYSTERECTOMY     • TOTAL HIP REPLACEMENT Right 10/14/16   • UPPER GI ENDOSCOPY,EXAM       Current Outpatient Medications   Medication Sig Dispense Refill   • HYDROcodone-acetamino • PEG 3350 17 g Oral Powd Pack Take 17 g by mouth daily as needed.  (Patient not taking: Reported on 11/20/2020 ) 170 g 0       Bactrim [Sulfametho*    HIVES  Iodides (Topical)       HIVES    Comment:IVP DYE  Radiology Contrast *    HIVES    Comment:Othe balance issues, dizziness, memory loss  PSYCHIATRIC: denies Hx of depression, anxiety, other psychiatric disorders  HEMATOLOGIC: denies blood clots, anemia, blood clotting disorders, blood transfusion  ENDOCRINE: denies autoimmune disease, thyroid issues, Displaced fractures of proximal radius and ulna as detailed above. Suboptimal positioning due to deformity.    Dictated by (CST): Korin Padilla MD on 11/04/2020 at 6:11 PM     Finalized by (CST): Korin Padilla MD on 11/04/2020 at 6:14 PM       Xr Forearm ( ([facility]) NRDR ([address]) which includes the Dose Index Registry.   FINDINGS:  BONES: There is a transverse minimally common fracture at the proximal metadiaphysis of the ulna with apex dorsal angulation of the 11/06/2020. TECHNIQUE: 2 views were obtained. FINDINGS:  BONES: Postoperative changes are again noted from a recent ORIF of a left radial head/neck fracture and left olecranon process fracture. There is near anatomic alignment at the fracture sites.   A utilized during open reduction internal fixation of the proximal ulna and radial head. Dorsal ulnar plate transfixing comminuted proximal ulnar fracture in anatomic position.   ORIF procedure with radial head plate and multiple screws transfixing the commi

## 2020-11-21 ENCOUNTER — APPOINTMENT (OUTPATIENT)
Dept: LAB | Age: 65
End: 2020-11-21
Attending: INTERNAL MEDICINE
Payer: COMMERCIAL

## 2020-11-21 DIAGNOSIS — I10 ESSENTIAL HYPERTENSION: ICD-10-CM

## 2020-11-21 PROCEDURE — 80053 COMPREHEN METABOLIC PANEL: CPT

## 2020-11-21 PROCEDURE — 36415 COLL VENOUS BLD VENIPUNCTURE: CPT

## 2020-11-23 ENCOUNTER — TELEPHONE (OUTPATIENT)
Dept: PODIATRY CLINIC | Facility: CLINIC | Age: 65
End: 2020-11-23

## 2020-11-24 ENCOUNTER — PATIENT MESSAGE (OUTPATIENT)
Dept: ORTHOPEDICS CLINIC | Facility: CLINIC | Age: 65
End: 2020-11-24

## 2020-11-24 NOTE — TELEPHONE ENCOUNTER
Pt s/p Left prox ulna/radius ORIF 11/6/20 by Lizzie Han. Can I provide pt with letter to be off work until 12/7?

## 2020-11-27 ENCOUNTER — TELEMEDICINE (OUTPATIENT)
Dept: INTERNAL MEDICINE CLINIC | Facility: CLINIC | Age: 65
End: 2020-11-27

## 2020-11-27 DIAGNOSIS — I10 ESSENTIAL HYPERTENSION: Primary | ICD-10-CM

## 2020-11-27 DIAGNOSIS — E03.9 HYPOTHYROIDISM, UNSPECIFIED TYPE: ICD-10-CM

## 2020-11-27 DIAGNOSIS — E55.9 VITAMIN D DEFICIENCY: ICD-10-CM

## 2020-11-27 DIAGNOSIS — M25.552 PAIN OF LEFT HIP JOINT: ICD-10-CM

## 2020-11-27 PROCEDURE — 99214 OFFICE O/P EST MOD 30 MIN: CPT | Performed by: INTERNAL MEDICINE

## 2020-11-30 ENCOUNTER — OFFICE VISIT (OUTPATIENT)
Dept: PODIATRY CLINIC | Facility: CLINIC | Age: 65
End: 2020-11-30

## 2020-11-30 DIAGNOSIS — M25.571 SINUS TARSI SYNDROME, RIGHT: Primary | ICD-10-CM

## 2020-11-30 NOTE — PROGRESS NOTES
Estela Styles is a 63year old female. Patient presents with:  Orthotic Status: Pt here for dispensing of orthotics.          HPI:   Patient returns to the clinic to  her adjusted orthotics does not really have any complaints at today's visit revceline morning       • Cyanocobalamin (B-12) 2500 MCG Oral Tab Take 2,500 mcg by mouth daily. • PEG 3350 17 g Oral Powd Pack Take 17 g by mouth daily as needed.  (Patient not taking: Reported on 11/20/2020 ) 170 g 0      Past Medical History:   Diagnosis Date Mother    • Other (Colonic polyps) Mother    • Depression Sister    • Hypertension Sister    • Other (acromegaly from a pituitary tumor) Sister       Social History    Socioeconomic History      Marital status:       Spouse name: Not on file      Nu

## 2020-12-04 ENCOUNTER — OFFICE VISIT (OUTPATIENT)
Dept: ORTHOPEDICS CLINIC | Facility: CLINIC | Age: 65
End: 2020-12-04

## 2020-12-04 ENCOUNTER — HOSPITAL ENCOUNTER (OUTPATIENT)
Dept: GENERAL RADIOLOGY | Facility: HOSPITAL | Age: 65
Discharge: HOME | End: 2020-12-04
Attending: ORTHOPAEDIC SURGERY
Payer: COMMERCIAL

## 2020-12-04 DIAGNOSIS — S52.202P CLOSED FRACTURE OF LEFT RADIUS AND ULNA WITH MALUNION, SUBSEQUENT ENCOUNTER: Primary | ICD-10-CM

## 2020-12-04 DIAGNOSIS — S52.92XP CLOSED FRACTURE OF LEFT RADIUS AND ULNA WITH MALUNION, SUBSEQUENT ENCOUNTER: Primary | ICD-10-CM

## 2020-12-04 DIAGNOSIS — Z47.89 ORTHOPEDIC AFTERCARE: ICD-10-CM

## 2020-12-04 PROCEDURE — 73080 X-RAY EXAM OF ELBOW: CPT | Performed by: ORTHOPAEDIC SURGERY

## 2020-12-04 PROCEDURE — 99024 POSTOP FOLLOW-UP VISIT: CPT | Performed by: ORTHOPAEDIC SURGERY

## 2020-12-04 NOTE — PROGRESS NOTES
NURSING INTAKE COMMENTS: Patient presents with:  Post-Op: s/p L elbow ORIF f/u - had sx on 11/6/2020- she is dooing exercises with the splint off at home - still has numbness in her 5th finger       HPI: This 63year old female presents today with complain 2000 Bilateral   • REDUCTION RIGHT     • SPINE SURGERY PROCEDURE UNLISTED     • TOTAL ABDOM HYSTERECTOMY     • TOTAL HIP REPLACEMENT Right 10/14/16   • UPPER GI ENDOSCOPY,EXAM       Current Outpatient Medications   Medication Sig Dispense Refill   • P of Onset   • Cancer Father         prostate   • Heart Disease Mother         CVA   • Depression Mother    • Hypertension Mother    • Other (Colonic polyps) Mother    • Depression Sister    • Hypertension Sister    • Other (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 There is a very small pinhole area of opening but no drainage is noted. I am not able to express any fluid. No surrounding erythema.   Elbow rang slight angulation of the radial neck, unchanged. The surgical hardware is otherwise intact.      Dictated by (CST): Kanwal Lorenzana MD on 11/20/2020 at 12:40 PM     Finalized by (CST): Kanwal Lorenzana MD on 11/20/2020 at 12:42 PM          Xr Elbow, Complete (min Displaced fractures of proximal radius and ulna.    Dictated by (CST): Richard Lagunas MD on 11/04/2020 at 6:14 PM     Finalized by (CST): Richard Lagunas MD on 11/04/2020 at 6:15 PM       Ct Elbow Left (dlq=85221)    Result Date: 11/5/2020  PROCEDURE: CT ELBOW the ulnohumeral joint. There is apex dorsal angulation of the fracture in the coronoid process is also levels. 2.  Acute moderately comminuted articular fracture involving the proximal right radius including the radial head.   One of the radial head fractu recent surgery. EFFUSION: There is a persistent left elbow joint effusion. OTHER: Vertically oriented skin staples are seen posterior to the elbow.          CONCLUSION:  Near anatomic alignment at the left elbow status post ORIF of a comminuted radial head/ unchanged.   Labs:  Lab Results   Component Value Date    WBC 10.4 09/28/2020    HGB 10.7 (L) 11/07/2020    .0 09/28/2020      Lab Results   Component Value Date     (H) 11/21/2020    BUN 18 11/21/2020    CREATSERUM 1.21 (H) 11/21/2020    GFR

## 2020-12-07 NOTE — ASSESSMENT & PLAN NOTE
Thyroid function tests have been stable on levothyroxine 25 mcg daily. Due for recheck labs–orders provided.

## 2020-12-07 NOTE — ASSESSMENT & PLAN NOTE
Blood pressures continue to be stable. Patient is on amlodipine 2.5 mg daily. She also takes triamterene hydrochlorothiazide 1 capsule 2 times a week. Kidney functions have been low. This has been improving with adequate hydration.   Recheck labs as ord

## 2020-12-07 NOTE — ASSESSMENT & PLAN NOTE
Patient is status postRight hip replacement per Dr. Radha Velasquez for hip dysplasia. Has been doing well since with patchy worsening pain, stiffness in the left hip. Recently suffered a fall and fractured and.   She has noticed some worsening in and stiffness an

## 2020-12-07 NOTE — PROGRESS NOTES
HPI:    Patient ID: Ama Calderon is a 63year old female.   Telehealth outside of [facility] [address] Verbal Consent   I conducted a telehealth visit with Ama Calderon today, 11/27/20, which was completed using two-way, real-time interactive audio a filtration rate has to be about 60 to be in the normal range. This should continue to improve if adequate hydration is maintained.    We will need to continue to recheck in about 3 months      Hip Pain   The pain is present in the left upper leg and left lifestyle. Past treatments include lifestyle changes and calcium channel blockers. The current treatment provides significant improvement. Compliance problems include diet and exercise. There is no history of angina, kidney disease, heart failure or PVD. Oral Tab TAKE 1 TABLET BY MOUTH ONE TIME A DAY 90 tablet 2   • Triamterene-HCTZ 37.5-25 MG Oral Tab 1 tablet 2 times a week on Monday and Friday 24 tablet 2   • Levothyroxine Sodium 125 MCG Oral Tab Take 1 tablet (125 mcg total) by mouth before breakfast. nerve deficit. Skin: No rash noted. Psychiatric: She has a normal mood and affect. Her behavior is normal. Judgment and thought content normal.   Vitals reviewed.              ASSESSMENT/PLAN:     Problem List Items Addressed This Visit        Heidy Crawford TO FOLLOW DIRECTIONS AND ADVICE    Orders Placed This Encounter      CBC W Differential W Platelet [E]      Comp Metabolic Panel (14) [E]      Lipid Panel [E]      TSH W Reflex To Free T4 [E]      Vitamin B12 [E]      Vitamin D, 25-Hydroxy [E]      Meds Th

## 2020-12-08 ENCOUNTER — OFFICE VISIT (OUTPATIENT)
Dept: PHYSICAL THERAPY | Age: 65
End: 2020-12-08
Attending: PODIATRIST
Payer: COMMERCIAL

## 2020-12-08 DIAGNOSIS — M79.671 INFLAMMATORY HEEL PAIN, RIGHT: ICD-10-CM

## 2020-12-08 DIAGNOSIS — M79.671 ARCH PAIN, RIGHT: ICD-10-CM

## 2020-12-08 DIAGNOSIS — M72.2 PLANTAR FASCIITIS OF RIGHT FOOT: ICD-10-CM

## 2020-12-08 DIAGNOSIS — M19.071 ARTHROSIS OF MIDFOOT, RIGHT: ICD-10-CM

## 2020-12-08 PROCEDURE — 97140 MANUAL THERAPY 1/> REGIONS: CPT | Performed by: PHYSICAL THERAPIST

## 2020-12-08 PROCEDURE — 97162 PT EVAL MOD COMPLEX 30 MIN: CPT | Performed by: PHYSICAL THERAPIST

## 2020-12-08 NOTE — PROGRESS NOTES
LOWER EXTREMITY EVALUATION:   Referring Physician: Dr. 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  Decreased mobility of R midfoot/forefoot    Gait: pt ambulates on level Please co-sign or sign and return this letter via fax as soon as possible to [phone].  If you have any questions, please contact me at Dept: [phone]    Sincerely,  Electronically signed by therapist: Arnoldo Nunez, PT, DPT, OCS, Cert MDT   Ph

## 2020-12-08 NOTE — TELEPHONE ENCOUNTER
Dr. Michi Jay,     Please sign off on form: Certification of sick leave  -Highlight the patient and hit \"Chart\" button.   -In Chart Review, w/in the Encounter tab - click 1 time on the Telephone call encounter for 11/20/2020 Scroll down the telephone encou

## 2020-12-10 ENCOUNTER — HOSPITAL ENCOUNTER (OUTPATIENT)
Dept: GENERAL RADIOLOGY | Age: 65
Discharge: HOME | End: 2020-12-10
Attending: INTERNAL MEDICINE
Payer: COMMERCIAL

## 2020-12-10 ENCOUNTER — OFFICE VISIT (OUTPATIENT)
Dept: PHYSICAL THERAPY | Age: 65
End: 2020-12-10
Attending: PODIATRIST
Payer: COMMERCIAL

## 2020-12-10 DIAGNOSIS — M25.552 PAIN OF LEFT HIP JOINT: ICD-10-CM

## 2020-12-10 PROCEDURE — 73502 X-RAY EXAM HIP UNI 2-3 VIEWS: CPT | Performed by: INTERNAL MEDICINE

## 2020-12-10 PROCEDURE — 72110 X-RAY EXAM L-2 SPINE 4/>VWS: CPT | Performed by: INTERNAL MEDICINE

## 2020-12-10 PROCEDURE — 97162 PT EVAL MOD COMPLEX 30 MIN: CPT | Performed by: PHYSICAL THERAPIST

## 2020-12-10 PROCEDURE — 97110 THERAPEUTIC EXERCISES: CPT | Performed by: PHYSICAL THERAPIST

## 2020-12-10 NOTE — PROGRESS NOTES
ELBOW EVALUATION:   Referring Physician: Chris Freed  Diagnosis: Closed fracture of left radius and ulna with malunion, subsequent encounter ([id number])      Date of Service: 12/8/2020     PATIENT SUMMARY   Aundra Aschoff is a 63 year provided on exam findings, treatment diagnosis, treatment plan, expectations, and prognosis. Pt was also provided recommendations for activity modifications, importance of remaining active and HEP.   Patient was instructed in and issued a HEP for: elbow BENIGNO [phone]    Sincerely,  Electronically signed by therapist: Jory Mckinley, PT, DPT, OCS, Cert MDT   [de-identified] certification required: Yes  I certify the need for these services furnished under this plan of treatment and while under my care.     X

## 2020-12-14 RX ORDER — DULOXETIN HYDROCHLORIDE 30 MG/1
30 CAPSULE, DELAYED RELEASE ORAL DAILY
Qty: 90 CAPSULE | Refills: 1 | Status: SHIPPED | OUTPATIENT
Start: 2020-12-14 | End: 2021-06-11

## 2020-12-15 ENCOUNTER — OFFICE VISIT (OUTPATIENT)
Dept: PHYSICAL THERAPY | Age: 65
End: 2020-12-15
Attending: PODIATRIST
Payer: COMMERCIAL

## 2020-12-15 PROCEDURE — 97110 THERAPEUTIC EXERCISES: CPT | Performed by: PHYSICAL THERAPIST

## 2020-12-15 PROCEDURE — 97140 MANUAL THERAPY 1/> REGIONS: CPT | Performed by: PHYSICAL THERAPIST

## 2020-12-15 NOTE — PROGRESS NOTES
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

## 2020-12-22 ENCOUNTER — OFFICE VISIT (OUTPATIENT)
Dept: PHYSICAL THERAPY | Age: 65
End: 2020-12-22
Attending: PODIATRIST
Payer: COMMERCIAL

## 2020-12-22 PROCEDURE — 97140 MANUAL THERAPY 1/> REGIONS: CPT | Performed by: PHYSICAL THERAPIST

## 2020-12-22 PROCEDURE — 97110 THERAPEUTIC EXERCISES: CPT | Performed by: PHYSICAL THERAPIST

## 2020-12-22 NOTE — PROGRESS NOTES
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

## 2020-12-28 RX ORDER — PANTOPRAZOLE SODIUM 40 MG/1
40 TABLET, DELAYED RELEASE ORAL
Qty: 60 TABLET | Refills: 0 | Status: SHIPPED | OUTPATIENT
Start: 2020-12-28 | End: 2021-09-10

## 2020-12-30 ENCOUNTER — HOSPITAL ENCOUNTER (OUTPATIENT)
Dept: GENERAL RADIOLOGY | Facility: HOSPITAL | Age: 65
Discharge: HOME | End: 2020-12-30
Attending: ORTHOPAEDIC SURGERY
Payer: COMMERCIAL

## 2020-12-30 ENCOUNTER — OFFICE VISIT (OUTPATIENT)
Dept: ORTHOPEDICS CLINIC | Facility: CLINIC | Age: 65
End: 2020-12-30

## 2020-12-30 DIAGNOSIS — Z47.89 ORTHOPEDIC AFTERCARE: ICD-10-CM

## 2020-12-30 DIAGNOSIS — S52.202P CLOSED FRACTURE OF LEFT RADIUS AND ULNA WITH MALUNION, SUBSEQUENT ENCOUNTER: Primary | ICD-10-CM

## 2020-12-30 DIAGNOSIS — S52.92XP CLOSED FRACTURE OF LEFT RADIUS AND ULNA WITH MALUNION, SUBSEQUENT ENCOUNTER: Primary | ICD-10-CM

## 2020-12-30 PROCEDURE — 73080 X-RAY EXAM OF ELBOW: CPT | Performed by: ORTHOPAEDIC SURGERY

## 2020-12-30 PROCEDURE — 99024 POSTOP FOLLOW-UP VISIT: CPT | Performed by: ORTHOPAEDIC SURGERY

## 2020-12-30 RX ORDER — MULTIVITAMIN
TABLET ORAL DAILY
COMMUNITY
End: 2021-06-11

## 2020-12-30 NOTE — PROGRESS NOTES
NURSING INTAKE COMMENTS: Patient presents with:  Post-Op: Post op, elbow ORIF, SX 11/6/20. Patient c/o numbness on pinky. Denies any tingling, some pain with stretching. Pain scale 7/10. Pt still doing exercises at home.        HPI: This 63year old female WIRE 1 SITE LEFT (CPT=19281)      1998   • REDUCTION LEFT      2000 Bilateral   • REDUCTION RIGHT     • SPINE SURGERY PROCEDURE UNLISTED     • TOTAL ABDOM HYSTERECTOMY     • TOTAL HIP REPLACEMENT Right 10/14/16   • UPPER GI ENDOSCOPY,EXAM       Current Out ITCHING  Family History   Problem Relation Age of Onset   • Cancer Father         prostate   • Heart Disease Mother         CVA   • Depression Mother    • Hypertension Mother    • Other (Colonic polyps) Mother    • Depression Sister    • Hypertension Siste well hydrated, alert and responsive, no acute distress noted  Extremities: Left elbow incision well-healed. Range of motion of the left elbow 45 to 105 degrees. Pronation 50 degrees supination 60 degrees with some manual force applied.   There is pain at 3 VIEWS), LEFT (CPT=73080)  COMPARISON: [facility], [facility] 2nd Floor, XR ELBOW, COMPLETE (MIN 3 VIEWS), LEFT (CPT=73080), 11/20/2020, 10:02 AM.  INDICATIONS: FU left elbow fracture, post ORIF.   TECHNIQUE: AP, lateral common oblique views o left hip. No injury. History of right hip replacement several years. FINDINGS:  There is joint space narrowing with osteophyte formation present. No acute fracture or dislocation is seen. Postsurgical changes noted at the right hip.             Tatyana Nolan weightbearing as tolerated. She will remain out of work until after the new year holiday. Follow-up with me again in 4 weeks with x-rays. Follow Up: No follow-ups on file.     Orquidea Barros MD

## 2021-01-05 ENCOUNTER — OFFICE VISIT (OUTPATIENT)
Dept: PHYSICAL THERAPY | Age: 66
End: 2021-01-05
Attending: PODIATRIST
Payer: COMMERCIAL

## 2021-01-05 PROCEDURE — 97140 MANUAL THERAPY 1/> REGIONS: CPT | Performed by: PHYSICAL THERAPIST

## 2021-01-05 PROCEDURE — 97110 THERAPEUTIC EXERCISES: CPT | Performed by: PHYSICAL THERAPIST

## 2021-01-05 NOTE — PROGRESS NOTES
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

## 2021-01-07 ENCOUNTER — OFFICE VISIT (OUTPATIENT)
Dept: PHYSICAL THERAPY | Age: 66
End: 2021-01-07
Attending: PODIATRIST
Payer: COMMERCIAL

## 2021-01-07 PROCEDURE — 97110 THERAPEUTIC EXERCISES: CPT | Performed by: PHYSICAL THERAPIST

## 2021-01-07 PROCEDURE — 97140 MANUAL THERAPY 1/> REGIONS: CPT | Performed by: PHYSICAL THERAPIST

## 2021-01-07 NOTE — PROGRESS NOTES
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

## 2021-01-08 ENCOUNTER — TELEPHONE (OUTPATIENT)
Dept: ORTHOPEDICS CLINIC | Facility: CLINIC | Age: 66
End: 2021-01-08

## 2021-01-08 NOTE — TELEPHONE ENCOUNTER
Called pt and informed her that her referral has been approved for church and it was just approved today.  She had no further q's

## 2021-01-08 NOTE — TELEPHONE ENCOUNTER
Per pt will be going to [facility] for PT, asking for new order and referral, states she is unable to schedule because order is for [facility]. Please advise thank you.

## 2021-01-09 ENCOUNTER — LAB ENCOUNTER (OUTPATIENT)
Dept: LAB | Age: 66
End: 2021-01-09
Attending: INTERNAL MEDICINE
Payer: COMMERCIAL

## 2021-01-09 DIAGNOSIS — I10 ESSENTIAL HYPERTENSION: ICD-10-CM

## 2021-01-09 DIAGNOSIS — E55.9 VITAMIN D DEFICIENCY: ICD-10-CM

## 2021-01-09 LAB
ALBUMIN SERPL-MCNC: 3.9 G/DL (ref 3.4–5)
ALBUMIN/GLOB SERPL: 1.3 {RATIO} (ref 1–2)
ALP LIVER SERPL-CCNC: 106 U/L
ALT SERPL-CCNC: 28 U/L
ANION GAP SERPL CALC-SCNC: 6 MMOL/L (ref 0–18)
AST SERPL-CCNC: 21 U/L (ref 15–37)
BASOPHILS # BLD AUTO: 0.07 X10(3) UL (ref 0–0.2)
BASOPHILS NFR BLD AUTO: 0.9 %
BILIRUB SERPL-MCNC: 0.4 MG/DL (ref 0.1–2)
BUN BLD-MCNC: 16 MG/DL (ref 7–18)
BUN/CREAT SERPL: 16.3 (ref 10–20)
CALCIUM BLD-MCNC: 9.3 MG/DL (ref 8.5–10.1)
CHLORIDE SERPL-SCNC: 100 MMOL/L (ref 98–112)
CHOLEST SMN-MCNC: 222 MG/DL (ref ?–200)
CO2 SERPL-SCNC: 26 MMOL/L (ref 21–32)
CREAT BLD-MCNC: 0.98 MG/DL
DEPRECATED RDW RBC AUTO: 46.9 FL (ref 35.1–46.3)
EOSINOPHIL # BLD AUTO: 0.23 X10(3) UL (ref 0–0.7)
EOSINOPHIL NFR BLD AUTO: 3 %
ERYTHROCYTE [DISTWIDTH] IN BLOOD BY AUTOMATED COUNT: 14 % (ref 11–15)
GLOBULIN PLAS-MCNC: 3 G/DL (ref 2.8–4.4)
GLUCOSE BLD-MCNC: 100 MG/DL (ref 70–99)
HCT VFR BLD AUTO: 38.3 %
HDLC SERPL-MCNC: 99 MG/DL (ref 40–59)
HGB BLD-MCNC: 12.5 G/DL
IMM GRANULOCYTES # BLD AUTO: 0.03 X10(3) UL (ref 0–1)
IMM GRANULOCYTES NFR BLD: 0.4 %
LDLC SERPL CALC-MCNC: 110 MG/DL (ref ?–100)
LYMPHOCYTES # BLD AUTO: 1.73 X10(3) UL (ref 1–4)
LYMPHOCYTES NFR BLD AUTO: 22.4 %
M PROTEIN MFR SERPL ELPH: 6.9 G/DL (ref 6.4–8.2)
MCH RBC QN AUTO: 29.6 PG (ref 26–34)
MCHC RBC AUTO-ENTMCNC: 32.6 G/DL (ref 31–37)
MCV RBC AUTO: 90.8 FL
MONOCYTES # BLD AUTO: 0.62 X10(3) UL (ref 0.1–1)
MONOCYTES NFR BLD AUTO: 8 %
NEUTROPHILS # BLD AUTO: 5.06 X10 (3) UL (ref 1.5–7.7)
NEUTROPHILS # BLD AUTO: 5.06 X10(3) UL (ref 1.5–7.7)
NEUTROPHILS NFR BLD AUTO: 65.3 %
NONHDLC SERPL-MCNC: 123 MG/DL (ref ?–130)
OSMOLALITY SERPL CALC.SUM OF ELEC: 275 MOSM/KG (ref 275–295)
PATIENT FASTING Y/N/NP: YES
PATIENT FASTING Y/N/NP: YES
PLATELET # BLD AUTO: 419 10(3)UL (ref 150–450)
POTASSIUM SERPL-SCNC: 4.3 MMOL/L (ref 3.5–5.1)
RBC # BLD AUTO: 4.22 X10(6)UL
SODIUM SERPL-SCNC: 132 MMOL/L (ref 136–145)
TRIGL SERPL-MCNC: 65 MG/DL (ref 30–149)
TSI SER-ACNC: 1.2 MIU/ML (ref 0.36–3.74)
VIT B12 SERPL-MCNC: >2000 PG/ML (ref 193–986)
VIT D+METAB SERPL-MCNC: 28.9 NG/ML (ref 30–100)
VLDLC SERPL CALC-MCNC: 13 MG/DL (ref 0–30)
WBC # BLD AUTO: 7.7 X10(3) UL (ref 4–11)

## 2021-01-09 PROCEDURE — 82306 VITAMIN D 25 HYDROXY: CPT

## 2021-01-09 PROCEDURE — 80053 COMPREHEN METABOLIC PANEL: CPT

## 2021-01-09 PROCEDURE — 85025 COMPLETE CBC W/AUTO DIFF WBC: CPT

## 2021-01-09 PROCEDURE — 80061 LIPID PANEL: CPT

## 2021-01-09 PROCEDURE — 36415 COLL VENOUS BLD VENIPUNCTURE: CPT

## 2021-01-09 PROCEDURE — 84443 ASSAY THYROID STIM HORMONE: CPT

## 2021-01-09 PROCEDURE — 82607 VITAMIN B-12: CPT

## 2021-01-11 ENCOUNTER — OFFICE VISIT (OUTPATIENT)
Dept: INTERNAL MEDICINE CLINIC | Facility: CLINIC | Age: 66
End: 2021-01-11

## 2021-01-11 VITALS
HEART RATE: 76 BPM | WEIGHT: 290 LBS | DIASTOLIC BLOOD PRESSURE: 90 MMHG | SYSTOLIC BLOOD PRESSURE: 138 MMHG | HEIGHT: 66 IN | TEMPERATURE: 97 F | RESPIRATION RATE: 18 BRPM | BODY MASS INDEX: 46.61 KG/M2

## 2021-01-11 DIAGNOSIS — E03.9 HYPOTHYROIDISM, UNSPECIFIED TYPE: ICD-10-CM

## 2021-01-11 DIAGNOSIS — G89.29 CHRONIC BILATERAL LOW BACK PAIN WITHOUT SCIATICA: ICD-10-CM

## 2021-01-11 DIAGNOSIS — N18.32 STAGE 3B CHRONIC KIDNEY DISEASE (HCC): ICD-10-CM

## 2021-01-11 DIAGNOSIS — E55.9 VITAMIN D DEFICIENCY: Primary | ICD-10-CM

## 2021-01-11 DIAGNOSIS — M25.552 PAIN OF LEFT HIP JOINT: ICD-10-CM

## 2021-01-11 DIAGNOSIS — M54.50 CHRONIC BILATERAL LOW BACK PAIN WITHOUT SCIATICA: ICD-10-CM

## 2021-01-11 DIAGNOSIS — I10 ESSENTIAL HYPERTENSION: ICD-10-CM

## 2021-01-11 DIAGNOSIS — M21.961 DEFORMITY OF RIGHT FOOT: ICD-10-CM

## 2021-01-11 PROCEDURE — 3080F DIAST BP >= 90 MM HG: CPT | Performed by: INTERNAL MEDICINE

## 2021-01-11 PROCEDURE — 3008F BODY MASS INDEX DOCD: CPT | Performed by: INTERNAL MEDICINE

## 2021-01-11 PROCEDURE — 99214 OFFICE O/P EST MOD 30 MIN: CPT | Performed by: INTERNAL MEDICINE

## 2021-01-11 PROCEDURE — 3075F SYST BP GE 130 - 139MM HG: CPT | Performed by: INTERNAL MEDICINE

## 2021-01-11 RX ORDER — ERGOCALCIFEROL 1.25 MG/1
50000 CAPSULE ORAL WEEKLY
Qty: 12 CAPSULE | Refills: 1 | Status: SHIPPED | OUTPATIENT
Start: 2021-01-11 | End: 2021-02-10

## 2021-01-11 NOTE — ASSESSMENT & PLAN NOTE
Thyroid function tests have been stable on levothyroxine at 25 mcg daily. Recheck labs have been ordered.

## 2021-01-11 NOTE — ASSESSMENT & PLAN NOTE
Pain in the groin and the left hip on and off. Worse with getting in and out of the car. Some restriction in range of movements noted. X-rays did not show any significant arthritic changes that would necessitate immediate intervention.   She did have thao

## 2021-01-11 NOTE — ASSESSMENT & PLAN NOTE
Patient with a history of hammertoes/feet and calluses at the heads of the metatarsals of the right foot. She has had worsening pain in her right foot and ankle after her right TKA.   He has been on gabapentin for management of numbness and tingling in her

## 2021-01-11 NOTE — ASSESSMENT & PLAN NOTE
Intermittent episodes of chronic disease. She has been taken off ACE inhibitor's. She has been drinking adequate amount of fluids. She does take diuretics twice a week.   At her last visit she does have a CT of the kidneys which did not show any abnormal

## 2021-01-11 NOTE — ASSESSMENT & PLAN NOTE
Blood pressure 138/90, pulse 76, temperature 97 °F (36.1 °C), temperature source Tympanic, resp. rate 18, height 5' 6\" (1.676 m), weight 290 lb (131.5 kg), not currently breastfeeding. Blood pressure remains borderline elevated.   She is currently on tria

## 2021-01-11 NOTE — ASSESSMENT & PLAN NOTE
She has had chronic low back pain which improved with physical therapy. X-ray of the lumbar spine shows mild lumbar spinal arthritis. No radicular symptoms at this time. Continue to monitor.

## 2021-01-11 NOTE — PATIENT INSTRUCTIONS
Problem List Items Addressed This Visit        Unprioritized    Chronic bilateral low back pain     She has had chronic low back pain which improved with physical therapy. X-ray of the lumbar spine shows mild lumbar spinal arthritis.   No radicular symptom hip on and off. Worse with getting in and out of the car. Some restriction in range of movements noted. X-rays did not show any significant arthritic changes that would necessitate immediate intervention.   She did have evidence of hip dysplasia on her r

## 2021-01-11 NOTE — PROGRESS NOTES
HPI:    Patient ID: Gwmayrada Chapis is a 63year old female. The blood counts look normal.no anemia. The sugars ,calcium and calcium levels are normal.  Sodium level is slightly low, we will discuss this at the visit.   The kidney and liver functions l Dr Lawrence Lagunas on the right.has had scoliosis with  pelvic tilt.). The quality of the pain is described as aching and dull. The pain is at a severity of 7/10. The pain is moderate.  Associated symptoms comments: xr ls spine 5/2020       FINDINGS:      Lumbar moses negatives include no change in bowel habit, fatigue, nausea or vomiting. Associated symptoms comments: Mild anemia noted labs. .  Will need follow-up. . Nothing aggravates the symptoms. She has tried nothing for the symptoms.  The treatment provided no reli gabapentin 400 MG Oral Cap Take 400 mg by mouth nightly.      • primidone 50 MG Oral Tab 3 pills qhs 270 tablet 3   • amLODIPine Besylate 2.5 MG Oral Tab TAKE 1 TABLET BY MOUTH ONE TIME A DAY 90 tablet 2   • Triamterene-HCTZ 37.5-25 MG Oral Tab 1 tablet 2 t She has no rales. She exhibits no tenderness. Abdominal: Soft. Bowel sounds are normal. She exhibits no distension and no mass. There is no abdominal tenderness. There is no rebound. Musculoskeletal:         General: No edema.       Left elbow: She exhi she does have a CT of the kidneys which did not show any abnormalities. As the kidney functions have normalized at this time–continue to monitor. Recheck labs as directed in 3 months.          Relevant Orders    BASIC METABOLIC PANEL (8)    Pain of left h

## 2021-01-12 ENCOUNTER — PATIENT MESSAGE (OUTPATIENT)
Dept: INTERNAL MEDICINE CLINIC | Facility: CLINIC | Age: 66
End: 2021-01-12

## 2021-01-12 ENCOUNTER — OFFICE VISIT (OUTPATIENT)
Dept: PHYSICAL THERAPY | Age: 66
End: 2021-01-12
Attending: PODIATRIST
Payer: COMMERCIAL

## 2021-01-12 PROCEDURE — 97110 THERAPEUTIC EXERCISES: CPT | Performed by: PHYSICAL THERAPIST

## 2021-01-12 PROCEDURE — 97140 MANUAL THERAPY 1/> REGIONS: CPT | Performed by: PHYSICAL THERAPIST

## 2021-01-12 NOTE — PROGRESS NOTES
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

## 2021-01-12 NOTE — TELEPHONE ENCOUNTER
Dr Higginbotham=[facility] for approval.    From: Josselin Nye  To: Marta Oropeza MD  Sent: 1/12/2021 12:28 PM CST  Subject: Prescription Question    Hi Dr. Sima Oconnell,    I would like to try the Adderall again, but I'm out of prescriptions.  I would appreciate if y

## 2021-01-15 ENCOUNTER — OFFICE VISIT (OUTPATIENT)
Dept: PHYSICAL THERAPY | Age: 66
End: 2021-01-15
Attending: PODIATRIST
Payer: COMMERCIAL

## 2021-01-15 PROCEDURE — 97140 MANUAL THERAPY 1/> REGIONS: CPT | Performed by: PHYSICAL THERAPIST

## 2021-01-15 PROCEDURE — 97110 THERAPEUTIC EXERCISES: CPT | Performed by: PHYSICAL THERAPIST

## 2021-01-15 RX ORDER — DEXTROAMPHETAMINE SACCHARATE, AMPHETAMINE ASPARTATE, DEXTROAMPHETAMINE SULFATE AND AMPHETAMINE SULFATE 2.5; 2.5; 2.5; 2.5 MG/1; MG/1; MG/1; MG/1
10 TABLET ORAL DAILY
Qty: 30 TABLET | Refills: 0 | Status: SHIPPED | OUTPATIENT
Start: 2021-01-15 | End: 2021-02-14

## 2021-01-15 RX ORDER — DEXTROAMPHETAMINE SACCHARATE, AMPHETAMINE ASPARTATE, DEXTROAMPHETAMINE SULFATE AND AMPHETAMINE SULFATE 2.5; 2.5; 2.5; 2.5 MG/1; MG/1; MG/1; MG/1
10 TABLET ORAL DAILY
Qty: 30 TABLET | Refills: 0 | Status: SHIPPED | OUTPATIENT
Start: 2021-02-12 | End: 2021-03-04

## 2021-01-15 RX ORDER — DEXTROAMPHETAMINE SACCHARATE, AMPHETAMINE ASPARTATE, DEXTROAMPHETAMINE SULFATE AND AMPHETAMINE SULFATE 2.5; 2.5; 2.5; 2.5 MG/1; MG/1; MG/1; MG/1
10 TABLET ORAL DAILY
Qty: 30 TABLET | Refills: 0 | Status: SHIPPED | OUTPATIENT
Start: 2021-03-15 | End: 2021-04-14

## 2021-01-15 NOTE — PROGRESS NOTES
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

## 2021-01-21 ENCOUNTER — OFFICE VISIT (OUTPATIENT)
Dept: PHYSICAL THERAPY | Age: 66
End: 2021-01-21
Attending: PODIATRIST
Payer: COMMERCIAL

## 2021-01-21 PROCEDURE — 97110 THERAPEUTIC EXERCISES: CPT | Performed by: PHYSICAL THERAPIST

## 2021-01-21 PROCEDURE — 97140 MANUAL THERAPY 1/> REGIONS: CPT | Performed by: PHYSICAL THERAPIST

## 2021-01-21 NOTE — PROGRESS NOTES
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

## 2021-01-26 ENCOUNTER — OFFICE VISIT (OUTPATIENT)
Dept: PHYSICAL THERAPY | Age: 66
End: 2021-01-26
Attending: PODIATRIST
Payer: COMMERCIAL

## 2021-01-26 PROCEDURE — 97140 MANUAL THERAPY 1/> REGIONS: CPT | Performed by: PHYSICAL THERAPIST

## 2021-01-26 PROCEDURE — 97110 THERAPEUTIC EXERCISES: CPT | Performed by: PHYSICAL THERAPIST

## 2021-01-26 NOTE — PROGRESS NOTES
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 Plan: cont current HEP - cont ROM hourly, focus on restoring functional ROM L elbow.     Skilled Services: TE, MT    Charges: TE2, MT1       Total Timed Treatment: 46 min  Total Treatment Time: 49 min

## 2021-01-28 ENCOUNTER — OFFICE VISIT (OUTPATIENT)
Dept: PHYSICAL THERAPY | Age: 66
End: 2021-01-28
Attending: PODIATRIST
Payer: COMMERCIAL

## 2021-01-28 PROCEDURE — 97110 THERAPEUTIC EXERCISES: CPT | Performed by: PHYSICAL THERAPIST

## 2021-01-28 PROCEDURE — 97140 MANUAL THERAPY 1/> REGIONS: CPT | Performed by: PHYSICAL THERAPIST

## 2021-01-28 NOTE — PROGRESS NOTES
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   3) no night time pain in L elbow  4) Functional ROM L elbow: Ext lacking no more than 10 degrees, flexion 130 degrees, able to comb and wash hair, reach overhead

## 2021-01-29 ENCOUNTER — HOSPITAL ENCOUNTER (OUTPATIENT)
Dept: GENERAL RADIOLOGY | Facility: HOSPITAL | Age: 66
Discharge: HOME | End: 2021-01-29
Attending: ORTHOPAEDIC SURGERY
Payer: COMMERCIAL

## 2021-01-29 ENCOUNTER — OFFICE VISIT (OUTPATIENT)
Dept: ORTHOPEDICS CLINIC | Facility: CLINIC | Age: 66
End: 2021-01-29

## 2021-01-29 DIAGNOSIS — S52.202P CLOSED FRACTURE OF LEFT RADIUS AND ULNA WITH MALUNION, SUBSEQUENT ENCOUNTER: Primary | ICD-10-CM

## 2021-01-29 DIAGNOSIS — Z47.89 ORTHOPEDIC AFTERCARE: ICD-10-CM

## 2021-01-29 DIAGNOSIS — S52.92XP CLOSED FRACTURE OF LEFT RADIUS AND ULNA WITH MALUNION, SUBSEQUENT ENCOUNTER: Primary | ICD-10-CM

## 2021-01-29 PROCEDURE — 99024 POSTOP FOLLOW-UP VISIT: CPT | Performed by: ORTHOPAEDIC SURGERY

## 2021-01-29 PROCEDURE — 73080 X-RAY EXAM OF ELBOW: CPT | Performed by: ORTHOPAEDIC SURGERY

## 2021-02-01 ENCOUNTER — TELEPHONE (OUTPATIENT)
Dept: CASE MANAGEMENT | Age: 66
End: 2021-02-01

## 2021-02-01 ENCOUNTER — TELEPHONE (OUTPATIENT)
Dept: PHYSICAL THERAPY | Facility: HOSPITAL | Age: 66
End: 2021-02-01

## 2021-02-01 ENCOUNTER — TELEPHONE (OUTPATIENT)
Dept: ORTHOPEDICS CLINIC | Facility: CLINIC | Age: 66
End: 2021-02-01

## 2021-02-01 DIAGNOSIS — S52.132K CLOSED DISPLACED FRACTURE OF NECK OF LEFT RADIUS WITH NONUNION, SUBSEQUENT ENCOUNTER: Primary | ICD-10-CM

## 2021-02-01 DIAGNOSIS — M19.071 ARTHROSIS OF MIDFOOT, RIGHT: Primary | ICD-10-CM

## 2021-02-01 DIAGNOSIS — M72.2 PLANTAR FASCIITIS, RIGHT: ICD-10-CM

## 2021-02-01 DIAGNOSIS — M79.671 FOOT ARCH PAIN, RIGHT: ICD-10-CM

## 2021-02-01 NOTE — TELEPHONE ENCOUNTER
Pt calling in regards to physical therapy of her foot. Pt notes that she needs a 2021 referral. Please sign referral so pt can move forward with scheduling.

## 2021-02-02 ENCOUNTER — APPOINTMENT (OUTPATIENT)
Dept: PHYSICAL THERAPY | Age: 66
End: 2021-02-02
Attending: PODIATRIST
Payer: COMMERCIAL

## 2021-02-02 DIAGNOSIS — S52.132K CLOSED DISPLACED FRACTURE OF NECK OF LEFT RADIUS WITH NONUNION, SUBSEQUENT ENCOUNTER: Primary | ICD-10-CM

## 2021-02-02 NOTE — TELEPHONE ENCOUNTER
Type of surgery: LEFT ELBOW HARDWARE REMOVAL, RADIAL HEAD REPLACEMENT  Date: 3/8/21  Location: [facility]  Medical Clearance: PENDING      *Medical: ALF      *Dental:      *Other:  Prior Authorization Status: PENDING   Workers Comp:  Medacta/Jeet:  Checklist

## 2021-02-04 ENCOUNTER — OFFICE VISIT (OUTPATIENT)
Dept: PHYSICAL THERAPY | Age: 66
End: 2021-02-04
Attending: PODIATRIST
Payer: COMMERCIAL

## 2021-02-04 ENCOUNTER — PATIENT MESSAGE (OUTPATIENT)
Dept: PODIATRY CLINIC | Facility: CLINIC | Age: 66
End: 2021-02-04

## 2021-02-04 DIAGNOSIS — M79.671 ARCH PAIN, RIGHT: ICD-10-CM

## 2021-02-04 DIAGNOSIS — M19.071 ARTHROSIS OF MIDFOOT, RIGHT: Primary | ICD-10-CM

## 2021-02-04 DIAGNOSIS — M72.2 PLANTAR FASCIITIS OF RIGHT FOOT: ICD-10-CM

## 2021-02-04 DIAGNOSIS — M79.671 INFLAMMATORY HEEL PAIN, RIGHT: ICD-10-CM

## 2021-02-04 PROCEDURE — 97140 MANUAL THERAPY 1/> REGIONS: CPT | Performed by: PHYSICAL THERAPIST

## 2021-02-04 PROCEDURE — 97110 THERAPEUTIC EXERCISES: CPT | Performed by: PHYSICAL THERAPIST

## 2021-02-04 NOTE — PROGRESS NOTES
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       Goals:   (to be met in 8-12 visits)  1) independent with HEP for self management - MET  2) pain fr

## 2021-02-05 NOTE — TELEPHONE ENCOUNTER
From: Johnnie Shah  To: Arun Lewis DPM  Sent: 2/4/2021 5:44 PM CST  Subject: Referral Request    Hi Dr. Nancy Moreira,    Is it possible to get another referral for physical therapy?  I only used 4 of the 8 visits that were approved, due to the circum

## 2021-02-09 ENCOUNTER — OFFICE VISIT (OUTPATIENT)
Dept: INTERNAL MEDICINE CLINIC | Facility: CLINIC | Age: 66
End: 2021-02-09

## 2021-02-09 ENCOUNTER — PATIENT MESSAGE (OUTPATIENT)
Dept: INTERNAL MEDICINE CLINIC | Facility: CLINIC | Age: 66
End: 2021-02-09

## 2021-02-09 ENCOUNTER — OFFICE VISIT (OUTPATIENT)
Dept: PHYSICAL THERAPY | Age: 66
End: 2021-02-09
Attending: PODIATRIST
Payer: COMMERCIAL

## 2021-02-09 VITALS
WEIGHT: 289.69 LBS | TEMPERATURE: 96 F | SYSTOLIC BLOOD PRESSURE: 157 MMHG | BODY MASS INDEX: 46.56 KG/M2 | DIASTOLIC BLOOD PRESSURE: 94 MMHG | HEIGHT: 66 IN | HEART RATE: 71 BPM

## 2021-02-09 DIAGNOSIS — Z01.818 PREOP GENERAL PHYSICAL EXAM: ICD-10-CM

## 2021-02-09 DIAGNOSIS — N18.32 STAGE 3B CHRONIC KIDNEY DISEASE (HCC): ICD-10-CM

## 2021-02-09 DIAGNOSIS — I10 ESSENTIAL HYPERTENSION: Primary | ICD-10-CM

## 2021-02-09 DIAGNOSIS — F32.A DEPRESSIVE DISORDER: Primary | ICD-10-CM

## 2021-02-09 PROCEDURE — 3077F SYST BP >= 140 MM HG: CPT | Performed by: INTERNAL MEDICINE

## 2021-02-09 PROCEDURE — 97140 MANUAL THERAPY 1/> REGIONS: CPT | Performed by: PHYSICAL THERAPIST

## 2021-02-09 PROCEDURE — 97110 THERAPEUTIC EXERCISES: CPT | Performed by: PHYSICAL THERAPIST

## 2021-02-09 PROCEDURE — 3080F DIAST BP >= 90 MM HG: CPT | Performed by: INTERNAL MEDICINE

## 2021-02-09 PROCEDURE — 99214 OFFICE O/P EST MOD 30 MIN: CPT | Performed by: INTERNAL MEDICINE

## 2021-02-09 PROCEDURE — 3008F BODY MASS INDEX DOCD: CPT | Performed by: INTERNAL MEDICINE

## 2021-02-09 RX ORDER — BISOPROLOL FUMARATE 5 MG/1
5 TABLET ORAL DAILY
Qty: 30 TABLET | Refills: 2 | Status: SHIPPED | OUTPATIENT
Start: 2021-02-09 | End: 2021-04-06

## 2021-02-09 NOTE — PROGRESS NOTES
Cynthia Muñoz is a 63year old female who presents for a pre-operative physical exam.   Cynthia Muñoz is scheduled for a left elbow hardware removal, radial head replacement, procedure at [facility] on March 8, 2021 performed by Dr Perla Saeed' Performed by Tiffanie Richard MD at [address] [place] [address] MAIN OR   • HYSTERECTOMY     • LUMBAR INTERLAMINAR EPIDURAL INJECTION N/A 6/4/2020    Performed by Shaila Juárez MD at [facility] ENDOSCOPY   • YOLI LOCALIZATION WIRE 1 SITE LEFT (CPT=19281)      1998   • REDUCTION LEFT before breakfast. 90 tablet 2   • Cholecalciferol (VITAMIN D) 50 MCG (2000 UT) Oral Tab Take 2,000 mcg by mouth. • loratadine 10 MG Oral Tab Take 10 mg by mouth nightly. • Brexpiprazole (REXULTI) 0.5 MG Oral Tab Take by mouth daily.      • buPROPion Palpations: Abdomen is soft. There is no mass. Tenderness: There is no abdominal tenderness. There is no guarding. Musculoskeletal:         General: No tenderness. Lymphadenopathy:      Cervical: No cervical adenopathy.    Skin:     General: Skin i peripheral neuropathy and burning pain in her feet–this has improved with addition of Cymbalta. She is on gabapentin in addition. We will try discontinuation of both the primidone and the gabapentin.   We will continue on the Cymbalta at this time as it s disease  Preop general physical exam      Plan   No orders of the defined types were placed in this encounter.         Eleuterio Lagunas MD

## 2021-02-09 NOTE — ASSESSMENT & PLAN NOTE
Blood pressure (!) 157/94, pulse 71, temperature (!) 96.2 °F (35.7 °C), temperature source Tympanic, height 5' 6\" (1.676 m), weight 289 lb 11.2 oz (131.4 kg), not currently breastfeeding.      Blood pressures have been elevated and seems to have rise in af

## 2021-02-09 NOTE — ASSESSMENT & PLAN NOTE
Normal exam.  Blood pressures are slightly elevated and has been so due to inadequate management with medications as well as difficulty tolerating medications as well as chronic kidney disease and interactions with her current medications.   Changes with me

## 2021-02-09 NOTE — PROGRESS NOTES
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 Try different shoes.     Skilled Services: TE, MT    Charges: TE2, MT1       Total Timed Treatm

## 2021-02-09 NOTE — ASSESSMENT & PLAN NOTE
Stable chronic kidney disease stage III which had improved after she was taken off of her ACE inhibitor's. She is on diuretic twice a week and amlodipine. Blood pressures are quite elevated.   She was unable to tolerate amlodipine at 5 mg daily due to leg

## 2021-02-09 NOTE — PATIENT INSTRUCTIONS
Problem List Items Addressed This Visit        Unprioritized    CKD (chronic kidney disease) stage 3, GFR 30-59 ml/min     Stable chronic kidney disease stage III which had improved after she was taken off of her ACE inhibitor's.   She is on diuretic twice management with medications as well as difficulty tolerating medications as well as chronic kidney disease and interactions with her current medications. Changes with medications as discussed earlier. She is being started on bisoprolol.   Advised to retur

## 2021-02-10 NOTE — TELEPHONE ENCOUNTER
Dr. Elkin Cardenas, patient is requesting a referral to Dr. Darren Sofia. Referral has been pended, please advise.

## 2021-02-10 NOTE — TELEPHONE ENCOUNTER
From: Justyna Wilkerson  To: Jl Faith MD  Sent: 2/9/2021 6:41 PM CST  Subject: Referral Request    Hi Dr. Crow Russell,    I forgot to mention I need a referral to see my psychiatrist, Dr. Haily Swanson. I have an appointment on February 18th.     Thanks,  [facility]

## 2021-02-11 ENCOUNTER — OFFICE VISIT (OUTPATIENT)
Dept: PHYSICAL THERAPY | Age: 66
End: 2021-02-11
Attending: PODIATRIST
Payer: COMMERCIAL

## 2021-02-11 PROCEDURE — 97110 THERAPEUTIC EXERCISES: CPT | Performed by: PHYSICAL THERAPIST

## 2021-02-11 PROCEDURE — 97140 MANUAL THERAPY 1/> REGIONS: CPT | Performed by: PHYSICAL THERAPIST

## 2021-02-11 NOTE — PROGRESS NOTES
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 Pt responds well to repeated self inversion mobs.       Goals:   (to be met in 8-12 visits)  1) independent with HEP for self management  2) pain free standing and walking  3) no night time pain in R lateral ankle  4) equal, pain free ROM

## 2021-02-19 ENCOUNTER — HOSPITAL ENCOUNTER (OUTPATIENT)
Dept: GENERAL RADIOLOGY | Facility: HOSPITAL | Age: 66
Discharge: HOME | End: 2021-02-19
Attending: INTERNAL MEDICINE
Payer: COMMERCIAL

## 2021-02-19 ENCOUNTER — TELEPHONE (OUTPATIENT)
Dept: INTERNAL MEDICINE CLINIC | Facility: CLINIC | Age: 66
End: 2021-02-19

## 2021-02-19 ENCOUNTER — NURSE ONLY (OUTPATIENT)
Dept: INTERNAL MEDICINE CLINIC | Facility: CLINIC | Age: 66
End: 2021-02-19

## 2021-02-19 ENCOUNTER — LAB ENCOUNTER (OUTPATIENT)
Dept: LAB | Facility: HOSPITAL | Age: 66
End: 2021-02-19
Attending: INTERNAL MEDICINE
Payer: COMMERCIAL

## 2021-02-19 VITALS — HEART RATE: 68 BPM | SYSTOLIC BLOOD PRESSURE: 140 MMHG | DIASTOLIC BLOOD PRESSURE: 70 MMHG

## 2021-02-19 DIAGNOSIS — I10 ESSENTIAL HYPERTENSION: Primary | ICD-10-CM

## 2021-02-19 DIAGNOSIS — Z01.818 PREOP EXAMINATION: ICD-10-CM

## 2021-02-19 DIAGNOSIS — I10 ESSENTIAL HYPERTENSION: ICD-10-CM

## 2021-02-19 DIAGNOSIS — Z01.818 PREOP EXAMINATION: Primary | ICD-10-CM

## 2021-02-19 LAB
ALBUMIN SERPL-MCNC: 4.1 G/DL (ref 3.4–5)
ALBUMIN/GLOB SERPL: 1.1 {RATIO} (ref 1–2)
ALP LIVER SERPL-CCNC: 112 U/L
ALT SERPL-CCNC: 34 U/L
ANION GAP SERPL CALC-SCNC: 4 MMOL/L (ref 0–18)
AST SERPL-CCNC: 19 U/L (ref 15–37)
BACTERIA UR QL AUTO: NEGATIVE /HPF
BASOPHILS # BLD AUTO: 0.06 X10(3) UL (ref 0–0.2)
BASOPHILS NFR BLD AUTO: 0.6 %
BILIRUB SERPL-MCNC: 0.3 MG/DL (ref 0.1–2)
BILIRUB UR QL: NEGATIVE
BUN BLD-MCNC: 21 MG/DL (ref 7–18)
BUN/CREAT SERPL: 18.1 (ref 10–20)
CALCIUM BLD-MCNC: 9.3 MG/DL (ref 8.5–10.1)
CHLORIDE SERPL-SCNC: 99 MMOL/L (ref 98–112)
CLARITY UR: CLEAR
CO2 SERPL-SCNC: 29 MMOL/L (ref 21–32)
COLOR UR: YELLOW
CREAT BLD-MCNC: 1.16 MG/DL
DEPRECATED RDW RBC AUTO: 45.8 FL (ref 35.1–46.3)
EOSINOPHIL # BLD AUTO: 0.27 X10(3) UL (ref 0–0.7)
EOSINOPHIL NFR BLD AUTO: 2.9 %
ERYTHROCYTE [DISTWIDTH] IN BLOOD BY AUTOMATED COUNT: 13.7 % (ref 11–15)
GLOBULIN PLAS-MCNC: 3.7 G/DL (ref 2.8–4.4)
GLUCOSE BLD-MCNC: 97 MG/DL (ref 70–99)
GLUCOSE UR-MCNC: NEGATIVE MG/DL
HCT VFR BLD AUTO: 40.2 %
HGB BLD-MCNC: 13.2 G/DL
IMM GRANULOCYTES # BLD AUTO: 0.03 X10(3) UL (ref 0–1)
IMM GRANULOCYTES NFR BLD: 0.3 %
KETONES UR-MCNC: NEGATIVE MG/DL
LEUKOCYTE ESTERASE UR QL STRIP.AUTO: NEGATIVE
LYMPHOCYTES # BLD AUTO: 2.15 X10(3) UL (ref 1–4)
LYMPHOCYTES NFR BLD AUTO: 23 %
M PROTEIN MFR SERPL ELPH: 7.8 G/DL (ref 6.4–8.2)
MCH RBC QN AUTO: 29.7 PG (ref 26–34)
MCHC RBC AUTO-ENTMCNC: 32.8 G/DL (ref 31–37)
MCV RBC AUTO: 90.3 FL
MONOCYTES # BLD AUTO: 0.89 X10(3) UL (ref 0.1–1)
MONOCYTES NFR BLD AUTO: 9.5 %
NEUTROPHILS # BLD AUTO: 5.93 X10 (3) UL (ref 1.5–7.7)
NEUTROPHILS # BLD AUTO: 5.93 X10(3) UL (ref 1.5–7.7)
NEUTROPHILS NFR BLD AUTO: 63.7 %
NITRITE UR QL STRIP.AUTO: NEGATIVE
OSMOLALITY SERPL CALC.SUM OF ELEC: 277 MOSM/KG (ref 275–295)
PATIENT FASTING Y/N/NP: NO
PH UR: 6 [PH] (ref 5–8)
PLATELET # BLD AUTO: 432 10(3)UL (ref 150–450)
POTASSIUM SERPL-SCNC: 4.2 MMOL/L (ref 3.5–5.1)
PROT UR-MCNC: NEGATIVE MG/DL
RBC # BLD AUTO: 4.45 X10(6)UL
RBC #/AREA URNS AUTO: 0 /HPF
SODIUM SERPL-SCNC: 132 MMOL/L (ref 136–145)
SP GR UR STRIP: 1.01 (ref 1–1.03)
UROBILINOGEN UR STRIP-ACNC: <2
WBC # BLD AUTO: 9.3 X10(3) UL (ref 4–11)
WBC #/AREA URNS AUTO: 1 /HPF

## 2021-02-19 PROCEDURE — 3077F SYST BP >= 140 MM HG: CPT | Performed by: INTERNAL MEDICINE

## 2021-02-19 PROCEDURE — 80053 COMPREHEN METABOLIC PANEL: CPT

## 2021-02-19 PROCEDURE — 81001 URINALYSIS AUTO W/SCOPE: CPT

## 2021-02-19 PROCEDURE — 85025 COMPLETE CBC W/AUTO DIFF WBC: CPT

## 2021-02-19 PROCEDURE — 71046 X-RAY EXAM CHEST 2 VIEWS: CPT | Performed by: INTERNAL MEDICINE

## 2021-02-19 PROCEDURE — 36415 COLL VENOUS BLD VENIPUNCTURE: CPT

## 2021-02-19 PROCEDURE — 3078F DIAST BP <80 MM HG: CPT | Performed by: INTERNAL MEDICINE

## 2021-02-19 PROCEDURE — 93005 ELECTROCARDIOGRAM TRACING: CPT

## 2021-02-19 PROCEDURE — 93010 ELECTROCARDIOGRAM REPORT: CPT | Performed by: INTERNAL MEDICINE

## 2021-02-19 NOTE — PROGRESS NOTES
Blood pressure is not ideal but acceptable at this time. Advised to continue on the same, watch salt in the diet, drink plenty of fluids.

## 2021-02-19 NOTE — TELEPHONE ENCOUNTER
Sandro Never from lab called stating pt is in lab at this time to have lab work and EKG completed. States pt informed her doctor wanted her to have a CMP done, not the BMP that was ordered on 1/11/21.  States she will complete urinalysis and EKG and send pt to ra

## 2021-02-22 NOTE — PROGRESS NOTES
Called pt and informed of MD instructions. Per patient she does watch her diet and her salt intake. Patient informed to continue same medication and call the office is she has any questions or concerns. Patient verbalized understanding.

## 2021-02-23 ENCOUNTER — OFFICE VISIT (OUTPATIENT)
Dept: PHYSICAL THERAPY | Age: 66
End: 2021-02-23
Attending: ORTHOPAEDIC SURGERY
Payer: COMMERCIAL

## 2021-02-23 PROCEDURE — 97110 THERAPEUTIC EXERCISES: CPT | Performed by: PHYSICAL THERAPIST

## 2021-02-23 PROCEDURE — 97140 MANUAL THERAPY 1/> REGIONS: CPT | Performed by: PHYSICAL THERAPIST

## 2021-02-23 NOTE — PROGRESS NOTES
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

## 2021-02-24 NOTE — TELEPHONE ENCOUNTER
Patient has been medically cleared by PCP    Prior Auth has been approved    Patient is cleared for surgery.

## 2021-02-25 ENCOUNTER — OFFICE VISIT (OUTPATIENT)
Dept: PHYSICAL THERAPY | Age: 66
End: 2021-02-25
Attending: ORTHOPAEDIC SURGERY
Payer: COMMERCIAL

## 2021-02-25 PROCEDURE — 97110 THERAPEUTIC EXERCISES: CPT | Performed by: PHYSICAL THERAPIST

## 2021-02-25 PROCEDURE — 97140 MANUAL THERAPY 1/> REGIONS: CPT | Performed by: PHYSICAL THERAPIST

## 2021-02-25 NOTE — PROGRESS NOTES
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

## 2021-03-02 ENCOUNTER — OFFICE VISIT (OUTPATIENT)
Dept: PHYSICAL THERAPY | Age: 66
End: 2021-03-02
Attending: ORTHOPAEDIC SURGERY
Payer: COMMERCIAL

## 2021-03-02 PROCEDURE — 97110 THERAPEUTIC EXERCISES: CPT | Performed by: PHYSICAL THERAPIST

## 2021-03-02 PROCEDURE — 97140 MANUAL THERAPY 1/> REGIONS: CPT | Performed by: PHYSICAL THERAPIST

## 2021-03-02 NOTE — H&P
ORTHO SURGERY H&P  Deisy Davenport is a 63year old female. MRN is [id number]. CC: Left elbow pain status post ORIF     HPI: 19-year-old female, status post ORIF left elbow on 11/6/2020, 3 months post operative from fracture fixation.   She has minimal Right 10/14/16   • UPPER GI ENDOSCOPY,EXAM         Social History: Social History    Socioeconomic History      Marital status:       Spouse name: Not on file      Number of children: Not on file      Years of education: Not on file      Highest edu Concern: Not Asked        Special Diet: Not Asked        Back Care: Not Asked        Exercise: No        Bike Helmet: Not Asked        Seat Belt: Not Asked        Self-Exams: Not Asked        Grew up on a farm: Not Asked        History of tanning: Not Aske gabapentin 400 MG Oral Cap Take 400 mg by mouth nightly.      • primidone 50 MG Oral Tab 3 pills qhs 270 tablet 3   • amLODIPine Besylate 2.5 MG Oral Tab TAKE 1 TABLET BY MOUTH ONE TIME A DAY 90 tablet 2   • Triamterene-HCTZ 37.5-25 MG Oral Tab 1 tablet 2 t 04/14/2018      Lab Results   Component Value Date    GLU 97 02/19/2021    BUN 21 (H) 02/19/2021    BUNCREA 18.1 02/19/2021    CREATSERUM 1.16 (H) 02/19/2021    ANIONGAP 4 02/19/2021    GFR 64 04/10/2017    GFRNAA 50 (L) 02/19/2021    GFRAA 58 (L) 02/19/20 visible mass or adenopathy. LUNGS/PLEURA: Normal.  No significant pulmonary parenchymal abnormalities. No effusion or pleural thickening. BONES: No fracture or visible bony lesion. Moderate thoracic spine spondylosis. OTHER: Negative.           Sissy Fernández

## 2021-03-02 NOTE — PROGRESS NOTES
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

## 2021-03-04 ENCOUNTER — OFFICE VISIT (OUTPATIENT)
Dept: PHYSICAL THERAPY | Age: 66
End: 2021-03-04
Attending: ORTHOPAEDIC SURGERY
Payer: COMMERCIAL

## 2021-03-04 PROCEDURE — 97110 THERAPEUTIC EXERCISES: CPT | Performed by: PHYSICAL THERAPIST

## 2021-03-04 PROCEDURE — 97140 MANUAL THERAPY 1/> REGIONS: CPT | Performed by: PHYSICAL THERAPIST

## 2021-03-04 RX ORDER — CHOLECALCIFEROL (VITAMIN D3) 1250 MCG
CAPSULE ORAL WEEKLY
COMMUNITY
End: 2021-04-16

## 2021-03-04 NOTE — PROGRESS NOTES
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

## 2021-03-06 ENCOUNTER — LAB ENCOUNTER (OUTPATIENT)
Dept: LAB | Age: 66
End: 2021-03-06
Attending: ORTHOPAEDIC SURGERY
Payer: COMMERCIAL

## 2021-03-06 DIAGNOSIS — Z01.818 PREOP TESTING: ICD-10-CM

## 2021-03-06 PROCEDURE — 87641 MR-STAPH DNA AMP PROBE: CPT

## 2021-03-07 LAB
MRSA DNA SPEC QL NAA+PROBE: NEGATIVE
SARS-COV-2 RNA RESP QL NAA+PROBE: NOT DETECTED

## 2021-03-08 ENCOUNTER — HOSPITAL ENCOUNTER (OUTPATIENT)
Facility: HOSPITAL | Age: 66
Discharge: HOME | End: 2021-03-09
Attending: ORTHOPAEDIC SURGERY | Admitting: HOSPITALIST
Payer: COMMERCIAL

## 2021-03-08 ENCOUNTER — ANESTHESIA (OUTPATIENT)
Dept: SURGERY | Facility: HOSPITAL | Age: 66
End: 2021-03-08
Payer: COMMERCIAL

## 2021-03-08 ENCOUNTER — ANESTHESIA EVENT (OUTPATIENT)
Dept: SURGERY | Facility: HOSPITAL | Age: 66
End: 2021-03-08
Payer: COMMERCIAL

## 2021-03-08 ENCOUNTER — APPOINTMENT (OUTPATIENT)
Dept: GENERAL RADIOLOGY | Facility: HOSPITAL | Age: 66
End: 2021-03-08
Attending: ORTHOPAEDIC SURGERY
Payer: COMMERCIAL

## 2021-03-08 DIAGNOSIS — S52.132K CLOSED DISPLACED FRACTURE OF NECK OF LEFT RADIUS WITH NONUNION, SUBSEQUENT ENCOUNTER: ICD-10-CM

## 2021-03-08 DIAGNOSIS — Z01.818 PREOP TESTING: Primary | ICD-10-CM

## 2021-03-08 PROBLEM — T84.84XA PAINFUL ORTHOPAEDIC HARDWARE: Status: ACTIVE | Noted: 2021-03-08

## 2021-03-08 PROBLEM — T84.84XA PAINFUL ORTHOPAEDIC HARDWARE (HCC): Status: ACTIVE | Noted: 2021-03-08

## 2021-03-08 PROCEDURE — 99225 SUBSEQUENT OBSERVATION CARE: CPT | Performed by: HOSPITALIST

## 2021-03-08 PROCEDURE — 3008F BODY MASS INDEX DOCD: CPT | Performed by: HOSPITALIST

## 2021-03-08 PROCEDURE — 0PPJ04Z REMOVAL OF INTERNAL FIXATION DEVICE FROM LEFT RADIUS, OPEN APPROACH: ICD-10-PCS | Performed by: ORTHOPAEDIC SURGERY

## 2021-03-08 PROCEDURE — 76000 FLUOROSCOPY <1 HR PHYS/QHP: CPT | Performed by: ORTHOPAEDIC SURGERY

## 2021-03-08 PROCEDURE — 3078F DIAST BP <80 MM HG: CPT | Performed by: HOSPITALIST

## 2021-03-08 PROCEDURE — 0PRJ0JZ REPLACEMENT OF LEFT RADIUS WITH SYNTHETIC SUBSTITUTE, OPEN APPROACH: ICD-10-PCS | Performed by: ORTHOPAEDIC SURGERY

## 2021-03-08 PROCEDURE — 3074F SYST BP LT 130 MM HG: CPT | Performed by: HOSPITALIST

## 2021-03-08 PROCEDURE — 0MQ40ZZ REPAIR LEFT ELBOW BURSA AND LIGAMENT, OPEN APPROACH: ICD-10-PCS | Performed by: ORTHOPAEDIC SURGERY

## 2021-03-08 RX ORDER — DULOXETIN HYDROCHLORIDE 30 MG/1
30 CAPSULE, DELAYED RELEASE ORAL EVERY MORNING
Status: DISCONTINUED | OUTPATIENT
Start: 2021-03-09 | End: 2021-03-09

## 2021-03-08 RX ORDER — HYDROCODONE BITARTRATE AND ACETAMINOPHEN 7.5; 325 MG/1; MG/1
1 TABLET ORAL EVERY 4 HOURS PRN
Status: DISCONTINUED | OUTPATIENT
Start: 2021-03-08 | End: 2021-03-09

## 2021-03-08 RX ORDER — SODIUM PHOSPHATE, DIBASIC AND SODIUM PHOSPHATE, MONOBASIC 7; 19 G/133ML; G/133ML
1 ENEMA RECTAL ONCE AS NEEDED
Status: DISCONTINUED | OUTPATIENT
Start: 2021-03-08 | End: 2021-03-09

## 2021-03-08 RX ORDER — HYDROCODONE BITARTRATE AND ACETAMINOPHEN 5; 325 MG/1; MG/1
2 TABLET ORAL AS NEEDED
Status: DISCONTINUED | OUTPATIENT
Start: 2021-03-08 | End: 2021-03-08 | Stop reason: HOSPADM

## 2021-03-08 RX ORDER — LIDOCAINE HYDROCHLORIDE 10 MG/ML
INJECTION, SOLUTION EPIDURAL; INFILTRATION; INTRACAUDAL; PERINEURAL AS NEEDED
Status: DISCONTINUED | OUTPATIENT
Start: 2021-03-08 | End: 2021-03-08 | Stop reason: SURG

## 2021-03-08 RX ORDER — DEXAMETHASONE SODIUM PHOSPHATE 4 MG/ML
VIAL (ML) INJECTION AS NEEDED
Status: DISCONTINUED | OUTPATIENT
Start: 2021-03-08 | End: 2021-03-08 | Stop reason: SURG

## 2021-03-08 RX ORDER — PROCHLORPERAZINE EDISYLATE 5 MG/ML
10 INJECTION INTRAMUSCULAR; INTRAVENOUS EVERY 6 HOURS PRN
Status: DISCONTINUED | OUTPATIENT
Start: 2021-03-08 | End: 2021-03-09

## 2021-03-08 RX ORDER — ONDANSETRON 2 MG/ML
INJECTION INTRAMUSCULAR; INTRAVENOUS AS NEEDED
Status: DISCONTINUED | OUTPATIENT
Start: 2021-03-08 | End: 2021-03-08 | Stop reason: SURG

## 2021-03-08 RX ORDER — PHENYLEPHRINE HCL 10 MG/ML
VIAL (ML) INJECTION AS NEEDED
Status: DISCONTINUED | OUTPATIENT
Start: 2021-03-08 | End: 2021-03-08 | Stop reason: SURG

## 2021-03-08 RX ORDER — HYDROMORPHONE HYDROCHLORIDE 1 MG/ML
0.2 INJECTION, SOLUTION INTRAMUSCULAR; INTRAVENOUS; SUBCUTANEOUS EVERY 5 MIN PRN
Status: DISCONTINUED | OUTPATIENT
Start: 2021-03-08 | End: 2021-03-08 | Stop reason: HOSPADM

## 2021-03-08 RX ORDER — MORPHINE SULFATE 2 MG/ML
2 INJECTION, SOLUTION INTRAMUSCULAR; INTRAVENOUS EVERY 2 HOUR PRN
Status: DISCONTINUED | OUTPATIENT
Start: 2021-03-08 | End: 2021-03-09

## 2021-03-08 RX ORDER — OXYCODONE HCL 10 MG/1
10 TABLET, FILM COATED, EXTENDED RELEASE ORAL EVERY 12 HOURS
Status: DISCONTINUED | OUTPATIENT
Start: 2021-03-08 | End: 2021-03-09

## 2021-03-08 RX ORDER — FAMOTIDINE 20 MG/1
20 TABLET, FILM COATED ORAL ONCE
Status: DISCONTINUED | OUTPATIENT
Start: 2021-03-08 | End: 2021-03-08 | Stop reason: HOSPADM

## 2021-03-08 RX ORDER — PANTOPRAZOLE SODIUM 40 MG/1
40 TABLET, DELAYED RELEASE ORAL
Status: DISCONTINUED | OUTPATIENT
Start: 2021-03-09 | End: 2021-03-09

## 2021-03-08 RX ORDER — DOCUSATE SODIUM 100 MG/1
100 CAPSULE, LIQUID FILLED ORAL 2 TIMES DAILY
Status: DISCONTINUED | OUTPATIENT
Start: 2021-03-08 | End: 2021-03-09

## 2021-03-08 RX ORDER — HYDROCODONE BITARTRATE AND ACETAMINOPHEN 5; 325 MG/1; MG/1
1 TABLET ORAL AS NEEDED
Status: DISCONTINUED | OUTPATIENT
Start: 2021-03-08 | End: 2021-03-08 | Stop reason: HOSPADM

## 2021-03-08 RX ORDER — SODIUM CHLORIDE, SODIUM LACTATE, POTASSIUM CHLORIDE, CALCIUM CHLORIDE 600; 310; 30; 20 MG/100ML; MG/100ML; MG/100ML; MG/100ML
INJECTION, SOLUTION INTRAVENOUS CONTINUOUS
Status: DISCONTINUED | OUTPATIENT
Start: 2021-03-08 | End: 2021-03-08 | Stop reason: ALTCHOICE

## 2021-03-08 RX ORDER — ONDANSETRON 2 MG/ML
4 INJECTION INTRAMUSCULAR; INTRAVENOUS EVERY 4 HOURS PRN
Status: DISCONTINUED | OUTPATIENT
Start: 2021-03-08 | End: 2021-03-09

## 2021-03-08 RX ORDER — NALOXONE HYDROCHLORIDE 0.4 MG/ML
80 INJECTION, SOLUTION INTRAMUSCULAR; INTRAVENOUS; SUBCUTANEOUS AS NEEDED
Status: DISCONTINUED | OUTPATIENT
Start: 2021-03-08 | End: 2021-03-08 | Stop reason: HOSPADM

## 2021-03-08 RX ORDER — PROCHLORPERAZINE EDISYLATE 5 MG/ML
5 INJECTION INTRAMUSCULAR; INTRAVENOUS ONCE AS NEEDED
Status: DISCONTINUED | OUTPATIENT
Start: 2021-03-08 | End: 2021-03-08 | Stop reason: HOSPADM

## 2021-03-08 RX ORDER — HALOPERIDOL 5 MG/ML
0.25 INJECTION INTRAMUSCULAR ONCE AS NEEDED
Status: DISCONTINUED | OUTPATIENT
Start: 2021-03-08 | End: 2021-03-08 | Stop reason: HOSPADM

## 2021-03-08 RX ORDER — BUPROPION HYDROCHLORIDE 300 MG/1
600 TABLET ORAL EVERY MORNING
Status: DISCONTINUED | OUTPATIENT
Start: 2021-03-09 | End: 2021-03-09

## 2021-03-08 RX ORDER — CETIRIZINE HYDROCHLORIDE 10 MG/1
10 TABLET ORAL NIGHTLY
Status: DISCONTINUED | OUTPATIENT
Start: 2021-03-08 | End: 2021-03-09

## 2021-03-08 RX ORDER — MORPHINE SULFATE 4 MG/ML
4 INJECTION, SOLUTION INTRAMUSCULAR; INTRAVENOUS EVERY 2 HOUR PRN
Status: DISCONTINUED | OUTPATIENT
Start: 2021-03-08 | End: 2021-03-09

## 2021-03-08 RX ORDER — TRIAMTERENE AND HYDROCHLOROTHIAZIDE 37.5; 25 MG/1; MG/1
1 CAPSULE ORAL DAILY
Status: DISCONTINUED | OUTPATIENT
Start: 2021-03-09 | End: 2021-03-09

## 2021-03-08 RX ORDER — MIDAZOLAM HYDROCHLORIDE 1 MG/ML
INJECTION INTRAMUSCULAR; INTRAVENOUS AS NEEDED
Status: DISCONTINUED | OUTPATIENT
Start: 2021-03-08 | End: 2021-03-08 | Stop reason: SURG

## 2021-03-08 RX ORDER — MORPHINE SULFATE 10 MG/ML
6 INJECTION, SOLUTION INTRAMUSCULAR; INTRAVENOUS EVERY 10 MIN PRN
Status: DISCONTINUED | OUTPATIENT
Start: 2021-03-08 | End: 2021-03-08 | Stop reason: HOSPADM

## 2021-03-08 RX ORDER — ONDANSETRON 2 MG/ML
4 INJECTION INTRAMUSCULAR; INTRAVENOUS ONCE AS NEEDED
Status: DISCONTINUED | OUTPATIENT
Start: 2021-03-08 | End: 2021-03-08 | Stop reason: HOSPADM

## 2021-03-08 RX ORDER — METOPROLOL TARTRATE 5 MG/5ML
2.5 INJECTION INTRAVENOUS ONCE
Status: DISCONTINUED | OUTPATIENT
Start: 2021-03-08 | End: 2021-03-08 | Stop reason: HOSPADM

## 2021-03-08 RX ORDER — POLYETHYLENE GLYCOL 3350 17 G/17G
17 POWDER, FOR SOLUTION ORAL DAILY PRN
Status: DISCONTINUED | OUTPATIENT
Start: 2021-03-08 | End: 2021-03-09

## 2021-03-08 RX ORDER — HYDROMORPHONE HYDROCHLORIDE 1 MG/ML
0.4 INJECTION, SOLUTION INTRAMUSCULAR; INTRAVENOUS; SUBCUTANEOUS EVERY 5 MIN PRN
Status: DISCONTINUED | OUTPATIENT
Start: 2021-03-08 | End: 2021-03-08 | Stop reason: HOSPADM

## 2021-03-08 RX ORDER — CEFAZOLIN SODIUM/WATER 2 G/20 ML
2 SYRINGE (ML) INTRAVENOUS ONCE
Status: COMPLETED | OUTPATIENT
Start: 2021-03-08 | End: 2021-03-08

## 2021-03-08 RX ORDER — BISACODYL 10 MG
10 SUPPOSITORY, RECTAL RECTAL
Status: DISCONTINUED | OUTPATIENT
Start: 2021-03-08 | End: 2021-03-09

## 2021-03-08 RX ORDER — DOXEPIN HYDROCHLORIDE 50 MG/1
1 CAPSULE ORAL DAILY
Status: DISCONTINUED | OUTPATIENT
Start: 2021-03-09 | End: 2021-03-09

## 2021-03-08 RX ORDER — HYDROMORPHONE HYDROCHLORIDE 1 MG/ML
0.6 INJECTION, SOLUTION INTRAMUSCULAR; INTRAVENOUS; SUBCUTANEOUS EVERY 5 MIN PRN
Status: DISCONTINUED | OUTPATIENT
Start: 2021-03-08 | End: 2021-03-08 | Stop reason: HOSPADM

## 2021-03-08 RX ORDER — ACETAMINOPHEN 500 MG
1000 TABLET ORAL ONCE
Status: COMPLETED | OUTPATIENT
Start: 2021-03-08 | End: 2021-03-08

## 2021-03-08 RX ORDER — DEXTROAMPHETAMINE SACCHARATE, AMPHETAMINE ASPARTATE, DEXTROAMPHETAMINE SULFATE AND AMPHETAMINE SULFATE 2.5; 2.5; 2.5; 2.5 MG/1; MG/1; MG/1; MG/1
10 TABLET ORAL DAILY
Status: DISCONTINUED | OUTPATIENT
Start: 2021-03-09 | End: 2021-03-09

## 2021-03-08 RX ORDER — SODIUM CHLORIDE, SODIUM LACTATE, POTASSIUM CHLORIDE, CALCIUM CHLORIDE 600; 310; 30; 20 MG/100ML; MG/100ML; MG/100ML; MG/100ML
INJECTION, SOLUTION INTRAVENOUS CONTINUOUS
Status: DISCONTINUED | OUTPATIENT
Start: 2021-03-08 | End: 2021-03-08 | Stop reason: HOSPADM

## 2021-03-08 RX ORDER — MORPHINE SULFATE 2 MG/ML
1 INJECTION, SOLUTION INTRAMUSCULAR; INTRAVENOUS EVERY 2 HOUR PRN
Status: DISCONTINUED | OUTPATIENT
Start: 2021-03-08 | End: 2021-03-09

## 2021-03-08 RX ORDER — METOPROLOL TARTRATE 50 MG/1
50 TABLET, FILM COATED ORAL
Status: DISCONTINUED | OUTPATIENT
Start: 2021-03-09 | End: 2021-03-09

## 2021-03-08 RX ORDER — EPHEDRINE SULFATE 50 MG/ML
INJECTION, SOLUTION INTRAVENOUS AS NEEDED
Status: DISCONTINUED | OUTPATIENT
Start: 2021-03-08 | End: 2021-03-08 | Stop reason: SURG

## 2021-03-08 RX ORDER — AMLODIPINE BESYLATE 2.5 MG/1
2.5 TABLET ORAL DAILY
Status: DISCONTINUED | OUTPATIENT
Start: 2021-03-09 | End: 2021-03-09

## 2021-03-08 RX ORDER — LIDOCAINE HYDROCHLORIDE 40 MG/ML
SOLUTION TOPICAL AS NEEDED
Status: DISCONTINUED | OUTPATIENT
Start: 2021-03-08 | End: 2021-03-08 | Stop reason: SURG

## 2021-03-08 RX ORDER — MORPHINE SULFATE 4 MG/ML
2 INJECTION, SOLUTION INTRAMUSCULAR; INTRAVENOUS EVERY 10 MIN PRN
Status: DISCONTINUED | OUTPATIENT
Start: 2021-03-08 | End: 2021-03-08 | Stop reason: HOSPADM

## 2021-03-08 RX ORDER — MORPHINE SULFATE 4 MG/ML
4 INJECTION, SOLUTION INTRAMUSCULAR; INTRAVENOUS EVERY 10 MIN PRN
Status: DISCONTINUED | OUTPATIENT
Start: 2021-03-08 | End: 2021-03-08 | Stop reason: HOSPADM

## 2021-03-08 RX ADMIN — MIDAZOLAM HYDROCHLORIDE 2 MG: 1 INJECTION INTRAMUSCULAR; INTRAVENOUS at 13:21:00

## 2021-03-08 RX ADMIN — LIDOCAINE HYDROCHLORIDE 50 MG: 10 INJECTION, SOLUTION EPIDURAL; INFILTRATION; INTRACAUDAL; PERINEURAL at 13:21:00

## 2021-03-08 RX ADMIN — CEFAZOLIN SODIUM/WATER 2 G: 2 G/20 ML SYRINGE (ML) INTRAVENOUS at 13:26:00

## 2021-03-08 RX ADMIN — ONDANSETRON 4 MG: 2 INJECTION INTRAMUSCULAR; INTRAVENOUS at 13:21:00

## 2021-03-08 RX ADMIN — PHENYLEPHRINE HCL 100 MCG: 10 MG/ML VIAL (ML) INJECTION at 13:56:00

## 2021-03-08 RX ADMIN — EPHEDRINE SULFATE 10 MG: 50 INJECTION, SOLUTION INTRAVENOUS at 13:56:00

## 2021-03-08 RX ADMIN — SODIUM CHLORIDE, SODIUM LACTATE, POTASSIUM CHLORIDE, CALCIUM CHLORIDE: 600; 310; 30; 20 INJECTION, SOLUTION INTRAVENOUS at 13:21:00

## 2021-03-08 RX ADMIN — DEXAMETHASONE SODIUM PHOSPHATE 4 MG: 4 MG/ML VIAL (ML) INJECTION at 13:21:00

## 2021-03-08 RX ADMIN — LIDOCAINE HYDROCHLORIDE 4 ML: 40 SOLUTION TOPICAL at 13:21:00

## 2021-03-08 NOTE — ANESTHESIA PROCEDURE NOTES
Airway  Date/Time: 3/8/2021 1:25 PM  Urgency: Elective    Airway not difficult    General Information and Staff    Patient location during procedure: OR  Anesthesiologist: Mai Morrow MD  Performed: anesthesiologist     Indications and Patient Condition

## 2021-03-08 NOTE — PROGRESS NOTES
[facility] HOSP - [facility]    Progress Note    Griffin Fleischer Patient Status:  Outpatient in a Bed    3/15/1955 MRN [id number]   Location [address] UNIT Attending Baljeet Belle MD   Hosp Day # 0 PCP Justin Kurtz MD 99 02/19/2021    CO2 29.0 02/19/2021    GLU 97 02/19/2021    CA 9.3 02/19/2021    ALB 4.1 02/19/2021    ALKPHO 112 02/19/2021    BILT 0.3 02/19/2021    TP 7.8 02/19/2021    AST 19 02/19/2021    ALT 34 02/19/2021    PTT 29.5 11/21/2016    INR 1.0 11/21/2016

## 2021-03-08 NOTE — ANESTHESIA POSTPROCEDURE EVALUATION
Patient: Lory Nation    Procedure Summary     Date: 03/08/21 Room / Location: [facility] OR  / [facility] OR    Anesthesia Start: 1320 Anesthesia Stop: 0866    Procedures:       RADIAL HEAD RESECTION (Left )      EXTREMITY UPPER HARDWARE REMOVAL (Left )

## 2021-03-08 NOTE — OPERATIVE REPORT
Operative Note    Patient Name: Cynthia Muñoz    Preoperative Diagnosis: Closed displaced fracture of neck of left radius with nonunion, subsequent encounter [S5.220K]    Postoperative Diagnosis: Closed displaced fracture of neck of left radius with no

## 2021-03-08 NOTE — ANESTHESIA PREPROCEDURE EVALUATION
Anesthesia PreOp Note    HPI:     Justyna Wilkerson is a 63year old female who presents for preoperative consultation requested by: Jesus Hartley MD    Date of Surgery: 3/8/2021    Procedure(s):  RADIAL HEAD RESECTION  EXTREMITY UPPER HARDWARE REMOVAL deficiency         Date Noted: 09/23/2013      Depressive disorder         Date Noted: 02/12/2013      Sleep apnea         Date Noted: 01/23/2010      Insomnia with sleep apnea         Date Noted: 05/27/2009      Hypothyroidism         Date Noted: 05/22/20 Cholecalciferol (VITAMIN D3) 1.25 MG (02292 UT) Oral Cap, Take by mouth once a week., Disp: , Rfl: , Past Week at Unknown time  Probiotic Product (PROBIOTIC DAILY OR), Take by mouth daily. , Disp: , Rfl: , Past Week at Unknown time  MAGNESIUM OR, Take (REXULTI) 0.5 MG Oral Tab, Take by mouth every morning.  , Disp: , Rfl: , 3/8/2021 at Unknown time  buPROPion HCl ER, XL, 300 MG Oral Tablet 24 Hr, Take 600 mg by mouth every morning. Take 2 pills to equal 600mg.  In the morning  , Disp: , Rfl: , 3/8/2021 a activity: Not on file    Other Topics      Concerns:         Service: Not Asked        Blood Transfusions: Not Asked        Caffeine Concern: Yes          3 cups tea/coffee daily        Occupational Exposure: Not Asked        Hobby Hazards: Not Ask 02/19/2021    .0 02/19/2021     Lab Results   Component Value Date     (L) 02/19/2021    K 4.2 02/19/2021    CL 99 02/19/2021    CO2 29.0 02/19/2021    BUN 21 (H) 02/19/2021    CREATSERUM 1.16 (H) 02/19/2021    GLU 97 02/19/2021    CA 9.3 02/1

## 2021-03-09 VITALS
BODY MASS INDEX: 45.8 KG/M2 | RESPIRATION RATE: 18 BRPM | SYSTOLIC BLOOD PRESSURE: 114 MMHG | HEART RATE: 66 BPM | WEIGHT: 285 LBS | HEIGHT: 66 IN | DIASTOLIC BLOOD PRESSURE: 76 MMHG | OXYGEN SATURATION: 96 % | TEMPERATURE: 99 F

## 2021-03-09 PROCEDURE — 99214 OFFICE O/P EST MOD 30 MIN: CPT | Performed by: HOSPITALIST

## 2021-03-09 RX ORDER — OXYCODONE HYDROCHLORIDE AND ACETAMINOPHEN 5; 325 MG/1; MG/1
1 TABLET ORAL EVERY 4 HOURS PRN
Qty: 30 TABLET | Refills: 0 | Status: SHIPPED | OUTPATIENT
Start: 2021-03-09 | End: 2021-04-16

## 2021-03-09 RX ORDER — OXYCODONE HCL 10 MG/1
10 TABLET, FILM COATED, EXTENDED RELEASE ORAL EVERY 12 HOURS
Qty: 5 TABLET | Refills: 0 | Status: SHIPPED | OUTPATIENT
Start: 2021-03-09 | End: 2021-03-19

## 2021-03-09 RX ORDER — POLYETHYLENE GLYCOL 3350 17 G/17G
17 POWDER, FOR SOLUTION ORAL DAILY PRN
Qty: 170 G | Refills: 0 | Status: SHIPPED | OUTPATIENT
Start: 2021-03-09 | End: 2021-03-25

## 2021-03-09 RX ORDER — OXYCODONE HYDROCHLORIDE AND ACETAMINOPHEN 5; 325 MG/1; MG/1
1 TABLET ORAL EVERY 4 HOURS PRN
Status: DISCONTINUED | OUTPATIENT
Start: 2021-03-09 | End: 2021-03-09

## 2021-03-09 RX ORDER — OXYCODONE HYDROCHLORIDE AND ACETAMINOPHEN 5; 325 MG/1; MG/1
1 TABLET ORAL
Qty: 30 TABLET | Refills: 0 | Status: SHIPPED | OUTPATIENT
Start: 2021-03-09 | End: 2021-03-25

## 2021-03-09 NOTE — PHYSICAL THERAPY NOTE
PHYSICAL THERAPY EVALUATION - INPATIENT     Room Number: 423/423-A  Evaluation Date: 3/9/2021  Type of Evaluation: Initial   Physician Order: PT Eval and Treat    Presenting Problem: Radial head resection left arm due to non-union, hardwar removal  Reason surgeon. DISCHARGE RECOMMENDATIONS  PT Discharge Recommendations: Intermittent Supervision;Home;Outpatient PT    PLAN    Patient has been evaluated and presents with no skilled Physical Therapy needs at this time.   Patient will be discharged from [facility] REPLACEMENT SURGERY      10/14/16   • HIP TOTAL REPLACEMENT Right 10/14/2016    Performed by Edwin Vargas MD at [facility] OR   • HYSTERECTOMY     • LUMBAR INTERLAMINAR EPIDURAL INJECTION N/A 6/4/2020    Performed by Donnie Spangler MD at [address] [place] [address]   • M and stairs)  BP Location: Right arm  BP Method: Automatic  Patient Position: Standing    O2 WALK  SPO2% on Room Air at Rest: 98  SPO2% Ambulation on Room Air: 98            AM-PAC '6-Clicks' [address]  How much difficulty does t independently    Patient able to ambulate on level surfaces  Safely and independently/SBA and wife aware

## 2021-03-09 NOTE — DISCHARGE SUMMARY
[facility]    Discharge Summary    Esdras Rueda Patient Status:  Outpatient in a Bed    3/15/1955 MRN [id number]   Location [facility] 4W/SW/SE Attending Elaina Spencer MD   Hosp Day # 0 PCP Regis Callaway MD     Date of Admis hypertension  CONT HOME MEDS, MONITOR.        Hypothyroidism  CONT HOME MEDS.        Morbid obesity (HCC)  MONITOR RESPIRATORY AND HEMODYNAMIC STATUS.        EDWAR (obstructive sleep apnea)  CONT CPAP, EDWAR PROTOCOL, MONITOR.        Complications: none    Con TIME A DAY    Triamterene-HCTZ 37.5-25 MG Oral Tab  1 tablet 2 times a week on Monday and Friday    Levothyroxine Sodium 125 MCG Oral Tab  Take 1 tablet (125 mcg total) by mouth before breakfast.    loratadine 10 MG Oral Tab  Take 10 mg by mouth nightly. changed: when to take this      Take 1 capsule (30 mg total) by mouth daily.    Quantity: 90 capsule  Refills: 1        CONTINUE taking these medications      Instructions Prescription details   amphetamine-dextroamphetamine 10 MG Tabs  Commonly known as: A These medications were sent to [id number] - [place], [address] [phone], [address] [place] [address], [place] [address]    Phone: [phone]   · oxyCODONE-acetaminophen 5-325 MG Tabs         Follow up:      Follow-u

## 2021-03-09 NOTE — PROGRESS NOTES
Subjective: No complaints. Pain poorly controlled. Small finger numbness unchanged.     Objective:    Patient Vitals for the past 24 hrs:   BP Temp Temp src Pulse Resp SpO2   03/09/21 0416 147/79 98.6 °F (37 °C) Oral 73 20 95 %   03/09/21 0350 — — — 76 — 9

## 2021-03-09 NOTE — PLAN OF CARE
Pt A&Ox  VSS. CMS decreased with left arm in splint. RA.  IS.  SCD b/l. Gas (-). Up w/ walker X 1. NWB on left aND ELEVATE  Ancef IVABX. Check void until 8 pm.  Pain managed w/ scheduled oxycontin. Tolerating diet well w/ no N&V.     Problem: Patient C and evaluate response  - Consider cultural and social influences on pain and pain management  - Manage/alleviate anxiety  - Utilize distraction and/or relaxation techniques  - Monitor for opioid side effects  - Notify MD/LIP if interventions unsuccessful o

## 2021-03-09 NOTE — OCCUPATIONAL THERAPY NOTE
OCCUPATIONAL THERAPY EVALUATION - INPATIENT     Room Number: 423/423-A  Evaluation Date: 3/9/2021  Type of Evaluation: Initial  Presenting Problem: closed displaced fx of neck of L  radius, hardware replacement     Physician Order: IP Consult to [facility] spv   · Ambulation in room , standing grooming with spv     The patient's Approx Degree of Impairment: 50.11% has been calculated based on documentation in the [facility] '6 clicks' Inpatient Daily Activity Short Form.   Research supports that patients with this Performed by Charlotte Oscar MD at [facility] MAIN OR   • ESOPHAGOGASTRODUODENOSCOPY (EGD) N/A 5/22/2020    Performed by Baljeet Spencer MD at [facility] ENDOSCOPY   • ESOPHAGOGASTRODUODENOSCOPY (EGD) N/A 12/13/2019    Performed by Baljeet Spencer MD at [facility] ENDOSCOPY dizziness    COGNITION  wfl    RANGE OF MOTION   Upper extremity ROM is within functional limits except for the following: LUE    STRENGTH ASSESSMENT  Upper extremity strength is within functional limits except for the following; LUE    ACTIVITIES OF DAILY

## 2021-03-09 NOTE — OPERATIVE REPORT
[facility]    PATIENT'S NAME: Karen Rodríguezmikel DOTTY   ATTENDING PHYSICIAN: Chris Kaufman MD   OPERATING PHYSICIAN: Chris Kaufman MD   PATIENT ACCOUNT#:   [id number]    LOCATION:  [facility] [address] PACU 1 [facility] 10  MEDICAL RECORD #:   [id number] were then prepped and draped in a sterile fashion. SCD devices were placed on both lower extremities. The patient was given preoperative IV antibiotics.   After prepping and draping, the left upper extremity was exsanguinated, and the tourniquet was infla Care was taken to align the implant with Jose tubercle for adequate rotational stability. The capsular tissues and remnants of the annular ligament were then repaired around the radial head implant to enhance stability and reduction.   The forearm was h

## 2021-03-10 ENCOUNTER — OFFICE VISIT (OUTPATIENT)
Dept: ORTHOPEDICS CLINIC | Facility: CLINIC | Age: 66
End: 2021-03-10

## 2021-03-10 ENCOUNTER — PATIENT MESSAGE (OUTPATIENT)
Dept: ORTHOPEDICS CLINIC | Facility: CLINIC | Age: 66
End: 2021-03-10

## 2021-03-10 DIAGNOSIS — Z47.89 ORTHOPEDIC AFTERCARE: ICD-10-CM

## 2021-03-10 DIAGNOSIS — S52.132K CLOSED DISPLACED FRACTURE OF NECK OF LEFT RADIUS WITH NONUNION, SUBSEQUENT ENCOUNTER: Primary | ICD-10-CM

## 2021-03-10 PROCEDURE — 99024 POSTOP FOLLOW-UP VISIT: CPT | Performed by: ORTHOPAEDIC SURGERY

## 2021-03-10 NOTE — TELEPHONE ENCOUNTER
Spoke to pt and states pink and 4th fingers are pain. Rates pain 9/10 last night and 8/10 right now. Denies increased swelling. Icing and elevating. Pinky has some numbness and tingling. Color and temperature of left fingers same as right hand.    Sx on 03/

## 2021-03-10 NOTE — TELEPHONE ENCOUNTER
From: Calvin Larose  To: Madhu Ramos MD  Sent: 3/10/2021 8:48 AM CST  Subject: Non-Urgent Medical Question    Hi Dr. Concepcion Aguilar,  The fingers on my left hand are wrapped tightly and overlap, this is very painful.  Is it possible to get my left hand a

## 2021-03-10 NOTE — PROGRESS NOTES
NURSING INTAKE COMMENTS: Patient presents with:  Post-Op: s/p left radial head prosthetic replacement, proximal radius hardware removal, radial head resection, elbow LCL repair on 3/8/21- pt here today d/t splint discomfort.       HPI: This 63year old fema [address] [facility] MAIN OR   • HYSTERECTOMY     • LUMBAR INTERLAMINAR EPIDURAL INJECTION N/A 6/4/2020    Performed by Nikki Guajardo MD at [facility] ENDOSCOPY   • YOLI LOCALIZATION WIRE 1 SITE LEFT (HMQ=74401)      1998   • NEEDLE BIOPSY LEFT     • RADIAL HEAD RESECTION Left 3/8/2 differently: Take 30 mg by mouth every morning.  ) 90 capsule 1   • amLODIPine Besylate 2.5 MG Oral Tab TAKE 1 TABLET BY MOUTH ONE TIME A DAY (Patient taking differently: every morning.  TAKE 1 TABLET BY MOUTH ONE TIME A DAY  ) 90 tablet 2   • Triamterene-H congestion,hearing loss, tinnitus, sore throat, headaches  RESPIRATORY: denies new shortness of breath, cough, asthma, wheezing  CARDIOVASCULAR: denies chest pain, leg cramps with exertion, palpitations, leg swelling  GI: denies abdominal pain, nausea, vom Daphne Lawrence MD on 2/19/2021 at 2:04 PM     Finalized by (CST): Daphne Lawrence MD on 2/19/2021 at 2:05 PM          XR FLUOROSCOPY C-ARM TIME <1 HOUR  (CPT=76000)    Result Date: 3/8/2021  PROCEDURE: XR FLUORO PHYSICIAN TIME< 1 HOUR (CPT=76000)  COMPARISO

## 2021-03-10 NOTE — TELEPHONE ENCOUNTER
Per Mei Raphael okay to add pt today to adjust splint. Spoke to pt and scheduled appt with Escobar/HOSEA Walden, at 3:20pm at [facility]. Pt verbalized understanding.

## 2021-03-15 ENCOUNTER — TELEPHONE (OUTPATIENT)
Dept: INTERNAL MEDICINE CLINIC | Facility: CLINIC | Age: 66
End: 2021-03-15

## 2021-03-15 NOTE — TELEPHONE ENCOUNTER
Pt had shoulder surgery and was scheduled for f/u on 3/22. Patient had to cancel because she only has transportation on fridays. Patient requesting f/u appt on a Friday but not comfortable waiting until the end of April for next open.

## 2021-03-19 ENCOUNTER — HOSPITAL ENCOUNTER (OUTPATIENT)
Dept: GENERAL RADIOLOGY | Facility: HOSPITAL | Age: 66
Discharge: HOME | End: 2021-03-19
Attending: ORTHOPAEDIC SURGERY
Payer: COMMERCIAL

## 2021-03-19 ENCOUNTER — IMMUNIZATION (OUTPATIENT)
Dept: LAB | Age: 66
End: 2021-03-19
Attending: HOSPITALIST
Payer: COMMERCIAL

## 2021-03-19 ENCOUNTER — OFFICE VISIT (OUTPATIENT)
Dept: ORTHOPEDICS CLINIC | Facility: CLINIC | Age: 66
End: 2021-03-19

## 2021-03-19 DIAGNOSIS — Z47.89 ORTHOPEDIC AFTERCARE: ICD-10-CM

## 2021-03-19 DIAGNOSIS — S52.92XP CLOSED FRACTURE OF LEFT RADIUS AND ULNA WITH MALUNION, SUBSEQUENT ENCOUNTER: ICD-10-CM

## 2021-03-19 DIAGNOSIS — Z47.89 ORTHOPEDIC AFTERCARE: Primary | ICD-10-CM

## 2021-03-19 DIAGNOSIS — Z23 NEED FOR VACCINATION: Primary | ICD-10-CM

## 2021-03-19 DIAGNOSIS — S52.202P CLOSED FRACTURE OF LEFT RADIUS AND ULNA WITH MALUNION, SUBSEQUENT ENCOUNTER: ICD-10-CM

## 2021-03-19 DIAGNOSIS — S52.132K CLOSED DISPLACED FRACTURE OF NECK OF LEFT RADIUS WITH NONUNION, SUBSEQUENT ENCOUNTER: ICD-10-CM

## 2021-03-19 PROCEDURE — 99024 POSTOP FOLLOW-UP VISIT: CPT | Performed by: ORTHOPAEDIC SURGERY

## 2021-03-19 PROCEDURE — 0001A SARSCOV2 VAC 30MCG/0.3ML IM: CPT

## 2021-03-19 PROCEDURE — 73070 X-RAY EXAM OF ELBOW: CPT | Performed by: ORTHOPAEDIC SURGERY

## 2021-03-19 PROCEDURE — 29065 APPL CST SHO TO HAND LNG ARM: CPT | Performed by: PHYSICIAN ASSISTANT

## 2021-03-19 NOTE — PROGRESS NOTES
NURSING INTAKE COMMENTS: Patient presents with:  Post-Op: s/p left radial head resection -- States she can feel the stitches on her elbow. Rates pain 2/10. Denies fever. Right handed.        HPI: This 61year old female presents today with complaints of lef Performed by Jesus Barillas MD at [facility] ENDOSCOPY   • YOLI LOCALIZATION WIRE 1 SITE LEFT (MJM=95595)      1998   • NEEDLE BIOPSY LEFT     • RADIAL HEAD RESECTION Left 3/8/2021    Performed by Alba Page MD at [address] [place] [address] OR   • [address] [place] [address] • buPROPion HCl ER, XL, 300 MG Oral Tablet 24 Hr Take 600 mg by mouth every morning. Take 2 pills to equal 600mg. In the morning       • oxyCODONE-acetaminophen 5-325 MG Oral Tab Take 1 tablet by mouth every 4 (four) hours as needed for Pain.  (Patient no wheezing  CARDIOVASCULAR: denies chest pain, leg cramps with exertion, palpitations, leg swelling  GI: denies abdominal pain, nausea, vomiting, diarrhea, constipation, hematochezia, worsening heartburn or stomach ulcers  : denies dysuria, hematuria, inco Dictated by (CST): Lucas Maldonado MD on 2/19/2021 at 2:04 PM     Finalized by (CST): Lucas Maldonado MD on 2/19/2021 at 2:05 PM          XR ELBOW, (2 VIEWS), LEFT (CPT=73070)    Result Date: 3/19/2021  PROCEDURE: XR ELBOW, (2 VIEWS), LEFT (CPT=73070)  [facility] 2nd Floor, XR ELBOW, COMPLETE (MIN 3 VIEWS), LEFT (CPT=73080), 1/29/2021, 9:46 AM.  [facility], XR FLUOROSCOPY C-ARM  TIME< 1 HOUR (CPT=76000), 11/06/2020, 4:47 PM.  INDICATIONS: Left elbow hardware removal, radial head replaceme

## 2021-03-25 ENCOUNTER — OFFICE VISIT (OUTPATIENT)
Dept: ORTHOPEDICS CLINIC | Facility: CLINIC | Age: 66
End: 2021-03-25

## 2021-03-25 ENCOUNTER — HOSPITAL ENCOUNTER (OUTPATIENT)
Dept: GENERAL RADIOLOGY | Facility: HOSPITAL | Age: 66
Discharge: HOME | End: 2021-03-25
Attending: ORTHOPAEDIC SURGERY
Payer: COMMERCIAL

## 2021-03-25 VITALS — WEIGHT: 285 LBS | BODY MASS INDEX: 45.8 KG/M2 | HEIGHT: 66 IN

## 2021-03-25 DIAGNOSIS — Z47.89 ORTHOPEDIC AFTERCARE: ICD-10-CM

## 2021-03-25 DIAGNOSIS — S52.202P CLOSED FRACTURE OF LEFT RADIUS AND ULNA WITH MALUNION, SUBSEQUENT ENCOUNTER: ICD-10-CM

## 2021-03-25 DIAGNOSIS — S52.132K CLOSED DISPLACED FRACTURE OF NECK OF LEFT RADIUS WITH NONUNION, SUBSEQUENT ENCOUNTER: Primary | ICD-10-CM

## 2021-03-25 DIAGNOSIS — S52.92XP CLOSED FRACTURE OF LEFT RADIUS AND ULNA WITH MALUNION, SUBSEQUENT ENCOUNTER: ICD-10-CM

## 2021-03-25 PROCEDURE — 99024 POSTOP FOLLOW-UP VISIT: CPT | Performed by: ORTHOPAEDIC SURGERY

## 2021-03-25 PROCEDURE — 3008F BODY MASS INDEX DOCD: CPT | Performed by: ORTHOPAEDIC SURGERY

## 2021-03-25 PROCEDURE — 73080 X-RAY EXAM OF ELBOW: CPT | Performed by: ORTHOPAEDIC SURGERY

## 2021-03-25 NOTE — PROGRESS NOTES
NURSING INTAKE COMMENTS: Patient presents with:  Post-Op: s/p left radial head resection -- Sx on 03/08/21. Rates pain 3/10 with left hand use. Left 5th finger has numbness. Right handed.        HPI: This 61year old female presents today with complaints of YOLI LOCALIZATION WIRE 1 SITE LEFT (CPT=19281)      1998   • NEEDLE BIOPSY LEFT     • RADIAL HEAD RESECTION Left 3/8/2021    Performed by Libra Blanton MD at [facility] MAIN OR   • REDUCTION LEFT      2000 Bilateral   • REDUCTION RIGHT     • SPINE SURGERY PRO Hr Take 600 mg by mouth every morning. Take 2 pills to equal 600mg. In the morning       • oxyCODONE-acetaminophen 5-325 MG Oral Tab Take 1 tablet by mouth every 4 (four) hours as needed for Pain.  (Patient not taking: Reported on 3/25/2021 ) 30 tablet 0 complaints other than in HPI  NEURO: denies numbness, tingling, weakness, balance issues, dizziness, memory loss  PSYCHIATRIC: denies Hx of depression, anxiety, other psychiatric disorders  HEMATOLOGIC: denies blood clots, anemia, blood clotting disorders, SOFT TISSUES: Soft tissue swelling at the elbow joint with posterior cast material. EFFUSION: Persistent trace elbow joint effusion is noted. OTHER: Negative.          CONCLUSION:   Postsurgical changes of radial fixation plate hardware and radial head [place] REFERRAL TO [facility]    Orthopedic aftercare  -     XR ELBOW, COMPLETE (MIN 3 VIEWS), LEFT (CPT=73080);  Future  -     OP REFERRAL TO [facility]    Closed fracture of left radius and ulna with malunion, subsequent

## 2021-03-26 ENCOUNTER — OFFICE VISIT (OUTPATIENT)
Dept: INTERNAL MEDICINE CLINIC | Facility: CLINIC | Age: 66
End: 2021-03-26

## 2021-03-26 ENCOUNTER — LAB ENCOUNTER (OUTPATIENT)
Dept: LAB | Facility: HOSPITAL | Age: 66
End: 2021-03-26
Attending: INTERNAL MEDICINE
Payer: COMMERCIAL

## 2021-03-26 VITALS
SYSTOLIC BLOOD PRESSURE: 116 MMHG | BODY MASS INDEX: 44.92 KG/M2 | HEIGHT: 66 IN | TEMPERATURE: 97 F | HEART RATE: 67 BPM | WEIGHT: 279.5 LBS | DIASTOLIC BLOOD PRESSURE: 79 MMHG

## 2021-03-26 DIAGNOSIS — I10 ESSENTIAL HYPERTENSION: Primary | ICD-10-CM

## 2021-03-26 DIAGNOSIS — N18.32 STAGE 3B CHRONIC KIDNEY DISEASE (HCC): ICD-10-CM

## 2021-03-26 DIAGNOSIS — Z96.622 STATUS POST LEFT ELBOW JOINT REPLACEMENT: ICD-10-CM

## 2021-03-26 PROBLEM — T84.84XA PAINFUL ORTHOPAEDIC HARDWARE (HCC): Status: RESOLVED | Noted: 2021-03-08 | Resolved: 2021-03-26

## 2021-03-26 PROBLEM — T84.84XA PAINFUL ORTHOPAEDIC HARDWARE: Status: RESOLVED | Noted: 2021-03-08 | Resolved: 2021-03-26

## 2021-03-26 LAB
ANION GAP SERPL CALC-SCNC: 6 MMOL/L (ref 0–18)
BASOPHILS # BLD AUTO: 0.06 X10(3) UL (ref 0–0.2)
BASOPHILS NFR BLD AUTO: 0.7 %
BUN BLD-MCNC: 18 MG/DL (ref 7–18)
BUN/CREAT SERPL: 14.9 (ref 10–20)
CALCIUM BLD-MCNC: 9.3 MG/DL (ref 8.5–10.1)
CHLORIDE SERPL-SCNC: 100 MMOL/L (ref 98–112)
CO2 SERPL-SCNC: 27 MMOL/L (ref 21–32)
CREAT BLD-MCNC: 1.21 MG/DL
DEPRECATED RDW RBC AUTO: 45.6 FL (ref 35.1–46.3)
EOSINOPHIL # BLD AUTO: 0.21 X10(3) UL (ref 0–0.7)
EOSINOPHIL NFR BLD AUTO: 2.3 %
ERYTHROCYTE [DISTWIDTH] IN BLOOD BY AUTOMATED COUNT: 14.1 % (ref 11–15)
GLUCOSE BLD-MCNC: 101 MG/DL (ref 70–99)
HCT VFR BLD AUTO: 39.6 %
HGB BLD-MCNC: 13.1 G/DL
IMM GRANULOCYTES # BLD AUTO: 0.05 X10(3) UL (ref 0–1)
IMM GRANULOCYTES NFR BLD: 0.6 %
LYMPHOCYTES # BLD AUTO: 1.61 X10(3) UL (ref 1–4)
LYMPHOCYTES NFR BLD AUTO: 17.8 %
MCH RBC QN AUTO: 29.5 PG (ref 26–34)
MCHC RBC AUTO-ENTMCNC: 33.1 G/DL (ref 31–37)
MCV RBC AUTO: 89.2 FL
MONOCYTES # BLD AUTO: 0.8 X10(3) UL (ref 0.1–1)
MONOCYTES NFR BLD AUTO: 8.8 %
NEUTROPHILS # BLD AUTO: 6.31 X10 (3) UL (ref 1.5–7.7)
NEUTROPHILS # BLD AUTO: 6.31 X10(3) UL (ref 1.5–7.7)
NEUTROPHILS NFR BLD AUTO: 69.8 %
OSMOLALITY SERPL CALC.SUM OF ELEC: 278 MOSM/KG (ref 275–295)
PATIENT FASTING Y/N/NP: NO
PLATELET # BLD AUTO: 463 10(3)UL (ref 150–450)
POTASSIUM SERPL-SCNC: 4.4 MMOL/L (ref 3.5–5.1)
RBC # BLD AUTO: 4.44 X10(6)UL
SODIUM SERPL-SCNC: 133 MMOL/L (ref 136–145)
WBC # BLD AUTO: 9 X10(3) UL (ref 4–11)

## 2021-03-26 PROCEDURE — 99214 OFFICE O/P EST MOD 30 MIN: CPT | Performed by: INTERNAL MEDICINE

## 2021-03-26 PROCEDURE — 85025 COMPLETE CBC W/AUTO DIFF WBC: CPT

## 2021-03-26 PROCEDURE — 3008F BODY MASS INDEX DOCD: CPT | Performed by: INTERNAL MEDICINE

## 2021-03-26 PROCEDURE — 80048 BASIC METABOLIC PNL TOTAL CA: CPT | Performed by: INTERNAL MEDICINE

## 2021-03-26 PROCEDURE — 36415 COLL VENOUS BLD VENIPUNCTURE: CPT

## 2021-03-26 PROCEDURE — 3078F DIAST BP <80 MM HG: CPT | Performed by: INTERNAL MEDICINE

## 2021-03-26 PROCEDURE — 3074F SYST BP LT 130 MM HG: CPT | Performed by: INTERNAL MEDICINE

## 2021-03-26 NOTE — ASSESSMENT & PLAN NOTE
Patient is about 3 weeks postoperative from a radial head replacement. She has tolerated the procedure well. Cast has been removed. Range of movement at the elbow is restricted.   Extension up to about 90 degrees but passive extension improved to about 1

## 2021-03-26 NOTE — ASSESSMENT & PLAN NOTE
Stable CKD stage III–improved since discontinuation of her ACE inhibitor. She continues on her diuretics twice a week but has been advised to keep well-hydrated. Currently on amlodipine 5 mg daily, bisoprolol 5 mg daily, Dyazide 2 times a week.   Advised

## 2021-03-26 NOTE — PROGRESS NOTES
HPI:    Patient ID: Cynthia Muñoz is a 61year old female.     MEDICAL RECORD #:   [id number]       YOB: 1955  ADMISSION DATE:       03/08/2021      OPERATION DATE:  03/08/2021     OPERATIVE REPORT     PREOPERATIVE DIAGNOSIS:   Left radi Negative. Genitourinary: Negative. Musculoskeletal: Positive for arthralgias. Skin: Negative. Allergic/Immunologic: Negative. Neurological: Negative. Hematological: Negative. Psychiatric/Behavioral: Negative.              Current Outpati tablet by mouth every 4 (four) hours as needed for Pain.  (Patient not taking: Reported on 3/25/2021 ) 30 tablet 0     Allergies:  Bactrim [Sulfametho*    HIVES  Iodides (Topical)       HIVES    Comment:IVP DYE  Radiology Contrast *    HIVES    Comment:Othe Nerves: No cranial nerve deficit. Motor: No abnormal muscle tone. Coordination: Coordination normal.      Deep Tendon Reflexes: Reflexes are normal and symmetric. Psychiatric:         Behavior: Behavior normal.         Thought Content:  Thought recommendations from Dr. Davila Airport. Recheck labs ordered. She will call for any other concerns. Return in about 3 months (around 6/26/2021).     PT UNDERSTANDS AND AGREES TO FOLLOW DIRECTIONS AND ADVICE    Orders Placed This Encounter

## 2021-03-26 NOTE — PATIENT INSTRUCTIONS
Problem List Items Addressed This Visit        Unprioritized    CKD (chronic kidney disease) stage 3, GFR 30-59 ml/min     Stable CKD stage III–improved since discontinuation of her ACE inhibitor.   She continues on her diuretics twice a week but has been a

## 2021-03-26 NOTE — ASSESSMENT & PLAN NOTE
Blood pressure 116/79, pulse 67, temperature 96.7 °F (35.9 °C), temperature source Tympanic, height 5' 6\" (1.676 m), weight 279 lb 8 oz (126.8 kg), not currently breastfeeding. Pressures remain well controlled at this time.   Continue on amlodipine at 2.5

## 2021-04-05 ENCOUNTER — PATIENT MESSAGE (OUTPATIENT)
Dept: GASTROENTEROLOGY | Facility: CLINIC | Age: 66
End: 2021-04-05

## 2021-04-05 ENCOUNTER — PATIENT MESSAGE (OUTPATIENT)
Dept: INTERNAL MEDICINE CLINIC | Facility: CLINIC | Age: 66
End: 2021-04-05

## 2021-04-06 ENCOUNTER — TELEPHONE (OUTPATIENT)
Dept: GASTROENTEROLOGY | Facility: CLINIC | Age: 66
End: 2021-04-06

## 2021-04-06 RX ORDER — PANTOPRAZOLE SODIUM 40 MG/1
40 TABLET, DELAYED RELEASE ORAL
Qty: 180 TABLET | Refills: 0 | Status: SHIPPED | OUTPATIENT
Start: 2021-04-06 | End: 2021-04-16

## 2021-04-06 RX ORDER — BISOPROLOL FUMARATE 5 MG/1
5 TABLET ORAL DAILY
Qty: 90 TABLET | Refills: 1 | Status: SHIPPED | OUTPATIENT
Start: 2021-04-06 | End: 2021-09-10

## 2021-04-06 NOTE — TELEPHONE ENCOUNTER
Giana Marroquin-    Patient requesting refill for Pantoprazole 40 MG. Please review and sign pended orders if appropriate. Per below, patient requesting refill for 90 day supply.      LOV: 01/29/2020  LR: 12/28/2020  Future Appts: none    Pantoprazole Sodium 40 M

## 2021-04-06 NOTE — TELEPHONE ENCOUNTER
From: Bia Rosenberg  To: Mika Merlos MD  Sent: 4/5/2021 6:21 PM CDT  Subject: Prescription Question    Hi Dr. Doug Sim,    Do you want me to continue taking Bisoprolol 5mg?  If you do want me to continue taking the Bisoprolol, would you please prescribe

## 2021-04-06 NOTE — TELEPHONE ENCOUNTER
Patient scheduled follow up appointment on  5/4/2021, patient requesting script refill for rx: Pantoprazole. Please call at [phone],[facility]. [facility]/Pharm - [phone]    Current Outpatient Medications:   •  Pantoprazole Sodium 40 MG Ora

## 2021-04-06 NOTE — TELEPHONE ENCOUNTER
From: Lottie Dee  To: Omar Jacinto MD  Sent: 4/5/2021 6:31 PM CDT  Subject: Prescription Question    Hi Dr. Kerwin Sanchez,    I need to have my prescription for Pantoprazole, 40mg renewed.  The last time it was refilled, Dr. Sarahi Malone filled it for only [address]

## 2021-04-06 NOTE — TELEPHONE ENCOUNTER
Pantoprazole 40 mg rx script sent in today per DOMENICA Lancaster.      Per below and recommendation per provider, scheduled office visit yearly f/u as per below:     Future Appointments   Date Time Provider Manasa Malik   5/4/2021  3:30 PM Raisa Martinez

## 2021-04-09 ENCOUNTER — IMMUNIZATION (OUTPATIENT)
Dept: LAB | Age: 66
End: 2021-04-09
Attending: HOSPITALIST
Payer: COMMERCIAL

## 2021-04-09 DIAGNOSIS — Z23 NEED FOR VACCINATION: Primary | ICD-10-CM

## 2021-04-09 PROCEDURE — 0002A SARSCOV2 VAC 30MCG/0.3ML IM: CPT

## 2021-04-16 ENCOUNTER — HOSPITAL ENCOUNTER (OUTPATIENT)
Dept: GENERAL RADIOLOGY | Facility: HOSPITAL | Age: 66
Discharge: HOME | End: 2021-04-16
Attending: ORTHOPAEDIC SURGERY
Payer: COMMERCIAL

## 2021-04-16 ENCOUNTER — OFFICE VISIT (OUTPATIENT)
Dept: ORTHOPEDICS CLINIC | Facility: CLINIC | Age: 66
End: 2021-04-16

## 2021-04-16 DIAGNOSIS — Z47.89 ORTHOPEDIC AFTERCARE: ICD-10-CM

## 2021-04-16 DIAGNOSIS — S52.92XP CLOSED FRACTURE OF LEFT RADIUS AND ULNA WITH MALUNION, SUBSEQUENT ENCOUNTER: Primary | ICD-10-CM

## 2021-04-16 DIAGNOSIS — S52.202P CLOSED FRACTURE OF LEFT RADIUS AND ULNA WITH MALUNION, SUBSEQUENT ENCOUNTER: Primary | ICD-10-CM

## 2021-04-16 PROCEDURE — 99024 POSTOP FOLLOW-UP VISIT: CPT | Performed by: ORTHOPAEDIC SURGERY

## 2021-04-16 PROCEDURE — 73080 X-RAY EXAM OF ELBOW: CPT | Performed by: ORTHOPAEDIC SURGERY

## 2021-04-16 RX ORDER — ERGOCALCIFEROL 1.25 MG/1
50000 CAPSULE ORAL WEEKLY
COMMUNITY
Start: 2021-04-02 | End: 2021-07-29

## 2021-04-16 NOTE — PROGRESS NOTES
NURSING INTAKE COMMENTS: Patient presents with:  Post-Op: s/p left radial head resection -- Sx on 03/08/21. Rates pain 4/10 with movement. Going to therapy. Right handed.        HPI: This 61year old female presents today with complaints of left elbow surg Right 10/14/16   • UPPER GI ENDOSCOPY,EXAM       Current Outpatient Medications   Medication Sig Dispense Refill   • ergocalciferol 1.25 MG (05180 UT) Oral Cap Take 50,000 Units by mouth once a week.      • Levocetirizine Dihydrochloride (XYZAL ALLERGY 24HR • Depression Sister    • Hypertension Sister    • Other (acromegaly from a pituitary tumor) Sister        Social History    Occupational History      Not on file    Tobacco Use      Smoking status: Never Smoker      Smokeless tobacco: Never Used    Vapi the dorsal and lateral elbow diffusely noted. No palpable instability. Neurological: Diminished light touch sensation in the ulnar nerve distribution. Finger abduction abduction strength is 4 out of 5.     Imaging:   XR ELBOW, COMPLETE (MIN 3 VIEWS), LEF from a previous ORIF of a comminuted left olecranon process fracture. There is stable near anatomic alignment at the fracture site with persistent anterior angulation of the distal bone.   A posterior compression plate is again seen over the left olecranon fragments with placement of a radial head prosthesis. Proximal ulnar metaphysis fracture again noted, with posterior fixation plate. There is increased sclerosis around the ulnar fracture lines but the fracture is not yet fully healed.   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

## 2021-04-19 DIAGNOSIS — E03.9 HYPOTHYROIDISM, UNSPECIFIED TYPE: ICD-10-CM

## 2021-04-21 RX ORDER — LEVOTHYROXINE SODIUM 0.12 MG/1
125 TABLET ORAL
Qty: 90 TABLET | Refills: 1 | Status: SHIPPED | OUTPATIENT
Start: 2021-04-21 | End: 2021-09-10

## 2021-04-23 ENCOUNTER — PATIENT MESSAGE (OUTPATIENT)
Dept: INTERNAL MEDICINE CLINIC | Facility: CLINIC | Age: 66
End: 2021-04-23

## 2021-04-23 DIAGNOSIS — I10 ESSENTIAL HYPERTENSION: ICD-10-CM

## 2021-04-23 RX ORDER — TRIAMTERENE AND HYDROCHLOROTHIAZIDE 37.5; 25 MG/1; MG/1
TABLET ORAL
Qty: 24 TABLET | Refills: 2 | Status: SHIPPED | OUTPATIENT
Start: 2021-04-23 | End: 2021-09-10

## 2021-05-04 ENCOUNTER — PATIENT MESSAGE (OUTPATIENT)
Dept: INTERNAL MEDICINE CLINIC | Facility: CLINIC | Age: 66
End: 2021-05-04

## 2021-05-04 ENCOUNTER — OFFICE VISIT (OUTPATIENT)
Dept: GASTROENTEROLOGY | Facility: CLINIC | Age: 66
End: 2021-05-04

## 2021-05-04 VITALS
WEIGHT: 279 LBS | SYSTOLIC BLOOD PRESSURE: 120 MMHG | HEART RATE: 66 BPM | DIASTOLIC BLOOD PRESSURE: 74 MMHG | BODY MASS INDEX: 44.84 KG/M2 | HEIGHT: 66 IN | TEMPERATURE: 99 F

## 2021-05-04 DIAGNOSIS — K59.00 CONSTIPATION, UNSPECIFIED CONSTIPATION TYPE: ICD-10-CM

## 2021-05-04 DIAGNOSIS — R68.81 EARLY SATIETY: ICD-10-CM

## 2021-05-04 DIAGNOSIS — R14.2 BELCHING: ICD-10-CM

## 2021-05-04 DIAGNOSIS — R10.84 GENERALIZED ABDOMINAL PAIN: ICD-10-CM

## 2021-05-04 DIAGNOSIS — K21.00 GASTROESOPHAGEAL REFLUX DISEASE WITH ESOPHAGITIS WITHOUT HEMORRHAGE: Primary | ICD-10-CM

## 2021-05-04 DIAGNOSIS — Z12.11 COLON CANCER SCREENING: ICD-10-CM

## 2021-05-04 PROCEDURE — 3008F BODY MASS INDEX DOCD: CPT | Performed by: NURSE PRACTITIONER

## 2021-05-04 PROCEDURE — 99214 OFFICE O/P EST MOD 30 MIN: CPT | Performed by: NURSE PRACTITIONER

## 2021-05-04 PROCEDURE — 3078F DIAST BP <80 MM HG: CPT | Performed by: NURSE PRACTITIONER

## 2021-05-04 PROCEDURE — 3074F SYST BP LT 130 MM HG: CPT | Performed by: NURSE PRACTITIONER

## 2021-05-04 NOTE — PATIENT INSTRUCTIONS
-continue pantoprazole with reflux diet modification  -trial fodmap  -starting benefiber once daily and squatty potty  -cln 2027 unless otherwise indicated  -follow-up in 3 mos with status update to consider xifaxan

## 2021-05-04 NOTE — TELEPHONE ENCOUNTER
From: Missy Arriola  To: Dae Malave MD  Sent: 5/4/2021 2:10 PM CDT  Subject: Prescription Question    Hi Dr. Carlitos Zamora,    I need refills for the generic Adderall, 10 mg. If you are able to send in a 90 day prescription, please do.   I know it's a contro

## 2021-05-04 NOTE — PROGRESS NOTES
[address] [facility] [address] Gastroenterology                                                                                                               Reason for consult: f 2000    Good Robert   • Internal hemorrhoids 12/15/2017   • Neuropathy     kike feet   • Osteoarthritis    • Renal disorder    • Unspecified sleep apnea     CPAP   • Visual impairment     glasses      Past Surgical History:   Procedure Laterality Date   • ABDO (ADDERALL) 10 MG Oral Tab Take 1 tablet (10 mg total) by mouth daily.  30 tablet 0   • Triamterene-HCTZ 37.5-25 MG Oral Tab 1 tablet 2 times a week on Monday and Friday 24 tablet 2   • Levothyroxine Sodium 125 MCG Oral Tab Take 1 tablet (125 mcg total) by m and shortness of breath  CARDIOVASCULAR: Negative for chest pain, palpitations  GASTROINTESTINAL: See HPI  GENITOURINARY: Negative for dysuria and frequency  MUSCULOSKELETAL: Negative for arthralgias and myalgias  NEUROLOGICAL: Negative for dizziness and h     Please see above for further details.         .  ASSESSMENT/PLAN:   Alfonso Diane is a 61year old year-old female with history of diverticulosis, sleep apnea, depression, hypothyroidism    #gerd  #belching  #early satiety  She is here today for f/

## 2021-05-04 NOTE — TELEPHONE ENCOUNTER
Routed to Dr Perfecto Jordan for advise, thanks. Requested Prescriptions     Pending Prescriptions Disp Refills   • amphetamine-dextroamphetamine (ADDERALL) 10 MG Oral Tab 30 tablet 0     Sig: Take 1 tablet (10 mg total) by mouth daily.    • amphetamine-dextroa

## 2021-05-05 RX ORDER — DEXTROAMPHETAMINE SACCHARATE, AMPHETAMINE ASPARTATE, DEXTROAMPHETAMINE SULFATE AND AMPHETAMINE SULFATE 2.5; 2.5; 2.5; 2.5 MG/1; MG/1; MG/1; MG/1
10 TABLET ORAL DAILY
Qty: 30 TABLET | Refills: 0 | Status: SHIPPED | OUTPATIENT
Start: 2021-05-05 | End: 2021-05-14

## 2021-05-05 RX ORDER — DEXTROAMPHETAMINE SACCHARATE, AMPHETAMINE ASPARTATE, DEXTROAMPHETAMINE SULFATE AND AMPHETAMINE SULFATE 2.5; 2.5; 2.5; 2.5 MG/1; MG/1; MG/1; MG/1
10 TABLET ORAL DAILY
Qty: 30 TABLET | Refills: 0 | Status: SHIPPED | OUTPATIENT
Start: 2021-06-04 | End: 2021-05-14

## 2021-05-05 RX ORDER — DEXTROAMPHETAMINE SACCHARATE, AMPHETAMINE ASPARTATE, DEXTROAMPHETAMINE SULFATE AND AMPHETAMINE SULFATE 2.5; 2.5; 2.5; 2.5 MG/1; MG/1; MG/1; MG/1
10 TABLET ORAL DAILY
Qty: 30 TABLET | Refills: 0 | Status: SHIPPED | OUTPATIENT
Start: 2021-07-04 | End: 2021-07-29

## 2021-05-07 ENCOUNTER — HOSPITAL ENCOUNTER (OUTPATIENT)
Dept: MAMMOGRAPHY | Age: 66
Discharge: HOME | End: 2021-05-07
Attending: INTERNAL MEDICINE
Payer: COMMERCIAL

## 2021-05-07 DIAGNOSIS — Z12.31 BREAST CANCER SCREENING BY MAMMOGRAM: ICD-10-CM

## 2021-05-07 PROCEDURE — 77067 SCR MAMMO BI INCL CAD: CPT | Performed by: INTERNAL MEDICINE

## 2021-05-07 PROCEDURE — 77063 BREAST TOMOSYNTHESIS BI: CPT | Performed by: INTERNAL MEDICINE

## 2021-05-12 ENCOUNTER — TELEPHONE (OUTPATIENT)
Dept: INTERNAL MEDICINE CLINIC | Facility: CLINIC | Age: 66
End: 2021-05-12

## 2021-05-14 ENCOUNTER — OFFICE VISIT (OUTPATIENT)
Dept: ORTHOPEDICS CLINIC | Facility: CLINIC | Age: 66
End: 2021-05-14

## 2021-05-14 DIAGNOSIS — S52.92XP CLOSED FRACTURE OF LEFT RADIUS AND ULNA WITH MALUNION, SUBSEQUENT ENCOUNTER: Primary | ICD-10-CM

## 2021-05-14 DIAGNOSIS — S52.202P CLOSED FRACTURE OF LEFT RADIUS AND ULNA WITH MALUNION, SUBSEQUENT ENCOUNTER: Primary | ICD-10-CM

## 2021-05-14 PROCEDURE — 99024 POSTOP FOLLOW-UP VISIT: CPT | Performed by: ORTHOPAEDIC SURGERY

## 2021-05-14 NOTE — PROGRESS NOTES
NURSING INTAKE COMMENTS: Patient presents with:  Post-Op: Sx 3/8/21. S/p left radial head resection. Pt states there is swelling and pain. Pain scale 6/10 with movement and activity. Still going to PT which seems to be helping.  C/o pain on wrist, pain scal amphetamine-dextroamphetamine (ADDERALL) 10 MG Oral Tab Take 1 tablet (10 mg total) by mouth daily.  30 tablet 0   • Triamterene-HCTZ 37.5-25 MG Oral Tab 1 tablet 2 times a week on Monday and Friday 24 tablet 2   • Levothyroxine Sodium 125 MCG Oral Tab Take • Depression Sister    • Hypertension Sister    • Other (acromegaly from a pituitary tumor) Sister        Social History    Occupational History      Not on file    Tobacco Use      Smoking status: Never Smoker      Smokeless tobacco: Never Used    Vapin Pronation to 70 degrees. Neurological: Left hand light touch sensation slightly diminished in ulnar nerve distribution. Finger abduction abduction intact.     Imaging:   XR ELBOW, COMPLETE (MIN 3 VIEWS), LEFT (CPT=73080)    Result Date: 4/16/2021  PROCED SCREENING BILAT (CPT=77067/88955), 3/02/2019, 2:36 PM.  MG Tesfaye, [facility] ANDREW 2D+3D SCREENING BILAT (CPT=77067/88597), 3/04/2020, 3:18 PM.  MG Shonna, [facility] ANDREW 2D+3D DIAGNOSTIC ADDL VWS LEFT (CPT=77065/67427), 3/13/2020, 7:26 AM.  INDICATIONS:  Z12.3 persistent motion deficits and discomfort likely related to malunion of her proximal ulna with apex dorsal angulation. This is likely leading to some persistent radiocapitellar instability.   I discussed the potential benefits of revision surgery and osteo

## 2021-05-22 ENCOUNTER — PATIENT MESSAGE (OUTPATIENT)
Dept: INTERNAL MEDICINE CLINIC | Facility: CLINIC | Age: 66
End: 2021-05-22

## 2021-05-27 ENCOUNTER — TELEPHONE (OUTPATIENT)
Dept: INTERNAL MEDICINE CLINIC | Facility: CLINIC | Age: 66
End: 2021-05-27

## 2021-05-27 NOTE — TELEPHONE ENCOUNTER
I left a message for patient that her referral was approved and was wondering if E has reached out to her.

## 2021-06-07 ENCOUNTER — LAB ENCOUNTER (OUTPATIENT)
Dept: LAB | Age: 66
End: 2021-06-07
Attending: INTERNAL MEDICINE
Payer: COMMERCIAL

## 2021-06-07 PROCEDURE — 36415 COLL VENOUS BLD VENIPUNCTURE: CPT | Performed by: INTERNAL MEDICINE

## 2021-06-07 PROCEDURE — 80048 BASIC METABOLIC PNL TOTAL CA: CPT | Performed by: INTERNAL MEDICINE

## 2021-06-11 ENCOUNTER — HOSPITAL ENCOUNTER (OUTPATIENT)
Dept: GENERAL RADIOLOGY | Facility: HOSPITAL | Age: 66
Discharge: HOME | End: 2021-06-11
Attending: ORTHOPAEDIC SURGERY
Payer: COMMERCIAL

## 2021-06-11 ENCOUNTER — OFFICE VISIT (OUTPATIENT)
Dept: INTERNAL MEDICINE CLINIC | Facility: CLINIC | Age: 66
End: 2021-06-11

## 2021-06-11 ENCOUNTER — OFFICE VISIT (OUTPATIENT)
Dept: ORTHOPEDICS CLINIC | Facility: CLINIC | Age: 66
End: 2021-06-11

## 2021-06-11 VITALS
BODY MASS INDEX: 45.62 KG/M2 | WEIGHT: 283.88 LBS | HEART RATE: 66 BPM | RESPIRATION RATE: 14 BRPM | DIASTOLIC BLOOD PRESSURE: 76 MMHG | TEMPERATURE: 98 F | HEIGHT: 66 IN | SYSTOLIC BLOOD PRESSURE: 125 MMHG

## 2021-06-11 DIAGNOSIS — Z00.00 ROUTINE PHYSICAL EXAMINATION: ICD-10-CM

## 2021-06-11 DIAGNOSIS — S52.202P CLOSED FRACTURE OF LEFT RADIUS AND ULNA WITH MALUNION, SUBSEQUENT ENCOUNTER: Primary | ICD-10-CM

## 2021-06-11 DIAGNOSIS — Q65.89 HIP DYSPLASIA: ICD-10-CM

## 2021-06-11 DIAGNOSIS — M25.551 PAIN OF RIGHT HIP JOINT: ICD-10-CM

## 2021-06-11 DIAGNOSIS — I10 ESSENTIAL HYPERTENSION: Primary | ICD-10-CM

## 2021-06-11 DIAGNOSIS — Z47.89 ORTHOPEDIC AFTERCARE: ICD-10-CM

## 2021-06-11 DIAGNOSIS — E66.01 MORBID OBESITY (HCC): ICD-10-CM

## 2021-06-11 DIAGNOSIS — S52.92XP CLOSED FRACTURE OF LEFT RADIUS AND ULNA WITH MALUNION, SUBSEQUENT ENCOUNTER: Primary | ICD-10-CM

## 2021-06-11 DIAGNOSIS — S52.132K CLOSED DISPLACED FRACTURE OF NECK OF LEFT RADIUS WITH NONUNION, SUBSEQUENT ENCOUNTER: ICD-10-CM

## 2021-06-11 DIAGNOSIS — G47.33 OSA (OBSTRUCTIVE SLEEP APNEA): ICD-10-CM

## 2021-06-11 DIAGNOSIS — N18.32 STAGE 3B CHRONIC KIDNEY DISEASE (HCC): ICD-10-CM

## 2021-06-11 DIAGNOSIS — E03.9 HYPOTHYROIDISM, UNSPECIFIED TYPE: ICD-10-CM

## 2021-06-11 DIAGNOSIS — Z23 NEED FOR VACCINATION: ICD-10-CM

## 2021-06-11 PROCEDURE — 90750 HZV VACC RECOMBINANT IM: CPT | Performed by: INTERNAL MEDICINE

## 2021-06-11 PROCEDURE — 3074F SYST BP LT 130 MM HG: CPT | Performed by: INTERNAL MEDICINE

## 2021-06-11 PROCEDURE — 3078F DIAST BP <80 MM HG: CPT | Performed by: INTERNAL MEDICINE

## 2021-06-11 PROCEDURE — 3008F BODY MASS INDEX DOCD: CPT | Performed by: INTERNAL MEDICINE

## 2021-06-11 PROCEDURE — 90715 TDAP VACCINE 7 YRS/> IM: CPT | Performed by: INTERNAL MEDICINE

## 2021-06-11 PROCEDURE — 99213 OFFICE O/P EST LOW 20 MIN: CPT | Performed by: ORTHOPAEDIC SURGERY

## 2021-06-11 PROCEDURE — 90472 IMMUNIZATION ADMIN EACH ADD: CPT | Performed by: INTERNAL MEDICINE

## 2021-06-11 PROCEDURE — 90471 IMMUNIZATION ADMIN: CPT | Performed by: INTERNAL MEDICINE

## 2021-06-11 PROCEDURE — 73090 X-RAY EXAM OF FOREARM: CPT | Performed by: ORTHOPAEDIC SURGERY

## 2021-06-11 PROCEDURE — 99214 OFFICE O/P EST MOD 30 MIN: CPT | Performed by: INTERNAL MEDICINE

## 2021-06-11 RX ORDER — AMLODIPINE BESYLATE 2.5 MG/1
TABLET ORAL
Qty: 90 TABLET | Refills: 2 | Status: SHIPPED | OUTPATIENT
Start: 2021-06-11

## 2021-06-11 RX ORDER — DULOXETIN HYDROCHLORIDE 30 MG/1
30 CAPSULE, DELAYED RELEASE ORAL EVERY MORNING
Qty: 90 CAPSULE | Refills: 1 | Status: SHIPPED | OUTPATIENT
Start: 2021-06-11 | End: 2021-09-10

## 2021-06-11 RX ORDER — TRAZODONE HYDROCHLORIDE 150 MG/1
TABLET ORAL
COMMUNITY
Start: 2021-05-17

## 2021-06-11 NOTE — ASSESSMENT & PLAN NOTE
Patient with a history of bilateral hip arthritis. She is status post left hip replacement with improvement. Currently with worsening right hip pain, stiffness and difficulty with gait. She is monitored per Dr. Elijah Shah for management of hip dysplasia.   Tommie Fritz

## 2021-06-11 NOTE — PROGRESS NOTES
HPI:    Patient ID: Marsha Chaney is a 61year old female. Kidney functions remain low, GFR is at 46. We will need to continue to monitor, repeat the kidney function test-nonfasting in about 8 weeks. Hypertension  This is a chronic problem.  The c stiffness. Pertinent negatives include no inability to bear weight. The symptoms are aggravated by activity. She has tried acetaminophen, movement, heat and rest for the symptoms. The treatment provided mild relief.  Her past medical history is significant 7/4/2021] amphetamine-dextroamphetamine (ADDERALL) 10 MG Oral Tab Take 1 tablet (10 mg total) by mouth daily.  30 tablet 0   • Triamterene-HCTZ 37.5-25 MG Oral Tab 1 tablet 2 times a week on Monday and Friday 24 tablet 2   • Levothyroxine Sodium 125 MCG Ora reactive to light. Neck:      Vascular: No JVD. Cardiovascular:      Rate and Rhythm: Normal rate and regular rhythm. Heart sounds: Normal heart sounds. No murmur heard.      Pulmonary:      Effort: Pulmonary effort is normal. No respiratory distre follow-up labs as directed. Reduce salt in the diet, drink plenty of fluids to keep hydrated and follow-up as directed.          Relevant Medications    amLODIPine Besylate 2.5 MG Oral Tab    Hypothyroidism     Thyroid function tests are stable on levothyr monitored per Dr. Vance Angelucci for management of hip dysplasia. She is due for follow-up evaluation–referral provided.          Relevant Orders    ORTHOPEDIC - INTERNAL      Other Visit Diagnoses     Need for vaccination        Relevant Orders    TETANUS, [address]

## 2021-06-11 NOTE — ASSESSMENT & PLAN NOTE
Blood pressure 125/76, pulse 66, temperature 97.5 °F (36.4 °C), temperature source Oral, resp. rate 14, height 5' 6\" (1.676 m), weight 283 lb 14.4 oz (128.8 kg), not currently breastfeeding.      Stable blood pressure, well controlled at this time on amlod

## 2021-06-11 NOTE — PATIENT INSTRUCTIONS
Problem List Items Addressed This Visit        Unprioritized    CKD (chronic kidney disease) stage 3, GFR 30-59 ml/min ([facility])     Persistent but stable CKD stage III. She has been off her ACE inhibitor's.   She is on diuretic twice a week to maintain euvole CPAP and has been compliant with use. Her equipment is more than 8years old. She will need a repeat CPAP titration study–orders provided today.            Relevant Orders    [facility] ALT REFERRAL CPAP RE-TITRATION STUDY    GENERAL SLEEP STUDY TRANSCRIPTIO

## 2021-06-11 NOTE — PROGRESS NOTES
NURSING INTAKE COMMENTS: Patient presents with: Follow - Up: pt is here for f/u after left radius surgery done 3/8/21. currently doing PT. rates pain 5/10.        HPI: This 61year old female presents today with complaints of left upper extremity follow-up Tab TAKE 1/3-1 TABLET BY MOUTH AT BEDTIME     • Calcium Carb-Cholecalciferol (CALCIUM 1000 + D OR) Take 1 tablet by mouth daily. Take 1 tablet by mouth daily     • Omega 3-6-9 Fatty Acids (OMEGA-3-6-9 OR) Take 1 capsule by mouth daily.      • DULoxetine HCl Sister    • Hypertension Sister    • Other (acromegaly from a pituitary tumor) Sister        Social History    Occupational History      Not on file    Tobacco Use      Smoking status: Never Smoker      Smokeless tobacco: Never Used    Vaping Use      Vapi Moderate pain with pronation supination of the forearm noted. Pronation supination both measure 50 degrees. Left elbow range of motion 40 to 130 degrees with pain at endrange of extension. No pain with passive flexion.   Neurological: Slightly diminished CONCLUSION: Normal examination.      Dictated by (CST): Casimiro Bourne MD on 6/11/2021 at 10:36 AM     Finalized by (CST): Casimiro Bourne MD on 6/11/2021 at 10:37 AM             X-rays of the left elbow were obtained in the office toda reconstruction. This treatment is out of the scope of my normal practice. I advised follow-up with Dr. Daniela Mariano at [facility]. I will attempt to contact Dr. Daniela Mariano with verbal communication on the phone.   Follow-up with me again as needed    Follow Up: Re

## 2021-06-11 NOTE — ASSESSMENT & PLAN NOTE
Thyroid function tests are stable on levothyroxine at 125 mcg daily.   Advised to continue on the same dose of medication, recheck labs and follow-up in about 3 months for a physical.

## 2021-06-11 NOTE — ASSESSMENT & PLAN NOTE
Persistent but stable CKD stage III. She has been off her ACE inhibitor's. She is on diuretic twice a week to maintain euvolemia. Continue amlodipine 5 mg daily, bisoprolol 5 mg daily and Dyazide twice a week.   Advised to restrict salt in the diet, maya

## 2021-06-11 NOTE — ASSESSMENT & PLAN NOTE
Wt Readings from Last 6 Encounters:  06/11/21 : 283 lb 14.4 oz (128.8 kg)  05/04/21 : 279 lb (126.6 kg)  03/26/21 : 279 lb 8 oz (126.8 kg)  03/25/21 : 285 lb (129.3 kg)  03/04/21 : 285 lb (129.3 kg)  02/09/21 : 289 lb 11.2 oz (131.4 kg)     Advised to rest

## 2021-06-11 NOTE — ASSESSMENT & PLAN NOTE
Body mass index is 45.82 kg/m². Patient has been on CPAP and has been compliant with use. Her equipment is more than 8years old. She will need a repeat CPAP titration study–orders provided today.

## 2021-06-14 ENCOUNTER — ORDER TRANSCRIPTION (OUTPATIENT)
Dept: SLEEP CENTER | Age: 66
End: 2021-06-14

## 2021-06-14 DIAGNOSIS — G47.33 OBSTRUCTIVE SLEEP APNEA (ADULT) (PEDIATRIC): Primary | ICD-10-CM

## 2021-06-17 ENCOUNTER — TELEPHONE (OUTPATIENT)
Dept: ORTHOPEDICS CLINIC | Facility: CLINIC | Age: 66
End: 2021-06-17

## 2021-06-17 DIAGNOSIS — S52.92XP CLOSED FRACTURE OF LEFT RADIUS AND ULNA WITH MALUNION, SUBSEQUENT ENCOUNTER: Primary | ICD-10-CM

## 2021-06-17 DIAGNOSIS — S52.202P CLOSED FRACTURE OF LEFT RADIUS AND ULNA WITH MALUNION, SUBSEQUENT ENCOUNTER: Primary | ICD-10-CM

## 2021-06-17 NOTE — TELEPHONE ENCOUNTER
DR KARLA NORRIS [facility] is out of network with [address] [place] [address]. Referral will be canceled. If patient needs to be referred to a tertiary provider at [facility], or [address] [place] [address] referral will need to come from the PCP.

## 2021-06-17 NOTE — TELEPHONE ENCOUNTER
Please see below. Per LOV note 6/11/21:    Plan: I discussed the treatment options in detail. It appears that her radial implant has failed. This may be due to persistent malalignment of her proximal ulna.   I believe she needs a revision radial head arth

## 2021-06-17 NOTE — TELEPHONE ENCOUNTER
Probably best to refer to one of the trauma specialists at [place]. May Spar [facility] is one I know.

## 2021-06-17 NOTE — TELEPHONE ENCOUNTER
Dr. Armando Regalado please see referral recommendations below to Dr. Lizzy Diaz with [facility]. Per pt insurance she will need a referral from her PCP. Please advise on order. RN triage please follow up with referral auth.  Please fax relevant documentation to Dr. Sonja Avalos

## 2021-06-24 RX ORDER — ERGOCALCIFEROL 1.25 MG/1
CAPSULE ORAL
Qty: 12 CAPSULE | Refills: 0 | OUTPATIENT
Start: 2021-06-24

## 2021-06-25 ENCOUNTER — TELEPHONE (OUTPATIENT)
Dept: INTERNAL MEDICINE CLINIC | Facility: CLINIC | Age: 66
End: 2021-06-25

## 2021-06-25 ENCOUNTER — HOSPITAL ENCOUNTER (OUTPATIENT)
Dept: ULTRASOUND IMAGING | Age: 66
Discharge: HOME | End: 2021-06-25
Attending: INTERNAL MEDICINE
Payer: COMMERCIAL

## 2021-06-25 DIAGNOSIS — N18.32 STAGE 3B CHRONIC KIDNEY DISEASE (HCC): ICD-10-CM

## 2021-06-25 DIAGNOSIS — Q65.89 HIP DYSPLASIA: ICD-10-CM

## 2021-06-25 DIAGNOSIS — M25.551 RIGHT HIP PAIN: Primary | ICD-10-CM

## 2021-06-25 PROCEDURE — 76775 US EXAM ABDO BACK WALL LIM: CPT | Performed by: INTERNAL MEDICINE

## 2021-06-25 NOTE — TELEPHONE ENCOUNTER
Pt is requesting a referral to see Dr. Delmi Lynn at [facility]. . Pt was referred to this doctor for her left elbow. Pt does not have an appointment scheduled yet. Please, call pt when the referral is in the system. Please, call pt with any questions.

## 2021-06-26 ENCOUNTER — TELEPHONE (OUTPATIENT)
Dept: INTERNAL MEDICINE CLINIC | Facility: CLINIC | Age: 66
End: 2021-06-26

## 2021-06-26 NOTE — TELEPHONE ENCOUNTER
Spoke with pt. She fell down a couple of steps and hit the front of the knee. She has bruising. Now notes severe pain in back of knee with bending or extending. Not much pain with walking. Pt does not tolerate NSAID's. I advised rest, ice, tylenol.  Con

## 2021-06-27 ENCOUNTER — TELEPHONE (OUTPATIENT)
Dept: INTERNAL MEDICINE CLINIC | Facility: CLINIC | Age: 66
End: 2021-06-27

## 2021-06-29 NOTE — TELEPHONE ENCOUNTER
Message # [id number]         06/27/2021 12:24p   [IVONAR]  To:  From:  FNANY Marshall MD:  Phone#:  ----------------------------------------------------------------------  [phone] Tawny Mak [birth year] RE PT POSSIBLE DVT IN RIGHT LEG.  PT IS CONCERNED AND WOU

## 2021-06-30 NOTE — TELEPHONE ENCOUNTER
Patient was concerned for DVT but patient does not have any pain at this time. She had fallen and had an injury to her right leg but this is resolved. Instructed patient to go to the ED if she develops any pain, redness or swelling.     Ina Valdez, MATEUSZ Reyes

## 2021-07-06 ENCOUNTER — TELEPHONE (OUTPATIENT)
Dept: INTERNAL MEDICINE CLINIC | Facility: CLINIC | Age: 66
End: 2021-07-06

## 2021-07-06 DIAGNOSIS — S52.022A CLOSED FRACTURE OF OLECRANON PROCESS OF LEFT ULNA, INITIAL ENCOUNTER: Primary | ICD-10-CM

## 2021-07-06 NOTE — TELEPHONE ENCOUNTER
Patient is requesting a referral for CT of left elbow without contrast and artifacts requested by Dr. Suzy Robertson to have patient get the referral from Dr. Roderick Harvey, please call when referral has been submitted.

## 2021-07-07 NOTE — TELEPHONE ENCOUNTER
Please provide patient with an order as referrals are not required. Thank you, Lydia Patricia Specialist    Managed Care.

## 2021-07-12 ENCOUNTER — HOSPITAL ENCOUNTER (OUTPATIENT)
Dept: CT IMAGING | Facility: HOSPITAL | Age: 66
Discharge: HOME | End: 2021-07-12
Attending: ORTHOPAEDIC SURGERY
Payer: COMMERCIAL

## 2021-07-12 DIAGNOSIS — S52.022A CLOSED DISPLACED FRACTURE OF OLECRANON PROCESS OF LEFT ULNA WITHOUT INTRA-ARTICULAR EXTENSION, INITIAL ENCOUNTER: ICD-10-CM

## 2021-07-12 DIAGNOSIS — S52.022A CLOSED FRACTURE OF LEFT OLECRANON PROCESS: ICD-10-CM

## 2021-07-12 PROCEDURE — 73200 CT UPPER EXTREMITY W/O DYE: CPT | Performed by: ORTHOPAEDIC SURGERY

## 2021-07-12 PROCEDURE — 76377 3D RENDER W/INTRP POSTPROCES: CPT | Performed by: ORTHOPAEDIC SURGERY

## 2021-07-22 ENCOUNTER — OFFICE VISIT (OUTPATIENT)
Dept: SLEEP CENTER | Age: 66
End: 2021-07-22
Attending: INTERNAL MEDICINE
Payer: COMMERCIAL

## 2021-07-22 DIAGNOSIS — G47.33 OSA (OBSTRUCTIVE SLEEP APNEA): ICD-10-CM

## 2021-07-22 DIAGNOSIS — Z76.89 SLEEP CONCERN: Primary | ICD-10-CM

## 2021-07-22 PROCEDURE — 95811 POLYSOM 6/>YRS CPAP 4/> PARM: CPT

## 2021-07-22 NOTE — TELEPHONE ENCOUNTER
Patient called and needs referral for office visit prior to surgery with Dr. Britta Zamora on August 5th    Please sign off referral  Thanks    [facility]

## 2021-07-28 ENCOUNTER — TELEPHONE (OUTPATIENT)
Dept: INTERNAL MEDICINE CLINIC | Facility: CLINIC | Age: 66
End: 2021-07-28

## 2021-07-28 DIAGNOSIS — S52.022A CLOSED FRACTURE OF OLECRANON PROCESS OF LEFT ULNA, INITIAL ENCOUNTER: Primary | ICD-10-CM

## 2021-07-28 NOTE — TELEPHONE ENCOUNTER
Dr. Gabi Valverde,     Patient is having procedure with Dr. Jolene Hodgson on 08/05. Please sign off on referral for office visits. There is not a referral on file for patient being referred to Dr. Maria Luisa Boyle. Please advise.     Thank you, Nida Shepherd Specialist

## 2021-07-29 ENCOUNTER — OFFICE VISIT (OUTPATIENT)
Dept: INTERNAL MEDICINE CLINIC | Facility: CLINIC | Age: 66
End: 2021-07-29

## 2021-07-29 ENCOUNTER — NURSE TRIAGE (OUTPATIENT)
Dept: INTERNAL MEDICINE CLINIC | Facility: CLINIC | Age: 66
End: 2021-07-29

## 2021-07-29 VITALS
RESPIRATION RATE: 18 BRPM | SYSTOLIC BLOOD PRESSURE: 113 MMHG | BODY MASS INDEX: 43.55 KG/M2 | HEART RATE: 57 BPM | WEIGHT: 271 LBS | HEIGHT: 66 IN | DIASTOLIC BLOOD PRESSURE: 75 MMHG

## 2021-07-29 DIAGNOSIS — M25.561 ACUTE PAIN OF RIGHT KNEE: ICD-10-CM

## 2021-07-29 DIAGNOSIS — M25.571 CHRONIC PAIN OF RIGHT ANKLE: ICD-10-CM

## 2021-07-29 DIAGNOSIS — M79.671 FOOT PAIN, RIGHT: ICD-10-CM

## 2021-07-29 DIAGNOSIS — G47.33 OSA (OBSTRUCTIVE SLEEP APNEA): Primary | ICD-10-CM

## 2021-07-29 DIAGNOSIS — I10 ESSENTIAL HYPERTENSION: ICD-10-CM

## 2021-07-29 DIAGNOSIS — G89.29 CHRONIC PAIN OF RIGHT ANKLE: ICD-10-CM

## 2021-07-29 PROCEDURE — 3008F BODY MASS INDEX DOCD: CPT | Performed by: INTERNAL MEDICINE

## 2021-07-29 PROCEDURE — 3074F SYST BP LT 130 MM HG: CPT | Performed by: INTERNAL MEDICINE

## 2021-07-29 PROCEDURE — 3078F DIAST BP <80 MM HG: CPT | Performed by: INTERNAL MEDICINE

## 2021-07-29 PROCEDURE — 99214 OFFICE O/P EST MOD 30 MIN: CPT | Performed by: INTERNAL MEDICINE

## 2021-07-29 NOTE — PROGRESS NOTES
HPI:    Patient ID: Griffin Fleischer is a 61year old female. Impression  CONCLUSION:     1.  Interval open reduction and internal fixation of proximal ulna fracture.  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 Aubrie Dragon is a separate fragment of the coronoid pro Current Outpatient Medications   Medication Sig Dispense Refill   • Turmeric (QC TUMERIC COMPLEX OR)      • traZODone HCl 150 MG Oral Tab TAKE 1/3-1 TABLET BY MOUTH AT BEDTIME     • Calcium Carb-Cholecalciferol (CALCIUM 1000 + D OR) Take 1 tablet by mout appearance. She is obese. HENT:      Head: Normocephalic. Right Ear: Tympanic membrane normal.      Left Ear: Tympanic membrane normal.      Mouth/Throat:      Pharynx: No oropharyngeal exudate. Eyes:      General: No scleral icterus.      Extraocu has been sent. Patient is advised to give me a call after she obtains the equipment. Relevant Orders    DME - EXTERNAL     Chronic pain of right ankle     Patient has had intermittent pain in the ankle and foot–right.   Ongoing issues for at least

## 2021-07-29 NOTE — TELEPHONE ENCOUNTER
Action Requested: Summary for Provider     []  Critical Lab, Recommendations Needed  [] Need Additional Advice  []   FYI    []   Need Orders  [] Need Medications Sent to Pharmacy  []  Other     SUMMARY: c/o of right leg/calf pain on the right side for abou

## 2021-07-30 ENCOUNTER — HOSPITAL ENCOUNTER (OUTPATIENT)
Dept: GENERAL RADIOLOGY | Age: 66
Discharge: HOME | End: 2021-07-30
Attending: INTERNAL MEDICINE
Payer: COMMERCIAL

## 2021-07-30 DIAGNOSIS — M25.561 ACUTE PAIN OF RIGHT KNEE: ICD-10-CM

## 2021-07-30 PROCEDURE — 73560 X-RAY EXAM OF KNEE 1 OR 2: CPT | Performed by: INTERNAL MEDICINE

## 2021-07-30 NOTE — ASSESSMENT & PLAN NOTE
Patient has had intermittent pain in the ankle and foot–right. Ongoing issues for at least the past 6 to 8 months. Was seen by Dr. Rosy Adrian and given orthotics. Prior to that she was seen by Dr. Viktoria Paulson.   She has had an MRI of the ankle showing osteoart

## 2021-07-30 NOTE — ASSESSMENT & PLAN NOTE
Patient has completed her sleep study and CPAP titration. Referral for new CPAP has been sent. Patient is advised to give me a call after she obtains the equipment.

## 2021-07-30 NOTE — PATIENT INSTRUCTIONS
Problem List Items Addressed This Visit        Unprioritized    Acute pain of right knee     Pain in the right knee with swelling and stiffness. Gradually worse over the past 2 to 3 months.   Unsure if this is related to the ankle pain and weightbearing me

## 2021-08-02 ENCOUNTER — TELEPHONE (OUTPATIENT)
Dept: CASE MANAGEMENT | Age: 66
End: 2021-08-02

## 2021-08-02 NOTE — TELEPHONE ENCOUNTER
Received a fax for pt to have procedure done with Dr. Saroj Torres. Please sign off if you agree with plan of care.     Thank you,  Managed care

## 2021-08-03 RX ORDER — METOCLOPRAMIDE 10 MG/1
10 TABLET ORAL ONCE
Status: DISCONTINUED | OUTPATIENT
Start: 2021-08-03 | End: 2021-08-03

## 2021-08-05 ENCOUNTER — ANESTHESIA (OUTPATIENT)
Dept: SURGERY | Facility: HOSPITAL | Age: 66
End: 2021-08-05
Payer: COMMERCIAL

## 2021-08-05 ENCOUNTER — ANESTHESIA EVENT (OUTPATIENT)
Dept: SURGERY | Facility: HOSPITAL | Age: 66
End: 2021-08-05
Payer: COMMERCIAL

## 2021-08-05 ENCOUNTER — HOSPITAL ENCOUNTER (OUTPATIENT)
Facility: HOSPITAL | Age: 66
Setting detail: HOSPITAL OUTPATIENT SURGERY
Discharge: HOME | End: 2021-08-05
Attending: ORTHOPAEDIC SURGERY | Admitting: ORTHOPAEDIC SURGERY
Payer: COMMERCIAL

## 2021-08-05 ENCOUNTER — APPOINTMENT (OUTPATIENT)
Dept: GENERAL RADIOLOGY | Facility: HOSPITAL | Age: 66
End: 2021-08-05
Attending: ORTHOPAEDIC SURGERY
Payer: COMMERCIAL

## 2021-08-05 VITALS
BODY MASS INDEX: 41.78 KG/M2 | OXYGEN SATURATION: 94 % | DIASTOLIC BLOOD PRESSURE: 74 MMHG | RESPIRATION RATE: 18 BRPM | SYSTOLIC BLOOD PRESSURE: 131 MMHG | HEIGHT: 66 IN | TEMPERATURE: 97 F | WEIGHT: 260 LBS | HEART RATE: 57 BPM

## 2021-08-05 PROCEDURE — 0RJM0ZZ INSPECTION OF LEFT ELBOW JOINT, OPEN APPROACH: ICD-10-PCS | Performed by: ORTHOPAEDIC SURGERY

## 2021-08-05 PROCEDURE — 76942 ECHO GUIDE FOR BIOPSY: CPT | Performed by: ORTHOPAEDIC SURGERY

## 2021-08-05 PROCEDURE — 88300 SURGICAL PATH GROSS: CPT | Performed by: ORTHOPAEDIC SURGERY

## 2021-08-05 PROCEDURE — 76000 FLUOROSCOPY <1 HR PHYS/QHP: CPT | Performed by: ORTHOPAEDIC SURGERY

## 2021-08-05 PROCEDURE — 0RPM04Z REMOVAL OF INTERNAL FIXATION DEVICE FROM LEFT ELBOW JOINT, OPEN APPROACH: ICD-10-PCS | Performed by: ORTHOPAEDIC SURGERY

## 2021-08-05 PROCEDURE — 3E0T3BZ INTRODUCTION OF ANESTHETIC AGENT INTO PERIPHERAL NERVES AND PLEXI, PERCUTANEOUS APPROACH: ICD-10-PCS | Performed by: STUDENT IN AN ORGANIZED HEALTH CARE EDUCATION/TRAINING PROGRAM

## 2021-08-05 PROCEDURE — 64415 NJX AA&/STRD BRCH PLXS IMG: CPT | Performed by: ORTHOPAEDIC SURGERY

## 2021-08-05 RX ORDER — LIDOCAINE HYDROCHLORIDE 10 MG/ML
INJECTION, SOLUTION INFILTRATION; PERINEURAL
Status: COMPLETED | OUTPATIENT
Start: 2021-08-05 | End: 2021-08-05

## 2021-08-05 RX ORDER — MORPHINE SULFATE 10 MG/ML
6 INJECTION, SOLUTION INTRAMUSCULAR; INTRAVENOUS EVERY 10 MIN PRN
Status: DISCONTINUED | OUTPATIENT
Start: 2021-08-05 | End: 2021-08-05

## 2021-08-05 RX ORDER — SODIUM CHLORIDE, SODIUM LACTATE, POTASSIUM CHLORIDE, CALCIUM CHLORIDE 600; 310; 30; 20 MG/100ML; MG/100ML; MG/100ML; MG/100ML
INJECTION, SOLUTION INTRAVENOUS CONTINUOUS
Status: DISCONTINUED | OUTPATIENT
Start: 2021-08-05 | End: 2021-08-05

## 2021-08-05 RX ORDER — ONDANSETRON 2 MG/ML
4 INJECTION INTRAMUSCULAR; INTRAVENOUS ONCE AS NEEDED
Status: DISCONTINUED | OUTPATIENT
Start: 2021-08-05 | End: 2021-08-05

## 2021-08-05 RX ORDER — ROPIVACAINE HYDROCHLORIDE 5 MG/ML
INJECTION, SOLUTION EPIDURAL; INFILTRATION; PERINEURAL
Status: COMPLETED | OUTPATIENT
Start: 2021-08-05 | End: 2021-08-05

## 2021-08-05 RX ORDER — LIDOCAINE HYDROCHLORIDE 10 MG/ML
INJECTION, SOLUTION EPIDURAL; INFILTRATION; INTRACAUDAL; PERINEURAL AS NEEDED
Status: DISCONTINUED | OUTPATIENT
Start: 2021-08-05 | End: 2021-08-05 | Stop reason: SURG

## 2021-08-05 RX ORDER — MORPHINE SULFATE 4 MG/ML
6 INJECTION, SOLUTION INTRAMUSCULAR; INTRAVENOUS EVERY 2 HOUR PRN
Status: CANCELLED | OUTPATIENT
Start: 2021-08-05 | End: 2021-08-06

## 2021-08-05 RX ORDER — HALOPERIDOL 5 MG/ML
0.25 INJECTION INTRAMUSCULAR ONCE AS NEEDED
Status: DISCONTINUED | OUTPATIENT
Start: 2021-08-05 | End: 2021-08-05

## 2021-08-05 RX ORDER — HYDROMORPHONE HYDROCHLORIDE 1 MG/ML
0.6 INJECTION, SOLUTION INTRAMUSCULAR; INTRAVENOUS; SUBCUTANEOUS EVERY 5 MIN PRN
Status: DISCONTINUED | OUTPATIENT
Start: 2021-08-05 | End: 2021-08-05

## 2021-08-05 RX ORDER — MIDAZOLAM HYDROCHLORIDE 1 MG/ML
INJECTION INTRAMUSCULAR; INTRAVENOUS
Status: COMPLETED | OUTPATIENT
Start: 2021-08-05 | End: 2021-08-05

## 2021-08-05 RX ORDER — HYDROMORPHONE HYDROCHLORIDE 1 MG/ML
0.8 INJECTION, SOLUTION INTRAMUSCULAR; INTRAVENOUS; SUBCUTANEOUS EVERY 2 HOUR PRN
Status: DISCONTINUED | OUTPATIENT
Start: 2021-08-05 | End: 2021-08-05

## 2021-08-05 RX ORDER — OXYCODONE HYDROCHLORIDE 5 MG/1
5 TABLET ORAL EVERY 4 HOURS PRN
Status: CANCELLED | OUTPATIENT
Start: 2021-08-05 | End: 2021-08-06

## 2021-08-05 RX ORDER — MORPHINE SULFATE 4 MG/ML
2 INJECTION, SOLUTION INTRAMUSCULAR; INTRAVENOUS EVERY 10 MIN PRN
Status: DISCONTINUED | OUTPATIENT
Start: 2021-08-05 | End: 2021-08-05

## 2021-08-05 RX ORDER — VANCOMYCIN HYDROCHLORIDE 1 G/20ML
INJECTION, POWDER, LYOPHILIZED, FOR SOLUTION INTRAVENOUS AS NEEDED
Status: DISCONTINUED | OUTPATIENT
Start: 2021-08-05 | End: 2021-08-05

## 2021-08-05 RX ORDER — HYDROMORPHONE HYDROCHLORIDE 1 MG/ML
0.2 INJECTION, SOLUTION INTRAMUSCULAR; INTRAVENOUS; SUBCUTANEOUS EVERY 5 MIN PRN
Status: DISCONTINUED | OUTPATIENT
Start: 2021-08-05 | End: 2021-08-05

## 2021-08-05 RX ORDER — ONDANSETRON 2 MG/ML
INJECTION INTRAMUSCULAR; INTRAVENOUS AS NEEDED
Status: DISCONTINUED | OUTPATIENT
Start: 2021-08-05 | End: 2021-08-05 | Stop reason: SURG

## 2021-08-05 RX ORDER — ACETAMINOPHEN 500 MG
1000 TABLET ORAL EVERY 8 HOURS
Status: CANCELLED | OUTPATIENT
Start: 2021-08-05 | End: 2021-08-06

## 2021-08-05 RX ORDER — PROCHLORPERAZINE EDISYLATE 5 MG/ML
5 INJECTION INTRAMUSCULAR; INTRAVENOUS ONCE AS NEEDED
Status: DISCONTINUED | OUTPATIENT
Start: 2021-08-05 | End: 2021-08-05

## 2021-08-05 RX ORDER — OXYCODONE HYDROCHLORIDE 5 MG/1
10 TABLET ORAL EVERY 4 HOURS PRN
Status: CANCELLED | OUTPATIENT
Start: 2021-08-05 | End: 2021-08-06

## 2021-08-05 RX ORDER — NALOXONE HYDROCHLORIDE 0.4 MG/ML
80 INJECTION, SOLUTION INTRAMUSCULAR; INTRAVENOUS; SUBCUTANEOUS AS NEEDED
Status: DISCONTINUED | OUTPATIENT
Start: 2021-08-05 | End: 2021-08-05

## 2021-08-05 RX ORDER — OXYCODONE HYDROCHLORIDE 5 MG/1
5 TABLET ORAL EVERY 4 HOURS PRN
Status: DISCONTINUED | OUTPATIENT
Start: 2021-08-05 | End: 2021-08-05

## 2021-08-05 RX ORDER — DEXAMETHASONE SODIUM PHOSPHATE 10 MG/ML
INJECTION, SOLUTION INTRAMUSCULAR; INTRAVENOUS
Status: COMPLETED | OUTPATIENT
Start: 2021-08-05 | End: 2021-08-05

## 2021-08-05 RX ORDER — HYDROCODONE BITARTRATE AND ACETAMINOPHEN 5; 325 MG/1; MG/1
2 TABLET ORAL AS NEEDED
Status: DISCONTINUED | OUTPATIENT
Start: 2021-08-05 | End: 2021-08-05

## 2021-08-05 RX ORDER — MORPHINE SULFATE 15 MG/1
15 TABLET ORAL EVERY 4 HOURS PRN
Status: CANCELLED | OUTPATIENT
Start: 2021-08-05 | End: 2021-08-06

## 2021-08-05 RX ORDER — HYDROMORPHONE HYDROCHLORIDE 1 MG/ML
0.4 INJECTION, SOLUTION INTRAMUSCULAR; INTRAVENOUS; SUBCUTANEOUS EVERY 5 MIN PRN
Status: DISCONTINUED | OUTPATIENT
Start: 2021-08-05 | End: 2021-08-05

## 2021-08-05 RX ORDER — OXYCODONE HYDROCHLORIDE 5 MG/1
10 TABLET ORAL EVERY 4 HOURS PRN
Status: DISCONTINUED | OUTPATIENT
Start: 2021-08-05 | End: 2021-08-05

## 2021-08-05 RX ORDER — ACETAMINOPHEN 500 MG
1000 TABLET ORAL ONCE
Status: COMPLETED | OUTPATIENT
Start: 2021-08-05 | End: 2021-08-05

## 2021-08-05 RX ORDER — ROCURONIUM BROMIDE 10 MG/ML
INJECTION, SOLUTION INTRAVENOUS AS NEEDED
Status: DISCONTINUED | OUTPATIENT
Start: 2021-08-05 | End: 2021-08-05 | Stop reason: SURG

## 2021-08-05 RX ORDER — METOPROLOL TARTRATE 5 MG/5ML
2.5 INJECTION INTRAVENOUS ONCE
Status: DISCONTINUED | OUTPATIENT
Start: 2021-08-05 | End: 2021-08-05

## 2021-08-05 RX ORDER — MORPHINE SULFATE 2 MG/ML
2 INJECTION, SOLUTION INTRAMUSCULAR; INTRAVENOUS EVERY 2 HOUR PRN
Status: CANCELLED | OUTPATIENT
Start: 2021-08-05 | End: 2021-08-06

## 2021-08-05 RX ORDER — MORPHINE SULFATE 4 MG/ML
4 INJECTION, SOLUTION INTRAMUSCULAR; INTRAVENOUS EVERY 2 HOUR PRN
Status: CANCELLED | OUTPATIENT
Start: 2021-08-05 | End: 2021-08-06

## 2021-08-05 RX ORDER — GLYCOPYRROLATE 0.2 MG/ML
INJECTION, SOLUTION INTRAMUSCULAR; INTRAVENOUS AS NEEDED
Status: DISCONTINUED | OUTPATIENT
Start: 2021-08-05 | End: 2021-08-05 | Stop reason: SURG

## 2021-08-05 RX ORDER — MORPHINE SULFATE 4 MG/ML
4 INJECTION, SOLUTION INTRAMUSCULAR; INTRAVENOUS EVERY 10 MIN PRN
Status: DISCONTINUED | OUTPATIENT
Start: 2021-08-05 | End: 2021-08-05

## 2021-08-05 RX ORDER — HYDROMORPHONE HYDROCHLORIDE 1 MG/ML
0.4 INJECTION, SOLUTION INTRAMUSCULAR; INTRAVENOUS; SUBCUTANEOUS EVERY 2 HOUR PRN
Status: DISCONTINUED | OUTPATIENT
Start: 2021-08-05 | End: 2021-08-05

## 2021-08-05 RX ORDER — DEXAMETHASONE SODIUM PHOSPHATE 4 MG/ML
VIAL (ML) INJECTION AS NEEDED
Status: DISCONTINUED | OUTPATIENT
Start: 2021-08-05 | End: 2021-08-05 | Stop reason: SURG

## 2021-08-05 RX ORDER — NEOSTIGMINE METHYLSULFATE 1 MG/ML
INJECTION INTRAVENOUS AS NEEDED
Status: DISCONTINUED | OUTPATIENT
Start: 2021-08-05 | End: 2021-08-05 | Stop reason: SURG

## 2021-08-05 RX ORDER — HYDROCODONE BITARTRATE AND ACETAMINOPHEN 5; 325 MG/1; MG/1
1 TABLET ORAL AS NEEDED
Status: DISCONTINUED | OUTPATIENT
Start: 2021-08-05 | End: 2021-08-05

## 2021-08-05 RX ORDER — FAMOTIDINE 20 MG/1
20 TABLET ORAL ONCE
Status: COMPLETED | OUTPATIENT
Start: 2021-08-05 | End: 2021-08-05

## 2021-08-05 RX ADMIN — DEXAMETHASONE SODIUM PHOSPHATE 4 MG: 10 INJECTION, SOLUTION INTRAMUSCULAR; INTRAVENOUS at 13:50:00

## 2021-08-05 RX ADMIN — NEOSTIGMINE METHYLSULFATE 5 MG: 1 INJECTION INTRAVENOUS at 15:22:00

## 2021-08-05 RX ADMIN — LIDOCAINE HYDROCHLORIDE 25 MG: 10 INJECTION, SOLUTION EPIDURAL; INFILTRATION; INTRACAUDAL; PERINEURAL at 14:10:00

## 2021-08-05 RX ADMIN — LIDOCAINE HYDROCHLORIDE 5 ML: 10 INJECTION, SOLUTION INFILTRATION; PERINEURAL at 13:50:00

## 2021-08-05 RX ADMIN — ROCURONIUM BROMIDE 5 MG: 10 INJECTION, SOLUTION INTRAVENOUS at 14:10:00

## 2021-08-05 RX ADMIN — GLYCOPYRROLATE 0.8 MG: 0.2 INJECTION, SOLUTION INTRAMUSCULAR; INTRAVENOUS at 15:22:00

## 2021-08-05 RX ADMIN — ONDANSETRON 4 MG: 2 INJECTION INTRAMUSCULAR; INTRAVENOUS at 14:12:00

## 2021-08-05 RX ADMIN — ROCURONIUM BROMIDE 25 MG: 10 INJECTION, SOLUTION INTRAVENOUS at 14:23:00

## 2021-08-05 RX ADMIN — DEXAMETHASONE SODIUM PHOSPHATE 4 MG: 4 MG/ML VIAL (ML) INJECTION at 14:12:00

## 2021-08-05 RX ADMIN — MIDAZOLAM HYDROCHLORIDE 2 MG: 1 INJECTION INTRAMUSCULAR; INTRAVENOUS at 13:50:00

## 2021-08-05 RX ADMIN — SODIUM CHLORIDE, SODIUM LACTATE, POTASSIUM CHLORIDE, CALCIUM CHLORIDE: 600; 310; 30; 20 INJECTION, SOLUTION INTRAVENOUS at 14:07:00

## 2021-08-05 RX ADMIN — ROPIVACAINE HYDROCHLORIDE 30 ML: 5 INJECTION, SOLUTION EPIDURAL; INFILTRATION; PERINEURAL at 13:50:00

## 2021-08-05 NOTE — ANESTHESIA PROCEDURE NOTES
Peripheral Block    Date/Time: 8/5/2021 1:50 PM  Performed by: Eron Ruiz MD  Authorized by: Eron Ruiz MD       General Information and Staff    Start Time:  8/5/2021 1:47 PM  End Time:  8/5/2021 1:52 PM  Anesthesiologist:  Scarlet Oscar 0.5%, 30 mL  dexamethasone (DECADRON) PF injection 10 mg/ml, 4 mg    Additional Comments    Twitch absent at 0.4mA

## 2021-08-05 NOTE — ANESTHESIA PROCEDURE NOTES
Airway  Date/Time: 8/5/2021 2:13 PM  Urgency: elective      General Information and Staff    Patient location during procedure: OR  Anesthesiologist: Irineo Bedolla MD  Performed: anesthesiologist     Indications and Patient Condition  Indications f

## 2021-08-05 NOTE — PROGRESS NOTES
Patient came in for b/p check, I verified orders and name/. I checked b/p on right arm it was 150/80 with a pulse of 68. I let patient sit in the exam room for about 5-10min. I rechecked b/p on right arm and it was 140/70 with a pulse of 68.  Pt is lavell buPROPion HCl ER, XL, 300 MG Oral Tablet 24 Hr, Take 600 mg by mouth daily. Take 2 pills to equal 600mg. In the morning  , Disp: , Rfl:     •  Cyanocobalamin (B-12) 2500 MCG Oral Tab, Take 2,500 mcg by mouth daily. , Disp: , Rfl:     No current facility-adm

## 2021-08-05 NOTE — ANESTHESIA POSTPROCEDURE EVALUATION
Patient: Jamie Kelp    Procedure Summary     Date: 08/05/21 Room / Location: [address] [place] [address] MAIN OR 17 / [address] [place] [address] MAIN OR    Anesthesia Start: 8301 Anesthesia Stop: 0698    Procedure: LEFT ELBOW HARDWARE REMOVAL, RADIAL HEAD REMOVAL, AND HETEROTOPIC BONE REMOVAL (Kortney Montoya

## 2021-08-05 NOTE — ANESTHESIA PREPROCEDURE EVALUATION
Anesthesia PreOp Note    HPI:     Bia Rosenberg is a 61year old female who presents for preoperative consultation requested by: Lafe Cheadle, MD    Date of Surgery: 8/5/2021    Procedure(s):  left elbow removal versus revision of radial head capsula 02/12/2013      EDWAR (obstructive sleep apnea)         Date Noted: 01/23/2010      Insomnia with sleep apnea         Date Noted: 05/27/2009      Hypothyroidism         Date Noted: 05/22/2008      Morbid obesity ([facility] [address]         Date Noted: 03/15/2005        Pa mouth every morning., Disp: 90 capsule, Rfl: 1  amLODIPine Besylate 2.5 MG Oral Tab, TAKE 1 TABLET BY MOUTH ONE TIME A DAY, Disp: 90 tablet, Rfl: 2  Triamterene-HCTZ 37.5-25 MG Oral Tab, 1 tablet 2 times a week on Monday and Friday, Disp: 24 tablet, Rfl: 2 Onset   • Cancer Father         prostate   • Heart Disease Mother         CVA   • Depression Mother    • Hypertension Mother    • Other (Colonic polyps) Mother    • Depression Sister    • Hypertension Sister    • Other (acromegaly from a pituitary tumor) S Transportation (Non-Medical):   Physical Activity:       Days of Exercise per Week:       Minutes of Exercise per Session:   Stress:       Feeling of Stress :   Social Connections:       Frequency of Communication with Friends and Family:       Frequency o patient and answered all questions. The patient desires to proceed with surgery and anesthesia as planned.  I have informed Calvin Larose  of the risks of regional anesthesia including, but not limited to: failure, toxicity, cardiac arrest, infection, bl

## 2021-08-05 NOTE — BRIEF OP NOTE
Pre-Operative Diagnosis: left elbow failed radial head, adhesions and painful hardware     Post-Operative Diagnosis: left elbow failed radial head, adhesions and painful hardware      Procedure Performed:   LEFT ELBOW HARDWARE REMOVAL, RADIAL HEAD REMOVAL,

## 2021-08-09 NOTE — OPERATIVE REPORT
Pre-Operative Diagnosis: left elbow failed radial head, adhesions and painful hardware     Post-Operative Diagnosis: left elbow failed radial head, adhesions and painful hardware      Procedure Performed:   LEFT ELBOW HARDWARE REMOVAL, RADIAL HEAD REMOVAL, remove did not entirety. Subsequently I then focused on the hardware and the PRUJ and 3 screws were removed from the plate allowing for improved range of motion in pronation and supination.    Finally after neurolysin the ulnar nerve and working around t

## 2021-08-10 ENCOUNTER — OFFICE VISIT (OUTPATIENT)
Dept: ORTHOPEDICS CLINIC | Facility: CLINIC | Age: 66
End: 2021-08-10

## 2021-08-10 VITALS — SYSTOLIC BLOOD PRESSURE: 120 MMHG | HEART RATE: 61 BPM | DIASTOLIC BLOOD PRESSURE: 80 MMHG

## 2021-08-10 DIAGNOSIS — M17.11 PRIMARY OSTEOARTHRITIS OF RIGHT KNEE: Primary | ICD-10-CM

## 2021-08-10 PROCEDURE — 20610 DRAIN/INJ JOINT/BURSA W/O US: CPT | Performed by: ORTHOPAEDIC SURGERY

## 2021-08-10 PROCEDURE — 3074F SYST BP LT 130 MM HG: CPT | Performed by: ORTHOPAEDIC SURGERY

## 2021-08-10 PROCEDURE — 3079F DIAST BP 80-89 MM HG: CPT | Performed by: ORTHOPAEDIC SURGERY

## 2021-08-10 RX ORDER — TRIAMCINOLONE ACETONIDE 40 MG/ML
40 INJECTION, SUSPENSION INTRA-ARTICULAR; INTRAMUSCULAR ONCE
Status: COMPLETED | OUTPATIENT
Start: 2021-08-10 | End: 2021-08-10

## 2021-08-10 RX ADMIN — TRIAMCINOLONE ACETONIDE 40 MG: 40 INJECTION, SUSPENSION INTRA-ARTICULAR; INTRAMUSCULAR at 09:34:00

## 2021-08-10 NOTE — PROCEDURES
Per verbal order lane up 4ml of 1% lidocaine and 1 ml of kenalog 40mg for Right knee cortisone injection.

## 2021-08-11 NOTE — H&P
NURSING INTAKE COMMENTS: Patient presents with:  Consult: R knee pain, denies any injury, has been hurting for 2 months, 4/10 pain scale, xray in EPIC      HPI: This 61year old female presents today for right knee pain insidious onset of the last few radha TABLET BY MOUTH AT BEDTIME     • Calcium Carb-Cholecalciferol (CALCIUM 1000 + D OR) Take 1 tablet by mouth daily. Take 1 tablet by mouth daily     • Omega 3-6-9 Fatty Acids (OMEGA-3-6-9 OR) Take 1 capsule by mouth daily.      • DULoxetine HCl 30 MG Oral Cap Tobacco Use      Smoking status: Never Smoker      Smokeless tobacco: Never Used    Vaping Use      Vaping Use: Never used    Substance and Sexual Activity      Alcohol use:  Yes        Alcohol/week: 1.0 standard drinks        Types: 1 Standard drinks or eq patellofemoral joint space and mild to moderate loss of medial joint space. Marginal enthesophytes are noted. Patellar and femoral condylar osteophytes are noted. IMPRESSION:  Moderate to marked osteoarthritic changes of the right knee.   No evidence of transcribed by Technologist)  Evaluate closed fracture left olecranon process.     FINDINGS:  BONES:  Since previous study there is placement of compression plate and screws across previously noted displaced fracture of the ulna which extended to the articu (CPT=76000)  COMPARISON: [facility], XR FLUOROSCOPY C-ARM  TIME< 1 HOUR (CPT=76000), 3/08/2021, 1:37 PM.  [facility], XR FLUOROSCOPY C-ARM  TIME< 1 HOUR (CPT=76000), 11/06/2020, 4:47 PM.  INDICATIONS: Left elbow pain.  Left e using Dragon speech recognition technology. Please excuse any typos.     Epi Miller MD

## 2021-08-19 ENCOUNTER — TELEPHONE (OUTPATIENT)
Dept: INTERNAL MEDICINE CLINIC | Facility: CLINIC | Age: 66
End: 2021-08-19

## 2021-08-19 NOTE — TELEPHONE ENCOUNTER
Action Requested: Summary for Provider     []  Critical Lab, Recommendations Needed  [] Need Additional Advice  []   FYI    []   Need Orders  [] Need Medications Sent to Pharmacy  []  Other     SUMMARY: patient c/o leg pain, right, 9/10 with weight bearing

## 2021-08-20 NOTE — TELEPHONE ENCOUNTER
Patient states she received a phone call from Dr. Anni Valle last night, received a voicemail after missing phone call. She is requesting another phone call from Dr. Anni Valle when she is able. She states she is free for a phone call all day.

## 2021-08-24 ENCOUNTER — APPOINTMENT (OUTPATIENT)
Dept: ULTRASOUND IMAGING | Facility: HOSPITAL | Age: 66
End: 2021-08-24
Attending: EMERGENCY MEDICINE
Payer: COMMERCIAL

## 2021-08-24 ENCOUNTER — HOSPITAL ENCOUNTER (EMERGENCY)
Facility: HOSPITAL | Age: 66
Discharge: HOME | End: 2021-08-25
Attending: EMERGENCY MEDICINE
Payer: COMMERCIAL

## 2021-08-24 VITALS
SYSTOLIC BLOOD PRESSURE: 138 MMHG | DIASTOLIC BLOOD PRESSURE: 74 MMHG | TEMPERATURE: 97 F | HEART RATE: 62 BPM | HEIGHT: 66 IN | RESPIRATION RATE: 16 BRPM | WEIGHT: 270 LBS | BODY MASS INDEX: 43.39 KG/M2 | OXYGEN SATURATION: 96 %

## 2021-08-24 DIAGNOSIS — G89.29 CHRONIC PAIN OF RIGHT KNEE: Primary | ICD-10-CM

## 2021-08-24 DIAGNOSIS — M25.561 CHRONIC PAIN OF RIGHT KNEE: Primary | ICD-10-CM

## 2021-08-24 DIAGNOSIS — M79.89 LEG SWELLING: ICD-10-CM

## 2021-08-24 PROCEDURE — 99284 EMERGENCY DEPT VISIT MOD MDM: CPT

## 2021-08-24 PROCEDURE — 93971 EXTREMITY STUDY: CPT | Performed by: EMERGENCY MEDICINE

## 2021-08-24 NOTE — TELEPHONE ENCOUNTER
Dr Perefcto Jordan, patient calling back to check status of the message, see below, she missed your call. No change with leg pain, she was offered an appointment with another provider but she would prefer to discuss it with you.  She is available today at any time o

## 2021-08-25 NOTE — TELEPHONE ENCOUNTER
Patient has been contacted. She has been advised to go to the ER if the pain is any worse.   Most likely a calf muscle sprain but will need to consider getting an ultrasound of the calf to rule out DVT as a late development to sprain and inactivity

## 2021-08-25 NOTE — ED PROVIDER NOTES
Patient Seen in: [facility] Emergency Department      History   Patient presents with:  Leg Pain    Stated Complaint: pt sent here by her pmd for ultrasound of right calf to rule out dvt    HPI/Subjective:   HPI  Right knee and leg swelling and disc Smoker      Smokeless tobacco: Never Used    Vaping Use      Vaping Use: Never used    Alcohol use: Yes      Alcohol/week: 1.0 standard drinks      Types: 1 Standard drinks or equivalent per week    Drug use:  No             Review of Systems    Positive fo the lower extremity venous system. B-mode two-dimensional images     of the vascular structures, Doppler spectral analysis, and color flow. Doppler imaging were performed.   The     following veins were imaged:  Common, deep, and superficial femoral,

## 2021-08-25 NOTE — TELEPHONE ENCOUNTER
ED  Discharged   8/24/2021 - 8/25/2021 (2 hours)  [facility] Emergency Department   Teresa Barrientos MD  Last attending • Treatment team  Chronic pain of right knee +1 more  Clinical impression  Leg Pain  Chief complaint     FINDINGS:     EXTREMITY EXAM

## 2021-08-25 NOTE — ED INITIAL ASSESSMENT (HPI)
Pt presents to ED ambulatory for right leg pain with some ankle swelling. MD wants to r/o DVT. Pt states pain to right calf, ankle, knee, hamstring and foot. Pt denies JOVANNI or chest pain.

## 2021-08-26 ENCOUNTER — TELEPHONE (OUTPATIENT)
Dept: INTERNAL MEDICINE CLINIC | Facility: CLINIC | Age: 66
End: 2021-08-26

## 2021-08-26 ENCOUNTER — PATIENT MESSAGE (OUTPATIENT)
Dept: INTERNAL MEDICINE CLINIC | Facility: CLINIC | Age: 66
End: 2021-08-26

## 2021-08-27 NOTE — TELEPHONE ENCOUNTER
----- Message from Reddy Aponte sent at 8/26/2021  5:40 PM CDT -----  Regarding: Non-Urgent Medical Question  Contact: [phone]  [facility]    I think things have gone from bad to worse, my right leg is still very painful, while sitting, stand

## 2021-08-27 NOTE — TELEPHONE ENCOUNTER
Called patient in regards to MyChart message below    Patient states adam to ER  , ruled out DVT and was negative   Remains with right knee pain, offered earlier appt, declines at this time, just \" wanted to update doctor \" would like to keep appt for 9/1

## 2021-08-27 NOTE — TELEPHONE ENCOUNTER
From: Aron Arce  To: Anna Correa MD  Sent: 8/26/2021 5:40 PM CDT  Subject: Non-Urgent Ruperto Zuluaga,    I think things have gone from bad to worse, my right leg is still very painful, while sitting, standing and walking.  I'm

## 2021-09-04 ENCOUNTER — LAB ENCOUNTER (OUTPATIENT)
Dept: LAB | Age: 66
End: 2021-09-04
Attending: INTERNAL MEDICINE
Payer: COMMERCIAL

## 2021-09-04 DIAGNOSIS — Z00.00 ROUTINE PHYSICAL EXAMINATION: ICD-10-CM

## 2021-09-04 LAB
ALBUMIN SERPL-MCNC: 3.6 G/DL (ref 3.4–5)
ALBUMIN/GLOB SERPL: 1.1 {RATIO} (ref 1–2)
ALP LIVER SERPL-CCNC: 73 U/L
ALT SERPL-CCNC: 27 U/L
ANION GAP SERPL CALC-SCNC: 4 MMOL/L (ref 0–18)
AST SERPL-CCNC: 13 U/L (ref 15–37)
BASOPHILS # BLD AUTO: 0.04 X10(3) UL (ref 0–0.2)
BASOPHILS NFR BLD AUTO: 0.5 %
BILIRUB SERPL-MCNC: 0.4 MG/DL (ref 0.1–2)
BILIRUB UR QL STRIP.AUTO: NEGATIVE
BUN BLD-MCNC: 19 MG/DL (ref 7–18)
CALCIUM BLD-MCNC: 8.6 MG/DL (ref 8.5–10.1)
CHLORIDE SERPL-SCNC: 101 MMOL/L (ref 98–112)
CHOLEST SMN-MCNC: 191 MG/DL (ref ?–200)
CLARITY UR REFRACT.AUTO: CLEAR
CO2 SERPL-SCNC: 28 MMOL/L (ref 21–32)
COLOR UR AUTO: YELLOW
CREAT BLD-MCNC: 1.08 MG/DL
EOSINOPHIL # BLD AUTO: 0.25 X10(3) UL (ref 0–0.7)
EOSINOPHIL NFR BLD AUTO: 3.2 %
ERYTHROCYTE [DISTWIDTH] IN BLOOD BY AUTOMATED COUNT: 13.8 %
GLOBULIN PLAS-MCNC: 3.4 G/DL (ref 2.8–4.4)
GLUCOSE BLD-MCNC: 94 MG/DL (ref 70–99)
GLUCOSE UR STRIP.AUTO-MCNC: NEGATIVE MG/DL
HCT VFR BLD AUTO: 36.9 %
HDLC SERPL-MCNC: 79 MG/DL (ref 40–59)
HGB BLD-MCNC: 12.3 G/DL
HYALINE CASTS #/AREA URNS AUTO: PRESENT /LPF
IMM GRANULOCYTES # BLD AUTO: 0.03 X10(3) UL (ref 0–1)
IMM GRANULOCYTES NFR BLD: 0.4 %
KETONES UR STRIP.AUTO-MCNC: NEGATIVE MG/DL
LDLC SERPL CALC-MCNC: 103 MG/DL (ref ?–100)
LEUKOCYTE ESTERASE UR QL STRIP.AUTO: NEGATIVE
LYMPHOCYTES # BLD AUTO: 1.71 X10(3) UL (ref 1–4)
LYMPHOCYTES NFR BLD AUTO: 22.2 %
M PROTEIN MFR SERPL ELPH: 7 G/DL (ref 6.4–8.2)
MCH RBC QN AUTO: 30.8 PG (ref 26–34)
MCHC RBC AUTO-ENTMCNC: 33.3 G/DL (ref 31–37)
MCV RBC AUTO: 92.5 FL
MONOCYTES # BLD AUTO: 0.85 X10(3) UL (ref 0.1–1)
MONOCYTES NFR BLD AUTO: 11 %
NEUTROPHILS # BLD AUTO: 4.84 X10 (3) UL (ref 1.5–7.7)
NEUTROPHILS # BLD AUTO: 4.84 X10(3) UL (ref 1.5–7.7)
NEUTROPHILS NFR BLD AUTO: 62.7 %
NITRITE UR QL STRIP.AUTO: NEGATIVE
NONHDLC SERPL-MCNC: 112 MG/DL (ref ?–130)
OSMOLALITY SERPL CALC.SUM OF ELEC: 278 MOSM/KG (ref 275–295)
PATIENT FASTING Y/N/NP: YES
PATIENT FASTING Y/N/NP: YES
PH UR STRIP.AUTO: 7 [PH] (ref 5–8)
PLATELET # BLD AUTO: 378 10(3)UL (ref 150–450)
POTASSIUM SERPL-SCNC: 4.2 MMOL/L (ref 3.5–5.1)
PROT UR STRIP.AUTO-MCNC: NEGATIVE MG/DL
RBC # BLD AUTO: 3.99 X10(6)UL
SODIUM SERPL-SCNC: 133 MMOL/L (ref 136–145)
SP GR UR STRIP.AUTO: 1.02 (ref 1–1.03)
TRIGL SERPL-MCNC: 44 MG/DL (ref 30–149)
TSI SER-ACNC: 0.96 MIU/ML (ref 0.36–3.74)
UROBILINOGEN UR STRIP.AUTO-MCNC: <2 MG/DL
VIT B12 SERPL-MCNC: 1254 PG/ML (ref 193–986)
VIT D+METAB SERPL-MCNC: 41.9 NG/ML (ref 30–100)
VLDLC SERPL CALC-MCNC: 7 MG/DL (ref 0–30)
WBC # BLD AUTO: 7.7 X10(3) UL (ref 4–11)

## 2021-09-04 PROCEDURE — 80053 COMPREHEN METABOLIC PANEL: CPT

## 2021-09-04 PROCEDURE — 82306 VITAMIN D 25 HYDROXY: CPT

## 2021-09-04 PROCEDURE — 36415 COLL VENOUS BLD VENIPUNCTURE: CPT

## 2021-09-04 PROCEDURE — 82607 VITAMIN B-12: CPT

## 2021-09-04 PROCEDURE — 84443 ASSAY THYROID STIM HORMONE: CPT

## 2021-09-04 PROCEDURE — 85025 COMPLETE CBC W/AUTO DIFF WBC: CPT

## 2021-09-04 PROCEDURE — 81001 URINALYSIS AUTO W/SCOPE: CPT

## 2021-09-04 PROCEDURE — 80061 LIPID PANEL: CPT

## 2021-09-10 ENCOUNTER — OFFICE VISIT (OUTPATIENT)
Dept: INTERNAL MEDICINE CLINIC | Facility: CLINIC | Age: 66
End: 2021-09-10

## 2021-09-10 VITALS
RESPIRATION RATE: 16 BRPM | HEART RATE: 65 BPM | BODY MASS INDEX: 41.82 KG/M2 | DIASTOLIC BLOOD PRESSURE: 66 MMHG | SYSTOLIC BLOOD PRESSURE: 106 MMHG | WEIGHT: 260.19 LBS | HEIGHT: 66 IN | TEMPERATURE: 98 F

## 2021-09-10 DIAGNOSIS — E66.01 MORBID OBESITY (HCC): ICD-10-CM

## 2021-09-10 DIAGNOSIS — M17.11 PRIMARY OSTEOARTHRITIS OF RIGHT KNEE: ICD-10-CM

## 2021-09-10 DIAGNOSIS — D22.9 ATYPICAL NEVI: ICD-10-CM

## 2021-09-10 DIAGNOSIS — R31.29 MICROSCOPIC HEMATURIA: ICD-10-CM

## 2021-09-10 DIAGNOSIS — G47.33 OSA (OBSTRUCTIVE SLEEP APNEA): ICD-10-CM

## 2021-09-10 DIAGNOSIS — N18.32 STAGE 3B CHRONIC KIDNEY DISEASE (HCC): ICD-10-CM

## 2021-09-10 DIAGNOSIS — I10 ESSENTIAL HYPERTENSION: ICD-10-CM

## 2021-09-10 DIAGNOSIS — Z23 NEED FOR VACCINATION: ICD-10-CM

## 2021-09-10 DIAGNOSIS — E03.9 HYPOTHYROIDISM, UNSPECIFIED TYPE: ICD-10-CM

## 2021-09-10 DIAGNOSIS — Z00.00 ROUTINE GENERAL MEDICAL EXAMINATION AT A HEALTH CARE FACILITY: Primary | ICD-10-CM

## 2021-09-10 PROCEDURE — 90471 IMMUNIZATION ADMIN: CPT | Performed by: INTERNAL MEDICINE

## 2021-09-10 PROCEDURE — 3078F DIAST BP <80 MM HG: CPT | Performed by: INTERNAL MEDICINE

## 2021-09-10 PROCEDURE — 3008F BODY MASS INDEX DOCD: CPT | Performed by: INTERNAL MEDICINE

## 2021-09-10 PROCEDURE — 99396 PREV VISIT EST AGE 40-64: CPT | Performed by: INTERNAL MEDICINE

## 2021-09-10 PROCEDURE — 90750 HZV VACC RECOMBINANT IM: CPT | Performed by: INTERNAL MEDICINE

## 2021-09-10 PROCEDURE — 3074F SYST BP LT 130 MM HG: CPT | Performed by: INTERNAL MEDICINE

## 2021-09-10 RX ORDER — AMOXICILLIN 250 MG
CAPSULE ORAL
COMMUNITY

## 2021-09-10 RX ORDER — LEVOTHYROXINE SODIUM 0.12 MG/1
125 TABLET ORAL
Qty: 90 TABLET | Refills: 1 | Status: SHIPPED | OUTPATIENT
Start: 2021-09-10 | End: 2021-11-27

## 2021-09-10 RX ORDER — OXYCODONE HYDROCHLORIDE AND ACETAMINOPHEN 5; 325 MG/1; MG/1
TABLET ORAL
COMMUNITY
Start: 2021-08-04 | End: 2021-09-10 | Stop reason: ALTCHOICE

## 2021-09-10 RX ORDER — TRIAMTERENE AND HYDROCHLOROTHIAZIDE 37.5; 25 MG/1; MG/1
TABLET ORAL
Qty: 24 TABLET | Refills: 2 | Status: SHIPPED | OUTPATIENT
Start: 2021-09-10

## 2021-09-10 RX ORDER — BISOPROLOL FUMARATE 5 MG/1
5 TABLET ORAL DAILY
Qty: 90 TABLET | Refills: 1 | Status: SHIPPED | OUTPATIENT
Start: 2021-09-10 | End: 2021-10-12

## 2021-09-10 RX ORDER — PANTOPRAZOLE SODIUM 40 MG/1
40 TABLET, DELAYED RELEASE ORAL
Qty: 90 TABLET | Refills: 1 | Status: SHIPPED | OUTPATIENT
Start: 2021-09-10

## 2021-09-10 RX ORDER — DULOXETIN HYDROCHLORIDE 30 MG/1
30 CAPSULE, DELAYED RELEASE ORAL EVERY MORNING
Qty: 90 CAPSULE | Refills: 1 | Status: SHIPPED | OUTPATIENT
Start: 2021-09-10 | End: 2022-01-14

## 2021-09-10 NOTE — ASSESSMENT & PLAN NOTE
Thyroid function test look stable on levothyroxine at 125 mcg daily. Continue on the same dose of medication, recheck labs in about 6 months.

## 2021-09-10 NOTE — ASSESSMENT & PLAN NOTE
Blood pressure 106/66, pulse 65, temperature 97.9 °F (36.6 °C), temperature source Temporal, resp. rate 16, height 5' 6\" (1.676 m), weight 260 lb 3.2 oz (118 kg), not currently breastfeeding.      Stable blood pressure, well controlled at this time on amlo

## 2021-09-10 NOTE — ASSESSMENT & PLAN NOTE
Normal exam.  Labs as ordered. Skin check normal.  Multiple scattered nevi present, advised to follow-up with Dr. Deniz Joyce for a skin check–referral provided. .  Breast exam completed–no palpable abnormalities, discharge from the nipples or axillary adenopat

## 2021-09-10 NOTE — PROGRESS NOTES
HPI:   Johnnie Shah is a 61year old female who presents for an Annual Health Visit.        Allergies:     Bactrim [Sulfametho*    HIVES  Iodides (Topical)       HIVES    Comment:IVP DYE  Radiology Contrast *    HIVES    Comment:Other reaction(s): H 12/2017   • Depression    • Disorder of thyroid     hypothyroidism   • Diverticulosis 12/15/2017   • High blood pressure    • History of blood transfusion 2000    Good Robert   • Internal hemorrhoids 12/15/2017   • Neuropathy     kike feet   • Osteoarthritis Gastrointestinal: Negative. Endocrine: Negative. Genitourinary: Negative. Musculoskeletal: Positive for gait problem (right knee pain and pain in the right posterior thigh with knee flexion). Skin: Negative. Allergic/Immunologic: Negative. Breasts are symmetrical.         Right: No inverted nipple, mass, nipple discharge, skin change or tenderness. Left: No inverted nipple, mass, nipple discharge, skin change or tenderness.    Abdominal:      General: Bowel sounds are normal. There is abnormal muscle tone. Coordination: Coordination normal.      Gait: Gait normal.      Deep Tendon Reflexes: Reflexes normal.   Psychiatric:         Mood and Affect: Mood and affect normal.         Behavior: Behavior normal.         Thought Content:  Ang Chung a week. Essential hypertension     Blood pressure 106/66, pulse 65, temperature 97.9 °F (36.6 °C), temperature source Temporal, resp. rate 16, height 5' 6\" (1.676 m), weight 260 lb 3.2 oz (118 kg), not currently breastfeeding.      Stable blood p right knee especially posterior aspect of the right thigh with movement. Start on physical therapy to improve range of movement and weightbearing. Tylenol 650 mg 2-3 times a day if needed. Local heating pad as needed.          Relevant Orders    PHYSICAL Orders    DERM - INTERNAL             The patient indicates understanding of these issues and agrees to the plan.     Problem List:  Patient Active Problem List:     Depressive disorder     Essential hypertension     Hypothyroidism     Insomnia with sleep a

## 2021-09-10 NOTE — ASSESSMENT & PLAN NOTE
Worsening pain and stiffness in the right knee especially posterior aspect of the right thigh with movement. Start on physical therapy to improve range of movement and weightbearing. Tylenol 650 mg 2-3 times a day if needed. Local heating pad as needed.

## 2021-09-10 NOTE — ASSESSMENT & PLAN NOTE
Patient continues to use her old equipment as she has not obtained a new replacement for her CPAP. Advised to change the filters on a regular basis to reduce risks. Continue to monitor.

## 2021-09-10 NOTE — ASSESSMENT & PLAN NOTE
Persistent but mild CKD stage III. EGFR at 54. Advised to continue to drink plenty of fluids to keep hydrated. She has been off her ACE inhibitor's due to renal dysfunction.   Continue on amlodipine 5 mg daily, bisoprolol 5 mg daily and Dyazide twice a w

## 2021-09-10 NOTE — PATIENT INSTRUCTIONS
Problem List Items Addressed This Visit        Unprioritized    CKD (chronic kidney disease) stage 3, GFR 30-59 ml/min ([facility])     Persistent but mild CKD stage III. EGFR at 54. Advised to continue to drink plenty of fluids to keep hydrated.   She has been Continue to monitor. EDWAR (obstructive sleep apnea)     Patient continues to use her old equipment as she has not obtained a new replacement for her CPAP. Advised to change the filters on a regular basis to reduce risks. Continue to monitor. 06/11/2021     Second dose of shingles vaccine provided today. Recommend the Covid vaccine booster when available.              Other Visit Diagnoses     Need for vaccination        Relevant Orders    ZOSTER VACC RECOMBINANT IM NJX    Microscopic hematuria

## 2021-09-10 NOTE — ASSESSMENT & PLAN NOTE
Body mass index is 42 kg/m².    Wt Readings from Last 6 Encounters:  09/10/21 : 260 lb 3.2 oz (118 kg)  08/24/21 : 270 lb (122.5 kg)  08/05/21 : 260 lb (117.9 kg)  07/29/21 : 271 lb (122.9 kg)  06/11/21 : 283 lb 14.4 oz (128.8 kg)  05/04/21 : 279 lb (126.6

## 2021-09-13 ENCOUNTER — LAB ENCOUNTER (OUTPATIENT)
Dept: LAB | Age: 66
End: 2021-09-13
Attending: INTERNAL MEDICINE
Payer: COMMERCIAL

## 2021-09-13 DIAGNOSIS — R31.29 MICROSCOPIC HEMATURIA: ICD-10-CM

## 2021-09-13 LAB
BILIRUB UR QL STRIP.AUTO: NEGATIVE
CLARITY UR REFRACT.AUTO: CLEAR
GLUCOSE UR STRIP.AUTO-MCNC: NEGATIVE MG/DL
KETONES UR STRIP.AUTO-MCNC: NEGATIVE MG/DL
LEUKOCYTE ESTERASE UR QL STRIP.AUTO: NEGATIVE
NITRITE UR QL STRIP.AUTO: NEGATIVE
PH UR STRIP.AUTO: 7 [PH] (ref 5–8)
PROT UR STRIP.AUTO-MCNC: NEGATIVE MG/DL
RBC UR QL AUTO: NEGATIVE
SP GR UR STRIP.AUTO: 1 (ref 1–1.03)
UROBILINOGEN UR STRIP.AUTO-MCNC: <2 MG/DL

## 2021-09-13 PROCEDURE — 81003 URINALYSIS AUTO W/O SCOPE: CPT

## 2021-09-13 NOTE — H&P
[facility]    History and Physical    [address] Patient Status:  Hospital Outpatient Surgery    3/15/1955 MRN [id number]   Location [facility] Attending No att. providers found   Hosp Day # 0 PCP Hari Chau a pituitary tumor) Sister      Social History:  Social History    Tobacco Use      Smoking status: Never Smoker      Smokeless tobacco: Never Used    Vaping Use      Vaping Use: Never used    Alcohol use:  Yes      Alcohol/week: 1.0 standard drink      Type left elbow contracture release and removal of hardware          Brigida Padilla MD  9/13/2021

## 2021-10-03 ENCOUNTER — PATIENT MESSAGE (OUTPATIENT)
Dept: INTERNAL MEDICINE CLINIC | Facility: CLINIC | Age: 66
End: 2021-10-03

## 2021-10-03 DIAGNOSIS — G47.33 OSA (OBSTRUCTIVE SLEEP APNEA): Primary | ICD-10-CM

## 2021-10-03 NOTE — TELEPHONE ENCOUNTER
Tasked to Triage support at site to assist with new c-pap order. See pts' mychart message.      Please reply to pool: HERNÁN Angelo

## 2021-10-03 NOTE — TELEPHONE ENCOUNTER
From: Marsha Chaney  To: Imelda Carty MD  Sent: 10/3/2021 5:18 AM CDT  Subject: C-Pap referral     Hi Dr. Guevara Espinal,    I spoke with a representative from [facility] and was told that they have nothing on file regarding my new c-pap and mask.  I

## 2021-10-04 NOTE — TELEPHONE ENCOUNTER
Dr. Savanna Becerril, patient is requesting an order for a new CPAP machine since current one is over 8years old. DME order has been pended.

## 2021-10-04 NOTE — TELEPHONE ENCOUNTER
Orders have been signed but please let the patient know that there could be a delay due to the backlog from recall

## 2021-10-09 ENCOUNTER — IMMUNIZATION (OUTPATIENT)
Dept: LAB | Facility: HOSPITAL | Age: 66
End: 2021-10-09
Attending: EMERGENCY MEDICINE
Payer: COMMERCIAL

## 2021-10-09 DIAGNOSIS — Z23 NEED FOR VACCINATION: Primary | ICD-10-CM

## 2021-10-09 PROCEDURE — 0003A SARSCOV2 VAC 30MCG/0.3ML IM: CPT

## 2021-10-12 ENCOUNTER — PATIENT MESSAGE (OUTPATIENT)
Dept: INTERNAL MEDICINE CLINIC | Facility: CLINIC | Age: 66
End: 2021-10-12

## 2021-10-12 RX ORDER — BISOPROLOL FUMARATE 5 MG/1
5 TABLET ORAL DAILY
Qty: 90 TABLET | Refills: 1 | Status: SHIPPED | OUTPATIENT
Start: 2021-10-12 | End: 2021-10-12

## 2021-10-12 RX ORDER — BISOPROLOL FUMARATE 5 MG/1
5 TABLET ORAL DAILY
Qty: 90 TABLET | Refills: 1 | Status: SHIPPED | OUTPATIENT
Start: 2021-10-12

## 2021-10-12 NOTE — TELEPHONE ENCOUNTER
Refilled per [facility] protocol. Requested Prescriptions   Pending Prescriptions Disp Refills    bisoprolol 5 MG Oral Tab 90 tablet 1     Sig: Take 1 tablet (5 mg total) by mouth daily.         Hypertensive Medications Protocol Passed - 10/12/2021  4

## 2021-10-12 NOTE — TELEPHONE ENCOUNTER
From: Veronica Yeboah  To: Leo Zepeda MD  Sent: 10/12/2021 4:13 PM CDT  Subject: Refill request     Hi Dr. Virginia Ibrahim,   A refill request was sent in on Sunday or yesterday.  I’m completely out of bisoprolol   Thanks,   [employer]

## 2021-10-15 RX ORDER — BISOPROLOL FUMARATE 5 MG/1
TABLET, FILM COATED ORAL
Qty: 90 TABLET | Refills: 0 | OUTPATIENT
Start: 2021-10-15

## 2021-10-22 ENCOUNTER — PATIENT MESSAGE (OUTPATIENT)
Dept: INTERNAL MEDICINE CLINIC | Facility: CLINIC | Age: 66
End: 2021-10-22

## 2021-10-22 NOTE — TELEPHONE ENCOUNTER
From: Esdras Rueda  To: Regis Callaway MD  Sent: 10/22/2021 9:18 AM CDT  Subject: Referral     Hi Dr. Kristan Griffin,    I’m not sure if I need a referral to see Dr. Yaniv Shanks, this will be my second surgical post-op appointment after my surgery on 8/6.  My appoin

## 2021-10-26 NOTE — TELEPHONE ENCOUNTER
Copy of referral has been sent to [address] [place] [address] [address]. Thank you, [facility]    Managed Care.

## 2021-10-28 NOTE — TELEPHONE ENCOUNTER
.  Type Date User Summary Attachment    10/25/2021  2:54 PM Guero Shahid - -   Note    [employer] message sent and faxed to [address] [place] [address]  Fax [phone]

## 2021-11-27 ENCOUNTER — OFFICE VISIT (OUTPATIENT)
Dept: DERMATOLOGY CLINIC | Facility: CLINIC | Age: 66
End: 2021-11-27

## 2021-11-27 DIAGNOSIS — L81.4 LENTIGO: ICD-10-CM

## 2021-11-27 DIAGNOSIS — D23.9 BENIGN NEOPLASM OF SKIN, UNSPECIFIED LOCATION: ICD-10-CM

## 2021-11-27 DIAGNOSIS — D23.70 BENIGN NEOPLASM OF SKIN OF LOWER LIMB, INCLUDING HIP, UNSPECIFIED LATERALITY: ICD-10-CM

## 2021-11-27 DIAGNOSIS — D22.9 MULTIPLE NEVI: Primary | ICD-10-CM

## 2021-11-27 PROCEDURE — 99213 OFFICE O/P EST LOW 20 MIN: CPT | Performed by: DERMATOLOGY

## 2021-12-12 NOTE — PROGRESS NOTES
Bia Rosenberg is a 61year old female. HPI:     CC:  Patient presents with:  Derm Problem: LOV 9/19. Patient presents for full body check. Lesion to left inner elbow and right innder thigh that have been changing colors. Denies bleeding.  No personal or • Hypertension Sister    • Other (acromegaly from a pituitary tumor) Sister       Social History    Tobacco Use      Smoking status: Never Smoker      Smokeless tobacco: Never Used    Vaping Use      Vaping Use: Never used    Alcohol use:  Yes      Alcoho HIVES  Benadryl [Diphenhyd*    NAUSEA AND VOMITING  Trintellix [Vortiox*    ITCHING    Past Medical History:   Diagnosis Date   • Back problem    • CKD (chronic kidney disease)    • Colon polyp 12/2017   • Depression    • Disorder of thyroid     hypothyroi  Service: Not Asked        Blood Transfusions: Not Asked        Caffeine Concern: Yes          3 cups tea/coffee daily        Occupational Exposure: Not Asked        Hobby Hazards: Not Asked        Sleep Concern: Not Asked        Stress Concern: No maps reviewed. Including outside notes/ PCP notes as appropriate. Updated and new information noted in current visit. ROS:  Denies other relevant systemic complaints. History, medications, allergies reviewed as noted.        Physical Examination: management,  Moisturizers, good skin care    Please refer to map for specific lesions. See additional diagnoses. Pros cons of various therapies, risks benefits discussed. Pathophysiology discussed with patient. Therapeutic options reviewed.   See  Marcial Suárez

## 2021-12-20 NOTE — PROGRESS NOTES
Message sent to referring.  At this time, recommending addiction medicine treatment.    Ayana Gregory MD  [facility]    NURSING INTAKE COMMENTS: Patient presents with:  Post-Op: s/p L elbow ORIF f/u - had sx on 11/6/2020 - states she is good, no pain has numbness in the 5th finger       HPI: This 63year old female presents today with complaints of left elbow follow-up.   Sh (CPT=19281)      1998   • REDUCTION LEFT      2000 Bilateral   • REDUCTION RIGHT     • SPINE SURGERY PROCEDURE UNLISTED     • TOTAL ABDOM HYSTERECTOMY     • TOTAL HIP REPLACEMENT Right 10/14/16   • UPPER GI ENDOSCOPY,EXAM       Current Outpatient Medicatio to equal 600mg. In the morning       • Cyanocobalamin (B-12) 2500 MCG Oral Tab Take 2,500 mcg by mouth daily.          Bactrim [Sulfametho*    HIVES  Iodides (Topical)       HIVES    Comment:IVP DYE  Radiology Contrast *    HIVES    Comment:Other reaction(s issues, dizziness, memory loss  PSYCHIATRIC: denies Hx of depression, anxiety, other psychiatric disorders  HEMATOLOGIC: denies blood clots, anemia, blood clotting disorders, blood transfusion  ENDOCRINE: denies autoimmune disease, thyroid issues, or diabe aspect of the left elbow. Orthopedic anchor again noted in the lateral humeral epicondyle. No significant callus identified. SOFT TISSUES: Negative. No visible soft tissue swelling. EFFUSION: None visible. OTHER: Negative.          CONCLUSION:  1. Stable consider removal of the ulnar hardware with possible osteotomy of the ulnar shaft as well. Follow-up again on the date of the procedure. I would expect the surgery would be performed as an outpatient. Follow Up: No follow-ups on file.     Michelle 204

## 2021-12-30 ENCOUNTER — TELEPHONE (OUTPATIENT)
Dept: INTERNAL MEDICINE CLINIC | Facility: CLINIC | Age: 66
End: 2021-12-30

## 2021-12-30 ENCOUNTER — LAB ENCOUNTER (OUTPATIENT)
Dept: LAB | Age: 66
End: 2021-12-30
Attending: INTERNAL MEDICINE
Payer: COMMERCIAL

## 2021-12-30 DIAGNOSIS — I10 ESSENTIAL HYPERTENSION: ICD-10-CM

## 2021-12-30 LAB
ALBUMIN SERPL-MCNC: 3.9 G/DL (ref 3.4–5)
ALBUMIN/GLOB SERPL: 1.4 {RATIO} (ref 1–2)
ALP LIVER SERPL-CCNC: 75 U/L
ALT SERPL-CCNC: 35 U/L
ANION GAP SERPL CALC-SCNC: 6 MMOL/L (ref 0–18)
AST SERPL-CCNC: 22 U/L (ref 15–37)
BASOPHILS # BLD AUTO: 0.06 X10(3) UL (ref 0–0.2)
BASOPHILS NFR BLD AUTO: 0.9 %
BILIRUB SERPL-MCNC: 0.5 MG/DL (ref 0.1–2)
BILIRUB UR QL STRIP.AUTO: NEGATIVE
BUN BLD-MCNC: 21 MG/DL (ref 7–18)
CALCIUM BLD-MCNC: 9.4 MG/DL (ref 8.5–10.1)
CHLORIDE SERPL-SCNC: 100 MMOL/L (ref 98–112)
CHOLEST SERPL-MCNC: 209 MG/DL (ref ?–200)
CLARITY UR REFRACT.AUTO: CLEAR
CO2 SERPL-SCNC: 28 MMOL/L (ref 21–32)
COLOR UR AUTO: YELLOW
CREAT BLD-MCNC: 1.01 MG/DL
EOSINOPHIL # BLD AUTO: 0.18 X10(3) UL (ref 0–0.7)
EOSINOPHIL NFR BLD AUTO: 2.8 %
ERYTHROCYTE [DISTWIDTH] IN BLOOD BY AUTOMATED COUNT: 12.9 %
FASTING PATIENT LIPID ANSWER: YES
FASTING STATUS PATIENT QL REPORTED: YES
GLOBULIN PLAS-MCNC: 2.8 G/DL (ref 2.8–4.4)
GLUCOSE BLD-MCNC: 104 MG/DL (ref 70–99)
GLUCOSE UR STRIP.AUTO-MCNC: NEGATIVE MG/DL
HCT VFR BLD AUTO: 38.1 %
HDLC SERPL-MCNC: 75 MG/DL (ref 40–59)
HGB BLD-MCNC: 12.3 G/DL
IMM GRANULOCYTES # BLD AUTO: 0.03 X10(3) UL (ref 0–1)
IMM GRANULOCYTES NFR BLD: 0.5 %
KETONES UR STRIP.AUTO-MCNC: NEGATIVE MG/DL
LDLC SERPL CALC-MCNC: 122 MG/DL (ref ?–100)
LEUKOCYTE ESTERASE UR QL STRIP.AUTO: NEGATIVE
LYMPHOCYTES # BLD AUTO: 1.69 X10(3) UL (ref 1–4)
LYMPHOCYTES NFR BLD AUTO: 26.7 %
MCH RBC QN AUTO: 31.4 PG (ref 26–34)
MCHC RBC AUTO-ENTMCNC: 32.3 G/DL (ref 31–37)
MCV RBC AUTO: 97.2 FL
MONOCYTES # BLD AUTO: 0.57 X10(3) UL (ref 0.1–1)
MONOCYTES NFR BLD AUTO: 9 %
NEUTROPHILS # BLD AUTO: 3.81 X10 (3) UL (ref 1.5–7.7)
NEUTROPHILS # BLD AUTO: 3.81 X10(3) UL (ref 1.5–7.7)
NEUTROPHILS NFR BLD AUTO: 60.1 %
NITRITE UR QL STRIP.AUTO: NEGATIVE
NONHDLC SERPL-MCNC: 134 MG/DL (ref ?–130)
OSMOLALITY SERPL CALC.SUM OF ELEC: 281 MOSM/KG (ref 275–295)
PH UR STRIP.AUTO: 7 [PH] (ref 5–8)
PLATELET # BLD AUTO: 368 10(3)UL (ref 150–450)
POTASSIUM SERPL-SCNC: 4.9 MMOL/L (ref 3.5–5.1)
PROT SERPL-MCNC: 6.7 G/DL (ref 6.4–8.2)
PROT UR STRIP.AUTO-MCNC: NEGATIVE MG/DL
RBC # BLD AUTO: 3.92 X10(6)UL
RBC UR QL AUTO: NEGATIVE
SODIUM SERPL-SCNC: 134 MMOL/L (ref 136–145)
SP GR UR STRIP.AUTO: 1.01 (ref 1–1.03)
TRIGL SERPL-MCNC: 67 MG/DL (ref 30–149)
TSI SER-ACNC: 1.01 MIU/ML (ref 0.36–3.74)
UROBILINOGEN UR STRIP.AUTO-MCNC: <2 MG/DL
VLDLC SERPL CALC-MCNC: 12 MG/DL (ref 0–30)
WBC # BLD AUTO: 6.3 X10(3) UL (ref 4–11)

## 2021-12-30 PROCEDURE — 36415 COLL VENOUS BLD VENIPUNCTURE: CPT

## 2021-12-30 PROCEDURE — 80053 COMPREHEN METABOLIC PANEL: CPT

## 2021-12-30 PROCEDURE — 80061 LIPID PANEL: CPT

## 2021-12-30 PROCEDURE — 84443 ASSAY THYROID STIM HORMONE: CPT

## 2021-12-30 PROCEDURE — 85025 COMPLETE CBC W/AUTO DIFF WBC: CPT

## 2021-12-30 PROCEDURE — 81003 URINALYSIS AUTO W/O SCOPE: CPT

## 2021-12-30 NOTE — TELEPHONE ENCOUNTER
Patient requesting a referral to see Dr Adrian Mueller for a follow up visit currently has an appt for 02/18/2022.

## 2022-01-05 NOTE — PROGRESS NOTES
[facility]  Progress Note    Kenneth Vargas Patient Status:  Inpatient    3/15/1955 MRN [id number]   [facility] 3NW-A Attending Tung Daley MD   Hosp Day # 0 PCP Lois Borja MD     Subjective:   The patient sitting up in chair toda Encounter for routine gynecological examination     Pharyngoesophageal dysphagia     CKD (chronic kidney disease) stage 3, GFR 30-59 ml/min ([facility])     Pain of both hip joints     Wound dehiscence     S/P total hip arthroplasty     Diverticulosis     Tremor Unknown

## 2022-01-06 NOTE — ASSESSMENT & PLAN NOTE
Pressure rechecked was 142/90. Patient has been off all her blood pressure medications after surgery.   Given gradual worsening in the renal functions will consider restarting on Norvasc at 2.5 mg 1 tablet once daily, recheck labs today and repeat in about [facility]

## 2022-01-12 ENCOUNTER — NURSE TRIAGE (OUTPATIENT)
Dept: INTERNAL MEDICINE CLINIC | Facility: CLINIC | Age: 67
End: 2022-01-12

## 2022-01-12 DIAGNOSIS — S50.12XA CONTUSION OF LEFT LOWER ARM, INITIAL ENCOUNTER: Primary | ICD-10-CM

## 2022-01-13 NOTE — TELEPHONE ENCOUNTER
----- Message from Leonard Gonzalez RN sent at 3/99/4301  6:29 PM CST -----  Regarding: FW: Review test results       ----- Message -----  From: Caity Guardado RN  Sent: 1/12/2022   1:07 PM CST  To: Leonard Gonzalez RN  Subject: FW: Review test results

## 2022-01-13 NOTE — TELEPHONE ENCOUNTER
Action Requested: Summary for Provider     []  Critical Lab, Recommendations Needed  [] Need Additional Advice  []   FYI    []   Need Orders  [] Need Medications Sent to Pharmacy  []  Other     SUMMARY: Dr. Aminah Garcia: patient asked if you would consider xray?

## 2022-01-14 ENCOUNTER — HOSPITAL ENCOUNTER (OUTPATIENT)
Dept: GENERAL RADIOLOGY | Age: 67
Discharge: HOME | End: 2022-01-14
Attending: INTERNAL MEDICINE
Payer: COMMERCIAL

## 2022-01-14 DIAGNOSIS — S50.12XA CONTUSION OF LEFT LOWER ARM, INITIAL ENCOUNTER: ICD-10-CM

## 2022-01-14 PROCEDURE — 73080 X-RAY EXAM OF ELBOW: CPT | Performed by: INTERNAL MEDICINE

## 2022-01-14 PROCEDURE — 73090 X-RAY EXAM OF FOREARM: CPT | Performed by: INTERNAL MEDICINE

## 2022-01-14 NOTE — TELEPHONE ENCOUNTER
Left detailed VM, okay per KEESHA. Advised referral is in Open status. Provided number for Managed Care and advised to call our office if any other questions.

## 2022-01-14 NOTE — TELEPHONE ENCOUNTER
Patient already has schedule for Xray tomorrow 1/14/22. Dr Higginbotham=are you going to order  ortho referral after the xray results and still with persistent pain  ? Thanks.        Future Appointments   Date Time Provider Manasa Malik   1/14/2022  4:00

## 2022-01-17 ENCOUNTER — TELEPHONE (OUTPATIENT)
Dept: INTERNAL MEDICINE CLINIC | Facility: CLINIC | Age: 67
End: 2022-01-17

## 2022-01-17 NOTE — TELEPHONE ENCOUNTER
Aminata David from [facility] received the face to face notes but needs the following amended:      Patient has been using and benefitting from the pap. It's an insurance requirement.     Please FAX [phone] at

## 2022-01-17 NOTE — TELEPHONE ENCOUNTER
Left a message for Earleen Record. We need to know the date of the face to face notes, so we can direct Dr. Mague Mandujano what to amend. Ask if we can just have Dr. Mague Mandujano do a letter attesting the use and benefit of the CPAP machine.

## 2022-01-19 NOTE — TELEPHONE ENCOUNTER
Letter pended for approval.  Please add electronic signature and staff will fax to [facility].

## 2022-01-21 NOTE — TELEPHONE ENCOUNTER
Emily/[facility] [phone] is requesting current face-to-face notes for patient from Dr. Wei Cedeño;; notes from 9/10 only say patient is using the cpap. Please amend notes to say patient is benefiting from the cpap as well as using the cpap.

## 2022-02-02 NOTE — TELEPHONE ENCOUNTER
Patient calling to inform that [place] [phone] is still requesting letter stating that patient is using and benefiting from [address] [place] [address]    Fax to [phone].

## 2022-02-02 NOTE — TELEPHONE ENCOUNTER
Please review. Protocol failed / No protocol.     Requested Prescriptions   Pending Prescriptions Disp Refills    TIZANIDINE 4 MG Oral Tab [Pharmacy Med Name: tiZANidine HCl Oral Tablet 4 MG] 30 tablet 0     Sig: TAKE 1 TABLET BY MOUTH EVERY DAY AT NIGHT        There is no refill protocol information for this order           Future Appointments         Provider Department Appt Notes    In 2 days PF CT CONSULT RM1; PF CT [facility] PCP recommended the scan            Recent Outpatient Visits              2 months ago Multiple nevi    [facility] Lexii Buck MD    Office Visit    4 months ago Routine general medical examination at a health care facility    [facility], [address], Gabrielle Begum MD    Office Visit    5 months ago Primary osteoarthritis of right knee    [facility], [address], David Shaver MD    Office Visit    6 months ago EDWAR (obstructive sleep apnea)    [facility], [address], Gabrielle Begum MD    Office Visit    6 months ago Sleep concern    [facility]    Office Visit

## 2022-02-03 RX ORDER — TIZANIDINE 4 MG/1
4 TABLET ORAL NIGHTLY
Qty: 30 TABLET | Refills: 2 | Status: SHIPPED | OUTPATIENT
Start: 2022-02-03

## 2022-02-04 ENCOUNTER — HOSPITAL ENCOUNTER (OUTPATIENT)
Dept: CT IMAGING | Age: 67
Discharge: HOME | End: 2022-02-04
Attending: INTERNAL MEDICINE

## 2022-02-04 DIAGNOSIS — Z13.9 SCREENING PROCEDURE: ICD-10-CM

## 2022-02-08 NOTE — TELEPHONE ENCOUNTER
Behavior health is self referred. Pt would need to call # on back of card to verify in network.     Thank you,    [facility]   Referral specialist
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
## 2022-03-08 ENCOUNTER — HOSPITAL ENCOUNTER (OUTPATIENT)
Dept: GENERAL RADIOLOGY | Facility: HOSPITAL | Age: 67
Discharge: HOME | End: 2022-03-08
Attending: ORTHOPAEDIC SURGERY
Payer: COMMERCIAL

## 2022-03-08 ENCOUNTER — OFFICE VISIT (OUTPATIENT)
Dept: ORTHOPEDICS CLINIC | Facility: CLINIC | Age: 67
End: 2022-03-08
Payer: COMMERCIAL

## 2022-03-08 VITALS — SYSTOLIC BLOOD PRESSURE: 142 MMHG | DIASTOLIC BLOOD PRESSURE: 86 MMHG

## 2022-03-08 DIAGNOSIS — R52 PAIN: ICD-10-CM

## 2022-03-08 DIAGNOSIS — S86.111A GASTROCNEMIUS STRAIN, RIGHT, INITIAL ENCOUNTER: ICD-10-CM

## 2022-03-08 DIAGNOSIS — M17.11 PRIMARY OSTEOARTHRITIS OF RIGHT KNEE: Primary | ICD-10-CM

## 2022-03-08 PROCEDURE — 20610 DRAIN/INJ JOINT/BURSA W/O US: CPT | Performed by: ORTHOPAEDIC SURGERY

## 2022-03-08 PROCEDURE — 3079F DIAST BP 80-89 MM HG: CPT | Performed by: ORTHOPAEDIC SURGERY

## 2022-03-08 PROCEDURE — 3077F SYST BP >= 140 MM HG: CPT | Performed by: ORTHOPAEDIC SURGERY

## 2022-03-08 PROCEDURE — 73564 X-RAY EXAM KNEE 4 OR MORE: CPT | Performed by: ORTHOPAEDIC SURGERY

## 2022-03-08 RX ORDER — TRIAMCINOLONE ACETONIDE 40 MG/ML
40 INJECTION, SUSPENSION INTRA-ARTICULAR; INTRAMUSCULAR ONCE
Status: COMPLETED | OUTPATIENT
Start: 2022-03-08 | End: 2022-03-08

## 2022-03-08 RX ADMIN — TRIAMCINOLONE ACETONIDE 40 MG: 40 INJECTION, SUSPENSION INTRA-ARTICULAR; INTRAMUSCULAR at 17:23:00

## 2022-03-08 NOTE — PROGRESS NOTES
Per verbal order from Dr. Austyn Abrams, draw up 4ml of 1% lidocaine and 1ml of Kenalog 40 for cortisone injection to Right knee.   Nicholas Laguna

## 2022-03-08 NOTE — PROCEDURES
The patient identified the right knee(s) as the correct procedure site. This was verified with the consent and with 2 patient identifiers. The knee injection site was prepped with betadine and alcohol. A 22-gauge needle was introduced into the knee. The knee was injected with 40 mg of Kenalog and 4 mL of 1% lidocaine. The patient tolerated the procedure well. A soft dressing was applied.

## 2022-03-25 ENCOUNTER — LAB ENCOUNTER (OUTPATIENT)
Dept: LAB | Age: 67
End: 2022-03-25
Attending: INTERNAL MEDICINE
Payer: COMMERCIAL

## 2022-03-25 ENCOUNTER — OFFICE VISIT (OUTPATIENT)
Dept: INTERNAL MEDICINE CLINIC | Facility: CLINIC | Age: 67
End: 2022-03-25
Payer: COMMERCIAL

## 2022-03-25 VITALS
HEART RATE: 65 BPM | SYSTOLIC BLOOD PRESSURE: 130 MMHG | BODY MASS INDEX: 44.52 KG/M2 | HEIGHT: 66 IN | DIASTOLIC BLOOD PRESSURE: 77 MMHG | WEIGHT: 277 LBS

## 2022-03-25 DIAGNOSIS — Z12.31 BREAST CANCER SCREENING BY MAMMOGRAM: ICD-10-CM

## 2022-03-25 DIAGNOSIS — R10.30 LOWER ABDOMINAL PAIN: ICD-10-CM

## 2022-03-25 DIAGNOSIS — K21.9 GERD WITHOUT ESOPHAGITIS: ICD-10-CM

## 2022-03-25 DIAGNOSIS — I10 ESSENTIAL HYPERTENSION: Primary | ICD-10-CM

## 2022-03-25 LAB
BILIRUB UR QL: NEGATIVE
CLARITY UR: CLEAR
COLOR UR: YELLOW
GLUCOSE UR-MCNC: NEGATIVE MG/DL
HGB UR QL STRIP.AUTO: NEGATIVE
KETONES UR-MCNC: NEGATIVE MG/DL
LEUKOCYTE ESTERASE UR QL STRIP.AUTO: NEGATIVE
NITRITE UR QL STRIP.AUTO: NEGATIVE
PH UR: 8 [PH] (ref 5–8)
PROT UR-MCNC: NEGATIVE MG/DL
SP GR UR STRIP: 1.01 (ref 1–1.03)
UROBILINOGEN UR STRIP-ACNC: <2
VIT C UR-MCNC: NEGATIVE MG/DL

## 2022-03-25 PROCEDURE — 81003 URINALYSIS AUTO W/O SCOPE: CPT | Performed by: INTERNAL MEDICINE

## 2022-03-25 PROCEDURE — 90471 IMMUNIZATION ADMIN: CPT | Performed by: INTERNAL MEDICINE

## 2022-03-25 PROCEDURE — 3078F DIAST BP <80 MM HG: CPT | Performed by: INTERNAL MEDICINE

## 2022-03-25 PROCEDURE — 90670 PCV13 VACCINE IM: CPT | Performed by: INTERNAL MEDICINE

## 2022-03-25 PROCEDURE — 3008F BODY MASS INDEX DOCD: CPT | Performed by: INTERNAL MEDICINE

## 2022-03-25 PROCEDURE — 99214 OFFICE O/P EST MOD 30 MIN: CPT | Performed by: INTERNAL MEDICINE

## 2022-03-25 PROCEDURE — 3075F SYST BP GE 130 - 139MM HG: CPT | Performed by: INTERNAL MEDICINE

## 2022-03-25 RX ORDER — BISOPROLOL FUMARATE 5 MG/1
5 TABLET ORAL DAILY
Qty: 90 TABLET | Refills: 1 | Status: SHIPPED | OUTPATIENT
Start: 2022-03-25

## 2022-03-25 RX ORDER — AMLODIPINE BESYLATE 2.5 MG/1
TABLET ORAL
Qty: 90 TABLET | Refills: 2 | Status: SHIPPED | OUTPATIENT
Start: 2022-03-25

## 2022-03-25 RX ORDER — TRIAMTERENE AND HYDROCHLOROTHIAZIDE 37.5; 25 MG/1; MG/1
TABLET ORAL
Qty: 24 TABLET | Refills: 2 | Status: SHIPPED | OUTPATIENT
Start: 2022-03-25

## 2022-03-25 RX ORDER — PANTOPRAZOLE SODIUM 40 MG/1
40 TABLET, DELAYED RELEASE ORAL
Qty: 90 TABLET | Refills: 1 | Status: SHIPPED | OUTPATIENT
Start: 2022-03-25

## 2022-03-27 PROBLEM — M25.561 ACUTE PAIN OF RIGHT KNEE: Status: RESOLVED | Noted: 2021-07-29 | Resolved: 2022-03-27

## 2022-03-27 PROBLEM — K57.92 ACUTE DIVERTICULITIS: Status: RESOLVED | Noted: 2020-01-03 | Resolved: 2022-03-27

## 2022-03-27 PROBLEM — M25.572 ACUTE LEFT ANKLE PAIN: Status: RESOLVED | Noted: 2020-02-06 | Resolved: 2022-03-27

## 2022-03-27 PROBLEM — K57.80 PERFORATED DIVERTICULUM: Status: RESOLVED | Noted: 2020-01-04 | Resolved: 2022-03-27

## 2022-04-08 ENCOUNTER — HOSPITAL ENCOUNTER (OUTPATIENT)
Dept: CT IMAGING | Age: 67
Discharge: HOME | End: 2022-04-08
Attending: INTERNAL MEDICINE
Payer: COMMERCIAL

## 2022-04-08 DIAGNOSIS — R10.30 LOWER ABDOMINAL PAIN: ICD-10-CM

## 2022-04-08 PROCEDURE — 72192 CT PELVIS W/O DYE: CPT | Performed by: INTERNAL MEDICINE

## 2022-04-25 ENCOUNTER — LAB ENCOUNTER (OUTPATIENT)
Dept: LAB | Age: 67
End: 2022-04-25
Attending: INTERNAL MEDICINE
Payer: COMMERCIAL

## 2022-04-25 DIAGNOSIS — I10 ESSENTIAL HYPERTENSION: ICD-10-CM

## 2022-04-25 LAB
ALBUMIN SERPL-MCNC: 3.8 G/DL (ref 3.4–5)
ALBUMIN/GLOB SERPL: 1.3 {RATIO} (ref 1–2)
ALP LIVER SERPL-CCNC: 88 U/L
ALT SERPL-CCNC: 25 U/L
ANION GAP SERPL CALC-SCNC: 6 MMOL/L (ref 0–18)
AST SERPL-CCNC: 20 U/L (ref 15–37)
BILIRUB SERPL-MCNC: 0.3 MG/DL (ref 0.1–2)
BUN BLD-MCNC: 22 MG/DL (ref 7–18)
CALCIUM BLD-MCNC: 9.1 MG/DL (ref 8.5–10.1)
CHLORIDE SERPL-SCNC: 96 MMOL/L (ref 98–112)
CO2 SERPL-SCNC: 28 MMOL/L (ref 21–32)
CREAT BLD-MCNC: 1.13 MG/DL
FASTING STATUS PATIENT QL REPORTED: YES
GLOBULIN PLAS-MCNC: 3 G/DL (ref 2.8–4.4)
GLUCOSE BLD-MCNC: 84 MG/DL (ref 70–99)
OSMOLALITY SERPL CALC.SUM OF ELEC: 273 MOSM/KG (ref 275–295)
POTASSIUM SERPL-SCNC: 4.5 MMOL/L (ref 3.5–5.1)
PROT SERPL-MCNC: 6.8 G/DL (ref 6.4–8.2)
SODIUM SERPL-SCNC: 130 MMOL/L (ref 136–145)

## 2022-04-25 PROCEDURE — 36415 COLL VENOUS BLD VENIPUNCTURE: CPT

## 2022-04-25 PROCEDURE — 80053 COMPREHEN METABOLIC PANEL: CPT

## 2022-05-13 ENCOUNTER — HOSPITAL ENCOUNTER (OUTPATIENT)
Dept: MAMMOGRAPHY | Age: 67
Discharge: HOME | End: 2022-05-13
Attending: INTERNAL MEDICINE
Payer: COMMERCIAL

## 2022-05-13 DIAGNOSIS — Z12.31 BREAST CANCER SCREENING BY MAMMOGRAM: ICD-10-CM

## 2022-05-13 PROCEDURE — 77067 SCR MAMMO BI INCL CAD: CPT | Performed by: INTERNAL MEDICINE

## 2022-05-18 NOTE — TELEPHONE ENCOUNTER
Patient requesting appointment with Dr. Dilan Alcantara for neuropathy lower back pain.  had MRI done last year at [address] and EMG done Feb or March of this year. Please advise if patient should be scheduled to come into office. Tetracycline Counseling: Patient counseled regarding possible photosensitivity and increased risk for sunburn.  Patient instructed to avoid sunlight, if possible.  When exposed to sunlight, patients should wear protective clothing, sunglasses, and sunscreen.  The patient was instructed to call the office immediately if the following severe adverse effects occur:  hearing changes, easy bruising/bleeding, severe headache, or vision changes.  The patient verbalized understanding of the proper use and possible adverse effects of tetracycline.  All of the patient's questions and concerns were addressed. Patient understands to avoid pregnancy while on therapy due to potential birth defects.

## 2022-06-06 ENCOUNTER — LAB ENCOUNTER (OUTPATIENT)
Dept: LAB | Age: 67
End: 2022-06-06
Attending: INTERNAL MEDICINE
Payer: COMMERCIAL

## 2022-06-06 ENCOUNTER — OFFICE VISIT (OUTPATIENT)
Dept: SURGERY | Facility: CLINIC | Age: 67
End: 2022-06-06
Payer: COMMERCIAL

## 2022-06-06 VITALS — SYSTOLIC BLOOD PRESSURE: 124 MMHG | DIASTOLIC BLOOD PRESSURE: 78 MMHG

## 2022-06-06 DIAGNOSIS — R31.29 MICROSCOPIC HEMATURIA: ICD-10-CM

## 2022-06-06 DIAGNOSIS — E87.1 HYPONATREMIA: ICD-10-CM

## 2022-06-06 DIAGNOSIS — R10.30 LOWER ABDOMINAL PAIN: Primary | ICD-10-CM

## 2022-06-06 LAB
ANION GAP SERPL CALC-SCNC: 3 MMOL/L (ref 0–18)
BUN BLD-MCNC: 18 MG/DL (ref 7–18)
BUN/CREAT SERPL: 15.4 (ref 10–20)
CALCIUM BLD-MCNC: 9.1 MG/DL (ref 8.5–10.1)
CHLORIDE SERPL-SCNC: 102 MMOL/L (ref 98–112)
CO2 SERPL-SCNC: 29 MMOL/L (ref 21–32)
CREAT BLD-MCNC: 1.17 MG/DL
FASTING STATUS PATIENT QL REPORTED: YES
GLUCOSE BLD-MCNC: 92 MG/DL (ref 70–99)
OSMOLALITY SERPL CALC.SUM OF ELEC: 280 MOSM/KG (ref 275–295)
POTASSIUM SERPL-SCNC: 4.1 MMOL/L (ref 3.5–5.1)
SODIUM SERPL-SCNC: 134 MMOL/L (ref 136–145)

## 2022-06-06 PROCEDURE — 80048 BASIC METABOLIC PNL TOTAL CA: CPT

## 2022-06-06 PROCEDURE — 36415 COLL VENOUS BLD VENIPUNCTURE: CPT

## 2022-06-06 PROCEDURE — 99244 OFF/OP CNSLTJ NEW/EST MOD 40: CPT | Performed by: UROLOGY

## 2022-06-06 PROCEDURE — 3074F SYST BP LT 130 MM HG: CPT | Performed by: UROLOGY

## 2022-06-06 PROCEDURE — 3078F DIAST BP <80 MM HG: CPT | Performed by: UROLOGY

## 2022-06-10 ENCOUNTER — OFFICE VISIT (OUTPATIENT)
Dept: INTERNAL MEDICINE CLINIC | Facility: CLINIC | Age: 67
End: 2022-06-10
Payer: COMMERCIAL

## 2022-06-10 VITALS
DIASTOLIC BLOOD PRESSURE: 77 MMHG | WEIGHT: 280 LBS | HEIGHT: 66 IN | SYSTOLIC BLOOD PRESSURE: 119 MMHG | BODY MASS INDEX: 45 KG/M2 | HEART RATE: 71 BPM

## 2022-06-10 DIAGNOSIS — G60.8 SENSORY PERIPHERAL NEUROPATHY: ICD-10-CM

## 2022-06-10 DIAGNOSIS — N18.32 STAGE 3B CHRONIC KIDNEY DISEASE (HCC): ICD-10-CM

## 2022-06-10 DIAGNOSIS — E87.1 HYPONATREMIA: Primary | ICD-10-CM

## 2022-06-10 DIAGNOSIS — R89.9 ABNORMAL LABORATORY TEST: ICD-10-CM

## 2022-06-10 PROCEDURE — 99213 OFFICE O/P EST LOW 20 MIN: CPT | Performed by: INTERNAL MEDICINE

## 2022-06-10 PROCEDURE — 3008F BODY MASS INDEX DOCD: CPT | Performed by: INTERNAL MEDICINE

## 2022-06-10 PROCEDURE — 3078F DIAST BP <80 MM HG: CPT | Performed by: INTERNAL MEDICINE

## 2022-06-10 PROCEDURE — 3074F SYST BP LT 130 MM HG: CPT | Performed by: INTERNAL MEDICINE

## 2022-06-10 RX ORDER — DULOXETIN HYDROCHLORIDE 60 MG/1
60 CAPSULE, DELAYED RELEASE ORAL DAILY
Qty: 90 CAPSULE | Refills: 1 | Status: SHIPPED | OUTPATIENT
Start: 2022-06-10

## 2022-06-18 ENCOUNTER — OFFICE VISIT (OUTPATIENT)
Dept: OBGYN CLINIC | Facility: CLINIC | Age: 67
End: 2022-06-18
Payer: COMMERCIAL

## 2022-06-18 VITALS
DIASTOLIC BLOOD PRESSURE: 71 MMHG | WEIGHT: 280 LBS | HEART RATE: 70 BPM | HEIGHT: 66 IN | BODY MASS INDEX: 45 KG/M2 | SYSTOLIC BLOOD PRESSURE: 112 MMHG

## 2022-06-18 DIAGNOSIS — Z01.419 ENCOUNTER FOR GYNECOLOGICAL EXAMINATION: Primary | ICD-10-CM

## 2022-06-18 PROCEDURE — 3074F SYST BP LT 130 MM HG: CPT | Performed by: OBSTETRICS & GYNECOLOGY

## 2022-06-18 PROCEDURE — 3078F DIAST BP <80 MM HG: CPT | Performed by: OBSTETRICS & GYNECOLOGY

## 2022-06-18 PROCEDURE — 99387 INIT PM E/M NEW PAT 65+ YRS: CPT | Performed by: OBSTETRICS & GYNECOLOGY

## 2022-06-18 PROCEDURE — 3008F BODY MASS INDEX DOCD: CPT | Performed by: OBSTETRICS & GYNECOLOGY

## 2022-06-20 LAB — HPV I/H RISK 1 DNA SPEC QL NAA+PROBE: NEGATIVE

## 2022-06-20 NOTE — TELEPHONE ENCOUNTER
Last Procedure:  06/28/2007  Last Diagnosis:  Diverticular disease, internl hemorrhoids  Recalled for (years): 10 years  Sedation used previously:  IV  Last Prep Used (if known):    Quality of prep (if known): excellent   Anticoagulants/Diabetic Meds:No  H Xerosis Aggressive Treatment: I recommended application of Cetaphil or CeraVe numerous times a day and before going to bed to all dry areas. I also prescribed a topical steroid for twice daily use.

## 2022-06-26 LAB — LAST PAP RESULT: NORMAL

## 2022-07-06 ENCOUNTER — OFFICE VISIT (OUTPATIENT)
Dept: NEPHROLOGY | Facility: CLINIC | Age: 67
End: 2022-07-06
Payer: COMMERCIAL

## 2022-07-06 VITALS
WEIGHT: 287 LBS | DIASTOLIC BLOOD PRESSURE: 72 MMHG | BODY MASS INDEX: 46.12 KG/M2 | SYSTOLIC BLOOD PRESSURE: 111 MMHG | HEART RATE: 69 BPM | HEIGHT: 66 IN

## 2022-07-06 DIAGNOSIS — N18.31 STAGE 3A CHRONIC KIDNEY DISEASE (HCC): Primary | ICD-10-CM

## 2022-07-06 DIAGNOSIS — E87.1 HYPONATREMIA: ICD-10-CM

## 2022-07-06 PROCEDURE — 3074F SYST BP LT 130 MM HG: CPT | Performed by: INTERNAL MEDICINE

## 2022-07-06 PROCEDURE — 3078F DIAST BP <80 MM HG: CPT | Performed by: INTERNAL MEDICINE

## 2022-07-06 PROCEDURE — 3008F BODY MASS INDEX DOCD: CPT | Performed by: INTERNAL MEDICINE

## 2022-07-06 PROCEDURE — 99245 OFF/OP CONSLTJ NEW/EST HI 55: CPT | Performed by: INTERNAL MEDICINE

## 2022-07-06 NOTE — PATIENT INSTRUCTIONS
Stop the water pill and repeat your laboratory studies in 1 week. Make sure you do them first thing in the morning.

## 2022-07-06 NOTE — PROGRESS NOTES
07/06/22        Patient: Huber Alva   YOB: 1955   Date of Visit: 7/6/2022       Dear  Dr. Catina Gee MD,      Thank you for referring Huber Alva to my practice. Please find my assessment and plan below. As you know she is a 61-year-old female with a history of hypertension, DJD, depression, GERD and hypothyroidism who I now had the pleasure of seeing for hyponatremia and chronic kidney disease stage III. The patient's laboratory studies have been reviewed in epic. Her creatinines since 2020 have fluctuated up and down. Has been as good as 0.98 but as high as 1.37. More recently in December 2021 her creatinine was 1.01 but increased to 1.13 on April 25, 2022 and most recent was 1.17 on June 6, 2022 with an estimated GFR 49 cc/min. The patient also has had chronic problems with hyponatremia. For the most part they are in the low 130 range but at one time dropped to 128. Again most recently was 130 in April 2022 and 134 on June 6, 2022. Her past medical history is as stated above. She does have a history of depression which is under good control but is currently requiring Cymbalta, Wellbutrin, trazodone and brexpiprazole. She has had hypertension for at least 15 years. Under good control. Medications are as listed and reports that Dyazide was just discontinued. .  Did use nonsteroidals in the past but not in recent years. Social history she is a non-smoker. Works [employer]. She does have a sister who may have some mild chronic kidney disease but further details unclear. Review of system the patient otherwise that she is doing well without chest pain, shortness of breath, GI or urinary tract symptoms. She does complain of dry mouth secondary to her medications. As result she does drink a fair amount of water. 10 point review of systems is otherwise unremarkable. And physical exam her blood pressure is 111/72 with a pulse of 69 she weighed 287 pounds.   Her neck was supple without JVD. Lungs were clear. Heart revealed a regular rate and rhythm with an S4 but no gallops, murmurs or rubs. Abdomen was soft, flat, nontender without organomegaly, masses or bruits. Extremities revealed trace edema bilaterally. I therefore informed the patient that she does have some chronic kidney disease stage III. This may be secondary to hypertension and nephrosclerosis but other causes need to be excluded. She also has chronic hyponatremia. This most likely secondary to the fact that she is on Cymbalta and Wellbutrin. For completion sake a repeat CBC, renal panel, urinalysis, liver panel, urine for microalbumin, urine for Bence-Guevara protein, sed rate, connective tissue profile, TSH, a.m. cortisol, and random urine for sodium and osmolality have been ordered. She had a renal ultrasound done in June 2021 that was unremarkable. I encouraged her to follow-up modest fluid restriction but again she states it is difficult as she has a dry mouth a lot secondary to her medications. Avoid nonsteroidals. Further impressions and recommendations will be forthcoming after reviewing the above. Thank you very much for allowing me to participate in the care of your patient. If you have any questions please were free to call.            Sincerely,   Eleonora Blood MD   [facility] , [address] [place] [address]  [address]  [facility] Levels [id number]    Document electronically generated by:  Eleonora Blood MD

## 2022-07-29 ENCOUNTER — LAB ENCOUNTER (OUTPATIENT)
Dept: LAB | Age: 67
End: 2022-07-29
Attending: INTERNAL MEDICINE
Payer: COMMERCIAL

## 2022-07-29 DIAGNOSIS — N18.31 STAGE 3A CHRONIC KIDNEY DISEASE (HCC): ICD-10-CM

## 2022-07-29 DIAGNOSIS — E87.1 HYPONATREMIA: ICD-10-CM

## 2022-07-29 DIAGNOSIS — R89.9 ABNORMAL LABORATORY TEST: ICD-10-CM

## 2022-07-29 LAB
ALBUMIN SERPL-MCNC: 3.8 G/DL (ref 3.4–5)
ALBUMIN SERPL-MCNC: 3.8 G/DL (ref 3.4–5)
ALP LIVER SERPL-CCNC: 84 U/L
ALT SERPL-CCNC: 30 U/L
ANION GAP SERPL CALC-SCNC: 5 MMOL/L (ref 0–18)
AST SERPL-CCNC: 30 U/L (ref 15–37)
BASOPHILS # BLD AUTO: 0.07 X10(3) UL (ref 0–0.2)
BASOPHILS NFR BLD AUTO: 1 %
BILIRUB DIRECT SERPL-MCNC: 0.1 MG/DL (ref 0–0.2)
BILIRUB SERPL-MCNC: 0.5 MG/DL (ref 0.1–2)
BILIRUB UR QL STRIP.AUTO: NEGATIVE
BUN BLD-MCNC: 17 MG/DL (ref 7–18)
C3 SERPL-MCNC: 106 MG/DL (ref 90–180)
C4 SERPL-MCNC: 23.4 MG/DL (ref 10–40)
CALCIUM BLD-MCNC: 8.8 MG/DL (ref 8.5–10.1)
CHLORIDE SERPL-SCNC: 106 MMOL/L (ref 98–112)
CLARITY UR REFRACT.AUTO: CLEAR
CO2 SERPL-SCNC: 27 MMOL/L (ref 21–32)
COLOR UR AUTO: YELLOW
CORTIS SERPL-MCNC: 18.4 UG/DL
CREAT BLD-MCNC: 1.05 MG/DL
CREAT UR-SCNC: 30.2 MG/DL
CRP SERPL-MCNC: 1.28 MG/DL (ref ?–0.3)
EOSINOPHIL # BLD AUTO: 0.25 X10(3) UL (ref 0–0.7)
EOSINOPHIL NFR BLD AUTO: 3.5 %
ERYTHROCYTE [DISTWIDTH] IN BLOOD BY AUTOMATED COUNT: 13.5 %
ERYTHROCYTE [SEDIMENTATION RATE] IN BLOOD: 34 MM/HR
GLUCOSE BLD-MCNC: 111 MG/DL (ref 70–99)
GLUCOSE UR STRIP.AUTO-MCNC: NEGATIVE MG/DL
HCT VFR BLD AUTO: 37 %
HGB BLD-MCNC: 12 G/DL
IMM GRANULOCYTES # BLD AUTO: 0.03 X10(3) UL (ref 0–1)
IMM GRANULOCYTES NFR BLD: 0.4 %
KETONES UR STRIP.AUTO-MCNC: NEGATIVE MG/DL
LEUKOCYTE ESTERASE UR QL STRIP.AUTO: NEGATIVE
LYMPHOCYTES # BLD AUTO: 1.6 X10(3) UL (ref 1–4)
LYMPHOCYTES NFR BLD AUTO: 22.7 %
MCH RBC QN AUTO: 30.2 PG (ref 26–34)
MCHC RBC AUTO-ENTMCNC: 32.4 G/DL (ref 31–37)
MCV RBC AUTO: 93 FL
MICROALBUMIN UR-MCNC: <0.5 MG/DL
MONOCYTES # BLD AUTO: 0.62 X10(3) UL (ref 0.1–1)
MONOCYTES NFR BLD AUTO: 8.8 %
NEUTROPHILS # BLD AUTO: 4.48 X10 (3) UL (ref 1.5–7.7)
NEUTROPHILS # BLD AUTO: 4.48 X10(3) UL (ref 1.5–7.7)
NEUTROPHILS NFR BLD AUTO: 63.6 %
NITRITE UR QL STRIP.AUTO: NEGATIVE
OSMOLALITY SERPL CALC.SUM OF ELEC: 288 MOSM/KG (ref 275–295)
OSMOLALITY UR: 316 MOSM/KG (ref 300–1300)
PH UR STRIP.AUTO: 7 [PH] (ref 5–8)
PHOSPHATE SERPL-MCNC: 3.7 MG/DL (ref 2.5–4.9)
PLATELET # BLD AUTO: 411 10(3)UL (ref 150–450)
POTASSIUM SERPL-SCNC: 4.1 MMOL/L (ref 3.5–5.1)
PROT SERPL-MCNC: 7.1 G/DL (ref 6.4–8.2)
PROT UR STRIP.AUTO-MCNC: NEGATIVE MG/DL
PROT UR-MCNC: 7.8 MG/DL
RBC # BLD AUTO: 3.98 X10(6)UL
RBC UR QL AUTO: NEGATIVE
RHEUMATOID FACT SERPL-ACNC: <10 IU/ML (ref ?–15)
SODIUM SERPL-SCNC: 138 MMOL/L (ref 136–145)
SODIUM SERPL-SCNC: 83 MMOL/L
SP GR UR STRIP.AUTO: 1.01 (ref 1–1.03)
TSI SER-ACNC: 0.42 MIU/ML (ref 0.36–3.74)
UROBILINOGEN UR STRIP.AUTO-MCNC: 0.2 MG/DL
WBC # BLD AUTO: 7.1 X10(3) UL (ref 4–11)

## 2022-07-29 PROCEDURE — 84166 PROTEIN E-PHORESIS/URINE/CSF: CPT

## 2022-07-29 PROCEDURE — 86038 ANTINUCLEAR ANTIBODIES: CPT

## 2022-07-29 PROCEDURE — 86334 IMMUNOFIX E-PHORESIS SERUM: CPT

## 2022-07-29 PROCEDURE — 84165 PROTEIN E-PHORESIS SERUM: CPT

## 2022-07-29 PROCEDURE — 84156 ASSAY OF PROTEIN URINE: CPT

## 2022-07-29 PROCEDURE — 86160 COMPLEMENT ANTIGEN: CPT

## 2022-07-29 PROCEDURE — 86335 IMMUNFIX E-PHORSIS/URINE/CSF: CPT

## 2022-07-29 PROCEDURE — 84300 ASSAY OF URINE SODIUM: CPT

## 2022-07-29 PROCEDURE — 84443 ASSAY THYROID STIM HORMONE: CPT

## 2022-07-29 PROCEDURE — 80069 RENAL FUNCTION PANEL: CPT

## 2022-07-29 PROCEDURE — 82533 TOTAL CORTISOL: CPT

## 2022-07-29 PROCEDURE — 86431 RHEUMATOID FACTOR QUANT: CPT

## 2022-07-29 PROCEDURE — 80076 HEPATIC FUNCTION PANEL: CPT

## 2022-07-29 PROCEDURE — 85652 RBC SED RATE AUTOMATED: CPT

## 2022-07-29 PROCEDURE — 36415 COLL VENOUS BLD VENIPUNCTURE: CPT

## 2022-07-29 PROCEDURE — 83935 ASSAY OF URINE OSMOLALITY: CPT

## 2022-07-29 PROCEDURE — 83521 IG LIGHT CHAINS FREE EACH: CPT

## 2022-07-29 PROCEDURE — 82043 UR ALBUMIN QUANTITATIVE: CPT

## 2022-07-29 PROCEDURE — 81003 URINALYSIS AUTO W/O SCOPE: CPT

## 2022-07-29 PROCEDURE — 82570 ASSAY OF URINE CREATININE: CPT

## 2022-07-29 PROCEDURE — 86140 C-REACTIVE PROTEIN: CPT

## 2022-07-29 PROCEDURE — 85025 COMPLETE CBC W/AUTO DIFF WBC: CPT

## 2022-08-01 ENCOUNTER — PATIENT MESSAGE (OUTPATIENT)
Dept: INTERNAL MEDICINE CLINIC | Facility: CLINIC | Age: 67
End: 2022-08-01

## 2022-08-01 ENCOUNTER — TELEPHONE (OUTPATIENT)
Dept: NEPHROLOGY | Facility: CLINIC | Age: 67
End: 2022-08-01

## 2022-08-01 DIAGNOSIS — N18.31 STAGE 3A CHRONIC KIDNEY DISEASE (HCC): Primary | ICD-10-CM

## 2022-08-01 NOTE — TELEPHONE ENCOUNTER
Dr. Cathie Recio, received a call from lab. Pathologist requesting if monoclonal protein study order can be added to cover all protein electrophoresis lab tests? Order pended if appropriate.

## 2022-08-02 LAB
ALBUMIN SERPL ELPH-MCNC: 4.24 G/DL (ref 3.75–5.21)
ALBUMIN/GLOB SERPL: 1.65 {RATIO} (ref 1–2)
ALPHA1 GLOB SERPL ELPH-MCNC: 0.28 G/DL (ref 0.19–0.46)
ALPHA2 GLOB SERPL ELPH-MCNC: 0.65 G/DL (ref 0.48–1.05)
ANA SER QL: POSITIVE
B-GLOBULIN SERPL ELPH-MCNC: 0.75 G/DL (ref 0.68–1.23)
GAMMA GLOB SERPL ELPH-MCNC: 0.88 G/DL (ref 0.62–1.7)
KAPPA LC FREE SER-MCNC: 1.74 MG/DL (ref 0.33–1.94)
KAPPA LC FREE/LAMBDA FREE SER NEPH: 0.9 {RATIO} (ref 0.26–1.65)
LAMBDA LC FREE SERPL-MCNC: 1.94 MG/DL (ref 0.57–2.63)
PROT SERPL-MCNC: 6.8 G/DL (ref 6.4–8.2)

## 2022-08-06 ENCOUNTER — TELEPHONE (OUTPATIENT)
Dept: NEPHROLOGY | Facility: CLINIC | Age: 67
End: 2022-08-06

## 2022-08-16 ENCOUNTER — OFFICE VISIT (OUTPATIENT)
Dept: NEPHROLOGY | Facility: CLINIC | Age: 67
End: 2022-08-16
Payer: COMMERCIAL

## 2022-08-16 VITALS
HEIGHT: 66 IN | BODY MASS INDEX: 46.61 KG/M2 | HEART RATE: 68 BPM | SYSTOLIC BLOOD PRESSURE: 117 MMHG | WEIGHT: 290 LBS | DIASTOLIC BLOOD PRESSURE: 73 MMHG

## 2022-08-16 DIAGNOSIS — N18.31 STAGE 3A CHRONIC KIDNEY DISEASE (HCC): Primary | ICD-10-CM

## 2022-08-16 DIAGNOSIS — E87.1 HYPONATREMIA: ICD-10-CM

## 2022-08-16 PROCEDURE — 3008F BODY MASS INDEX DOCD: CPT | Performed by: INTERNAL MEDICINE

## 2022-08-16 PROCEDURE — 3074F SYST BP LT 130 MM HG: CPT | Performed by: INTERNAL MEDICINE

## 2022-08-16 PROCEDURE — 99213 OFFICE O/P EST LOW 20 MIN: CPT | Performed by: INTERNAL MEDICINE

## 2022-08-16 PROCEDURE — 3078F DIAST BP <80 MM HG: CPT | Performed by: INTERNAL MEDICINE

## 2022-08-16 NOTE — PATIENT INSTRUCTIONS
Try and cut back on your fluid intake. Repeat your kidney blood test in 3 months. Orders are in the computer.

## 2022-08-18 ENCOUNTER — TELEPHONE (OUTPATIENT)
Dept: INTERNAL MEDICINE CLINIC | Facility: CLINIC | Age: 67
End: 2022-08-18

## 2022-08-18 NOTE — TELEPHONE ENCOUNTER
Faxed over referral at [phone]
Rickey Sorensen from [address] [facility] office states they do not have a referral for patient They can not see if in their end. Patient has appointment tomorrow 8/19.   Would like it fax:    [phone]
Possible COVID-19

## 2022-08-31 ENCOUNTER — PATIENT MESSAGE (OUTPATIENT)
Dept: INTERNAL MEDICINE CLINIC | Facility: CLINIC | Age: 67
End: 2022-08-31

## 2022-09-01 ENCOUNTER — NURSE TRIAGE (OUTPATIENT)
Dept: INTERNAL MEDICINE CLINIC | Facility: CLINIC | Age: 67
End: 2022-09-01

## 2022-09-01 NOTE — TELEPHONE ENCOUNTER
----- Message from Alex Ramos RN sent at 9/1/2022  1:18 PM CDT -----  Regarding: FW: Ear ringing       ----- Message -----  From: Maureen Kelley  Sent: 8/31/2022   5:35 PM CDT  To: Emmanuelle Rn Triage  Subject: Ear ringing                                      Hi Dr. Nona Peña,   I experienced a loud, high pitched noise that continued to resonate for about minute. Both my ears were ringing and I felt pressure in my ears. Eventually, the ringing in my right ear stopped but has continued in my left ear for over a day. Is there something I can do to alleviate or eliminate the ringing and pressure? Do I need to see an ENT?  Thanks,  [facility]

## 2022-09-01 NOTE — TELEPHONE ENCOUNTER
RN called patient. Patient's date of birth and full name both confirmed. RN informed patient of provider's message as detailed below. She recorded number to call. She verbalizes understanding of all information, and agreeable to plan.        Future Appointments   Date Time Provider Manasa Malik   9/16/2022  9:40 AM Yohan Hart MD [email or web address]

## 2022-09-01 NOTE — TELEPHONE ENCOUNTER
From: Prachi Grady  To: Diane Viera MD  Sent: 8/31/2022 5:35 PM CDT  Subject: Ear ringing     Hi Dr. Kristel Jimenez,   I experienced a loud, high pitched noise that continued to resonate for about minute. Both my ears were ringing and I felt pressure in my ears. Eventually, the ringing in my right ear stopped but has continued in my left ear for over a day. Is there something I can do to alleviate or eliminate the ringing and pressure? Do I need to see an ENT?  Thanks,  [facility]

## 2022-09-01 NOTE — TELEPHONE ENCOUNTER
Please advise patient to see ENT specialist it may take few days before she sees them Dr. Aden Prom [phone] or any ENT of your choice

## 2022-09-13 ENCOUNTER — PATIENT MESSAGE (OUTPATIENT)
Dept: INTERNAL MEDICINE CLINIC | Facility: CLINIC | Age: 67
End: 2022-09-13

## 2022-09-13 ENCOUNTER — LAB ENCOUNTER (OUTPATIENT)
Dept: LAB | Age: 67
End: 2022-09-13
Attending: INTERNAL MEDICINE
Payer: COMMERCIAL

## 2022-09-13 DIAGNOSIS — H93.19 TINNITUS, UNSPECIFIED LATERALITY: Primary | ICD-10-CM

## 2022-09-13 DIAGNOSIS — N18.32 STAGE 3B CHRONIC KIDNEY DISEASE (HCC): ICD-10-CM

## 2022-09-13 LAB
ANION GAP SERPL CALC-SCNC: 4 MMOL/L (ref 0–18)
BUN BLD-MCNC: 20 MG/DL (ref 7–18)
CALCIUM BLD-MCNC: 9.1 MG/DL (ref 8.5–10.1)
CHLORIDE SERPL-SCNC: 102 MMOL/L (ref 98–112)
CO2 SERPL-SCNC: 27 MMOL/L (ref 21–32)
CREAT BLD-MCNC: 1.14 MG/DL
FASTING STATUS PATIENT QL REPORTED: NO
GFR SERPLBLD BASED ON 1.73 SQ M-ARVRAT: 53 ML/MIN/1.73M2 (ref 60–?)
GLUCOSE BLD-MCNC: 73 MG/DL (ref 70–99)
OSMOLALITY SERPL CALC.SUM OF ELEC: 277 MOSM/KG (ref 275–295)
POTASSIUM SERPL-SCNC: 4.5 MMOL/L (ref 3.5–5.1)
SODIUM SERPL-SCNC: 133 MMOL/L (ref 136–145)

## 2022-09-13 PROCEDURE — 36415 COLL VENOUS BLD VENIPUNCTURE: CPT

## 2022-09-13 PROCEDURE — 80048 BASIC METABOLIC PNL TOTAL CA: CPT

## 2022-09-14 NOTE — TELEPHONE ENCOUNTER
From: Pepe Julio  To: Juana Swartz MD  Sent: 9/13/2022 8:28 PM CDT  Subject: Referral     Dr. Cathy Shore,  I received a phone call from your nurse on 9/1 after I had a acute ear trauma.  She said you wanted me to visit an ENT, I have and appointment with Dr. Juan Zarate next week and need a referral.  Thanks,   Heriberto Shell

## 2022-09-16 ENCOUNTER — OFFICE VISIT (OUTPATIENT)
Dept: INTERNAL MEDICINE CLINIC | Facility: CLINIC | Age: 67
End: 2022-09-16
Payer: COMMERCIAL

## 2022-09-16 ENCOUNTER — IMMUNIZATION (OUTPATIENT)
Dept: LAB | Age: 67
End: 2022-09-16
Attending: EMERGENCY MEDICINE
Payer: COMMERCIAL

## 2022-09-16 VITALS
SYSTOLIC BLOOD PRESSURE: 127 MMHG | WEIGHT: 283 LBS | OXYGEN SATURATION: 95 % | HEART RATE: 71 BPM | BODY MASS INDEX: 45.48 KG/M2 | HEIGHT: 66 IN | DIASTOLIC BLOOD PRESSURE: 85 MMHG

## 2022-09-16 DIAGNOSIS — E03.9 ACQUIRED HYPOTHYROIDISM: ICD-10-CM

## 2022-09-16 DIAGNOSIS — Z00.00 PHYSICAL EXAM, ANNUAL: Primary | ICD-10-CM

## 2022-09-16 DIAGNOSIS — G60.8 SENSORY PERIPHERAL NEUROPATHY: ICD-10-CM

## 2022-09-16 DIAGNOSIS — Z23 NEED FOR VACCINATION: Primary | ICD-10-CM

## 2022-09-16 DIAGNOSIS — I10 PRIMARY HYPERTENSION: ICD-10-CM

## 2022-09-16 PROCEDURE — 3008F BODY MASS INDEX DOCD: CPT | Performed by: INTERNAL MEDICINE

## 2022-09-16 PROCEDURE — 3079F DIAST BP 80-89 MM HG: CPT | Performed by: INTERNAL MEDICINE

## 2022-09-16 PROCEDURE — 99397 PER PM REEVAL EST PAT 65+ YR: CPT | Performed by: INTERNAL MEDICINE

## 2022-09-16 PROCEDURE — 3074F SYST BP LT 130 MM HG: CPT | Performed by: INTERNAL MEDICINE

## 2022-09-16 PROCEDURE — 0124A SARSCOV2 VAC BVL 30MCG/0.3ML: CPT

## 2022-09-20 RX ORDER — BISOPROLOL FUMARATE 5 MG/1
5 TABLET ORAL DAILY
Qty: 90 TABLET | Refills: 1 | Status: SHIPPED | OUTPATIENT
Start: 2022-09-20

## 2022-09-20 RX ORDER — PANTOPRAZOLE SODIUM 40 MG/1
40 TABLET, DELAYED RELEASE ORAL
Qty: 90 TABLET | Refills: 1 | Status: SHIPPED | OUTPATIENT
Start: 2022-09-20

## 2022-09-20 NOTE — TELEPHONE ENCOUNTER
[facility]    Requested Prescriptions   Pending Prescriptions Disp Refills    pantoprazole 40 MG Oral Tab EC 90 tablet 1     Sig: Take 1 tablet (40 mg total) by mouth every morning before breakfast.        Gastrointestional Medication Protocol Passed - 9/19/2022  8:29 PM        Passed - In person appointment or virtual visit in the past 12 mos or appointment in next 3 mos       Recent Outpatient Visits              4 days ago Physical exam, annual    Jeri Foley [place], MD    Office Visit    1 month ago Stage 3a chronic kidney disease [facility])    [facility], [facility]    Office Visit    2 months ago Stage 3a chronic kidney disease [facility])    [facility], [address] [facility]    Office Visit    3 months ago Encounter for gynecological examination    [facility], [address], Az Corbin MD    Office Visit    3 months ago Hyponatremia    Betty Nicole, Arturo Eason MD    Office Visit     Future Appointments         Provider Department Appt Notes    In 2 days Jose Cruz Tabor MD [facility], [address] [facility] in the left ear caused by trauma  Need referral    In 2 days [address] Audiology Ring[place] in the left ear caused by trauma  Need referral    In 5 months Sola Green MD [facility], Main [address], [place] 6 month follow up                  bisoprolol 5 MG Oral Tab 90 tablet 1     Sig: Take 1 tablet (5 mg total) by mouth daily.         Hypertensive Medications Protocol Passed - 9/19/2022  8:29 PM        Passed - In person appointment in the past 12 or next 3 months       Recent Outpatient Visits              4 days ago Physical exam, annual    [place] [facility], [address] [facility], Ariane Kennedy MD    Office Visit    1 month ago Stage 3a chronic kidney disease [facility]) Afua Coppola, MARY Iglesias MD    Office Visit    2 months ago Stage 3a chronic kidney disease [facility])    Leah Rodriguez, [address] [facility], Kelsie, LÄNGHE[place], MD    Office Visit    3 months ago Encounter for gynecological examination    [facility], [address], Thad Mai MD    Office Visit    3 months ago Hyponatremia    [address] [facility], Ariane Cortes MD    Office Visit     Future Appointments         Provider Department Appt Notes    In 2 days Meliton Gould MD [facility], [facility] [facility] in the left ear caused by trauma  Need referral    In 2 days Andie [address] [place] [address] Audiology Ringing in the left ear caused by trauma  Need referral    In 5 months MD Leah Rucker Main [address], [place] 6 month follow up               Passed - Last BP reading less than 140/90     BP Readings from Last 1 Encounters:  09/16/22 : 127/85                Passed - CMP or BMP in past 6 months     Recent Results (from the past 4392 hour(s))   BASIC METABOLIC PANEL (8)    Collection Time: 09/13/22  2:52 PM   Result Value Ref Range    Glucose 73 70 - 99 mg/dL    Sodium 133 (L) 136 - 145 mmol/L    Potassium 4.5 3.5 - 5.1 mmol/L    Chloride 102 98 - 112 mmol/L    CO2 27.0 21.0 - 32.0 mmol/L    Anion Gap 4 0 - 18 mmol/L    BUN 20 (H) 7 - 18 mg/dL    Creatinine 1.14 (H) 0.55 - 1.02 mg/dL    Calcium, Total 9.1 8.5 - 10.1 mg/dL    Calculated Osmolality 277 275 - 295 mOsm/kg    eGFR-Cr 53 (L) >=60 mL/min/1.73m2    Patient Fasting for BMP? No      *Note: Due to a large number of results and/or encounters for the requested time period, some results have not been displayed. A complete set of results can be found in Results Review.                  Passed - In person appointment or virtual visit in the past 6 months       Recent Outpatient Visits              4 days ago Physical exam, annual    [address], Corinne Horseman, [place], MD    Office Visit    1 month ago Stage 3a chronic kidney disease [facility])    [facility], [address] Adan Marin MD    Office Visit    2 months ago Stage 3a chronic kidney disease [facility])    [facility], [address] [place] Adan Marin MD    Office Visit    3 months ago Encounter for gynecological examination    [facility], Kenny Melvin MD    Office Visit    3 months ago Hyponatremia    [address], Jenny Prim[place], MD    Office Visit     Future Appointments         Provider Department Appt Notes    In 2 days Minh Chau MD [facility], [address] in the left ear caused by trauma  Need referral    In 2 days Andie [address] for [facility] Audiology Ring[place] in the left ear caused by trauma  Need referral    In 5 months Trinity Wayne MD [place] [facility], [address], [place] 6 month follow up               [facility] or GFRNAA > 50     GFR Evaluation  EGFRCR: 53 , resulted on 9/13/2022                  Future Appointments         Provider Department Appt Notes    In 2 days Alejandro Correa MD [facility], [address] in the left ear caused by trauma  Need referral    In 2 days [address] [facility] Audiology Ring[place] in the left ear caused by trauma  Need referral    In 5 months Trinity Wayne MD [place] [facility], [address], [place] 6 month follow up            Recent Outpatient Visits              4 days ago Physical exam, annual    [address], Corinne Horseman, [place], MD    Office Visit    1 month ago Stage 3a chronic kidney disease [facility])    [facility], [address] [facility]    Office Visit    2 months ago Stage 3a chronic kidney disease [facility])    [facility], [address] [place] [address], [place] Angelina Penn MD    Office Visit    3 months ago Encounter for gynecological examination    [facility] Chayito Simons MD    Office Visit    3 months ago Hyponatremia    Roni John, Rosie Olguin, Chelsi Jeff MD    Office Visit

## 2022-09-22 ENCOUNTER — OFFICE VISIT (OUTPATIENT)
Dept: AUDIOLOGY | Facility: CLINIC | Age: 67
End: 2022-09-22

## 2022-09-22 ENCOUNTER — OFFICE VISIT (OUTPATIENT)
Dept: OTOLARYNGOLOGY | Facility: CLINIC | Age: 67
End: 2022-09-22

## 2022-09-22 DIAGNOSIS — H93.12 TINNITUS OF LEFT EAR: Primary | ICD-10-CM

## 2022-09-22 DIAGNOSIS — H90.3 SENSORINEURAL HEARING LOSS, BILATERAL: Primary | ICD-10-CM

## 2022-09-22 DIAGNOSIS — G47.33 OSA (OBSTRUCTIVE SLEEP APNEA): ICD-10-CM

## 2022-09-22 DIAGNOSIS — H83.3X9: ICD-10-CM

## 2022-09-22 PROCEDURE — 92567 TYMPANOMETRY: CPT | Performed by: AUDIOLOGIST

## 2022-09-22 PROCEDURE — 99214 OFFICE O/P EST MOD 30 MIN: CPT | Performed by: SPECIALIST

## 2022-09-22 PROCEDURE — 92557 COMPREHENSIVE HEARING TEST: CPT | Performed by: AUDIOLOGIST

## 2022-09-22 RX ORDER — PREDNISONE 20 MG/1
20 TABLET ORAL DAILY
Qty: 3 TABLET | Refills: 0 | Status: SHIPPED | OUTPATIENT
Start: 2022-09-22

## 2022-09-22 NOTE — PATIENT INSTRUCTIONS
The noise pattern hearing loss in the left ear. I placed on 3 days of prednisone. Please call or message me if this does not improve your tinnitus.   Continue the CPAP machine for your obstructive sleep apnea

## 2022-10-05 DIAGNOSIS — E03.9 HYPOTHYROIDISM, UNSPECIFIED TYPE: ICD-10-CM

## 2022-10-06 RX ORDER — LEVOTHYROXINE SODIUM 0.12 MG/1
125 TABLET ORAL
Qty: 90 TABLET | Refills: 1 | Status: SHIPPED | OUTPATIENT
Start: 2022-10-06 | End: 2023-03-25

## 2022-10-06 NOTE — TELEPHONE ENCOUNTER
Refill passed per [address] protocol.   Requested Prescriptions   Pending Prescriptions Disp Refills    levothyroxine 125 MCG Oral Tab 90 tablet 2     Sig: Take 1 tablet (125 mcg total) by mouth before breakfast.        Thyroid Medication Protocol Passed - 10/6/2022  9:38 AM        Passed - TSH in past 12 months        Passed - Last TSH value is normal     Lab Results   Component Value Date    TSH 0.416 07/29/2022    THYROIDFUNC 0.21 (L) 02/04/2016                 Passed - In person appointment or virtual visit in the past 12 mos or appointment in next 3 mos       Recent Outpatient Visits              2 weeks ago Sensorineural hearing loss, bilateral    [facility] Audiology Ranjana Mcdermott    Office Visit    2 weeks ago Tinnitus of left ear    [facility], [address], Agnieszka Bowling MD    Office Visit    2 weeks ago Physical exam, annual    Ej Simon [place]ers, MD Ariane    Office Visit    1 month ago Stage 3a chronic kidney disease [facility])    [address], [address] [place] [address], [place], MD    Office Visit    3 months ago Stage 3a chronic kidney disease [facility])    [address], [address] [place] [address], [place], MD    Office Visit     Future Appointments         Provider Department Appt Notes    In 5 months Alysa Nguyen MD [address], [address], [place] 6 month follow up                   Future Appointments         Provider Department Appt Notes    In 5 months Alysa Nguyen MD [address], [address] [place] [address] 6 month follow up          Recent Outpatient Visits              2 weeks ago Sensorineural hearing loss, bilateral    [facility] Audiology Ranjana Mcdermott    Office Visit    2 weeks ago Tinnitus of left ear    [facility] for Saleantonio Moya MD    Office Visit    2 weeks ago Physical exam, annual    Elust Pato Retana [place], MD    Office Visit    1 month ago Stage 3a chronic kidney disease [facility])    [address] [facility], [address] [place] [address], [place], MD    Office Visit    3 months ago Stage 3a chronic kidney disease [facility])    [address] [facility], [address] [place] [address], [place], MD    Office Visit

## 2022-10-12 ENCOUNTER — IMMUNIZATION (OUTPATIENT)
Dept: INTERNAL MEDICINE CLINIC | Facility: CLINIC | Age: 67
End: 2022-10-12
Payer: COMMERCIAL

## 2022-10-12 DIAGNOSIS — Z23 NEED FOR VACCINATION: Primary | ICD-10-CM

## 2022-10-12 PROCEDURE — 90471 IMMUNIZATION ADMIN: CPT | Performed by: INTERNAL MEDICINE

## 2022-10-12 PROCEDURE — 90662 IIV NO PRSV INCREASED AG IM: CPT | Performed by: INTERNAL MEDICINE

## 2022-11-04 ENCOUNTER — LAB ENCOUNTER (OUTPATIENT)
Dept: LAB | Age: 67
End: 2022-11-04
Attending: INTERNAL MEDICINE
Payer: COMMERCIAL

## 2022-11-04 DIAGNOSIS — I10 PRIMARY HYPERTENSION: ICD-10-CM

## 2022-11-04 DIAGNOSIS — N18.31 STAGE 3A CHRONIC KIDNEY DISEASE (HCC): ICD-10-CM

## 2022-11-04 DIAGNOSIS — R89.9 ABNORMAL LABORATORY TEST: ICD-10-CM

## 2022-11-04 DIAGNOSIS — E87.1 HYPONATREMIA: ICD-10-CM

## 2022-11-04 LAB
ALBUMIN SERPL-MCNC: 3.7 G/DL (ref 3.4–5)
ALBUMIN/GLOB SERPL: 1.2 {RATIO} (ref 1–2)
ALP LIVER SERPL-CCNC: 99 U/L
ALT SERPL-CCNC: 26 U/L
ANION GAP SERPL CALC-SCNC: 5 MMOL/L (ref 0–18)
AST SERPL-CCNC: 17 U/L (ref 15–37)
BILIRUB SERPL-MCNC: 0.5 MG/DL (ref 0.1–2)
BUN BLD-MCNC: 19 MG/DL (ref 7–18)
CALCIUM BLD-MCNC: 8.7 MG/DL (ref 8.5–10.1)
CHLORIDE SERPL-SCNC: 105 MMOL/L (ref 98–112)
CHOLEST SERPL-MCNC: 169 MG/DL (ref ?–200)
CO2 SERPL-SCNC: 27 MMOL/L (ref 21–32)
CREAT BLD-MCNC: 1.12 MG/DL
FASTING PATIENT LIPID ANSWER: YES
FASTING STATUS PATIENT QL REPORTED: YES
GFR SERPLBLD BASED ON 1.73 SQ M-ARVRAT: 55 ML/MIN/1.73M2 (ref 60–?)
GLOBULIN PLAS-MCNC: 3.1 G/DL (ref 2.8–4.4)
GLUCOSE BLD-MCNC: 104 MG/DL (ref 70–99)
HDLC SERPL-MCNC: 72 MG/DL (ref 40–59)
LDLC SERPL CALC-MCNC: 79 MG/DL (ref ?–100)
NONHDLC SERPL-MCNC: 97 MG/DL (ref ?–130)
OSMOLALITY SERPL CALC.SUM OF ELEC: 287 MOSM/KG (ref 275–295)
PHOSPHATE SERPL-MCNC: 3.7 MG/DL (ref 2.5–4.9)
POTASSIUM SERPL-SCNC: 4.5 MMOL/L (ref 3.5–5.1)
PROT SERPL-MCNC: 6.8 G/DL (ref 6.4–8.2)
SODIUM SERPL-SCNC: 137 MMOL/L (ref 136–145)
TRIGL SERPL-MCNC: 102 MG/DL (ref 30–149)
VLDLC SERPL CALC-MCNC: 16 MG/DL (ref 0–30)

## 2022-11-04 PROCEDURE — 36415 COLL VENOUS BLD VENIPUNCTURE: CPT

## 2022-11-04 PROCEDURE — 80061 LIPID PANEL: CPT

## 2022-11-04 PROCEDURE — 84100 ASSAY OF PHOSPHORUS: CPT

## 2022-11-04 PROCEDURE — 80053 COMPREHEN METABOLIC PANEL: CPT

## 2022-11-11 ENCOUNTER — LAB ENCOUNTER (OUTPATIENT)
Dept: LAB | Age: 67
End: 2022-11-11
Attending: INTERNAL MEDICINE
Payer: COMMERCIAL

## 2022-11-11 DIAGNOSIS — N18.31 STAGE 3A CHRONIC KIDNEY DISEASE (HCC): ICD-10-CM

## 2022-11-11 DIAGNOSIS — E87.1 HYPONATREMIA: ICD-10-CM

## 2022-11-11 LAB
ALBUMIN SERPL-MCNC: 3.8 G/DL (ref 3.4–5)
ANION GAP SERPL CALC-SCNC: 6 MMOL/L (ref 0–18)
BUN BLD-MCNC: 22 MG/DL (ref 7–18)
CALCIUM BLD-MCNC: 9.1 MG/DL (ref 8.5–10.1)
CHLORIDE SERPL-SCNC: 104 MMOL/L (ref 98–112)
CO2 SERPL-SCNC: 26 MMOL/L (ref 21–32)
CREAT BLD-MCNC: 1.09 MG/DL
GFR SERPLBLD BASED ON 1.73 SQ M-ARVRAT: 56 ML/MIN/1.73M2 (ref 60–?)
GLUCOSE BLD-MCNC: 114 MG/DL (ref 70–99)
OSMOLALITY SERPL CALC.SUM OF ELEC: 286 MOSM/KG (ref 275–295)
PHOSPHATE SERPL-MCNC: 4 MG/DL (ref 2.5–4.9)
POTASSIUM SERPL-SCNC: 4.5 MMOL/L (ref 3.5–5.1)
SODIUM SERPL-SCNC: 136 MMOL/L (ref 136–145)

## 2022-11-11 PROCEDURE — 36415 COLL VENOUS BLD VENIPUNCTURE: CPT

## 2022-11-11 PROCEDURE — 80069 RENAL FUNCTION PANEL: CPT

## 2022-11-19 ENCOUNTER — TELEPHONE (OUTPATIENT)
Dept: INTERNAL MEDICINE CLINIC | Facility: CLINIC | Age: 67
End: 2022-11-19

## 2022-11-19 DIAGNOSIS — G47.33 OSA (OBSTRUCTIVE SLEEP APNEA): Primary | ICD-10-CM

## 2022-11-19 NOTE — TELEPHONE ENCOUNTER
received forms for referral request for [facility] for cpap supplies    Placed orders with codes provided and faxed over to [phone]

## 2022-11-21 ENCOUNTER — PATIENT MESSAGE (OUTPATIENT)
Dept: NEPHROLOGY | Facility: CLINIC | Age: 67
End: 2022-11-21

## 2022-11-23 NOTE — TELEPHONE ENCOUNTER
From: Amy Mayer  To: Natali Carias MD  Sent: 11/21/2022 8:43 PM CST  Subject: Renal Function Panel     What tests are included in a renal function panel? [facility] and listed with the renal function panel order from 8/16, it lists 10 of 12 remaining. Is this 10 of 12 tests remaining.

## 2022-11-25 ENCOUNTER — MED REC SCAN ONLY (OUTPATIENT)
Dept: INTERNAL MEDICINE CLINIC | Facility: CLINIC | Age: 67
End: 2022-11-25

## 2022-12-14 ENCOUNTER — PATIENT MESSAGE (OUTPATIENT)
Dept: INTERNAL MEDICINE CLINIC | Facility: CLINIC | Age: 67
End: 2022-12-14

## 2023-01-04 ENCOUNTER — APPOINTMENT (OUTPATIENT)
Dept: GENERAL RADIOLOGY | Age: 68
End: 2023-01-04
Attending: PHYSICIAN ASSISTANT
Payer: COMMERCIAL

## 2023-01-04 ENCOUNTER — HOSPITAL ENCOUNTER (OUTPATIENT)
Age: 68
Discharge: HOME | End: 2023-01-04
Payer: COMMERCIAL

## 2023-01-04 ENCOUNTER — PATIENT MESSAGE (OUTPATIENT)
Dept: INTERNAL MEDICINE CLINIC | Facility: CLINIC | Age: 68
End: 2023-01-04

## 2023-01-04 ENCOUNTER — NURSE TRIAGE (OUTPATIENT)
Dept: INTERNAL MEDICINE CLINIC | Facility: CLINIC | Age: 68
End: 2023-01-04

## 2023-01-04 VITALS
HEART RATE: 80 BPM | DIASTOLIC BLOOD PRESSURE: 69 MMHG | RESPIRATION RATE: 18 BRPM | TEMPERATURE: 97 F | OXYGEN SATURATION: 98 % | SYSTOLIC BLOOD PRESSURE: 123 MMHG

## 2023-01-04 DIAGNOSIS — Z99.89 OSA ON CPAP: Primary | ICD-10-CM

## 2023-01-04 DIAGNOSIS — M25.552 LEFT HIP PAIN: Primary | ICD-10-CM

## 2023-01-04 DIAGNOSIS — G47.33 OSA ON CPAP: Primary | ICD-10-CM

## 2023-01-04 PROCEDURE — 99213 OFFICE O/P EST LOW 20 MIN: CPT | Performed by: PHYSICIAN ASSISTANT

## 2023-01-04 PROCEDURE — 73502 X-RAY EXAM HIP UNI 2-3 VIEWS: CPT | Performed by: PHYSICIAN ASSISTANT

## 2023-01-04 RX ORDER — METHYLPREDNISOLONE 4 MG/1
TABLET ORAL
Qty: 1 EACH | Refills: 0 | Status: SHIPPED | OUTPATIENT
Start: 2023-01-04

## 2023-01-04 NOTE — ED INITIAL ASSESSMENT (HPI)
Pt c/o left hip pain. Pt states was moving a cabinet by pushing it with her leg.   Happened yesterday

## 2023-01-04 NOTE — DISCHARGE INSTRUCTIONS
Utilize your walker. Attempt to stay mobile. Follow steroid taper as written.   Recommend orthopedic follow-up in the next 7 to 10 days

## 2023-01-07 NOTE — TELEPHONE ENCOUNTER
Please speak to the patient clarify your request, CPAP equipment renewal was sent to [employer] in November 2022, she is requesting some kind of extension to be sent to [employer]? Please call patient first what DME company she is using, and call the company and ask what is required from us? I never done order for extension I am trying not to let it go for 3 to 4 months due to so please make calls as needed.

## 2023-01-09 NOTE — TELEPHONE ENCOUNTER
DME pended please sign pended order as it is what is needed, please do not create a new DME     HME just needs approval for extension as patient rents the machine

## 2023-01-17 ENCOUNTER — PATIENT MESSAGE (OUTPATIENT)
Dept: INTERNAL MEDICINE CLINIC | Facility: CLINIC | Age: 68
End: 2023-01-17

## 2023-01-18 ENCOUNTER — PATIENT MESSAGE (OUTPATIENT)
Dept: INTERNAL MEDICINE CLINIC | Facility: CLINIC | Age: 68
End: 2023-01-18

## 2023-01-18 DIAGNOSIS — M25.552 LEFT HIP PAIN: Primary | ICD-10-CM

## 2023-01-18 NOTE — TELEPHONE ENCOUNTER
Routed to Dr Boni Hurt for advise, thanks. Routed to Leanne oTro for assistance, thanks. UC referral pended.

## 2023-01-23 ENCOUNTER — OFFICE VISIT (OUTPATIENT)
Dept: INTERNAL MEDICINE CLINIC | Facility: CLINIC | Age: 68
End: 2023-01-23

## 2023-01-23 ENCOUNTER — LAB ENCOUNTER (OUTPATIENT)
Dept: LAB | Age: 68
End: 2023-01-23
Attending: INTERNAL MEDICINE
Payer: COMMERCIAL

## 2023-01-23 VITALS
HEART RATE: 74 BPM | OXYGEN SATURATION: 98 % | SYSTOLIC BLOOD PRESSURE: 133 MMHG | BODY MASS INDEX: 46.61 KG/M2 | DIASTOLIC BLOOD PRESSURE: 84 MMHG | HEIGHT: 66 IN | WEIGHT: 290 LBS

## 2023-01-23 DIAGNOSIS — E55.9 VITAMIN D DEFICIENCY: ICD-10-CM

## 2023-01-23 DIAGNOSIS — Z13.9 SCREENING FOR CONDITION: ICD-10-CM

## 2023-01-23 DIAGNOSIS — G47.33 OSA ON CPAP: ICD-10-CM

## 2023-01-23 DIAGNOSIS — M25.552 HIP PAIN, ACUTE, LEFT: ICD-10-CM

## 2023-01-23 DIAGNOSIS — R53.83 OTHER FATIGUE: Primary | ICD-10-CM

## 2023-01-23 DIAGNOSIS — R53.83 OTHER FATIGUE: ICD-10-CM

## 2023-01-23 DIAGNOSIS — E03.8 OTHER SPECIFIED HYPOTHYROIDISM: ICD-10-CM

## 2023-01-23 DIAGNOSIS — R06.09 DYSPNEA ON EXERTION: ICD-10-CM

## 2023-01-23 DIAGNOSIS — Z99.89 OSA ON CPAP: ICD-10-CM

## 2023-01-23 DIAGNOSIS — R91.1 PULMONARY NODULE: ICD-10-CM

## 2023-01-23 LAB
ALBUMIN SERPL-MCNC: 3.6 G/DL (ref 3.4–5)
ALBUMIN/GLOB SERPL: 1 {RATIO} (ref 1–2)
ALP LIVER SERPL-CCNC: 83 U/L
ALT SERPL-CCNC: 24 U/L
ANION GAP SERPL CALC-SCNC: 6 MMOL/L (ref 0–18)
AST SERPL-CCNC: 16 U/L (ref 15–37)
BASOPHILS # BLD AUTO: 0.07 X10(3) UL (ref 0–0.2)
BASOPHILS NFR BLD AUTO: 0.8 %
BILIRUB SERPL-MCNC: 0.2 MG/DL (ref 0.1–2)
BUN BLD-MCNC: 15 MG/DL (ref 7–18)
BUN/CREAT SERPL: 13.6 (ref 10–20)
CALCIUM BLD-MCNC: 8.8 MG/DL (ref 8.5–10.1)
CHLORIDE SERPL-SCNC: 105 MMOL/L (ref 98–112)
CO2 SERPL-SCNC: 25 MMOL/L (ref 21–32)
CREAT BLD-MCNC: 1.1 MG/DL
DEPRECATED HBV CORE AB SER IA-ACNC: 12.1 NG/ML
DEPRECATED RDW RBC AUTO: 48.7 FL (ref 35.1–46.3)
EOSINOPHIL # BLD AUTO: 0.26 X10(3) UL (ref 0–0.7)
EOSINOPHIL NFR BLD AUTO: 3.1 %
ERYTHROCYTE [DISTWIDTH] IN BLOOD BY AUTOMATED COUNT: 15.3 % (ref 11–15)
FASTING STATUS PATIENT QL REPORTED: NO
GFR SERPLBLD BASED ON 1.73 SQ M-ARVRAT: 56 ML/MIN/1.73M2 (ref 60–?)
GLOBULIN PLAS-MCNC: 3.5 G/DL (ref 2.8–4.4)
GLUCOSE BLD-MCNC: 97 MG/DL (ref 70–99)
HCT VFR BLD AUTO: 34.3 %
HGB BLD-MCNC: 11.3 G/DL
IMM GRANULOCYTES # BLD AUTO: 0.03 X10(3) UL (ref 0–1)
IMM GRANULOCYTES NFR BLD: 0.4 %
LYMPHOCYTES # BLD AUTO: 2.21 X10(3) UL (ref 1–4)
LYMPHOCYTES NFR BLD AUTO: 26.1 %
MCH RBC QN AUTO: 28.5 PG (ref 26–34)
MCHC RBC AUTO-ENTMCNC: 32.9 G/DL (ref 31–37)
MCV RBC AUTO: 86.4 FL
MONOCYTES # BLD AUTO: 0.67 X10(3) UL (ref 0.1–1)
MONOCYTES NFR BLD AUTO: 7.9 %
NEUTROPHILS # BLD AUTO: 5.23 X10 (3) UL (ref 1.5–7.7)
NEUTROPHILS # BLD AUTO: 5.23 X10(3) UL (ref 1.5–7.7)
NEUTROPHILS NFR BLD AUTO: 61.7 %
OSMOLALITY SERPL CALC.SUM OF ELEC: 283 MOSM/KG (ref 275–295)
PLATELET # BLD AUTO: 400 10(3)UL (ref 150–450)
POTASSIUM SERPL-SCNC: 4 MMOL/L (ref 3.5–5.1)
PROT SERPL-MCNC: 7.1 G/DL (ref 6.4–8.2)
RBC # BLD AUTO: 3.97 X10(6)UL
SODIUM SERPL-SCNC: 136 MMOL/L (ref 136–145)
TSI SER-ACNC: 0.72 MIU/ML (ref 0.36–3.74)
VIT B12 SERPL-MCNC: 666 PG/ML (ref 193–986)
VIT D+METAB SERPL-MCNC: 24.1 NG/ML (ref 30–100)
WBC # BLD AUTO: 8.5 X10(3) UL (ref 4–11)

## 2023-01-23 PROCEDURE — 99214 OFFICE O/P EST MOD 30 MIN: CPT | Performed by: INTERNAL MEDICINE

## 2023-01-23 PROCEDURE — 84443 ASSAY THYROID STIM HORMONE: CPT

## 2023-01-23 PROCEDURE — 3079F DIAST BP 80-89 MM HG: CPT | Performed by: INTERNAL MEDICINE

## 2023-01-23 PROCEDURE — 36415 COLL VENOUS BLD VENIPUNCTURE: CPT

## 2023-01-23 PROCEDURE — 82306 VITAMIN D 25 HYDROXY: CPT

## 2023-01-23 PROCEDURE — 85025 COMPLETE CBC W/AUTO DIFF WBC: CPT

## 2023-01-23 PROCEDURE — 82607 VITAMIN B-12: CPT

## 2023-01-23 PROCEDURE — 3075F SYST BP GE 130 - 139MM HG: CPT | Performed by: INTERNAL MEDICINE

## 2023-01-23 PROCEDURE — 82728 ASSAY OF FERRITIN: CPT

## 2023-01-23 PROCEDURE — 80053 COMPREHEN METABOLIC PANEL: CPT

## 2023-01-23 PROCEDURE — 3008F BODY MASS INDEX DOCD: CPT | Performed by: INTERNAL MEDICINE

## 2023-01-23 RX ORDER — BENZTROPINE MESYLATE 1 MG/1
1 TABLET ORAL EVERY MORNING
COMMUNITY
Start: 2022-11-28 | End: 2023-01-30

## 2023-01-30 ENCOUNTER — OFFICE VISIT (OUTPATIENT)
Dept: OBGYN CLINIC | Facility: CLINIC | Age: 68
End: 2023-01-30

## 2023-01-30 VITALS
BODY MASS INDEX: 47 KG/M2 | SYSTOLIC BLOOD PRESSURE: 138 MMHG | DIASTOLIC BLOOD PRESSURE: 85 MMHG | HEART RATE: 76 BPM | WEIGHT: 291.5 LBS

## 2023-01-30 DIAGNOSIS — R68.82 DECREASED LIBIDO: Primary | ICD-10-CM

## 2023-01-30 PROCEDURE — 99212 OFFICE O/P EST SF 10 MIN: CPT | Performed by: OBSTETRICS & GYNECOLOGY

## 2023-01-30 PROCEDURE — 3079F DIAST BP 80-89 MM HG: CPT | Performed by: OBSTETRICS & GYNECOLOGY

## 2023-01-30 PROCEDURE — 3075F SYST BP GE 130 - 139MM HG: CPT | Performed by: OBSTETRICS & GYNECOLOGY

## 2023-02-02 ENCOUNTER — TELEPHONE (OUTPATIENT)
Dept: INTERNAL MEDICINE CLINIC | Facility: CLINIC | Age: 68
End: 2023-02-02

## 2023-02-02 NOTE — TELEPHONE ENCOUNTER
Spoke to patient, reviewed test results, no anemia iron deficient, advised her to revisit with gastroenterology, last colonoscopy in 2017 with recommendation to follow-up in 2027. Advised patient to start iron supplementation, Feosol 325 mg daily eat foods high in iron. Will recheck CBC and ferritin level in 3 months. Low vitamin D level advised to start taking vitamin D3 5000 units daily for 3 months, and after that go down to 2000 units daily to maintain good level indefinitely.

## 2023-02-03 ENCOUNTER — HOSPITAL ENCOUNTER (OUTPATIENT)
Dept: CT IMAGING | Age: 68
Discharge: HOME | End: 2023-02-03
Attending: INTERNAL MEDICINE
Payer: COMMERCIAL

## 2023-02-03 ENCOUNTER — HOSPITAL ENCOUNTER (OUTPATIENT)
Dept: CV DIAGNOSTICS | Facility: HOSPITAL | Age: 68
Discharge: HOME | End: 2023-02-03
Attending: INTERNAL MEDICINE
Payer: COMMERCIAL

## 2023-02-03 DIAGNOSIS — R06.09 DYSPNEA ON EXERTION: ICD-10-CM

## 2023-02-03 DIAGNOSIS — R91.1 PULMONARY NODULE: ICD-10-CM

## 2023-02-03 PROCEDURE — 71250 CT THORAX DX C-: CPT | Performed by: INTERNAL MEDICINE

## 2023-02-03 PROCEDURE — 93306 TTE W/DOPPLER COMPLETE: CPT | Performed by: INTERNAL MEDICINE

## 2023-02-08 DIAGNOSIS — R06.09 DYSPNEA ON EXERTION: Primary | ICD-10-CM

## 2023-02-21 ENCOUNTER — LAB ENCOUNTER (OUTPATIENT)
Dept: LAB | Age: 68
End: 2023-02-21
Attending: INTERNAL MEDICINE
Payer: COMMERCIAL

## 2023-02-21 DIAGNOSIS — R06.09 DYSPNEA ON EXERTION: ICD-10-CM

## 2023-02-22 ENCOUNTER — TELEPHONE (OUTPATIENT)
Dept: INTERNAL MEDICINE CLINIC | Facility: CLINIC | Age: 68
End: 2023-02-22

## 2023-02-22 LAB — SARS-COV-2 RNA RESP QL NAA+PROBE: NOT DETECTED

## 2023-02-22 NOTE — TELEPHONE ENCOUNTER
Toribio from [address] calling that they need last office notes and certification of equipment sent over to them for CPAP equipment. Would like to know also if request was received, faxed today.     Fax: [phone]

## 2023-02-24 ENCOUNTER — TELEPHONE (OUTPATIENT)
Dept: INTERNAL MEDICINE CLINIC | Facility: CLINIC | Age: 68
End: 2023-02-24

## 2023-02-24 ENCOUNTER — RT VISIT (OUTPATIENT)
Dept: RESPIRATORY THERAPY | Facility: HOSPITAL | Age: 68
End: 2023-02-24
Attending: INTERNAL MEDICINE
Payer: COMMERCIAL

## 2023-02-24 DIAGNOSIS — R06.09 DYSPNEA ON EXERTION: ICD-10-CM

## 2023-02-24 PROCEDURE — 94010 BREATHING CAPACITY TEST: CPT

## 2023-02-24 PROCEDURE — 94729 DIFFUSING CAPACITY: CPT

## 2023-02-24 PROCEDURE — 94726 PLETHYSMOGRAPHY LUNG VOLUMES: CPT

## 2023-02-24 NOTE — PROCEDURES
Findings:  FEV1 is 2.68L, 108% predicted. FVC is 3.53L, 110% predicted. FEV1/ FVC ratio is 0.76. The flow-volume loop demonstrates a normal pattern. The TLC is 6.53L, 124% predicted. The residual volume 2.78L, 134% predicted. The diffusion capacity is 82% predicted and 80% predicted when corrected for alveolar volume. Impression:  There is no airway obstruction on spirometry and visualized on flow-volume loop. Despite the absence of airway obstruction, there is evidence of air trapping (residual volume of 2.78L, 134% predicted). Diffusion capacity is normal.  There are no previous pulmonary function tests available for comparison. The above testing does not demonstrate obstruction, however the presence of air trapping suggests an obstructive process.  If clinically indicated, may consider further assessment with bronchoprovocation challenge test such as mannitol challenge test.

## 2023-02-24 NOTE — TELEPHONE ENCOUNTER
Form received from [facility]. Placed on Dr. Aliza Cole desteri to review and sign.

## 2023-02-24 NOTE — TELEPHONE ENCOUNTER
Windy Herzog from [address] calling to state need chart notes and certificate of medical necessity.      Fax: [phone]

## 2023-02-27 ENCOUNTER — TELEPHONE (OUTPATIENT)
Dept: INTERNAL MEDICINE CLINIC | Facility: CLINIC | Age: 68
End: 2023-02-27

## 2023-02-27 NOTE — TELEPHONE ENCOUNTER
Forms faxed over to [facility] at [phone] with confirmation, forms in Pending folder.

## 2023-03-03 ENCOUNTER — HOSPITAL ENCOUNTER (OUTPATIENT)
Dept: GENERAL RADIOLOGY | Facility: HOSPITAL | Age: 68
Discharge: HOME | End: 2023-03-03
Attending: PHYSICAL MEDICINE & REHABILITATION
Payer: COMMERCIAL

## 2023-03-03 ENCOUNTER — OFFICE VISIT (OUTPATIENT)
Dept: PHYSICAL MEDICINE AND REHAB | Facility: CLINIC | Age: 68
End: 2023-03-03
Payer: COMMERCIAL

## 2023-03-03 ENCOUNTER — PATIENT MESSAGE (OUTPATIENT)
Dept: PHYSICAL MEDICINE AND REHAB | Facility: CLINIC | Age: 68
End: 2023-03-03

## 2023-03-03 VITALS
SYSTOLIC BLOOD PRESSURE: 124 MMHG | HEART RATE: 74 BPM | OXYGEN SATURATION: 96 % | BODY MASS INDEX: 46.61 KG/M2 | DIASTOLIC BLOOD PRESSURE: 78 MMHG | WEIGHT: 290 LBS | HEIGHT: 66 IN

## 2023-03-03 DIAGNOSIS — R26.89 ABNORMALITY OF GAIT DUE TO IMPAIRMENT OF BALANCE: ICD-10-CM

## 2023-03-03 DIAGNOSIS — G60.8 SENSORY PERIPHERAL NEUROPATHY: Primary | ICD-10-CM

## 2023-03-03 DIAGNOSIS — R10.32 LEFT GROIN PAIN: ICD-10-CM

## 2023-03-03 PROCEDURE — 3074F SYST BP LT 130 MM HG: CPT | Performed by: PHYSICAL MEDICINE & REHABILITATION

## 2023-03-03 PROCEDURE — 3078F DIAST BP <80 MM HG: CPT | Performed by: PHYSICAL MEDICINE & REHABILITATION

## 2023-03-03 PROCEDURE — 99204 OFFICE O/P NEW MOD 45 MIN: CPT | Performed by: PHYSICAL MEDICINE & REHABILITATION

## 2023-03-03 PROCEDURE — 73502 X-RAY EXAM HIP UNI 2-3 VIEWS: CPT | Performed by: PHYSICAL MEDICINE & REHABILITATION

## 2023-03-03 PROCEDURE — 3008F BODY MASS INDEX DOCD: CPT | Performed by: PHYSICAL MEDICINE & REHABILITATION

## 2023-03-03 PROCEDURE — 73503 X-RAY EXAM HIP UNI 4/> VIEWS: CPT | Performed by: PHYSICAL MEDICINE & REHABILITATION

## 2023-03-03 NOTE — TELEPHONE ENCOUNTER
From: Wallace Boudreaux  To: Marlen Vela,   Sent: 3/3/2023 1:43 PM CST  Subject: PT    Hi, I lost my order for PT, will you please send me a copy of the order in letters?  Thanks, [facility]

## 2023-03-10 ENCOUNTER — PATIENT MESSAGE (OUTPATIENT)
Dept: INTERNAL MEDICINE CLINIC | Facility: CLINIC | Age: 68
End: 2023-03-10

## 2023-03-10 ENCOUNTER — TELEPHONE (OUTPATIENT)
Dept: INTERNAL MEDICINE CLINIC | Facility: CLINIC | Age: 68
End: 2023-03-10

## 2023-03-10 ENCOUNTER — OFFICE VISIT (OUTPATIENT)
Dept: INTERNAL MEDICINE CLINIC | Facility: CLINIC | Age: 68
End: 2023-03-10

## 2023-03-10 VITALS
BODY MASS INDEX: 46.41 KG/M2 | DIASTOLIC BLOOD PRESSURE: 72 MMHG | HEART RATE: 69 BPM | WEIGHT: 288.81 LBS | SYSTOLIC BLOOD PRESSURE: 112 MMHG | HEIGHT: 66 IN

## 2023-03-10 DIAGNOSIS — I10 ESSENTIAL HYPERTENSION: Primary | ICD-10-CM

## 2023-03-10 DIAGNOSIS — Z12.11 COLON CANCER SCREENING: ICD-10-CM

## 2023-03-10 RX ORDER — TIOTROPIUM BROMIDE 18 UG/1
18 CAPSULE ORAL; RESPIRATORY (INHALATION) DAILY
Qty: 90 CAPSULE | Refills: 3 | Status: SHIPPED | OUTPATIENT
Start: 2023-03-10 | End: 2024-03-04

## 2023-03-10 RX ORDER — BISOPROLOL FUMARATE 5 MG/1
5 TABLET, FILM COATED ORAL DAILY
Qty: 90 TABLET | Refills: 1 | Status: SHIPPED | OUTPATIENT
Start: 2023-03-10

## 2023-03-10 RX ORDER — TRAZODONE HYDROCHLORIDE 150 MG/1
TABLET ORAL
COMMUNITY
Start: 2023-02-24

## 2023-03-10 RX ORDER — AMOXICILLIN 500 MG/1
CAPSULE ORAL
COMMUNITY
Start: 2023-03-09 | End: 2023-03-10

## 2023-03-10 RX ORDER — AMLODIPINE BESYLATE 2.5 MG/1
TABLET ORAL
Qty: 90 TABLET | Refills: 3 | Status: SHIPPED | OUTPATIENT
Start: 2023-03-10

## 2023-03-10 RX ORDER — IPRATROPIUM/ALBUTEROL SULFATE 20-100 MCG
1 MIST INHALER (GRAM) INHALATION
Qty: 1 EACH | Refills: 0 | Status: SHIPPED | OUTPATIENT
Start: 2023-03-10

## 2023-03-10 NOTE — TELEPHONE ENCOUNTER
Roderick London, RN 3/10/2023 1:52 PM CST      ----- Message -----  From: Sirena Cueto  Sent: 3/10/2023 1:49 PM CST  To: Em Triage Support  Subject: Inhaler     Dr Marlene Donohue was going to give me a prescription for an inhaler, I think she forgot to send it.  Thanks, [facility]

## 2023-03-12 ENCOUNTER — APPOINTMENT (OUTPATIENT)
Dept: LAB | Age: 68
End: 2023-03-12
Attending: INTERNAL MEDICINE
Payer: COMMERCIAL

## 2023-03-15 ENCOUNTER — PATIENT MESSAGE (OUTPATIENT)
Dept: INTERNAL MEDICINE CLINIC | Facility: CLINIC | Age: 68
End: 2023-03-15

## 2023-03-15 DIAGNOSIS — R06.09 DYSPNEA ON EXERTION: Primary | ICD-10-CM

## 2023-03-16 NOTE — TELEPHONE ENCOUNTER
Spoke to patient, advised that I placed referral, hopefully it is helpful. She will contact insurance and I will try to figure out why it is not covering Dr. Saroj Villar who read pulmonary function test, test was covered.

## 2023-03-25 DIAGNOSIS — E03.9 HYPOTHYROIDISM, UNSPECIFIED TYPE: ICD-10-CM

## 2023-03-27 RX ORDER — LEVOTHYROXINE SODIUM 0.12 MG/1
125 TABLET ORAL
Qty: 90 TABLET | Refills: 3 | Status: SHIPPED | OUTPATIENT
Start: 2023-03-27

## 2023-03-27 NOTE — TELEPHONE ENCOUNTER
Refill passed per [place] [employer] [place] protocol.      Requested Prescriptions   Pending Prescriptions Disp Refills    levothyroxine 125 MCG Oral Tab 90 tablet 1     Sig: Take 1 tablet (125 mcg total) by mouth before breakfast.       Thyroid Medication Protocol Passed - 3/25/2023 10:03 AM        Passed - TSH in past 12 months        Passed - Last TSH value is normal     Lab Results   Component Value Date    TSH 0.722 01/23/2023    THYROIDFUNC 0.21 (L) 02/04/2016                 Passed - In person appointment or virtual visit in the past 12 mos or appointment in next 3 mos     Recent Outpatient Visits              2 weeks ago Essential hypertension    [address] [facility], Mar[place] Ariane Clifford MD    Office Visit    3 weeks ago Sensory peripheral neuropathy    [address] [place] [address] Merle Rodriguez DO    Office Visit    1 month ago Decreased libido    Kathy Cuff, [address] [place] [address], [address] [place] [address] Pamela Serrano MD    Office Visit    2 months ago Other fatigue    [facility]    Office Visit    6 months ago Sensorineural hearing loss, bilateral    Kathy Cuff, [address] [facility],[address] [facility], [place] Colletta Peper, Au.ANIKA    Office Visit          Future Appointments         Provider Department Appt Notes    In 1 month Sallie Aburto MD Kathy Cuff, Main [address], [address] First visit    In 1 month Rock Neo MD Kathy Cuff, [address] [facility],[address] [facility], [place] Anemia/internal bleeding    In 3 months Pamela Serrano MD Kathy Cuff, [address] [facility],[address], [address] [facility],[address] up                     Future Appointments         Provider Department Appt Notes    In 1 month Sallie Aburto MD Kathy Cuff, Main [address], [place] First visit    In 1 month Rock Neo MD Kathy Cuff, [address] [facility] Anemia/internal bleeding    In 3 months Alexus De La Paz MD [address], [facility], [address] [facility], [address] up             Recent Outpatient Visits              2 weeks ago Essential hypertension    [address], [facility], Ariane Catalan MD    Office Visit    3 weeks ago Sensory peripheral neuropathy    [facility] [place], [place]    Office Visit    1 month ago Decreased libido    [address], [address] [place] [address], [address] [place] [address] Alexus De La Paz MD    Office Visit    2 months ago Other fatigue    [address], [address] [facility], Ariane Catalan MD    Office Visit    6 months ago Sensorineural hearing loss, bilateral    [address] [facility], [employer] Ranjana Narvaez    Office Visit

## 2023-04-12 ENCOUNTER — NURSE ONLY (OUTPATIENT)
Dept: INTERNAL MEDICINE CLINIC | Facility: CLINIC | Age: 68
End: 2023-04-12

## 2023-04-12 DIAGNOSIS — Z23 IMMUNIZATION DUE: Primary | ICD-10-CM

## 2023-04-12 PROCEDURE — 90677 PCV20 VACCINE IM: CPT | Performed by: INTERNAL MEDICINE

## 2023-04-12 PROCEDURE — 90471 IMMUNIZATION ADMIN: CPT | Performed by: INTERNAL MEDICINE

## 2023-04-12 NOTE — PROGRESS NOTES
Pt verified name and . She arrived to the office today for her second pneumonia vaccine. Reviewed immunizations list with NK and gave verbal order to give prevnar 20 to pt. Injection given into left deltoid with no reactions.

## 2023-04-19 NOTE — ASSESSMENT & PLAN NOTE
Major depressive disorder which has been stable on current medications–Abilify and Wellbutrin. Being managed per psychiatry.   Referral has been provided Initiate Treatment: Efudex BID x4 weeks Detail Level: Zone Render In Strict Bullet Format?: Yes

## 2023-04-28 ENCOUNTER — LAB ENCOUNTER (OUTPATIENT)
Dept: LAB | Age: 68
End: 2023-04-28
Attending: INTERNAL MEDICINE
Payer: COMMERCIAL

## 2023-04-28 DIAGNOSIS — N18.31 STAGE 3A CHRONIC KIDNEY DISEASE (HCC): ICD-10-CM

## 2023-04-28 LAB
ALBUMIN SERPL-MCNC: 3.8 G/DL (ref 3.4–5)
ANION GAP SERPL CALC-SCNC: 7 MMOL/L (ref 0–18)
BUN BLD-MCNC: 21 MG/DL (ref 7–18)
CALCIUM BLD-MCNC: 9.4 MG/DL (ref 8.5–10.1)
CHLORIDE SERPL-SCNC: 103 MMOL/L (ref 98–112)
CO2 SERPL-SCNC: 27 MMOL/L (ref 21–32)
CREAT BLD-MCNC: 1.26 MG/DL
GFR SERPLBLD BASED ON 1.73 SQ M-ARVRAT: 47 ML/MIN/1.73M2 (ref 60–?)
GLUCOSE BLD-MCNC: 97 MG/DL (ref 70–99)
OSMOLALITY SERPL CALC.SUM OF ELEC: 287 MOSM/KG (ref 275–295)
PHOSPHATE SERPL-MCNC: 4.5 MG/DL (ref 2.5–4.9)
POTASSIUM SERPL-SCNC: 5.1 MMOL/L (ref 3.5–5.1)
SODIUM SERPL-SCNC: 137 MMOL/L (ref 136–145)

## 2023-04-28 PROCEDURE — 80069 RENAL FUNCTION PANEL: CPT

## 2023-04-28 PROCEDURE — 36415 COLL VENOUS BLD VENIPUNCTURE: CPT

## 2023-04-29 ENCOUNTER — PATIENT MESSAGE (OUTPATIENT)
Dept: INTERNAL MEDICINE CLINIC | Facility: CLINIC | Age: 68
End: 2023-04-29

## 2023-04-29 DIAGNOSIS — N18.32 STAGE 3B CHRONIC KIDNEY DISEASE (HCC): Primary | ICD-10-CM

## 2023-05-03 ENCOUNTER — OFFICE VISIT (OUTPATIENT)
Dept: NEPHROLOGY | Facility: CLINIC | Age: 68
End: 2023-05-03

## 2023-05-03 VITALS
WEIGHT: 293 LBS | HEART RATE: 71 BPM | SYSTOLIC BLOOD PRESSURE: 115 MMHG | DIASTOLIC BLOOD PRESSURE: 72 MMHG | BODY MASS INDEX: 47.09 KG/M2 | HEIGHT: 66 IN

## 2023-05-03 DIAGNOSIS — N18.31 STAGE 3A CHRONIC KIDNEY DISEASE (HCC): Primary | ICD-10-CM

## 2023-05-03 PROCEDURE — 3008F BODY MASS INDEX DOCD: CPT | Performed by: INTERNAL MEDICINE

## 2023-05-03 PROCEDURE — 3074F SYST BP LT 130 MM HG: CPT | Performed by: INTERNAL MEDICINE

## 2023-05-03 PROCEDURE — 99214 OFFICE O/P EST MOD 30 MIN: CPT | Performed by: INTERNAL MEDICINE

## 2023-05-03 PROCEDURE — 3078F DIAST BP <80 MM HG: CPT | Performed by: INTERNAL MEDICINE

## 2023-05-03 RX ORDER — DARIDOREXANT 50 MG/1
TABLET, FILM COATED ORAL
COMMUNITY
Start: 2023-03-25

## 2023-05-03 NOTE — PROGRESS NOTES
05/03/23        Patient: Pop Mcmahon   YOB: 1955   Date of Visit: 5/3/2023       Dear  Dr. Rosey Baca MD,      Thank you for referring Pop Mcmahon to my practice. Please find my assessment and plan below. As you know she is a 61-year-old female with a history of hypertension, DJD, depression, GERD and hypothyroidism who I now had the pleasure of seeing for follow-up of hyponatremia and chronic kidney disease stage III. Overall the patient states has been doing well without any chest pain, shortness of breath, GI or urinary tract symptoms. On physical exam her blood pressure was 115/72 with a pulse of 71 and she weighed 296 pounds. Her neck was supple without JVD. Lungs were clear. Heart revealed a regular rate and rhythm with an S4 but no gallops, murmurs or rubs. Abdomen was soft, flat, nontender without organomegaly, masses or bruits. Extremities revealed no edema. I reviewed her most recent labs which were done in April 28, 2023. Creatinine has crept up a bit to 1.26 now with an estimated GFR of 47 cc/min. Sodium remains normal at 137 with a potassium of 5.1. I therefore informed the patient that there has been some mild deterioration in her renal function. Blood pressures appear to be under good control. Maintain adequate hydration. Avoid nonsteroidals. We will repeat a CBC and renal panel quarterly. I will see her again in 6 months for follow-up or sooner if clinically indicated. Thank you again for allowing me to participate in the care of your patient. If you have any questions please feel free to call.            Sincerely,   Clem Morales MD   [facility] MEDICAL [facility], [address] [place] [address]  [address] [facility] [address]  [address] [facility] [address] [address]    Document electronically generated by:  Clem Morales MD

## 2023-05-03 NOTE — PATIENT INSTRUCTIONS
You can liberalize her fluid intake a bit. Please repeat your kidney blood test in 3 months. Orders are in the computer.

## 2023-05-05 ENCOUNTER — OFFICE VISIT (OUTPATIENT)
Dept: PULMONOLOGY | Facility: CLINIC | Age: 68
End: 2023-05-05

## 2023-05-05 VITALS
RESPIRATION RATE: 14 BRPM | BODY MASS INDEX: 46.45 KG/M2 | OXYGEN SATURATION: 95 % | DIASTOLIC BLOOD PRESSURE: 82 MMHG | HEIGHT: 66 IN | HEART RATE: 65 BPM | SYSTOLIC BLOOD PRESSURE: 125 MMHG | WEIGHT: 289 LBS

## 2023-05-05 DIAGNOSIS — G47.33 OSA ON CPAP: ICD-10-CM

## 2023-05-05 DIAGNOSIS — Z99.89 OSA ON CPAP: ICD-10-CM

## 2023-05-05 DIAGNOSIS — R06.00 DYSPNEA, UNSPECIFIED TYPE: Primary | ICD-10-CM

## 2023-05-15 NOTE — TELEPHONE ENCOUNTER
Addendum  created 05/15/23 1205 by Rudean Apgar, APRN - CRNA    Flowsheet accepted Requested Prescriptions     Pending Prescriptions Disp Refills   • AmLODIPine Besylate 2.5 MG Oral Tab 90 tablet 0     Sig: Take 1 tablet (2.5 mg total) by mouth daily.        Last Office Visit with PCP: 11/16/2018  Last Blood Pressures:  BP Readings from L

## 2023-05-16 ENCOUNTER — PATIENT MESSAGE (OUTPATIENT)
Dept: INTERNAL MEDICINE CLINIC | Facility: CLINIC | Age: 68
End: 2023-05-16

## 2023-05-16 DIAGNOSIS — Z12.31 SCREENING MAMMOGRAM, ENCOUNTER FOR: Primary | ICD-10-CM

## 2023-05-17 ENCOUNTER — TELEPHONE (OUTPATIENT)
Dept: GASTROENTEROLOGY | Facility: CLINIC | Age: 68
End: 2023-05-17

## 2023-05-17 ENCOUNTER — OFFICE VISIT (OUTPATIENT)
Dept: GASTROENTEROLOGY | Facility: CLINIC | Age: 68
End: 2023-05-17

## 2023-05-17 VITALS
WEIGHT: 293 LBS | SYSTOLIC BLOOD PRESSURE: 126 MMHG | DIASTOLIC BLOOD PRESSURE: 79 MMHG | HEART RATE: 65 BPM | HEIGHT: 66 IN | BODY MASS INDEX: 47.09 KG/M2

## 2023-05-17 DIAGNOSIS — K21.9 GASTROESOPHAGEAL REFLUX DISEASE WITHOUT ESOPHAGITIS: ICD-10-CM

## 2023-05-17 DIAGNOSIS — K57.90 DIVERTICULOSIS: ICD-10-CM

## 2023-05-17 DIAGNOSIS — D50.9 IRON DEFICIENCY ANEMIA, UNSPECIFIED IRON DEFICIENCY ANEMIA TYPE: Primary | ICD-10-CM

## 2023-05-17 DIAGNOSIS — R19.5 CHANGE IN STOOL: ICD-10-CM

## 2023-05-17 PROCEDURE — 3078F DIAST BP <80 MM HG: CPT | Performed by: INTERNAL MEDICINE

## 2023-05-17 PROCEDURE — 99214 OFFICE O/P EST MOD 30 MIN: CPT | Performed by: INTERNAL MEDICINE

## 2023-05-17 PROCEDURE — 3074F SYST BP LT 130 MM HG: CPT | Performed by: INTERNAL MEDICINE

## 2023-05-17 PROCEDURE — 3008F BODY MASS INDEX DOCD: CPT | Performed by: INTERNAL MEDICINE

## 2023-05-17 NOTE — TELEPHONE ENCOUNTER
Patient scheduled at [address] [place] [address] due to high BMI. Scheduled for:  Colonoscopy 12430/78167 & EGD 73633  Provider Name:  Dr. Ernesto Liz  Date:  6/26/2023  Location:  [facility]  Sedation: MAC   Time:  8:30 am, arrival 7:30 am  Prep:  Miralax/Gatorade  Meds/Allergies Reconciled?:  Physician reviewed  Diagnosis with codes:  RIDDHI D50.9; Change in stools R19.5  Was patient informed to call insurance with codes (Y/N): Yes   Referral sent?:  Referral was sent at the time of electronic surgical scheduling. [address] [place] [address] or [address] notified?:  I sent an electronic request to Endo Scheduling and received a confirmation today. Medication Orders:  N/A  Misc Orders:  N/A     Further instructions given by staff:  I provided patient with prep instruction sheet.

## 2023-05-17 NOTE — PATIENT INSTRUCTIONS
#gerd  #change in bowel habits  # RIDDHI    Recommend:  Here are some reflux precautions:   - Avoid trigger foods  - Anti-reflux measures: raising the head of the bed at night, avoiding tight clothing or belts, avoiding eating late at night and not lying down shortly after mealtime and achieving weight loss   - Avoid NSAID's, caffeine, peppermints, alcohol, tobacco and foods that incite symptoms   - ok to continue pantoprazole daily before breakfast  -Schedule EGD/colonoscopy w/MAC for RIDDHI and change in stools  -Prep: miralax prep    ** If MAC @ [facility]/[place]:    - NO alcohol, recreational drugs nor erectile dysfunction mediations 24 hours before procedure(s)   - NO herbal supplements or weight loss medications x 7 days prior to the procedure(s)    ** If MAC @ [facility] or IV twilight - continue all medications as prescribed

## 2023-05-24 ENCOUNTER — HOSPITAL ENCOUNTER (OUTPATIENT)
Dept: MAMMOGRAPHY | Age: 68
Discharge: HOME | End: 2023-05-24
Attending: INTERNAL MEDICINE
Payer: COMMERCIAL

## 2023-05-24 DIAGNOSIS — Z12.31 SCREENING MAMMOGRAM, ENCOUNTER FOR: ICD-10-CM

## 2023-05-24 DIAGNOSIS — Z12.31 ENCOUNTER FOR SCREENING MAMMOGRAM FOR MALIGNANT NEOPLASM OF BREAST: ICD-10-CM

## 2023-05-24 PROCEDURE — 77067 SCR MAMMO BI INCL CAD: CPT | Performed by: INTERNAL MEDICINE

## 2023-05-24 PROCEDURE — 77063 BREAST TOMOSYNTHESIS BI: CPT | Performed by: INTERNAL MEDICINE

## 2023-06-15 ENCOUNTER — OFFICE VISIT (OUTPATIENT)
Dept: INTERNAL MEDICINE CLINIC | Facility: CLINIC | Age: 68
End: 2023-06-15

## 2023-06-15 ENCOUNTER — HOSPITAL ENCOUNTER (OUTPATIENT)
Dept: GENERAL RADIOLOGY | Facility: HOSPITAL | Age: 68
Discharge: HOME | End: 2023-06-15
Payer: COMMERCIAL

## 2023-06-15 ENCOUNTER — NURSE TRIAGE (OUTPATIENT)
Dept: INTERNAL MEDICINE CLINIC | Facility: CLINIC | Age: 68
End: 2023-06-15

## 2023-06-15 VITALS
HEIGHT: 66 IN | BODY MASS INDEX: 46.61 KG/M2 | HEART RATE: 70 BPM | OXYGEN SATURATION: 96 % | TEMPERATURE: 98 F | SYSTOLIC BLOOD PRESSURE: 123 MMHG | WEIGHT: 290 LBS | DIASTOLIC BLOOD PRESSURE: 72 MMHG

## 2023-06-15 DIAGNOSIS — S69.92XA INJURY OF LEFT THUMB, INITIAL ENCOUNTER: ICD-10-CM

## 2023-06-15 DIAGNOSIS — R07.81 RIB PAIN ON LEFT SIDE: ICD-10-CM

## 2023-06-15 DIAGNOSIS — R07.81 RIB PAIN ON LEFT SIDE: Primary | ICD-10-CM

## 2023-06-15 PROCEDURE — 3074F SYST BP LT 130 MM HG: CPT

## 2023-06-15 PROCEDURE — 3078F DIAST BP <80 MM HG: CPT

## 2023-06-15 PROCEDURE — 3008F BODY MASS INDEX DOCD: CPT

## 2023-06-15 PROCEDURE — 99213 OFFICE O/P EST LOW 20 MIN: CPT

## 2023-06-15 PROCEDURE — 73140 X-RAY EXAM OF FINGER(S): CPT

## 2023-06-15 PROCEDURE — 71100 X-RAY EXAM RIBS UNI 2 VIEWS: CPT

## 2023-06-16 ENCOUNTER — PATIENT MESSAGE (OUTPATIENT)
Dept: GASTROENTEROLOGY | Facility: CLINIC | Age: 68
End: 2023-06-16

## 2023-06-16 NOTE — TELEPHONE ENCOUNTER
From: Miguel A Childs  To: Eve Anderson MD  Sent: 6/16/2023 4:39 PM CDT  Subject: Colonoscopy Prep     Dr Sofia Dockery,   I misplaced my prep instructions, please message them to me.   Thanks,  [facility]

## 2023-06-19 ENCOUNTER — PATIENT MESSAGE (OUTPATIENT)
Dept: INTERNAL MEDICINE CLINIC | Facility: CLINIC | Age: 68
End: 2023-06-19

## 2023-06-26 ENCOUNTER — ANESTHESIA EVENT (OUTPATIENT)
Dept: ENDOSCOPY | Facility: HOSPITAL | Age: 68
End: 2023-06-26
Payer: COMMERCIAL

## 2023-06-26 ENCOUNTER — ANESTHESIA (OUTPATIENT)
Dept: ENDOSCOPY | Facility: HOSPITAL | Age: 68
End: 2023-06-26
Payer: COMMERCIAL

## 2023-06-26 ENCOUNTER — HOSPITAL ENCOUNTER (OUTPATIENT)
Facility: HOSPITAL | Age: 68
Setting detail: HOSPITAL OUTPATIENT SURGERY
Discharge: HOME | End: 2023-06-26
Attending: INTERNAL MEDICINE | Admitting: INTERNAL MEDICINE
Payer: COMMERCIAL

## 2023-06-26 VITALS
HEIGHT: 66 IN | HEART RATE: 64 BPM | SYSTOLIC BLOOD PRESSURE: 122 MMHG | RESPIRATION RATE: 21 BRPM | OXYGEN SATURATION: 96 % | DIASTOLIC BLOOD PRESSURE: 80 MMHG | BODY MASS INDEX: 46.61 KG/M2 | WEIGHT: 290 LBS | TEMPERATURE: 98 F

## 2023-06-26 DIAGNOSIS — D50.9 IRON DEFICIENCY ANEMIA, UNSPECIFIED IRON DEFICIENCY ANEMIA TYPE: ICD-10-CM

## 2023-06-26 DIAGNOSIS — R19.5 CHANGE IN STOOL: ICD-10-CM

## 2023-06-26 PROCEDURE — 0DJD8ZZ INSPECTION OF LOWER INTESTINAL TRACT, VIA NATURAL OR ARTIFICIAL OPENING ENDOSCOPIC: ICD-10-PCS | Performed by: INTERNAL MEDICINE

## 2023-06-26 PROCEDURE — 0DB98ZX EXCISION OF DUODENUM, VIA NATURAL OR ARTIFICIAL OPENING ENDOSCOPIC, DIAGNOSTIC: ICD-10-PCS | Performed by: INTERNAL MEDICINE

## 2023-06-26 PROCEDURE — 45378 DIAGNOSTIC COLONOSCOPY: CPT | Performed by: INTERNAL MEDICINE

## 2023-06-26 PROCEDURE — 0DB38ZX EXCISION OF LOWER ESOPHAGUS, VIA NATURAL OR ARTIFICIAL OPENING ENDOSCOPIC, DIAGNOSTIC: ICD-10-PCS | Performed by: INTERNAL MEDICINE

## 2023-06-26 PROCEDURE — 43239 EGD BIOPSY SINGLE/MULTIPLE: CPT | Performed by: INTERNAL MEDICINE

## 2023-06-26 RX ORDER — NALOXONE HYDROCHLORIDE 0.4 MG/ML
80 INJECTION, SOLUTION INTRAMUSCULAR; INTRAVENOUS; SUBCUTANEOUS AS NEEDED
Status: DISCONTINUED | OUTPATIENT
Start: 2023-06-26 | End: 2023-06-26

## 2023-06-26 RX ORDER — LIDOCAINE HYDROCHLORIDE 10 MG/ML
INJECTION, SOLUTION EPIDURAL; INFILTRATION; INTRACAUDAL; PERINEURAL AS NEEDED
Status: DISCONTINUED | OUTPATIENT
Start: 2023-06-26 | End: 2023-06-26 | Stop reason: SURG

## 2023-06-26 RX ORDER — SODIUM CHLORIDE, SODIUM LACTATE, POTASSIUM CHLORIDE, CALCIUM CHLORIDE 600; 310; 30; 20 MG/100ML; MG/100ML; MG/100ML; MG/100ML
INJECTION, SOLUTION INTRAVENOUS CONTINUOUS PRN
Status: DISCONTINUED | OUTPATIENT
Start: 2023-06-26 | End: 2023-06-26 | Stop reason: SURG

## 2023-06-26 RX ORDER — SODIUM CHLORIDE, SODIUM LACTATE, POTASSIUM CHLORIDE, CALCIUM CHLORIDE 600; 310; 30; 20 MG/100ML; MG/100ML; MG/100ML; MG/100ML
INJECTION, SOLUTION INTRAVENOUS CONTINUOUS
Status: DISCONTINUED | OUTPATIENT
Start: 2023-06-26 | End: 2023-06-26

## 2023-06-26 RX ADMIN — SODIUM CHLORIDE, SODIUM LACTATE, POTASSIUM CHLORIDE, CALCIUM CHLORIDE: 600; 310; 30; 20 INJECTION, SOLUTION INTRAVENOUS at 08:45:00

## 2023-06-26 RX ADMIN — SODIUM CHLORIDE, SODIUM LACTATE, POTASSIUM CHLORIDE, CALCIUM CHLORIDE: 600; 310; 30; 20 INJECTION, SOLUTION INTRAVENOUS at 09:19:00

## 2023-06-26 RX ADMIN — LIDOCAINE HYDROCHLORIDE 50 MG: 10 INJECTION, SOLUTION EPIDURAL; INFILTRATION; INTRACAUDAL; PERINEURAL at 08:49:00

## 2023-06-29 ENCOUNTER — TELEPHONE (OUTPATIENT)
Facility: CLINIC | Age: 68
End: 2023-06-29

## 2023-07-10 ENCOUNTER — MED REC SCAN ONLY (OUTPATIENT)
Facility: CLINIC | Age: 68
End: 2023-07-10

## 2023-07-25 ENCOUNTER — LAB ENCOUNTER (OUTPATIENT)
Dept: LAB | Age: 68
End: 2023-07-25
Attending: INTERNAL MEDICINE
Payer: COMMERCIAL

## 2023-07-25 DIAGNOSIS — N18.31 STAGE 3A CHRONIC KIDNEY DISEASE (HCC): ICD-10-CM

## 2023-07-25 LAB
ALBUMIN SERPL-MCNC: 3.6 G/DL (ref 3.4–5)
ANION GAP SERPL CALC-SCNC: 6 MMOL/L (ref 0–18)
BASOPHILS # BLD AUTO: 0.06 X10(3) UL (ref 0–0.2)
BASOPHILS NFR BLD AUTO: 0.8 %
BUN BLD-MCNC: 18 MG/DL (ref 7–18)
CALCIUM BLD-MCNC: 8.7 MG/DL (ref 8.5–10.1)
CHLORIDE SERPL-SCNC: 104 MMOL/L (ref 98–112)
CO2 SERPL-SCNC: 25 MMOL/L (ref 21–32)
CREAT BLD-MCNC: 1.39 MG/DL
EGFRCR SERPLBLD CKD-EPI 2021: 42 ML/MIN/1.73M2 (ref 60–?)
EOSINOPHIL # BLD AUTO: 0.25 X10(3) UL (ref 0–0.7)
EOSINOPHIL NFR BLD AUTO: 3.3 %
ERYTHROCYTE [DISTWIDTH] IN BLOOD BY AUTOMATED COUNT: 14.6 %
GLUCOSE BLD-MCNC: 91 MG/DL (ref 70–99)
HCT VFR BLD AUTO: 37.6 %
HGB BLD-MCNC: 11.8 G/DL
IMM GRANULOCYTES # BLD AUTO: 0.06 X10(3) UL (ref 0–1)
IMM GRANULOCYTES NFR BLD: 0.8 %
LYMPHOCYTES # BLD AUTO: 1.9 X10(3) UL (ref 1–4)
LYMPHOCYTES NFR BLD AUTO: 25.4 %
MCH RBC QN AUTO: 28.9 PG (ref 26–34)
MCHC RBC AUTO-ENTMCNC: 31.4 G/DL (ref 31–37)
MCV RBC AUTO: 92.2 FL
MONOCYTES # BLD AUTO: 0.83 X10(3) UL (ref 0.1–1)
MONOCYTES NFR BLD AUTO: 11.1 %
NEUTROPHILS # BLD AUTO: 4.38 X10 (3) UL (ref 1.5–7.7)
NEUTROPHILS # BLD AUTO: 4.38 X10(3) UL (ref 1.5–7.7)
NEUTROPHILS NFR BLD AUTO: 58.6 %
OSMOLALITY SERPL CALC.SUM OF ELEC: 281 MOSM/KG (ref 275–295)
PHOSPHATE SERPL-MCNC: 3.2 MG/DL (ref 2.5–4.9)
PLATELET # BLD AUTO: 394 10(3)UL (ref 150–450)
POTASSIUM SERPL-SCNC: 4.3 MMOL/L (ref 3.5–5.1)
RBC # BLD AUTO: 4.08 X10(6)UL
SODIUM SERPL-SCNC: 135 MMOL/L (ref 136–145)
WBC # BLD AUTO: 7.5 X10(3) UL (ref 4–11)

## 2023-07-25 PROCEDURE — 80069 RENAL FUNCTION PANEL: CPT

## 2023-07-25 PROCEDURE — 36415 COLL VENOUS BLD VENIPUNCTURE: CPT

## 2023-07-25 PROCEDURE — 85025 COMPLETE CBC W/AUTO DIFF WBC: CPT

## 2023-07-28 ENCOUNTER — OFFICE VISIT (OUTPATIENT)
Dept: INTERNAL MEDICINE CLINIC | Facility: CLINIC | Age: 68
End: 2023-07-28

## 2023-07-28 VITALS
DIASTOLIC BLOOD PRESSURE: 76 MMHG | RESPIRATION RATE: 18 BRPM | WEIGHT: 293 LBS | HEART RATE: 76 BPM | BODY MASS INDEX: 47.09 KG/M2 | HEIGHT: 66 IN | SYSTOLIC BLOOD PRESSURE: 123 MMHG

## 2023-07-28 DIAGNOSIS — I10 PRIMARY HYPERTENSION: Primary | ICD-10-CM

## 2023-07-28 DIAGNOSIS — Z96.641 HISTORY OF TOTAL RIGHT HIP REPLACEMENT: ICD-10-CM

## 2023-07-28 PROCEDURE — 3008F BODY MASS INDEX DOCD: CPT | Performed by: INTERNAL MEDICINE

## 2023-07-28 PROCEDURE — 99212 OFFICE O/P EST SF 10 MIN: CPT | Performed by: INTERNAL MEDICINE

## 2023-07-28 PROCEDURE — 3074F SYST BP LT 130 MM HG: CPT | Performed by: INTERNAL MEDICINE

## 2023-07-28 PROCEDURE — 3078F DIAST BP <80 MM HG: CPT | Performed by: INTERNAL MEDICINE

## 2023-08-09 DIAGNOSIS — R89.9 ABNORMAL LABORATORY TEST: ICD-10-CM

## 2023-08-10 RX ORDER — DULOXETIN HYDROCHLORIDE 60 MG/1
60 CAPSULE, DELAYED RELEASE ORAL DAILY
Qty: 90 CAPSULE | Refills: 1 | Status: SHIPPED | OUTPATIENT
Start: 2023-08-10

## 2023-08-10 NOTE — TELEPHONE ENCOUNTER
Refill passed per [facility] Hector protocol. Please review pended refill request as unable to refill due to high/very high interaction warning copied here:    High  Drug-Drug: traZODone and DULoxetineSerotonergic effects of trazodone and serotonin/norepinephrine reuptake inhibitors (SNRIs) may be additive. The risk of serotonin syndrome/toxicity may be increased  Requested Prescriptions   Pending Prescriptions Disp Refills    DULoxetine (CYMBALTA) 60 MG Oral Cap DR Particles 90 capsule 1     Sig: Take 1 capsule (60 mg total) by mouth daily.        Psychiatric Non-Scheduled (Anti-Anxiety) Passed - 8/9/2023  3:47 PM        Passed - In person appointment or virtual visit in the past 6 mos or appointment in next 3 mos     Recent Outpatient Visits              1 week ago Primary hypertension    [facility], [facility], MichaelAriane Rebolledo MD    Office Visit    1 month ago Rib pain on left side    [facility], [address], [address] [place] [address],[address], AshleeDOMENICA    Office Visit    2 months ago Iron deficiency anemia, unspecified iron deficiency anemia type    [facility], [address], Rico Caal MD    Office Visit    3 months ago Dyspnea, unspecified type    [facility], [address], [place] Noel Flores MD    Office Visit    3 months ago Stage 3a chronic kidney disease [facility])    [address], [address] [facility] Christopher, MD    Office Visit          Future Appointments         Provider Department Appt Notes    Tomorrow Nile Bashir MD [address], 602 [facility] Lab results    In 1 month Cyril Kerns MD [address], [address] [facility] [address] up    In 1 month Arianne Murillo MD [address], [address], [place] Follow up to colonoscopy and endoscopy                  Recent Outpatient Visits              1 week ago Primary hypertension [facility], [address] [place], MauriAriane Duong MD    Office Visit    1 month ago Rib pain on left side    [facility], [address] [place], [place] Ernie Armstrong, APRN    Office Visit    2 months ago Iron deficiency anemia, unspecified iron deficiency anemia type    [facility], [address], [place]    Office Visit    3 months ago Dyspnea, unspecified type    [facility], [address] [place], [place] Yvette Leyva MD    Office Visit    3 months ago Stage 3a chronic kidney disease [facility])    Jonathan Olmos MD    Office Visit          Future Appointments         Provider Department Appt Notes    Tomorrow Angel Winston MD [address], 602 [facility] Lab results    In 1 month Samson Krishnamurthy MD [address], [address] [facility] [address] up    In 1 month Hector Lambert MD [address], [address], [place] Follow up to colonoscopy and endoscopy

## 2023-08-11 ENCOUNTER — OFFICE VISIT (OUTPATIENT)
Dept: NEPHROLOGY | Facility: CLINIC | Age: 68
End: 2023-08-11

## 2023-08-11 VITALS
HEIGHT: 66 IN | SYSTOLIC BLOOD PRESSURE: 117 MMHG | WEIGHT: 293 LBS | DIASTOLIC BLOOD PRESSURE: 81 MMHG | HEART RATE: 72 BPM | BODY MASS INDEX: 47.09 KG/M2

## 2023-08-11 DIAGNOSIS — N18.31 STAGE 3A CHRONIC KIDNEY DISEASE (HCC): Primary | ICD-10-CM

## 2023-08-11 DIAGNOSIS — E87.1 HYPONATREMIA: ICD-10-CM

## 2023-08-11 PROCEDURE — 3079F DIAST BP 80-89 MM HG: CPT | Performed by: INTERNAL MEDICINE

## 2023-08-11 PROCEDURE — 99214 OFFICE O/P EST MOD 30 MIN: CPT | Performed by: INTERNAL MEDICINE

## 2023-08-11 PROCEDURE — 3074F SYST BP LT 130 MM HG: CPT | Performed by: INTERNAL MEDICINE

## 2023-08-11 PROCEDURE — 3008F BODY MASS INDEX DOCD: CPT | Performed by: INTERNAL MEDICINE

## 2023-08-11 NOTE — PATIENT INSTRUCTIONS
Check your blood pressures daily and call me in 1 week to make sure that your blood pressures and not going too low. Otherwise repeat your kidney blood test in 1 month. Orders are in the computer.

## 2023-08-11 NOTE — PROGRESS NOTES
08/11/23        Patient: Cate Charles   YOB: 1955   Date of Visit: 8/11/2023       Dear  Dr. Maddie Augustin MD,      Thank you for referring Cate Charles to my practice. Please find my assessment and plan below. As you know she is a 61-year-old female with a history of hypertension, DJD, depression, GERD and hypothyroidism who I now the pleasure of seeing for follow-up of hyponatremia and chronic kidney disease stage III. Overall patient states she is doing well without any chest pain, shortness of breath, GI or urinary tract symptoms. She has not been checking her blood pressure readings at home. On physical exam her blood pressure was 117/81 with a pulse of 72 she weighed 297 pounds. Her neck was supple without JVD. Lungs were clear. Heart revealed a regular rate and rhythm and S4 but no gallops, murmurs or rubs. Abdomen was soft, flat, nontender without organomegaly, masses or bruits. Extremities revealed no edema. I reviewed her most recent labs done on July 25, 2023. Creatinine has crept up to 1.39 now with an estimated GFR of 42 cc/min. Sodium just borderline low at 135. I therefore informed the patient that her renal function has deteriorated. Creatinine was 1.09 back in November 2022. She states she is drinking at least 64 ounces of fluid per day. That should be adequate to maintain adequate hydration. Do not want her to increase this to avoid hyponatremia. She sometimes does have orthostasis. She will check her blood pressures daily and call me in 1 week just to make sure blood pressures are not trending too low. Otherwise not on any thing nephrotoxic. She knows to avoid nonsteroidals. We will have her repeat a CBC and renal panel in 1 month to ensure stability. If stable will continue quarterly. I will see her again in 6 months for follow-up or sooner if clinically indicated. Thank you again for allowing me to participate in the care of your patient. If you have any questions please feel free to call.                Sincerely,   Raymond Love MD   [facility], [address] [place] [address]  [address] [place] [address]  [address] [facility] [id number]    Document electronically generated by:  Raymond Love MD

## 2023-09-05 DIAGNOSIS — I10 ESSENTIAL HYPERTENSION: ICD-10-CM

## 2023-09-05 NOTE — TELEPHONE ENCOUNTER
amLODIPine 2.5 MG Oral Tab, TAKE 1 TABLET BY MOUTH ONE TIME A DAY, Disp: 90 tablet, [address]fl: [id number] [address]

## 2023-09-06 RX ORDER — AMLODIPINE BESYLATE 2.5 MG/1
TABLET ORAL
Qty: 90 TABLET | Refills: 3 | Status: SHIPPED | OUTPATIENT
Start: 2023-09-06

## 2023-09-06 NOTE — TELEPHONE ENCOUNTER
Please review; protocol failed/ no protocol  Medication pended for your review and approval.    Requested Prescriptions   Pending Prescriptions Disp Refills    amLODIPine 2.5 MG Oral Tab 90 tablet 3     Sig: TAKE 1 TABLET BY MOUTH ONE TIME A DAY       Hypertensive Medications Protocol Failed - 9/5/2023  3:30 PM        Failed - CMP or BMP in past 6 months     No results found for this or any previous visit (from the past 4392 hour(s)).             Failed - EGFRCR or GFRNAA > 50     GFR Evaluation  EGFRCR: 42 , resulted on 7/25/2023          Passed - In person appointment in the past 12 or next 3 months     Recent Outpatient Visits              3 weeks ago Stage 3a chronic kidney disease [facility])    Ole Gagnon, [address] [facility], Sinan Dailey MD    Office Visit    1 month ago Primary hypertension    [facility], [address], [place] Leticia Mendoza MD    Office Visit    2 months ago Rib pain on left side    [facility], [address] [place] [address] Ashlee Dillard, DOMENICA    Office Visit    3 months ago Iron deficiency anemia, unspecified iron deficiency anemia type    [address] Rayford Hammans, MD    Office Visit    4 months ago Dyspnea, unspecified type    [facility], [facility], Faby Toscano MD    Office Visit          Future Appointments         Provider Department Appt Notes    In 3 weeks MD Ole Domingo, [address] [facility],[address], [address] [place] [address] up    In 4 weeks Rayford Hammans, MD Delmon Hartmann, [address] [facility],[address] [facility], [place] Follow up to colonoscopy and endoscopy               Passed - Last BP reading less than 140/90     BP Readings from Last 1 Encounters:  08/11/23 : 117/81              Passed - In person appointment or virtual visit in the past 6 months     Recent Outpatient Visits              3 weeks ago Stage 3a chronic kidney disease ([facility] [address] Eduarda Juárez, [facility]    Office Visit    1 month ago Primary hypertension    [facility], [address] [place], PaulAriane Woo MD    Office Visit    2 months ago Rib pain on left side    [facility], [address] [place] [address] DOMENICA Mccray    Office Visit    3 months ago Iron deficiency anemia, unspecified iron deficiency anemia type    [address] [facility] Awa Lin MD    Office Visit    4 months ago Dyspnea, unspecified type    [address], [facility] [address], Prince Trejo MD    Office Visit          Future Appointments         Provider Department Appt Notes    In 3 weeks Kyler Kerr MD [address], [address] [facility],[address], [address] [place] [address] [address] up    In 4 weeks Awa Lin MD [address], [address] [facility] Follow up to colonoscopy and endoscopy

## 2023-09-08 ENCOUNTER — LAB ENCOUNTER (OUTPATIENT)
Dept: LAB | Age: 68
End: 2023-09-08
Attending: INTERNAL MEDICINE
Payer: COMMERCIAL

## 2023-09-08 DIAGNOSIS — N18.31 STAGE 3A CHRONIC KIDNEY DISEASE (HCC): ICD-10-CM

## 2023-09-08 LAB
ALBUMIN SERPL-MCNC: 3.7 G/DL (ref 3.4–5)
ANION GAP SERPL CALC-SCNC: 6 MMOL/L (ref 0–18)
BASOPHILS # BLD AUTO: 0.05 X10(3) UL (ref 0–0.2)
BASOPHILS NFR BLD AUTO: 0.7 %
BUN BLD-MCNC: 15 MG/DL (ref 7–18)
CALCIUM BLD-MCNC: 9.1 MG/DL (ref 8.5–10.1)
CHLORIDE SERPL-SCNC: 107 MMOL/L (ref 98–112)
CO2 SERPL-SCNC: 26 MMOL/L (ref 21–32)
CREAT BLD-MCNC: 1.19 MG/DL
EGFRCR SERPLBLD CKD-EPI 2021: 50 ML/MIN/1.73M2 (ref 60–?)
EOSINOPHIL # BLD AUTO: 0.2 X10(3) UL (ref 0–0.7)
EOSINOPHIL NFR BLD AUTO: 3 %
ERYTHROCYTE [DISTWIDTH] IN BLOOD BY AUTOMATED COUNT: 13.6 %
GLUCOSE BLD-MCNC: 109 MG/DL (ref 70–99)
HCT VFR BLD AUTO: 36.7 %
HGB BLD-MCNC: 12.4 G/DL
IMM GRANULOCYTES # BLD AUTO: 0.04 X10(3) UL (ref 0–1)
IMM GRANULOCYTES NFR BLD: 0.6 %
LYMPHOCYTES # BLD AUTO: 1.57 X10(3) UL (ref 1–4)
LYMPHOCYTES NFR BLD AUTO: 23.3 %
MCH RBC QN AUTO: 30.5 PG (ref 26–34)
MCHC RBC AUTO-ENTMCNC: 33.8 G/DL (ref 31–37)
MCV RBC AUTO: 90.2 FL
MONOCYTES # BLD AUTO: 0.58 X10(3) UL (ref 0.1–1)
MONOCYTES NFR BLD AUTO: 8.6 %
NEUTROPHILS # BLD AUTO: 4.31 X10 (3) UL (ref 1.5–7.7)
NEUTROPHILS # BLD AUTO: 4.31 X10(3) UL (ref 1.5–7.7)
NEUTROPHILS NFR BLD AUTO: 63.8 %
OSMOLALITY SERPL CALC.SUM OF ELEC: 289 MOSM/KG (ref 275–295)
PHOSPHATE SERPL-MCNC: 4.2 MG/DL (ref 2.5–4.9)
PLATELET # BLD AUTO: 317 10(3)UL (ref 150–450)
POTASSIUM SERPL-SCNC: 4.4 MMOL/L (ref 3.5–5.1)
RBC # BLD AUTO: 4.07 X10(6)UL
SODIUM SERPL-SCNC: 139 MMOL/L (ref 136–145)
WBC # BLD AUTO: 6.8 X10(3) UL (ref 4–11)

## 2023-09-08 PROCEDURE — 85025 COMPLETE CBC W/AUTO DIFF WBC: CPT

## 2023-09-08 PROCEDURE — 36415 COLL VENOUS BLD VENIPUNCTURE: CPT

## 2023-09-08 PROCEDURE — 80069 RENAL FUNCTION PANEL: CPT

## 2023-09-11 RX ORDER — BISOPROLOL FUMARATE 5 MG/1
5 TABLET, FILM COATED ORAL DAILY
Qty: 90 TABLET | Refills: 1 | Status: SHIPPED | OUTPATIENT
Start: 2023-09-11

## 2023-09-11 NOTE — TELEPHONE ENCOUNTER
Please review; protocol failed. No active /future labs noted     Requested Prescriptions   Pending Prescriptions Disp Refills    bisoprolol 5 MG Oral Tab 90 tablet 1     Sig: Take 1 tablet (5 mg total) by mouth daily. Hypertensive Medications Protocol Failed - 9/9/2023  2:05 PM        Failed - CMP or BMP in past 6 months     No results found for this or any previous visit (from the past 4392 hour(s)).             Failed - EGFRCR or GFRNAA > 50     GFR Evaluation  EGFRCR: 50 , resulted on 9/8/2023          Passed - In person appointment in the past 12 or next 3 months     Recent Outpatient Visits              1 month ago Stage 3a chronic kidney disease [facility])    [address], 602 [facility] MD Cheryl    Office Visit    1 month ago Primary hypertension    [facility], [address], [place] Yohan Hart MD    Office Visit    2 months ago Rib pain on left side    [facility], [address] [place] [address] Ashlee Dillard APRN    Office Visit    3 months ago Iron deficiency anemia, unspecified iron deficiency anemia type    [address] [facility], [place], Alison Tellez MD    Office Visit    4 months ago Dyspnea, unspecified type    [address], [address] [place], Earl Pallas, MD    Office Visit          Future Appointments         Provider Department Appt Notes    In 2 weeks Mayur Ragsdale MD [address], [address] [facility],[address], [address] [place] [address] up    In 3 weeks Komal Farias MD [address], [address] [facility], [place] Follow up to colonoscopy and endoscopy               Passed - Last BP reading less than 140/90     BP Readings from Last 1 Encounters:  08/11/23 : 117/81              Passed - In person appointment or virtual visit in the past 6 months     Recent Outpatient Visits              1 month ago Stage 3a chronic kidney disease ([facility] [address]    [facility], [place] [address] [facility], Hipolito Valle MD    Office Visit    1 month ago Primary hypertension    [facility], [facility], [facility] Ariane Roque MD    Office Visit    2 months ago Rib pain on left side    [facility], [address] [place] [address], Ashlee, APRN    Office Visit    3 months ago Iron deficiency anemia, unspecified iron deficiency anemia type    [facility], [address], José Caal MD    Office Visit    4 months ago Dyspnea, unspecified type    [facility], [facility], [place] Lucky Carmine, MD    Office Visit          Future Appointments         Provider Department Appt Notes    In 2 weeks MD Hemant Wolfe, [address], [address] [facility],[address] up    In 3 weeks Romayne Bloomer, MD Abelino Slates, [address] [facility] [address] [facility], [place] Follow up to colonoscopy and endoscopy                  Recent Outpatient Visits              1 month ago Stage 3a chronic kidney disease [facility])    Hemant Boudreaux, [address] [facility], [place] MD Christopher    Office Visit    1 month ago Primary hypertension    [facility] [place], Ariane Eli MD    Office Visit    2 months ago Rib pain on left side    [facility], [address] [facility], [facility], Ashlee, APRN    Office Visit    3 months ago Iron deficiency anemia, unspecified iron deficiency anemia type    [facility] [facility], [place] Romayne Bloomer, MD    Office Visit    4 months ago Dyspnea, unspecified type    [facility] [address], Bonifacio Tomas MD    Office Visit          Future Appointments         Provider Department Appt Notes    In 2 weeks MD Hemant Wolfe, [address], [address] [facility],[address] up    In 3 weeks Romayne Bloomer, MD Abelino Slates, [address], [place] Follow up to colonoscopy and endoscopy

## 2023-09-28 ENCOUNTER — APPOINTMENT (OUTPATIENT)
Dept: GENERAL RADIOLOGY | Facility: HOSPITAL | Age: 68
End: 2023-09-28
Attending: EMERGENCY MEDICINE
Payer: COMMERCIAL

## 2023-09-28 ENCOUNTER — HOSPITAL ENCOUNTER (EMERGENCY)
Facility: HOSPITAL | Age: 68
Discharge: HOME | End: 2023-09-28
Attending: EMERGENCY MEDICINE
Payer: COMMERCIAL

## 2023-09-28 VITALS
OXYGEN SATURATION: 98 % | DIASTOLIC BLOOD PRESSURE: 84 MMHG | BODY MASS INDEX: 47.09 KG/M2 | WEIGHT: 293 LBS | HEART RATE: 74 BPM | HEIGHT: 66 IN | RESPIRATION RATE: 18 BRPM | SYSTOLIC BLOOD PRESSURE: 155 MMHG | TEMPERATURE: 97 F

## 2023-09-28 DIAGNOSIS — S80.00XA CONTUSION OF KNEE, UNSPECIFIED LATERALITY, INITIAL ENCOUNTER: Primary | ICD-10-CM

## 2023-09-28 DIAGNOSIS — S63.259A DISLOCATION OF FINGER, INITIAL ENCOUNTER: ICD-10-CM

## 2023-09-28 PROCEDURE — 99284 EMERGENCY DEPT VISIT MOD MDM: CPT

## 2023-09-28 PROCEDURE — 73562 X-RAY EXAM OF KNEE 3: CPT | Performed by: EMERGENCY MEDICINE

## 2023-09-28 PROCEDURE — 26770 TREAT FINGER DISLOCATION: CPT

## 2023-09-28 PROCEDURE — 73140 X-RAY EXAM OF FINGER(S): CPT | Performed by: EMERGENCY MEDICINE

## 2023-09-28 RX ORDER — TRAZODONE HYDROCHLORIDE 50 MG/1
50 TABLET ORAL NIGHTLY
COMMUNITY

## 2023-09-28 NOTE — ED INITIAL ASSESSMENT (HPI)
Tripped and fell. Denies head injury/loss of consciousness. Denies blood thinners. C/o pain to bilateral knees and index finger to right hand. +deformity to right index finger. Abrasions noted to bilateral knees. Pt able to bear weight after fall. A&ox4.

## 2023-10-04 ENCOUNTER — TELEPHONE (OUTPATIENT)
Dept: GASTROENTEROLOGY | Facility: CLINIC | Age: 68
End: 2023-10-04

## 2023-10-04 ENCOUNTER — OFFICE VISIT (OUTPATIENT)
Dept: GASTROENTEROLOGY | Facility: CLINIC | Age: 68
End: 2023-10-04

## 2023-10-04 VITALS
HEIGHT: 66 IN | SYSTOLIC BLOOD PRESSURE: 138 MMHG | BODY MASS INDEX: 47.09 KG/M2 | WEIGHT: 293 LBS | DIASTOLIC BLOOD PRESSURE: 90 MMHG

## 2023-10-04 DIAGNOSIS — D50.9 IRON DEFICIENCY ANEMIA, UNSPECIFIED IRON DEFICIENCY ANEMIA TYPE: Primary | ICD-10-CM

## 2023-10-04 PROCEDURE — 1159F MED LIST DOCD IN RCRD: CPT | Performed by: INTERNAL MEDICINE

## 2023-10-04 PROCEDURE — 99214 OFFICE O/P EST MOD 30 MIN: CPT | Performed by: INTERNAL MEDICINE

## 2023-10-04 PROCEDURE — 3075F SYST BP GE 130 - 139MM HG: CPT | Performed by: INTERNAL MEDICINE

## 2023-10-04 PROCEDURE — 1160F RVW MEDS BY RX/DR IN RCRD: CPT | Performed by: INTERNAL MEDICINE

## 2023-10-04 PROCEDURE — 3008F BODY MASS INDEX DOCD: CPT | Performed by: INTERNAL MEDICINE

## 2023-10-04 PROCEDURE — 3080F DIAST BP >= 90 MM HG: CPT | Performed by: INTERNAL MEDICINE

## 2023-10-04 NOTE — TELEPHONE ENCOUNTER
Scheduled for:  Video capsule [id number]  Provider Name:  Dr. Agata Cespedes  Date:  10/18/2023 - ok'd by Bethanie Nickerson RN/[place]  Location:  [address] [place] [address]  Sedation:  NONE  Time:  7:30 am, arrival 6:45 am  Prep: \"Plan half prep\"  Meds/Allergies Reconciled?:  Physician reviewed  Diagnosis with codes:  RIDDHI D50.9  Was patient informed to call insurance with codes (Y/N):  Yes  Referral sent?:  Referral was sent at the time of electronic surgical scheduling. [address] [place] [address] or [address] notified?:  I sent an electronic request to Endo Scheduling and received a confirmation today. Medication Orders:  Pt is aware to NOT take iron pills, herbal meds and diet supplements for 7 days before exam. Also to NOT take any form of alcohol, recreational drugs and any forms of ED meds 24 hours before exam.   Misc Orders:  N/A     Further instructions given by staff:  Prep instructions were given to pt in the office, pt verbalized understanding.

## 2023-10-04 NOTE — TELEPHONE ENCOUNTER
Dr. Mireya Cullen -    Can you please send the prep to the pharmacy for the \"half prep\" pt will be consuming for VCE? Thank you! Per protocol, has to specify in notes which prep in order for me to send on your behalf.

## 2023-10-04 NOTE — PROGRESS NOTES
[facility] (formerly [facility]), [facility] - Gastroenterology                                                                                                               Reason for consult: follow-up    Requesting physician or provider: Alton Sequeira MD    No chief complaint on file. HPI:   Faboi Frias is a 79year old year-old female with history of diverticulosis, sleep apnea, depression, hypothyroidism presenting for new anemia    Last saw Thee Leger NP 5/2021  she is here today for f/u  Known to Dr. Kurt Denis  egd 12/2019 c/w LA grade b-c esophagitis  Started ppi  Path (-) h.pylori    Had repeat egd 5/2020  Reflux improved and no barretts or pre-cancerous change    she denies acid reflux while on pantoprazole 40 mg/daily. she has had fullness in epigastric area after eating followed by belching. No bloating. Reported early satiety at initial consult with gi. CT A/P 2020 w/o significant finding. No dysphagia, odynophagia and/or globus. she denies nausea and/or vomiting. she denies recent change in appetite and/or unintentional weight loss. she moves her bowels 2 times per day. Stool consistency variable. Has increased gas. Stools soft. No diarrhea. gen abd pain prior to bm-crampy type. she denies brbpr and/or melena. 05/17/23-- had anemia  Has been anemic despite not giving blood and on iron  No overt bleeding  Last colonoscopy 2017-- normal  Did have EGD with esophagitis healed with PPI now worse    10/04/23 presents for follow up  Anemia improved but kidney function worse  Controlling BP  Weight has gone up since cannot drink as much so eating more  Still on pantoprazole and has belching and full feeling  Has had hysterectomy and abdominoplasty  Did have appendix with hysterectomy      EGD/[id number]  EGD findings:       1. Esophagus: The squamocolumnar junction was noted at 40 cm and appeared irregular with one column extending to 39 cm. The GE junction was noted at 40 cm from the incisors.  Sliding 1-2 cm hiatal hernia. The esophageal mucosa appeared normal. There was no evidence of esophagitis, stricture. Small column again biopsied to rule out barretts  2. Stomach: The stomach distended normally. Normal rugal folds were seen. The pylorus was patent. The gastric mucosa appeared normal. Retroflexion revealed a normal fundus and a no cardia. 3. Duodenum: The duodenal mucosa appeared normal in the 1st and 2nd portion of the duodenum. Biopsied due to anemia     Colonoscopy findings:     1. ARTHUR: normal rectal tone, no masses palpated. 2. NO polyp(s) noted   3. Terminal ileum: the visualized mucosa appeared normal.  4. The colonic mucosa throughout the colon showed normal vascular pattern, without evidence of angioectasias or inflammation. 5. Diverticulosis: pandiverticulosis. 6. A retroflexed view of the rectum revealed hemorrhoids. Recommend:  Repeat colonoscopy in 10 years pending pathology. If new signs or symptoms develop, colonoscopy may need to be repeated sooner. High fiber diet. Monitor for blood in the stool. If having more than just tinge of blood, call office or go to the ER. If continued anemia can consider capsule     >>>If tissue was obtained and you have not received your pathology results either by phone or letter within 2 weeks, please call our office at [phone]. Specimens: duodenal biopsies, distal esophagus    Final Diagnosis:      A. Duodenum; biopsy:  Duodenal mucosa with no significant pathologic change. Preserved villous architecture. B. Distal esophagus; biopsy:   Squamocolumnar mucosa with mild reflux changes. Negative for intestinal metaplasia.        NSAIDS: no  Tobacco: no  Alcohol: rare  Illicit drugs: no    No [facility] GI malignancy    Colonoscopy (2017) negative-- repeat in 10 year    Wt Readings from Last 6 Encounters:  10/04/23 : 298 lb (135.2 kg)  09/28/23 : 297 lb (134.7 kg)  08/11/23 : 297 lb (134.7 kg)  07/28/23 : 297 lb 1.6 oz (134.8 kg)  06/26/23 : 290 lb (131.5 kg)  06/15/23 : 290 lb (131.5 kg)       History, Medications, Allergies, ROS:      Past Medical History:   Diagnosis Date    Back problem     CKD (chronic kidney disease)     Colon polyp 12/2017    Depression     Disorder of thyroid     hypothyroidism    Diverticulosis 12/15/2017    Essential hypertension     High blood pressure     History of blood transfusion 2000    Good Robert    Internal hemorrhoids 12/15/2017    Neuropathy     kike feet    Osteoarthritis     Renal disorder     CKD stage 3    Tremors of nervous system     Unspecified sleep apnea     CPAP    Visual impairment     glasses      Past Surgical History:   Procedure Laterality Date    ABDOMINOPLASTY Bilateral     ANESTH,LOWER ARM SURGERY Left 03/08/2021    radius head replacement    COLONOSCOPY N/A 12/15/2017    Procedure: COLONOSCOPY;  Surgeon: Kim Davis MD;  Location: [address] [place] [address] ENDOSCOPY    COLONOSCOPY  01/01/2007    COLONOSCOPY N/A 6/26/2023    Procedure: COLONOSCOPY / ESOPHAGOGASTRODUODENOSCOPY;  Surgeon: Lurdes Herman MD;  Location: [address] [place] [address] ENDOSCOPY    ELBOW SURGERY Left     HC SUPRACERVICAL ABDOMINAL HYSTERECTOMY      supracervical hysterectomy for fibroids    YOLI LOCALIZATION WIRE 1 SITE LEFT (CPT=19281)      1998    NEEDLE BIOPSY LEFT      REDUCTION LEFT      2000 Bilateral    REDUCTION RIGHT      SPINE SURGERY PROCEDURE UNLISTED      TONSILLECTOMY      TOTAL HIP REPLACEMENT Right 10/14/16    UPPER GI ENDOSCOPY,EXAM        Family Hx:   Family History   Problem Relation Age of Onset    Cancer Father         prostate    Dementia Father     Heart Disease Mother         CVA    Depression Mother     Hypertension Mother     Other (Colonic polyps) Mother     Stroke Mother     Depression Sister     Hypertension Sister     Other (acromegaly from a pituitary tumor) Sister     Depression Sister     Diabetes Sister     Hypertension Sister       Social History:   Social History     Socioeconomic History    Marital status:    Tobacco Use Smoking status: Never    Smokeless tobacco: Never   Vaping Use    Vaping Use: Never used   Substance and Sexual Activity    Alcohol use: Yes     Alcohol/week: 2.0 standard drinks of alcohol     Types: 1 Cans of beer, 1 Standard drinks or equivalent per week     Comment: [facility]    Drug use: Not Currently     Frequency: 1.0 times per week     Types: Cannabis     Comment: for sleep   Other Topics Concern    Caffeine Concern Yes     Comment: 3 cups tea/coffee daily    Exercise No    Pt has a pacemaker No    Pt has a defibrillator No    Reaction to local anesthetic No        Medications (Active prior to today's visit):  Current Outpatient Medications   Medication Sig Dispense Refill    traZODone 50 MG Oral Tab Take 1 tablet (50 mg total) by mouth nightly. bisoprolol 5 MG Oral Tab Take 1 tablet (5 mg total) by mouth daily. 90 tablet 1    amLODIPine 2.5 MG Oral Tab TAKE 1 TABLET BY MOUTH ONE TIME A DAY 90 tablet 3    DULoxetine (CYMBALTA) 60 MG Oral Cap DR Particles Take 1 capsule (60 mg total) by mouth daily. 90 capsule 1    pantoprazole 40 MG Oral Tab EC Take 1 tablet (40 mg total) by mouth every morning before breakfast. 90 tablet 3    levothyroxine 125 MCG Oral Tab Take 1 tablet (125 mcg total) by mouth before breakfast. 90 tablet 3    Levocetirizine Dihydrochloride (XYZAL ALLERGY 24HR OR) Take by mouth. Brexpiprazole 0.5 MG Oral Tab Take 1 mg by mouth every morning. Patient take 1 mg daily      buPROPion HCl ER, XL, 300 MG Oral Tablet 24 Hr Take 2 tablets (600 mg total) by mouth every morning. Take 2 pills to equal 600mg.  In the morning         Allergies:    Bactrim [Sulfametho*    HIVES  Iodides (Topical)       HIVES    Comment:IVP DYE  Radiology Contrast *    HIVES    Comment:Other reaction(s): Hives/Skin Rash  Sulfa Antibiotics       HIVES  Benadryl [Diphenhyd*    NAUSEA AND VOMITING  Trintellix [Vortiox*    ITCHING    ROS:   CONSTITUTIONAL: negative for fevers, chills, sweats and weight loss  EYES Negative for red eyes, yellow eyes, changes in vision  HEENT: Negative for dysphagia and hoarseness  RESPIRATORY: Negative for cough and shortness of breath  CARDIOVASCULAR: Negative for chest pain, palpitations  GASTROINTESTINAL: See HPI  GENITOURINARY: Negative for dysuria and frequency  MUSCULOSKELETAL: Negative for arthralgias and myalgias  NEUROLOGICAL: Negative for dizziness and headaches  BEHAVIOR/PSYCH: Negative for anxiety and poor appetite    PHYSICAL EXAM:   Blood pressure 138/90, height 5' 6\" (1.676 m), weight 298 lb (135.2 kg), not currently breastfeeding. GEN: WD/WN, NAD  HEENT: Supple symmetrical, trachea midline  CV: RRR, the extremities are warm and well perfused   LUNGS: No increased work of breathing  ABDOMEN: No scars, normal bowel sounds, soft, non-tender, non-distended no rebound or guarding, no masses, no hepatomegaly  MSK: No redness, no warmth, no swelling of joints  SKIN: No jaundice, no erythema, no rashes  HEMATOLOGIC: No bleeding, no bruising  NEURO: Alert and interactive, normal gait    Labs/Imaging/Procedures:     EGD 5/2020: Impression:  1. Rule out barretts     Recommend:  1. Ok to do pantoprazole 40 mg daily 30 min before breakfast  2. Await biopsies     Distal esophagus; biopsy:   Gastroesophageal junction mucosa with reactive changes suggestive of reflux. No evidence of dysplasia or metaplasia identified. EGD 12/2019:  Impression:  1. LA grade B-C esophagitis     Recommend:  1. Start pantoprazole 40 mg BID 30 min before breakfast and 30 min before dinner  2. Await biopsies  3. Repeat EGD in 6-8 weeks  Final Diagnosis:      Gastric biopsy:   Fragments of gastric mucosa with lamina propria vascular ectasia, and focal mild inactive chronic inflammation. Diff Quik stain (with appropriate control reactivity) is negative for H pylori microorganisms. CT A/P 9/2020: Impression   CONCLUSION:         1. No acute intra-abdominal/pelvic process identified.        2. Uncomplicated colonic diverticulosis. 3. Small hiatal hernia. Please see above for further details. .  ASSESSMENT/PLAN:   Valeria Jacobo is a 61year old year-old female with history of diverticulosis, sleep apnea, depression, hypothyroidism presenting for RIDDHI and acid reflux    #gerd  #change in bowel habits  # RIDDHI, improving    Recommend:  Here are some reflux precautions:   - Avoid trigger foods  - Anti-reflux measures: raising the head of the bed at night, avoiding tight clothing or belts, avoiding eating late at night and not lying down shortly after mealtime and achieving weight loss   - Avoid NSAID's, caffeine, peppermints, alcohol, tobacco and foods that incite symptoms   - ok to continue pantoprazole daily before breakfast  - improved Hgb but still anemic. Has not donated in last year. -- has donated 3 pints   - discussed capsule endoscopy given no etiology found. Could be from blood donation since dropped in 2021 and started donating in January 2022, June 2022, August 2022  - no donation in last year and has been trending up  - Given RIDDHI agreeable to capsule. Plan half prep  - Repeat labs in 3 months    Discussed risk of retained capsule, infection, perforation. Discussed benefits and agreeable to proceed        Orders This Visit:  No orders of the defined types were placed in this encounter.       Meds This Visit:  Requested Prescriptions      No prescriptions requested or ordered in this encounter       Imaging & Referrals:  None

## 2023-10-04 NOTE — PATIENT INSTRUCTIONS
#gerd  #change in bowel habits  # RIDDHI, improving    Recommend:  Here are some reflux precautions:   - Avoid trigger foods  - Anti-reflux measures: raising the head of the bed at night, avoiding tight clothing or belts, avoiding eating late at night and not lying down shortly after mealtime and achieving weight loss   - Avoid NSAID's, caffeine, peppermints, alcohol, tobacco and foods that incite symptoms   - ok to continue pantoprazole daily before breakfast  - improved Hgb but still anemic. Has not donated in last year. -- has donated 3 pints   - discussed capsule endoscopy given no etiology found. Could be from blood donation since dropped in 2021 and started donating in January 2022, June 2022, August 2022  - no donation in last year and has been trending up  - Given RIDDHI agreeable to capsule. Plan half prep  - Repeat labs in 3 months    Discussed risk of retained capsule, infection, perforation.  Discussed benefits and agreeable to proceed

## 2023-10-04 NOTE — PATIENT INSTRUCTIONS
Problem List Items Addressed This Visit        Unprioritized    Chronic pain of right ankle     Pt with intermittent pain in the ankle, forefoot along the arch and outer aspect of the ankle. Difficulty with walking.   Has been seen by Dr. Lee Ann White and betito sanchez Orders    COMP METABOLIC PANEL (14)    Morbid obesity ([facility] [address]     Body mass index is 47.94 kg/m².    Wt Readings from Last 6 Encounters:  10/02/20 : 297 lb (134.7 kg)  08/17/20 : (!) 301 lb (136.5 kg)  07/07/20 : 300 lb (136.1 kg)  07/03/20 : (!) 301 lb 14.4 Detail Level: Generalized

## 2023-10-05 ENCOUNTER — TELEPHONE (OUTPATIENT)
Facility: CLINIC | Age: 68
End: 2023-10-05

## 2023-10-05 NOTE — TELEPHONE ENCOUNTER
I reviewed below instructions with the pharmacist that she is only to drink half because it only comes in 4000 ML jug. Lesly Nichole MD         10/4/23 10:24 AM  Note  Half of Golytely prep-- 2L     Thank you.

## 2023-10-13 RX ORDER — SODIUM CHLORIDE, SODIUM LACTATE, POTASSIUM CHLORIDE, CALCIUM CHLORIDE 600; 310; 30; 20 MG/100ML; MG/100ML; MG/100ML; MG/100ML
INJECTION, SOLUTION INTRAVENOUS CONTINUOUS
Status: CANCELLED | OUTPATIENT
Start: 2023-10-13

## 2023-10-13 RX ORDER — SODIUM CHLORIDE 0.9 % (FLUSH) 0.9 %
10 SYRINGE (ML) INJECTION AS NEEDED
Status: CANCELLED | OUTPATIENT
Start: 2023-10-13

## 2023-10-18 ENCOUNTER — HOSPITAL ENCOUNTER (OUTPATIENT)
Facility: HOSPITAL | Age: 68
Setting detail: HOSPITAL OUTPATIENT SURGERY
Discharge: HOME | End: 2023-10-18
Attending: INTERNAL MEDICINE | Admitting: INTERNAL MEDICINE
Payer: MEDICARE

## 2023-10-18 VITALS
OXYGEN SATURATION: 93 % | DIASTOLIC BLOOD PRESSURE: 91 MMHG | WEIGHT: 293 LBS | HEART RATE: 71 BPM | HEIGHT: 66 IN | BODY MASS INDEX: 47.09 KG/M2 | SYSTOLIC BLOOD PRESSURE: 155 MMHG | RESPIRATION RATE: 13 BRPM

## 2023-10-18 PROCEDURE — 91110 GI TRC IMG INTRAL ESOPH-ILE: CPT | Performed by: INTERNAL MEDICINE

## 2023-10-18 PROCEDURE — 0DJ07ZZ INSPECTION OF UPPER INTESTINAL TRACT, VIA NATURAL OR ARTIFICIAL OPENING: ICD-10-PCS | Performed by: INTERNAL MEDICINE

## 2023-10-18 NOTE — DISCHARGE INSTRUCTIONS
Home Care Instructions Following Capsule Endoscopy    Diet:  You may drink colorless liquids 2 hours after swallowing the PillCam capsule  You may have a light snack 4 hours after swallowing the PillCam capsule  You may resume your normal diet after the exam is completed    Medication:  If you have questions about resuming your normal medications, please contact your Primary Care Physician. Activities:  Do not exercise, avoid heavy lifting. You may walk, sit, and lay down. You can drive a car. You may return to work. You may resume your normal activities following the study. Considerations:  Be sure the sensor belt is tight at the waist.  Avoid getting the data recorder wet. Check the blue flashing data recorder light every 15 minutes. If it stops blinking or changes color, note the time and contact your doctor. Avoid strong magnetic fields such as MRI devices after swallowing the capsule until you pass it in a bowel movement. Do not disconnect the equipment or completely remove the data recorder at any time during the procedure. Upon completion of the study remove the sensor belt and data recorder, and return the equipment to the [facility] Endoscopy Department at the scheduled time. The capsule passes naturally in a bowel movement, typically in about 24 hours. Most likely, you will be unaware of its passage. It does not need to be retrieved and can safely be flushed down the toilet. Should you be concerned that the capsule did not pass, in the absence of symptoms, an abdominal x-ray can be obtained to confirm its passage.     Contact Your Doctor If:  You have abdominal pain, nausea or vomiting during the procedure  You are not sure that the capsule has passed out of your body  You are undergoing MRI and are not certain the capsule is out of your body; contact your physician for evaluation and possible x-ray before undergoing an MRI exam.    **If unable to reach your doctor, please go to the [facility] Emergency Room**    - Your referring physician will receive a full report of your examination.  - If you do not hear from your doctor's office within two weeks, please call them for your results. You may be able to see your laboratory results in MyChart between 4 and 7 business days. In some cases, your physician may not have viewed the results before they are released to [address] If you have questions regarding your results contact the physician who ordered the test/exam by phone or via [address] by choosing \"Ask a Medical Question. \"

## 2023-10-24 NOTE — OPERATIVE REPORT
VIDEO CAPSULE ENDOSCOPY REPORT    Kilauea Janusz     3/15/1955 Age 79year old   PCP Godwin Valadez MD Gastroenterologist Tyler Moura MD       Date of procedure: 10/18/2023    Pre-operative diagnosis: RIDDHI    Post-operative diagnosis: small red spots but no bleeding noted    Procedure:  Informed consent was obtained from the patient after the risks of the procedure were discussed, including but not limited to aspiration, missed lesion, and capsule retention. After discussions of the risks/benefits and alternatives to this procedure, the patient signed consent. A standard 8 hour video capsule was administered after the patient prepped with a bowel laxative. Findings:   1. Stomach entry (1st gastric image): 34 seconds  2. Small bowel (1st duodenal image): 6 minutes 42 seconds  3. Colon (1st cecal image): 4 hours 58 minutes  4. Red spots/bleedin hour 9 minutes    Impression:  1. No active bleeding noted, some red spots    Recommendations:  1. Avoid NSAIDs as possible  2. Follow up in office   3.  Repeat labs in 3-6 months

## 2023-11-06 ENCOUNTER — TELEPHONE (OUTPATIENT)
Dept: NEUROLOGY | Facility: CLINIC | Age: 68
End: 2023-11-06

## 2023-11-06 ENCOUNTER — OFFICE VISIT (OUTPATIENT)
Dept: INTERNAL MEDICINE CLINIC | Facility: CLINIC | Age: 68
End: 2023-11-06
Payer: MEDICARE

## 2023-11-06 ENCOUNTER — IMMUNIZATION (OUTPATIENT)
Dept: LAB | Age: 68
End: 2023-11-06
Attending: EMERGENCY MEDICINE
Payer: MEDICARE

## 2023-11-06 VITALS
WEIGHT: 293 LBS | BODY MASS INDEX: 47.09 KG/M2 | SYSTOLIC BLOOD PRESSURE: 120 MMHG | DIASTOLIC BLOOD PRESSURE: 76 MMHG | HEART RATE: 66 BPM | RESPIRATION RATE: 18 BRPM | HEIGHT: 66 IN

## 2023-11-06 DIAGNOSIS — M48.061 SPINAL STENOSIS OF LUMBAR REGION WITHOUT NEUROGENIC CLAUDICATION: ICD-10-CM

## 2023-11-06 DIAGNOSIS — G60.8 SENSORY PERIPHERAL NEUROPATHY: ICD-10-CM

## 2023-11-06 DIAGNOSIS — Z00.00 MEDICARE ANNUAL WELLNESS VISIT, SUBSEQUENT: Primary | ICD-10-CM

## 2023-11-06 DIAGNOSIS — K21.9 GERD WITHOUT ESOPHAGITIS: ICD-10-CM

## 2023-11-06 DIAGNOSIS — I10 ESSENTIAL HYPERTENSION: ICD-10-CM

## 2023-11-06 DIAGNOSIS — G47.33 OSA ON CPAP: ICD-10-CM

## 2023-11-06 DIAGNOSIS — F33.41 RECURRENT MAJOR DEPRESSIVE DISORDER, IN PARTIAL REMISSION (HCC): ICD-10-CM

## 2023-11-06 DIAGNOSIS — N18.32 STAGE 3B CHRONIC KIDNEY DISEASE (HCC): ICD-10-CM

## 2023-11-06 DIAGNOSIS — Z23 NEED FOR VACCINATION: Primary | ICD-10-CM

## 2023-11-06 DIAGNOSIS — E66.01 CLASS 3 SEVERE OBESITY DUE TO EXCESS CALORIES WITH SERIOUS COMORBIDITY AND BODY MASS INDEX (BMI) OF 45.0 TO 49.9 IN ADULT (HCC): ICD-10-CM

## 2023-11-06 DIAGNOSIS — E03.9 ACQUIRED HYPOTHYROIDISM: ICD-10-CM

## 2023-11-06 PROCEDURE — 90480 ADMN SARSCOV2 VAC 1/ONLY CMP: CPT

## 2023-11-06 NOTE — TELEPHONE ENCOUNTER
Patient made an established Patient appointment thru [address] I don't see that she has ever seen Dr. Cici Orantes but I left her a message asking her to call back and clarify.

## 2023-11-08 ENCOUNTER — MED REC SCAN ONLY (OUTPATIENT)
Facility: CLINIC | Age: 68
End: 2023-11-08

## 2023-11-13 ENCOUNTER — TELEPHONE (OUTPATIENT)
Facility: CLINIC | Age: 68
End: 2023-11-13

## 2023-11-13 NOTE — TELEPHONE ENCOUNTER
1st reminder letter sent out via mail and shopp for the following:   Iron And Tibc (Order #[id number]) on 10/4/23     Ferritin (Order #[id number]) on 10/4/23     CBC, Platelet;  No Differential (Order #[id number]) on 10/4/23

## 2023-12-30 DIAGNOSIS — E03.9 HYPOTHYROIDISM, UNSPECIFIED TYPE: ICD-10-CM

## 2023-12-30 DIAGNOSIS — I10 ESSENTIAL HYPERTENSION: ICD-10-CM

## 2023-12-30 DIAGNOSIS — R89.9 ABNORMAL LABORATORY TEST: ICD-10-CM

## 2023-12-30 NOTE — TELEPHONE ENCOUNTER
[email or web address]:      amLODIPine 2.5 MG Oral Tab, TAKE 1 TABLET BY MOUTH ONE TIME A DAY, Disp: 90 tablet, Rfl: 3      pantoprazole 40 MG Oral Tab EC, Take 1 tablet (40 mg total) by mouth every morning before breakfast., Disp: 90 tablet, Rfl: 3        DULoxetine (CYMBALTA) 60 MG Oral Cap DR Particles, Take 1 capsule (60 mg total) by mouth daily. , Disp: 90 capsule, Rfl: 1      levothyroxine 125 MCG Oral Tab, Take 1 tablet (125 mcg total) by mouth before breakfast., Disp: 90 tablet, Rfl: 3

## 2023-12-31 RX ORDER — LEVOTHYROXINE SODIUM 0.12 MG/1
125 TABLET ORAL
Qty: 90 TABLET | Refills: 3 | Status: SHIPPED | OUTPATIENT
Start: 2023-12-31

## 2023-12-31 RX ORDER — AMLODIPINE BESYLATE 2.5 MG/1
TABLET ORAL
Qty: 90 TABLET | Refills: 3 | Status: SHIPPED | OUTPATIENT
Start: 2023-12-31

## 2023-12-31 RX ORDER — DULOXETIN HYDROCHLORIDE 60 MG/1
60 CAPSULE, DELAYED RELEASE ORAL DAILY
Qty: 90 CAPSULE | Refills: 1 | Status: SHIPPED | OUTPATIENT
Start: 2023-12-31

## 2023-12-31 RX ORDER — PANTOPRAZOLE SODIUM 40 MG/1
40 TABLET, DELAYED RELEASE ORAL
Qty: 90 TABLET | Refills: 3 | Status: SHIPPED | OUTPATIENT
Start: 2023-12-31

## 2024-01-02 ENCOUNTER — LAB ENCOUNTER (OUTPATIENT)
Dept: LAB | Age: 69
End: 2024-01-02
Attending: INTERNAL MEDICINE
Payer: MEDICARE

## 2024-01-02 DIAGNOSIS — N18.31 STAGE 3A CHRONIC KIDNEY DISEASE (HCC): ICD-10-CM

## 2024-01-02 LAB
ALBUMIN SERPL-MCNC: 3.7 G/DL (ref 3.4–5)
ANION GAP SERPL CALC-SCNC: 7 MMOL/L (ref 0–18)
BASOPHILS # BLD AUTO: 0.08 X10(3) UL (ref 0–0.2)
BASOPHILS NFR BLD AUTO: 0.9 %
BUN BLD-MCNC: 23 MG/DL (ref 9–23)
CALCIUM BLD-MCNC: 8.9 MG/DL (ref 8.5–10.1)
CHLORIDE SERPL-SCNC: 101 MMOL/L (ref 98–112)
CO2 SERPL-SCNC: 27 MMOL/L (ref 21–32)
CREAT BLD-MCNC: 1.37 MG/DL
DEPRECATED HBV CORE AB SER IA-ACNC: 22.5 NG/ML
EGFRCR SERPLBLD CKD-EPI 2021: 43 ML/MIN/1.73M2 (ref 60–?)
EOSINOPHIL # BLD AUTO: 0.26 X10(3) UL (ref 0–0.7)
EOSINOPHIL NFR BLD AUTO: 2.8 %
ERYTHROCYTE [DISTWIDTH] IN BLOOD BY AUTOMATED COUNT: 13.6 %
GLUCOSE BLD-MCNC: 88 MG/DL (ref 70–99)
HCT VFR BLD AUTO: 39.7 %
HGB BLD-MCNC: 12.7 G/DL
IMM GRANULOCYTES # BLD AUTO: 0.08 X10(3) UL (ref 0–1)
IMM GRANULOCYTES NFR BLD: 0.9 %
IRON SATN MFR SERPL: 27 %
IRON SERPL-MCNC: 102 UG/DL
LYMPHOCYTES # BLD AUTO: 2.03 X10(3) UL (ref 1–4)
LYMPHOCYTES NFR BLD AUTO: 21.6 %
MCH RBC QN AUTO: 29.7 PG (ref 26–34)
MCHC RBC AUTO-ENTMCNC: 32 G/DL (ref 31–37)
MCV RBC AUTO: 92.8 FL
MONOCYTES # BLD AUTO: 0.8 X10(3) UL (ref 0.1–1)
MONOCYTES NFR BLD AUTO: 8.5 %
NEUTROPHILS # BLD AUTO: 6.15 X10 (3) UL (ref 1.5–7.7)
NEUTROPHILS # BLD AUTO: 6.15 X10(3) UL (ref 1.5–7.7)
NEUTROPHILS NFR BLD AUTO: 65.3 %
OSMOLALITY SERPL CALC.SUM OF ELEC: 283 MOSM/KG (ref 275–295)
PHOSPHATE SERPL-MCNC: 3.9 MG/DL (ref 2.5–4.9)
PLATELET # BLD AUTO: 390 10(3)UL (ref 150–450)
POTASSIUM SERPL-SCNC: 4.6 MMOL/L (ref 3.5–5.1)
RBC # BLD AUTO: 4.28 X10(6)UL
SODIUM SERPL-SCNC: 135 MMOL/L (ref 136–145)
TIBC SERPL-MCNC: 373 UG/DL (ref 240–450)
TRANSFERRIN SERPL-MCNC: 250 MG/DL (ref 200–360)
WBC # BLD AUTO: 9.4 X10(3) UL (ref 4–11)

## 2024-01-02 PROCEDURE — 83540 ASSAY OF IRON: CPT | Performed by: INTERNAL MEDICINE

## 2024-01-02 PROCEDURE — 80069 RENAL FUNCTION PANEL: CPT

## 2024-01-02 PROCEDURE — 83550 IRON BINDING TEST: CPT | Performed by: INTERNAL MEDICINE

## 2024-01-02 PROCEDURE — 36415 COLL VENOUS BLD VENIPUNCTURE: CPT

## 2024-01-02 PROCEDURE — 82728 ASSAY OF FERRITIN: CPT | Performed by: INTERNAL MEDICINE

## 2024-01-02 PROCEDURE — 85025 COMPLETE CBC W/AUTO DIFF WBC: CPT

## 2024-01-16 ENCOUNTER — OFFICE VISIT (OUTPATIENT)
Dept: NEUROLOGY | Facility: CLINIC | Age: 69
End: 2024-01-16
Payer: MEDICARE

## 2024-01-16 VITALS — HEIGHT: 66 IN | BODY MASS INDEX: 47.09 KG/M2 | WEIGHT: 293 LBS

## 2024-01-16 DIAGNOSIS — Z82.0 FAMILY HISTORY OF HUNTINGTON'S DISEASE: ICD-10-CM

## 2024-01-16 DIAGNOSIS — R25.1 TREMOR: Primary | ICD-10-CM

## 2024-01-16 PROCEDURE — 99203 OFFICE O/P NEW LOW 30 MIN: CPT | Performed by: OTHER

## 2024-01-16 RX ORDER — DESIPRAMINE HYDROCHLORIDE 50 MG/1
TABLET ORAL
COMMUNITY
Start: 2023-12-19

## 2024-01-16 NOTE — PROGRESS NOTES
Neurology follow-up visit     Referred By: Dr. Sen ref. provider found    Chief Complaint:   Chief Complaint   Patient presents with    New Patient     LOV 7/7/20 - Pt presents to discuss what signs she should look out for since her sister has Sheldon's Disease and is there is any testing that needs to be done       HPI:     Helena Ramsey is a 68 year old female, who presents for tremors.  Apparently patient started to have tremors in 2017, there were very mild in the beginning.  She was noticing them more when she was holding objects like a phone, or especially when she was not paying attention shake more.  She felt it might be more prominent than her left arm.  She denied any other symptoms, no slowing down, no sense of self smell loss, no problems with sleep.  She does have 2 sisters who also been diagnosed with essential tremors in the past.  She also takes Abilify for many years.  At one point it was felt that some of this tremors might be a side effect of Abilify.    MRI of the brain was done, no obvious significant abnormality to explain her tremors.  No treatment was initiated at that time    Patient came back for the follow-up in August 2018.  Dose of Abilify was decreased.  However there was no improvement in her tremors.  She was still primarily bothered by it when she was holding a fall in her left hand.    Since she was quite bothered by the tremors even though they were not really perceptible the primidone was started to slow taper.    Patient came back for the follow-up in September 2018.  She felt significantly better, her tremors were significant better, she denies any side effects and if anything her sleep has improved as well.    Patient came back for the follow-up in March 2019, she still had occasional feeling of tremors, especially when she was holding something or other times when she was resting she would notice the tremor, may be more so in the right hand.  However she felt better  than she was months ago.  We will continue the same dose of primidone 150 mg at night.    Patient came back to the follow-up in October 2019, she was mentioned development of some burning paresthesias in her feet.  Especially at night.  She was started on 100 mg of gabapentin and that she was helpful but also she noted that her tremors were even better with start of gabapentin.  She does take B12 supplementation already.  She does have history of hypothyroidism.    Dose of gabapentin was increased at that time in October.  Additional blood work to look for any treatable cause of neuropathy was done.  Then patient was referred to a different neurologist who was ordered EMG, that showed also sensory polyneuropathy, possibility of some chronic lumbar radiculopathies as well.  Dose of gabapentin was increased to 3 times a day.  She continued to have burning sensation in her feet.  While her tremors were quite well controlled.  She came to the follow-up in July 2020, still with good control of the tremors but some burning sensation and concerns about her neuropathy.    Later her tremors shown significant improvement once she has dropped Wellbutrin and therefore she tapered off of primidone.  She was lost to follow-up for over 3 years and came back in January 2024 with a history of Barren's diagnosis in her sister.  But her sister has had symptoms for at least 5 or 10 years prior to that.  Patient still denies any history of chorea or any psychiatric problems.    Past Medical History:   Diagnosis Date    Back problem     CKD (chronic kidney disease)     Colon polyp 12/2017    Depression     Disorder of thyroid     hypothyroidism    Diverticulosis 12/15/2017    Essential hypertension     High blood pressure     History of blood transfusion 2000    Good Robert    Internal hemorrhoids 12/15/2017    Neuropathy     kike feet    Osteoarthritis     Renal disorder     CKD stage 3    Tremors of nervous system     Unspecified sleep  apnea     CPAP    Visual impairment     glasses       Past Surgical History:   Procedure Laterality Date    ABDOMINOPLASTY Bilateral     ANESTH,LOWER ARM SURGERY Left 03/08/2021    radius head replacement    COLONOSCOPY N/A 12/15/2017    Procedure: COLONOSCOPY;  Surgeon: Denisa Strodu MD;  Location: [facility] ENDOSCOPY    COLONOSCOPY  01/01/2007    COLONOSCOPY N/A 6/26/2023    Procedure: COLONOSCOPY / ESOPHAGOGASTRODUODENOSCOPY;  Surgeon: Azalea Olsen MD;  Location: [facility] ENDOSCOPY    ELBOW SURGERY Left     HC SUPRACERVICAL ABDOMINAL HYSTERECTOMY      supracervical hysterectomy for fibroids    YOLI LOCALIZATION WIRE 1 SITE LEFT (CPT=19281)      1998    NEEDLE BIOPSY LEFT      REDUCTION LEFT      2000 Bilateral    REDUCTION RIGHT      SPINE SURGERY PROCEDURE UNLISTED      TONSILLECTOMY      TOTAL HIP REPLACEMENT Right 10/14/16    UPPER GI ENDOSCOPY,EXAM         Social history:  History   Smoking Status    Never   Smokeless Tobacco    Never     History   Alcohol Use    2.0 standard drinks of alcohol/week    1 Cans of beer, 1 Standard drinks or equivalent per week     Comment: [facility]     History   Drug Use Unknown     Comment: edibls for sleep       Family History   Problem Relation Age of Onset    Cancer Father         prostate    Dementia Father     Heart Disease Mother         CVA    Depression Mother     Hypertension Mother     Other (Colonic polyps) Mother     Stroke Mother     Depression Sister     Hypertension Sister     Other (acromegaly from a pituitary tumor) Sister     Depression Sister     Diabetes Sister     Hypertension Sister          Current Outpatient Medications:     Desipramine HCl 50 MG Oral Tab, take 1 tablet by mouth at bedtime for 1 week, then 2 tabs at bedtime for 1 week, then 3 tabs at bedtime., Disp: , Rfl:     amLODIPine 2.5 MG Oral Tab, TAKE 1 TABLET BY MOUTH ONE TIME A DAY, Disp: 90 tablet, Rfl: 3    DULoxetine (CYMBALTA) 60 MG Oral Cap DR Particles, Take 1 capsule (60 mg total) by  mouth daily., Disp: 90 capsule, Rfl: 1    pantoprazole 40 MG Oral Tab EC, Take 1 tablet (40 mg total) by mouth every morning before breakfast., Disp: 90 tablet, Rfl: 3    levothyroxine 125 MCG Oral Tab, Take 1 tablet (125 mcg total) by mouth before breakfast., Disp: 90 tablet, Rfl: 3    traZODone 50 MG Oral Tab, Take 1 tablet (50 mg total) by mouth nightly., Disp: , Rfl:     bisoprolol 5 MG Oral Tab, Take 1 tablet (5 mg total) by mouth daily., Disp: 90 tablet, Rfl: 1    Levocetirizine Dihydrochloride (XYZAL ALLERGY 24HR OR), Take by mouth., Disp: , Rfl:     Allergies   Allergen Reactions    Bactrim [Sulfamethoxazole W/Trimethoprim] HIVES    Iodides (Topical) HIVES     IVP DYE    Radiology Contrast Iodinated Dyes HIVES     Other reaction(s): Hives/Skin Rash    Sulfa Antibiotics HIVES    Benadryl [Diphenhydramine] NAUSEA AND VOMITING    Trintellix [Vortioxetine] ITCHING       ROS:   As in HPI, the rest of the 14 system review was done and was negative      Physical Exam:  Vitals:    01/16/24 1044   Weight: 298 lb (135.2 kg)   Height: 66\"       General: No apparent distress, well nourished, well groomed.  Head- Normocephalic, atraumatic  Eyes- No redness or swelling  ENT- Hearing intake, smell preserved, normal glutition  Neck- No masses or adenopathy  Neurological:     Mental Status- Alert and oriented x3.  Normal attention span and concentration  Thought process intact  Memory intact- recent and remote  Mood intact  Fund of knowledge appropriate for education and age    Language intact including: comprehension, naming, repetition, vocabulary    Cranial Nerves:    VII. Face symmetric, no facial weakness  VIII. Hearing intact to whisper, tuning fork or finger rub.  IX. Pallet elevates symmetrically.  XI. Shoulder shrug is intact  XII. Tongue is midline    Motor Exam:  Muscle tone normal tone no more tremors  No atrophy or fasciculations  Strength- upper extremities 5/5 proximally and distally                  - lower   extremities 5/5 proximally and distally    Sensory Exam:  Light touch sensation- intact in all 4 extremities    Rapid alternating movements intact    Gait:  Normal posture  Normal physiologic      Labs:    Lab Results   Component Value Date    TSH 0.722 01/23/2023     Lab Results   Component Value Date    HDL 72 (H) 11/04/2022    LDL 79 11/04/2022    TRIG 102 11/04/2022     Lab Results   Component Value Date    HGB 12.7 01/02/2024    HCT 39.7 01/02/2024    MCV 92.8 01/02/2024    WBC 9.4 01/02/2024    .0 01/02/2024      Lab Results   Component Value Date    BUN 23 01/02/2024    CA 8.9 01/02/2024    ALT 24 01/23/2023    AST 16 01/23/2023    ALKPHOS 83 09/07/2016    ALB 3.7 01/02/2024     (L) 01/02/2024    K 4.6 01/02/2024     01/02/2024    CO2 27.0 01/02/2024      I have reviewed labs.    MRI of the brain was independently reviewed, no significant abnormalities found.    Assessment   1. Tremor  Tremors have significantly improved, possible side effects of Wellbutrin    2.  Polyneuropathy  MRI of the cervical spine was already ordered.    3.  Family history of Hi's disease.  We discussed possible genetic testing.  She will be referred to a genetic counselor and movement clinic for second opinion.         Education and counseling provided to patient. Instructed patient to call my office or seek medical attention immediately if symptoms worsen.  Patient verbalized understanding of information given. All questions were answered. All side effects of drugs were discussed.     Return to clinic in: No follow-ups on file.    Albert Vera MD

## 2024-02-07 ENCOUNTER — OFFICE VISIT (OUTPATIENT)
Dept: GASTROENTEROLOGY | Facility: CLINIC | Age: 69
End: 2024-02-07
Payer: MEDICARE

## 2024-02-07 VITALS
HEIGHT: 66 IN | HEART RATE: 75 BPM | BODY MASS INDEX: 47.09 KG/M2 | DIASTOLIC BLOOD PRESSURE: 74 MMHG | SYSTOLIC BLOOD PRESSURE: 117 MMHG | WEIGHT: 293 LBS

## 2024-02-07 DIAGNOSIS — D50.9 IRON DEFICIENCY ANEMIA, UNSPECIFIED IRON DEFICIENCY ANEMIA TYPE: Primary | ICD-10-CM

## 2024-02-07 PROCEDURE — 99214 OFFICE O/P EST MOD 30 MIN: CPT | Performed by: INTERNAL MEDICINE

## 2024-02-07 RX ORDER — ZALEPLON 10 MG/1
10 CAPSULE ORAL NIGHTLY
COMMUNITY
Start: 2024-01-22

## 2024-02-07 NOTE — PROGRESS NOTES
[facility] - Gastroenterology                                                                                                               Reason for consult: follow-up    Requesting physician or provider: Gloria Elliott MD    Chief Complaint   Patient presents with    Follow - Up     S/p video swallow        HPI:   Helena Ramsey is a 68 year old year-old female with history of diverticulosis, sleep apnea, depression, hypothyroidism presenting for new anemia    Last saw Tatyana Jerome NP 5/2021  she is here today for f/u  Known to Dr. Olsen  egd 12/2019 c/w LA grade b-c esophagitis  Started ppi  Path (-) h.pylori    Had repeat egd 5/2020  Reflux improved and no barretts or pre-cancerous change    she denies acid reflux while on pantoprazole 40 mg/daily. she has had fullness in epigastric area after eating followed by belching. No bloating.  Reported early satiety at initial consult with gi. CT A/P 2020 w/o significant finding.  No dysphagia, odynophagia and/or globus.  she denies nausea and/or vomiting.  she denies recent change in appetite and/or unintentional weight loss.    she moves her bowels 2 times per day.Stool consistency variable. Has increased gas. Stools soft. No diarrhea. gen abd pain prior to bm-crampy type. she denies brbpr and/or melena.      05/17/23-- had anemia  Has been anemic despite not giving blood and on iron  No overt bleeding  Last colonoscopy 2017-- normal  Did have EGD with esophagitis healed with PPI now worse    10/04/23 presents for follow up  Anemia improved but kidney function worse  Controlling BP  Weight has gone up since cannot drink as much so eating more  Still on pantoprazole and has belching and full feeling  Has had hysterectomy and abdominoplasty  Did have appendix with hysterectomy    02/07/24  Eating more iron foods-- pistachios  Still has LLQ pain randomly, mild but comes and goes  NO bleeding  Hgb stable-- able to donate blood   Does it 3-4 times a  day  Capsule done and no blood, just little red spots    Wt Readings from Last 10 Encounters:   02/07/24 300 lb (136.1 kg)   01/16/24 298 lb (135.2 kg)   11/06/23 298 lb 4.8 oz (135.3 kg)   10/13/23 296 lb (134.3 kg)   10/04/23 298 lb (135.2 kg)   09/28/23 297 lb (134.7 kg)   08/11/23 297 lb (134.7 kg)   07/28/23 297 lb 1.6 oz (134.8 kg)   06/26/23 290 lb (131.5 kg)   06/15/23 290 lb (131.5 kg)         EGD/[id number]  EGD findings:       1. Esophagus: The squamocolumnar junction was noted at 40 cm and appeared irregular with one column extending to 39 cm. The GE junction was noted at 40 cm from the incisors. Sliding 1-2 cm hiatal hernia. The esophageal mucosa appeared normal. There was no evidence of esophagitis, stricture. Small column again biopsied to rule out barretts  2. Stomach: The stomach distended normally. Normal rugal folds were seen. The pylorus was patent. The gastric mucosa appeared normal. Retroflexion revealed a normal fundus and a no cardia.   3. Duodenum: The duodenal mucosa appeared normal in the 1st and 2nd portion of the duodenum. Biopsied due to anemia     Colonoscopy findings:     1. ARTHUR: normal rectal tone, no masses palpated.   2. NO polyp(s) noted   3. Terminal ileum: the visualized mucosa appeared normal.  4. The colonic mucosa throughout the colon showed normal vascular pattern, without evidence of angioectasias or inflammation.   5. Diverticulosis: pandiverticulosis.  6. A retroflexed view of the rectum revealed hemorrhoids.        Recommend:  Repeat colonoscopy in 10 years pending pathology. If new signs or symptoms develop, colonoscopy may need to be repeated sooner.   High fiber diet.  Monitor for blood in the stool. If having more than just tinge of blood, call office or go to the ER.  If continued anemia can consider capsule     >>>If tissue was obtained and you have not received your pathology results either by phone or letter within 2 weeks, please call our office at  [phone].     Specimens: duodenal biopsies, distal esophagus    Final Diagnosis:      A. Duodenum; biopsy:  Duodenal mucosa with no significant pathologic change.   Preserved villous architecture.     B. Distal esophagus; biopsy:   Squamocolumnar mucosa with mild reflux changes.  Negative for intestinal metaplasia.       Capsule endoscopy 10/18/2023  Findings:   1. Stomach entry (1st gastric image): 34 seconds  2. Small bowel (1st duodenal image): 6 minutes 42 seconds  3. Colon (1st cecal image): 4 hours 58 minutes  4. Red spots/bleedin hour 9 minutes     Impression:  1. No active bleeding noted, some red spots     Recommendations:  1. Avoid NSAIDs as possible  2. Follow up in office   3. Repeat labs in 3-6 months    NSAIDS: no  Tobacco: no  Alcohol: rare  Illicit drugs: no    No FH GI malignancy    Colonoscopy () negative-- repeat in 10 year    Wt Readings from Last 6 Encounters:   24 300 lb (136.1 kg)   24 298 lb (135.2 kg)   23 298 lb 4.8 oz (135.3 kg)   10/13/23 296 lb (134.3 kg)   10/04/23 298 lb (135.2 kg)   23 297 lb (134.7 kg)        History, Medications, Allergies, ROS:      Past Medical History:   Diagnosis Date    Back problem     CKD (chronic kidney disease)     Colon polyp 2017    Depression     Disorder of thyroid     hypothyroidism    Diverticulosis 12/15/2017    Essential hypertension     High blood pressure     History of blood transfusion     Good Robert    Internal hemorrhoids 12/15/2017    Neuropathy     kike feet    Osteoarthritis     Renal disorder     CKD stage 3    Tremors of nervous system     Unspecified sleep apnea     CPAP    Visual impairment     glasses      Past Surgical History:   Procedure Laterality Date    ABDOMINOPLASTY Bilateral     ANESTH,LOWER ARM SURGERY Left 2021    radius head replacement    COLONOSCOPY N/A 12/15/2017    Procedure: COLONOSCOPY;  Surgeon: Denisa Stroud MD;  Location: [facility] ENDOSCOPY    COLONOSCOPY   01/01/2007    COLONOSCOPY N/A 6/26/2023    Procedure: COLONOSCOPY / ESOPHAGOGASTRODUODENOSCOPY;  Surgeon: Azalea Olsen MD;  Location: [facility] ENDOSCOPY    ELBOW SURGERY Left     HC SUPRACERVICAL ABDOMINAL HYSTERECTOMY      supracervical hysterectomy for fibroids    YOLI LOCALIZATION WIRE 1 SITE LEFT (CPT=19281)      1998    NEEDLE BIOPSY LEFT      REDUCTION LEFT      2000 Bilateral    REDUCTION RIGHT      SPINE SURGERY PROCEDURE UNLISTED      TONSILLECTOMY      TOTAL HIP REPLACEMENT Right 10/14/16    UPPER GI ENDOSCOPY,EXAM        Family Hx:   Family History   Problem Relation Age of Onset    Cancer Father         prostate    Dementia Father     Heart Disease Mother         CVA    Depression Mother     Hypertension Mother     Other (Colonic polyps) Mother     Stroke Mother     Depression Sister     Hypertension Sister     Other (acromegaly from a pituitary tumor) Sister     Depression Sister     Diabetes Sister     Hypertension Sister       Social History:   Social History     Socioeconomic History    Marital status:    Tobacco Use    Smoking status: Never    Smokeless tobacco: Never   Vaping Use    Vaping Use: Never used   Substance and Sexual Activity    Alcohol use: Yes     Alcohol/week: 2.0 standard drinks of alcohol     Types: 1 Cans of beer, 1 Standard drinks or equivalent per week     Comment: [facility]    Drug use: Not Currently     Frequency: 1.0 times per week     Types: Cannabis     Comment: edibls for sleep   Other Topics Concern    Caffeine Concern Yes     Comment: 3 cups tea/coffee daily    Exercise No    Pt has a pacemaker No    Pt has a defibrillator No    Reaction to local anesthetic No        Medications (Active prior to today's visit):  Current Outpatient Medications   Medication Sig Dispense Refill    Zaleplon 10 MG Oral Cap Take 1 capsule (10 mg total) by mouth nightly.      Desipramine HCl 50 MG Oral Tab take 1 tablet by mouth at bedtime for 1 week, then 2 tabs at bedtime for 1 week, then 3 tabs  at bedtime.      amLODIPine 2.5 MG Oral Tab TAKE 1 TABLET BY MOUTH ONE TIME A DAY 90 tablet 3    DULoxetine (CYMBALTA) 60 MG Oral Cap DR Particles Take 1 capsule (60 mg total) by mouth daily. 90 capsule 1    pantoprazole 40 MG Oral Tab EC Take 1 tablet (40 mg total) by mouth every morning before breakfast. 90 tablet 3    levothyroxine 125 MCG Oral Tab Take 1 tablet (125 mcg total) by mouth before breakfast. 90 tablet 3    traZODone 50 MG Oral Tab Take 1 tablet (50 mg total) by mouth nightly.      bisoprolol 5 MG Oral Tab Take 1 tablet (5 mg total) by mouth daily. 90 tablet 1    Levocetirizine Dihydrochloride (XYZAL ALLERGY 24HR OR) Take by mouth.         Allergies:  Allergies   Allergen Reactions    Bactrim [Sulfamethoxazole W/Trimethoprim] HIVES    Iodides (Topical) HIVES     IVP DYE    Radiology Contrast Iodinated Dyes HIVES     Other reaction(s): Hives/Skin Rash    Sulfa Antibiotics HIVES    Benadryl [Diphenhydramine] NAUSEA AND VOMITING    Trintellix [Vortioxetine] ITCHING       ROS:   CONSTITUTIONAL: negative for fevers, chills, sweats and weight loss  EYES Negative for red eyes, yellow eyes, changes in vision  HEENT: Negative for dysphagia and hoarseness  RESPIRATORY: Negative for cough and shortness of breath  CARDIOVASCULAR: Negative for chest pain, palpitations  GASTROINTESTINAL: See HPI  GENITOURINARY: Negative for dysuria and frequency  MUSCULOSKELETAL: Negative for arthralgias and myalgias  NEUROLOGICAL: Negative for dizziness and headaches  BEHAVIOR/PSYCH: Negative for anxiety and poor appetite    PHYSICAL EXAM:   Blood pressure 117/74, pulse 75, height 5' 6\" (1.676 m), weight 300 lb (136.1 kg), not currently breastfeeding.    GEN: WD/WN, NAD  HEENT: Supple symmetrical, trachea midline  CV: RRR, the extremities are warm and well perfused   LUNGS: No increased work of breathing  ABDOMEN: No scars, normal bowel sounds, soft, non-tender, non-distended no rebound or guarding, no masses, no  hepatomegaly  MSK: No redness, no warmth, no swelling of joints  SKIN: No jaundice, no erythema, no rashes  HEMATOLOGIC: No bleeding, no bruising  NEURO: Alert and interactive, normal gait    Labs/Imaging/Procedures:     EGD 5/2020:  Impression:  1. Rule out barretts     Recommend:  1. Ok to do pantoprazole 40 mg daily 30 min before breakfast  2. Await biopsies     Distal esophagus; biopsy:   Gastroesophageal junction mucosa with reactive changes suggestive of reflux.   No evidence of dysplasia or metaplasia identified.    EGD 12/2019:  Impression:  1. LA grade B-C esophagitis     Recommend:  1. Start pantoprazole 40 mg BID 30 min before breakfast and 30 min before dinner  2. Await biopsies  3. Repeat EGD in 6-8 weeks  Final Diagnosis:      Gastric biopsy:   Fragments of gastric mucosa with lamina propria vascular ectasia, and focal mild inactive chronic inflammation.  Diff Quik stain (with appropriate control reactivity) is negative for H pylori microorganisms.     CT A/P 9/2020:      Impression   CONCLUSION:         1. No acute intra-abdominal/pelvic process identified.       2. Uncomplicated colonic diverticulosis.       3. Small hiatal hernia.       Please see above for further details.        Component      Latest Ref Rng 7/25/2023 9/8/2023 1/2/2024   WBC      4.0 - 11.0 x10(3) uL 7.5  6.8  9.4    RBC      3.80 - 5.30 x10(6)uL 4.08  4.07  4.28    Hemoglobin      12.0 - 16.0 g/dL 11.8 (L)  12.4  12.7    Hematocrit      35.0 - 48.0 % 37.6  36.7  39.7    Platelet Count      150.0 - 450.0 10(3)uL 394.0  317.0  390.0    MCV      80.0 - 100.0 fL 92.2  90.2  92.8    MCH      26.0 - 34.0 pg 28.9  30.5  29.7    MCHC      31.0 - 37.0 g/dL 31.4  33.8  32.0    RDW      % 14.6  13.6  13.6    Prelim Neutrophil Abs      1.50 - 7.70 x10 (3) uL 4.38  4.31  6.15    Neutrophils Absolute      1.50 - 7.70 x10(3) uL 4.38  4.31  6.15    Lymphocytes Absolute      1.00 - 4.00 x10(3) uL 1.90  1.57  2.03    Monocytes Absolute      0.10 -  1.00 x10(3) uL 0.83  0.58  0.80    Eosinophils Absolute      0.00 - 0.70 x10(3) uL 0.25  0.20  0.26    Basophils Absolute      0.00 - 0.20 x10(3) uL 0.06  0.05  0.08    Immature Granulocyte Absolute      0.00 - 1.00 x10(3) uL 0.06  0.04  0.08    Neutrophils %      % 58.6  63.8  65.3    Lymphocytes %      % 25.4  23.3  21.6    Monocytes %      % 11.1  8.6  8.5    Eosinophils %      % 3.3  3.0  2.8    Basophils %      % 0.8  0.7  0.9    Immature Granulocyte %      % 0.8  0.6  0.9       Legend:  (L) Low    Impression   CONCLUSION:  Acute diverticulitis in the left anterior pelvis involving distal descending and proximal sigmoid colon in an area of numerous diverticula.  There is surrounding pericolonic inflammatory stranding and a tiny adjacent contained perforation.    Trace amount of free fluid.     .  ASSESSMENT/PLAN:   Helena Ramsey is a 66 year old year-old female with history of diverticulosis, sleep apnea, depression, hypothyroidism presenting for RIDDHI and acid reflux    # gerd  # change in bowel habits  #  RIDDHI, improving  # IBS- mixed    Recommend:  Here are some reflux precautions:   - Avoid trigger foods  - Anti-reflux measures: raising the head of the bed at night, avoiding tight clothing or belts, avoiding eating late at night and not lying down shortly after mealtime and achieving weight loss   - Avoid NSAID's, caffeine, peppermints, alcohol, tobacco and foods that incite symptoms   - ok to continue pantoprazole daily before breakfast  - improved Hgb, stable now -- has donated 3 pints   - Could be from blood donation since dropped in 2021 and started donating in January 2022, June 2022, August 2022. No donation in last year and has been trending up  - low FODMAP diet        Orders This Visit:  No orders of the defined types were placed in this encounter.      Meds This Visit:  Requested Prescriptions      No prescriptions requested or ordered in this encounter       Imaging & Referrals:  None

## 2024-02-07 NOTE — PATIENT INSTRUCTIONS
# gerd  # change in bowel habits  #  RIDDHI, improving  # IBS- mixed    Recommend:  Here are some reflux precautions:   - Avoid trigger foods  - Anti-reflux measures: raising the head of the bed at night, avoiding tight clothing or belts, avoiding eating late at night and not lying down shortly after mealtime and achieving weight loss   - Avoid NSAID's, caffeine, peppermints, alcohol, tobacco and foods that incite symptoms   - ok to continue pantoprazole daily before breakfast  - improved Hgb, stable now -- has donated 3 pints   - Could be from blood donation since dropped in 2021 and started donating in January 2022, June 2022, August 2022. No donation in last year and has been trending up  - low FODMAP diet

## 2024-02-27 ENCOUNTER — TELEPHONE (OUTPATIENT)
Dept: INTERNAL MEDICINE CLINIC | Facility: CLINIC | Age: 69
End: 2024-02-27

## 2024-02-28 ENCOUNTER — TELEPHONE (OUTPATIENT)
Dept: INTERNAL MEDICINE CLINIC | Facility: CLINIC | Age: 69
End: 2024-02-28

## 2024-02-28 NOTE — TELEPHONE ENCOUNTER
Fax received from [employer].  (Physician's order) form  requesting signature.  Form placed on doctor's desk for review.

## 2024-03-19 NOTE — ASSESSMENT & PLAN NOTE
Blood pressure 130/78, pulse 80, temperature 99.1 °F (37.3 °C), temperature source Oral, resp. rate 22, height 5' 7\" (1.702 m), weight 283 lb (128.4 kg), SpO2 96 %, not currently breastfeeding.      Pressures are stable, kidney functions are stable–EGFR ha
Patient has just restarted her CPAP. She has tolerated it well. No significant problems with noncompliance. Daytime fatigue and drowsiness is much improved. Continue use of the CPAP as directed.
Patient with on and off episodes of foot pain on the right side. She did have a large callus on the ball of the right foot which was extracted per Dr. Frankey Muir. She now has had worsening heel pain and ankle pain on the left side. Limps on the leg.   She
Recent perforated acute diverticulitis but doing much better after treatment with antibiotics. No palpable abdominal pain, rebound, guarding or tenderness. Has an upcoming CT abdomen and pelvis as per Dr. Ethyl Wakulla.   May need a colonoscopy with an EGD if any
Thyroid function test have been stable on levothyroxine at 125 mcg daily. Recheck labs prior to the next office visit in about 2 to 3 months.
Clear to auscultation bilaterally, no wheezing, rhonchi, or rales, excursions normal and symmetrical.  Cardio: Regular rate and rhythm, no murmurs, clicks, gallops, or rubs.   Abdomen: soft, nontender, nondistended, normoactive bowel sounds, no hernias.  Extremities: Warm, well perfused, no tenderness or swelling, normal gait/station, without edema or varicosities  Neuro: Sensation and strength grossly intact and symmetrical.  Psych: Alert and oriented to person, place, and time.    Labs:     Lab Results   Component Value Date/Time    WBC 4.4 02/13/2024 12:47 PM    HGB 13.5 02/13/2024 12:47 PM    HCT 42.3 02/13/2024 12:47 PM     02/13/2024 12:47 PM    MCV 83.3 02/13/2024 12:47 PM     Lab Results   Component Value Date/Time     02/13/2024 12:47 PM    K 3.4 02/13/2024 12:47 PM     02/13/2024 12:47 PM    CO2 27 02/13/2024 12:47 PM    BUN 9 02/13/2024 12:47 PM    GLOB 3.8 02/13/2024 12:47 PM    ALT 23 02/13/2024 12:47 PM         Component  Ref Range & Units 2/13/24 1247   Lipase  13 - 75 U/L 21          Component  Ref Range & Units 2/13/24 1247   Sodium  136 - 145 mmol/L 135 Low    Potassium  3.5 - 5.5 mmol/L 3.4 Low    Chloride  100 - 111 mmol/L 105   CO2  21 - 32 mmol/L 27   Anion Gap  3.0 - 18 mmol/L 3   Glucose  74 - 99 mg/dL 86   BUN  7.0 - 18 MG/DL 9   Creatinine  0.6 - 1.3 MG/DL 0.75   Bun/Cre Ratio  12 - 20   12   Est, Glom Filt Rate  >60 ml/min/1.73m2 >60   Comment:    Pediatric calculator link: https://www.kidney.org/professionals/kdoqi/gfr_calculatorped     These results are not intended for use in patients <18 years of age.     eGFR results are calculated without a race factor using  the 2021 CKD-EPI equation. Careful clinical correlation is recommended, particularly when comparing to results calculated using previous equations.  The CKD-EPI equation is less accurate in patients with extremes of muscle mass, extra-renal metabolism of creatinine, excessive creatine ingestion, or following

## 2024-03-21 RX ORDER — BISOPROLOL FUMARATE 5 MG/1
5 TABLET, FILM COATED ORAL DAILY
Qty: 90 TABLET | Refills: 3 | Status: SHIPPED | OUTPATIENT
Start: 2024-03-21

## 2024-04-01 ENCOUNTER — LAB ENCOUNTER (OUTPATIENT)
Dept: LAB | Age: 69
End: 2024-04-01
Attending: INTERNAL MEDICINE
Payer: MEDICARE

## 2024-04-01 DIAGNOSIS — N18.31 STAGE 3A CHRONIC KIDNEY DISEASE (HCC): ICD-10-CM

## 2024-04-01 LAB
ALBUMIN SERPL-MCNC: 3.7 G/DL (ref 3.4–5)
ANION GAP SERPL CALC-SCNC: 4 MMOL/L (ref 0–18)
BASOPHILS # BLD AUTO: 0.06 X10(3) UL (ref 0–0.2)
BASOPHILS NFR BLD AUTO: 0.7 %
BUN BLD-MCNC: 16 MG/DL (ref 9–23)
CALCIUM BLD-MCNC: 9 MG/DL (ref 8.5–10.1)
CHLORIDE SERPL-SCNC: 106 MMOL/L (ref 98–112)
CO2 SERPL-SCNC: 27 MMOL/L (ref 21–32)
CREAT BLD-MCNC: 1.28 MG/DL
EGFRCR SERPLBLD CKD-EPI 2021: 46 ML/MIN/1.73M2 (ref 60–?)
EOSINOPHIL # BLD AUTO: 0.12 X10(3) UL (ref 0–0.7)
EOSINOPHIL NFR BLD AUTO: 1.5 %
ERYTHROCYTE [DISTWIDTH] IN BLOOD BY AUTOMATED COUNT: 13.3 %
GLUCOSE BLD-MCNC: 91 MG/DL (ref 70–99)
HCT VFR BLD AUTO: 35.9 %
HGB BLD-MCNC: 11.5 G/DL
IMM GRANULOCYTES # BLD AUTO: 0.05 X10(3) UL (ref 0–1)
IMM GRANULOCYTES NFR BLD: 0.6 %
LYMPHOCYTES # BLD AUTO: 1.86 X10(3) UL (ref 1–4)
LYMPHOCYTES NFR BLD AUTO: 22.6 %
MCH RBC QN AUTO: 28.9 PG (ref 26–34)
MCHC RBC AUTO-ENTMCNC: 32 G/DL (ref 31–37)
MCV RBC AUTO: 90.2 FL
MONOCYTES # BLD AUTO: 0.66 X10(3) UL (ref 0.1–1)
MONOCYTES NFR BLD AUTO: 8 %
NEUTROPHILS # BLD AUTO: 5.48 X10 (3) UL (ref 1.5–7.7)
NEUTROPHILS # BLD AUTO: 5.48 X10(3) UL (ref 1.5–7.7)
NEUTROPHILS NFR BLD AUTO: 66.6 %
OSMOLALITY SERPL CALC.SUM OF ELEC: 285 MOSM/KG (ref 275–295)
PHOSPHATE SERPL-MCNC: 3.1 MG/DL (ref 2.5–4.9)
PLATELET # BLD AUTO: 435 10(3)UL (ref 150–450)
POTASSIUM SERPL-SCNC: 4.4 MMOL/L (ref 3.5–5.1)
RBC # BLD AUTO: 3.98 X10(6)UL
SODIUM SERPL-SCNC: 137 MMOL/L (ref 136–145)
WBC # BLD AUTO: 8.2 X10(3) UL (ref 4–11)

## 2024-04-01 PROCEDURE — 36415 COLL VENOUS BLD VENIPUNCTURE: CPT

## 2024-04-01 PROCEDURE — 85025 COMPLETE CBC W/AUTO DIFF WBC: CPT

## 2024-04-01 PROCEDURE — 80069 RENAL FUNCTION PANEL: CPT

## 2024-04-03 ENCOUNTER — TELEPHONE (OUTPATIENT)
Dept: NEPHROLOGY | Facility: CLINIC | Age: 69
End: 2024-04-03

## 2024-04-03 NOTE — TELEPHONE ENCOUNTER
Kidney function overall looks stable but please see soon for routine follow-up.   [FreeTextEntry1] : Bacterial pneumonia\par \par The patient was admitted with acute on top of chronic respiratory failure secondary to bacterial pneumonia versus vaping pneumonitis. Patient was started on antibiotic. Patient responded and she was discharged on antibiotic. The patient finished a course of antibiotic and systemic steroids. No clinical evidence of underlying acute infectious process. I would repeat the CT scan at the end of March to confirm resolution of the infiltrate\par \par Obstructive sleep apnea\par \par I instructed the patient that she needed to be compliant with the CPAP mask. Patient has chronic respiratory failure and need to have the CPAP mask daily.\par \par Acute frontal bur chronic respiratory failure with hypoxemia\par \par The patient is using oxygen at night and with exertion. The baseline oxygen saturation was 90% in room air and she clinically improved\par \par COPD\par \par Patient had a recent admission with acute exacerbation of COPD. The patient is off systemic steroids. Patient increase her exercise capacity and participating in home PT. Patient is not using a bronchodilator to return to high co-pay. I started the patient on albuterol and ipratropium nebulizer treatment every 6 hours as needed. I gave her a prescription for the compressor. Patient is to be started on trelegy pne puff daily.  I instructed the patient how to use the inhaler. Followup in one month.\par \par Pulmonary hypertension\par \par Recent echocardiogram done at the hospital revealed no change in the pulmonary pressures. No evidence of right heart failure.\par \par Thrombocytopenia\par \par Platelet count are stable\par \par Pulmonary nodule\par \par She is scheduled for repeat the CT scan of the chest in March\par \par Patient is still recovering from her left knee surgery. Vision tremendous amount of pain. Patient will require a cyst on her home. I wrote a letter\par \par \par \par Knee pain\par \par She is post op and doing well.  She is doing well and cutting down on her narcotics.  \par \par \par Obstructive sleep apnea\par \par She is compliant with his CPAP and tolerating full face mask. She has limited sleep at night only 2 hours. Instructed her to use when she naps as well.  Pt has lost weight once her weight loss plateaus to follow with repeat sleep study. \par

## 2024-04-16 ENCOUNTER — OFFICE VISIT (OUTPATIENT)
Dept: NEPHROLOGY | Facility: CLINIC | Age: 69
End: 2024-04-16
Payer: MEDICARE

## 2024-04-16 VITALS
DIASTOLIC BLOOD PRESSURE: 72 MMHG | SYSTOLIC BLOOD PRESSURE: 122 MMHG | HEIGHT: 66 IN | WEIGHT: 293 LBS | BODY MASS INDEX: 47.09 KG/M2 | HEART RATE: 75 BPM

## 2024-04-16 DIAGNOSIS — N18.31 STAGE 3A CHRONIC KIDNEY DISEASE (HCC): Primary | ICD-10-CM

## 2024-04-16 PROCEDURE — 99214 OFFICE O/P EST MOD 30 MIN: CPT | Performed by: INTERNAL MEDICINE

## 2024-04-16 NOTE — PATIENT INSTRUCTIONS
Check your blood pressures on a regular basis.  Repeat your kidney blood test in 3 months.  Orders updated in the computer.

## 2024-04-16 NOTE — PROGRESS NOTES
04/16/24        Patient: Helena Ramsey   YOB: 1955   Date of Visit: 4/16/2024       Dear  Dr. Lexi MD,      Thank you for referring Helena Ramsey to my practice.  Please find my assessment and plan below.       As you know she is a 69-year-old female with a history of hypertension, DJD, depression, GERD and hypothyroidism who I now had the pleasure of seeing for follow-up of hyponatremia and chronic kidney disease stage III.  Overall the patient states she has been doing well without any chest pain, shortness of breath, GI or urinary tract symptoms.  Does not check her blood pressure readings at home.    On physical exam her blood pressure 122/72 with a pulse of 75 and she weighed 304 pounds.  Her neck was supple without JVD.  Lungs are clear.  Heart revealed a regular rate and rhythm and S4 but no gallops, murmurs or rubs.  Abdomen was soft, flat, nontender without organomegaly, masses or bruits.  Extremities revealed no clubbing, cyanosis or edema.    I reviewed her most recent labs done on April 1, 2024.  Creatinine fluctuates a bit but overall stable at 1.28 with an estimated GFR of 46 cc/min.  Electrolytes were good.  Sodium 137.    I therefore reassured the patient that her renal function is overall stable.  Hyponatremia under control.  Continue a quart and 1/2/day fluid restriction.  Avoid nonsteroidals.  Encouraged her to check blood pressures periodically to make sure they are under adequate control.  She will continue to do CBC and renal panels quarterly.  I will see her again in 6 months for follow-up or sooner if clinically indicated.    Thank you again for allowing me to participate in the care of your patient.  If you have any questions please feel free to call.          Gen Chahal MD   [facility], [facility], [place]  [address]  [facility] [id number]    Document electronically generated by:  Gen Chahal MD

## 2024-05-06 ENCOUNTER — OFFICE VISIT (OUTPATIENT)
Dept: INTERNAL MEDICINE CLINIC | Facility: CLINIC | Age: 69
End: 2024-05-06
Payer: MEDICARE

## 2024-05-06 ENCOUNTER — HOSPITAL ENCOUNTER (OUTPATIENT)
Dept: GENERAL RADIOLOGY | Age: 69
Discharge: HOME | End: 2024-05-06
Attending: INTERNAL MEDICINE
Payer: MEDICARE

## 2024-05-06 VITALS
HEART RATE: 76 BPM | RESPIRATION RATE: 18 BRPM | SYSTOLIC BLOOD PRESSURE: 112 MMHG | DIASTOLIC BLOOD PRESSURE: 74 MMHG | HEIGHT: 66 IN | WEIGHT: 293 LBS | BODY MASS INDEX: 47.09 KG/M2

## 2024-05-06 DIAGNOSIS — Z12.31 SCREENING MAMMOGRAM FOR BREAST CANCER: ICD-10-CM

## 2024-05-06 DIAGNOSIS — I10 PRIMARY HYPERTENSION: ICD-10-CM

## 2024-05-06 DIAGNOSIS — E03.8 OTHER SPECIFIED HYPOTHYROIDISM: ICD-10-CM

## 2024-05-06 DIAGNOSIS — M79.632 PAIN OF LEFT FOREARM: ICD-10-CM

## 2024-05-06 DIAGNOSIS — Z12.83 SKIN CANCER SCREENING: Primary | ICD-10-CM

## 2024-05-06 DIAGNOSIS — S50.812D ABRASION OF LEFT FOREARM, SUBSEQUENT ENCOUNTER: ICD-10-CM

## 2024-05-06 PROCEDURE — 73090 X-RAY EXAM OF FOREARM: CPT | Performed by: INTERNAL MEDICINE

## 2024-05-06 NOTE — PROGRESS NOTES
Subjective:     Patient ID: Helena Ramsey is a 69 year old female.  Presents to address several concerns    HPI  Patient is feeling well, exercises with  trying to build up muscle strength.  History of left forearm fracture reported yesterday,,.  Experiencing twinges of pain with certain movements.  States that she has been which is not attached to the humerus.  Sore spot on the scalp strap from CPAP touching the area has not for the last week like to have skin check.  Takes medications as prescribed, being followed by nephrologist Dr. Chahal     recent creatinine is stable     Current Outpatient Medications   Medication Sig Dispense Refill    bisoprolol 5 MG Oral Tab Take 1 tablet (5 mg total) by mouth daily. 90 tablet 3    Zaleplon 10 MG Oral Cap Take 1 capsule (10 mg total) by mouth nightly.      Desipramine HCl 50 MG Oral Tab take 1 tablet by mouth at bedtime for 1 week, then 2 tabs at bedtime for 1 week, then 3 tabs at bedtime.      amLODIPine 2.5 MG Oral Tab TAKE 1 TABLET BY MOUTH ONE TIME A DAY 90 tablet 3    DULoxetine (CYMBALTA) 60 MG Oral Cap DR Particles Take 1 capsule (60 mg total) by mouth daily. 90 capsule 1    pantoprazole 40 MG Oral Tab EC Take 1 tablet (40 mg total) by mouth every morning before breakfast. 90 tablet 3    levothyroxine 125 MCG Oral Tab Take 1 tablet (125 mcg total) by mouth before breakfast. 90 tablet 3    traZODone 50 MG Oral Tab Take 1 tablet (50 mg total) by mouth nightly.      Levocetirizine Dihydrochloride (XYZAL ALLERGY 24HR OR) Take by mouth.       Allergies:  Allergies   Allergen Reactions    Bactrim [Sulfamethoxazole W/Trimethoprim] HIVES    Iodides (Topical) HIVES     IVP DYE    Radiology Contrast Iodinated Dyes HIVES     Other reaction(s): Hives/Skin Rash    Sulfa Antibiotics HIVES    Benadryl [Diphenhydramine] NAUSEA AND VOMITING    Trintellix [Vortioxetine] ITCHING       Past Medical History:    Back problem    CKD (chronic kidney disease)    Colon  polyp    Depression    Disorder of thyroid    hypothyroidism    Diverticulosis    Essential hypertension    High blood pressure    History of blood transfusion    Good Robert    Internal hemorrhoids    Neuropathy    kike feet    Osteoarthritis    Renal disorder    CKD stage 3    Tremors of nervous system    Unspecified sleep apnea    CPAP    Visual impairment    glasses      Past Surgical History:   Procedure Laterality Date    Abdominoplasty Bilateral     Anesth,lower arm surgery Left 03/08/2021    radius head replacement    Colonoscopy N/A 12/15/2017    Procedure: COLONOSCOPY;  Surgeon: Denisa Stroud MD;  Location: [facility] ENDOSCOPY    Colonoscopy  01/01/2007    Colonoscopy N/A 6/26/2023    Procedure: COLONOSCOPY / ESOPHAGOGASTRODUODENOSCOPY;  Surgeon: Azalea Olsen MD;  Location: [facility] ENDOSCOPY    Elbow surgery Left     Hc supracervical abdominal hysterectomy      supracervical hysterectomy for fibroids    Lianne localization wire 1 site left (cpt=19281)      1998    Needle biopsy left      Reduction left      2000 Bilateral    Reduction right      Spine surgery procedure unlisted      Tonsillectomy      Total hip replacement Right 10/14/16    Upper gi endoscopy,exam        Family History   Problem Relation Age of Onset    Cancer Father         prostate    Dementia Father     Heart Disease Mother         CVA    Depression Mother     Hypertension Mother     Other (Colonic polyps) Mother     Stroke Mother     Depression Sister     Hypertension Sister     Other (acromegaly from a pituitary tumor) Sister     Depression Sister     Diabetes Sister     Hypertension Sister       Social History:   Social History     Socioeconomic History    Marital status:    Tobacco Use    Smoking status: Never    Smokeless tobacco: Never   Vaping Use    Vaping status: Never Used   Substance and Sexual Activity    Alcohol use: Yes     Alcohol/week: 2.0 standard drinks of alcohol     Types: 1 Cans of beer, 1 Standard drinks or  equivalent per week     Comment: [facility]    Drug use: Not Currently     Frequency: 1.0 times per week     Types: Cannabis     Comment: edibls for sleep   Other Topics Concern    Caffeine Concern Yes     Comment: 3 cups tea/coffee daily    Exercise No    Pt has a pacemaker No    Pt has a defibrillator No    Reaction to local anesthetic No      /74 (BP Location: Right arm, Patient Position: Sitting, Cuff Size: large)   Pulse 76   Resp 18   Ht 5' 6\" (1.676 m)   Wt (!) 302 lb (137 kg)   BMI 48.74 kg/m²    Physical Exam  Constitutional:       Appearance: Normal appearance. She is obese.   HENT:      Head: Normocephalic and atraumatic.   Eyes:      General: No scleral icterus.     Extraocular Movements: Extraocular movements intact.      Conjunctiva/sclera: Conjunctivae normal.      Pupils: Pupils are equal, round, and reactive to light.   Cardiovascular:      Rate and Rhythm: Normal rate and regular rhythm.      Heart sounds: No murmur heard.     No gallop.   Pulmonary:      Effort: Pulmonary effort is normal. No respiratory distress.      Breath sounds: No wheezing or rhonchi.   Musculoskeletal:         General: Normal range of motion.      Cervical back: Normal range of motion and neck supple.      Right lower leg: No edema.      Left lower leg: No edema.   Lymphadenopathy:      Cervical: No cervical adenopathy.   Skin:     General: Skin is warm.      Findings: No lesion or rash.   Neurological:      General: No focal deficit present.      Mental Status: She is alert and oriented to person, place, and time. Mental status is at baseline.   Psychiatric:         Mood and Affect: Mood normal.         Behavior: Behavior normal.         Thought Content: Thought content normal.         Assessment & Plan:   1. Skin cancer screening dermatology controlled   2. Screening mammogram for breast cancer    3. Other specified hypothyroidism check TSH with reflex treat if necessary   4. Primary hypertension controlled on  current medications       5. Pain of left forearm x-ray ordered left forearm       Orders Placed This Encounter   Procedures    Hepatic Function Panel (7) [E]    TSH W Reflex To Free T4       Meds This Visit:  Requested Prescriptions      No prescriptions requested or ordered in this encounter       Imaging & Referrals:  [facility] - INTERNAL  YOLI ANDREW 2D+3D SCREENING BILAT (CPT=77067/[id number])

## 2024-05-16 ENCOUNTER — IMMUNIZATION (OUTPATIENT)
Dept: LAB | Age: 69
End: 2024-05-16
Attending: EMERGENCY MEDICINE

## 2024-05-16 ENCOUNTER — OFFICE VISIT (OUTPATIENT)
Dept: DERMATOLOGY CLINIC | Facility: CLINIC | Age: 69
End: 2024-05-16

## 2024-05-16 DIAGNOSIS — D23.9 BENIGN NEOPLASM OF SKIN, UNSPECIFIED LOCATION: ICD-10-CM

## 2024-05-16 DIAGNOSIS — L82.0 INFLAMED SEBORRHEIC KERATOSIS: Primary | ICD-10-CM

## 2024-05-16 DIAGNOSIS — L81.4 LENTIGO: ICD-10-CM

## 2024-05-16 DIAGNOSIS — D22.9 MULTIPLE NEVI: ICD-10-CM

## 2024-05-16 DIAGNOSIS — L82.1 SK (SEBORRHEIC KERATOSIS): ICD-10-CM

## 2024-05-16 DIAGNOSIS — Z23 NEED FOR VACCINATION: Primary | ICD-10-CM

## 2024-05-16 DIAGNOSIS — Z12.83 SKIN CANCER SCREENING: ICD-10-CM

## 2024-05-16 PROCEDURE — 90480 ADMN SARSCOV2 VAC 1/ONLY CMP: CPT

## 2024-05-16 PROCEDURE — 99213 OFFICE O/P EST LOW 20 MIN: CPT | Performed by: DERMATOLOGY

## 2024-05-24 ENCOUNTER — HOSPITAL ENCOUNTER (OUTPATIENT)
Dept: MAMMOGRAPHY | Age: 69
Discharge: HOME | End: 2024-05-24
Attending: INTERNAL MEDICINE

## 2024-05-24 DIAGNOSIS — Z12.31 SCREENING MAMMOGRAM FOR BREAST CANCER: ICD-10-CM

## 2024-05-24 PROCEDURE — 77063 BREAST TOMOSYNTHESIS BI: CPT | Performed by: INTERNAL MEDICINE

## 2024-05-24 PROCEDURE — 77067 SCR MAMMO BI INCL CAD: CPT | Performed by: INTERNAL MEDICINE

## 2024-05-27 NOTE — PROGRESS NOTES
Helena Ramsey is a 69 year old female.  HPI:     CC:    Chief Complaint   Patient presents with    Full Skin Exam     LOV 11/27/21. Patient present for FBSE. Has questions regarding raised lesion on her R scalp. Patient states \" lesion was caused by her C-Pap equipment\". Denies pain. Denies personal or family Hx of skin cnacer.         Allergies:  Bactrim [sulfamethoxazole w/trimethoprim], Iodides (topical), Radiology contrast iodinated dyes, Sulfa antibiotics, Benadryl [diphenhydramine], and Trintellix [vortioxetine]    HISTORY:    Past Medical History:    Back problem    CKD (chronic kidney disease)    Colon polyp    Depression    Disorder of thyroid    hypothyroidism    Diverticulosis    Essential hypertension    High blood pressure    History of blood transfusion    Good Robert    Internal hemorrhoids    Neuropathy    kike feet    Osteoarthritis    Renal disorder    CKD stage 3    Tremors of nervous system    Unspecified sleep apnea    CPAP    Visual impairment    glasses      Past Surgical History:   Procedure Laterality Date    Abdominoplasty Bilateral     Anesth,lower arm surgery Left 03/08/2021    radius head replacement    Colonoscopy N/A 12/15/2017    Procedure: COLONOSCOPY;  Surgeon: Denisa Stroud MD;  Location: [facility] ENDOSCOPY    Colonoscopy  01/01/2007    Colonoscopy N/A 6/26/2023    Procedure: COLONOSCOPY / ESOPHAGOGASTRODUODENOSCOPY;  Surgeon: Azalea Olsen MD;  Location: [facility] ENDOSCOPY    Elbow surgery Left     Hc supracervical abdominal hysterectomy      supracervical hysterectomy for fibroids    Lianne localization wire 1 site left (cpt=19281)      1998    Needle biopsy left      Reduction left      2000 Bilateral    Reduction right      Spine surgery procedure unlisted      Tonsillectomy      Total hip replacement Right 10/14/16    Upper gi endoscopy,exam        Family History   Problem Relation Age of Onset    Cancer Father         prostate    Dementia Father     Heart Disease Mother          CVA    Depression Mother     Hypertension Mother     Other (Colonic polyps) Mother     Stroke Mother     Depression Sister     Hypertension Sister     Other (acromegaly from a pituitary tumor) Sister     Depression Sister     Diabetes Sister     Hypertension Sister       Social History     Socioeconomic History    Marital status:    Tobacco Use    Smoking status: Never    Smokeless tobacco: Never   Vaping Use    Vaping status: Never Used   Substance and Sexual Activity    Alcohol use: Yes     Alcohol/week: 2.0 standard drinks of alcohol     Types: 1 Cans of beer, 1 Standard drinks or equivalent per week     Comment: [facility]    Drug use: Not Currently     Frequency: 1.0 times per week     Types: Cannabis     Comment: edibls for sleep   Other Topics Concern    Caffeine Concern Yes     Comment: 3 cups tea/coffee daily    Exercise No    History of tanning Yes    Pt has a pacemaker No    Pt has a defibrillator No    Reaction to local anesthetic No        Current Outpatient Medications   Medication Sig Dispense Refill    bisoprolol 5 MG Oral Tab Take 1 tablet (5 mg total) by mouth daily. 90 tablet 3    Zaleplon 10 MG Oral Cap Take 1 capsule (10 mg total) by mouth nightly.      Desipramine HCl 50 MG Oral Tab take 1 tablet by mouth at bedtime for 1 week, then 2 tabs at bedtime for 1 week, then 3 tabs at bedtime.      amLODIPine 2.5 MG Oral Tab TAKE 1 TABLET BY MOUTH ONE TIME A DAY 90 tablet 3    DULoxetine (CYMBALTA) 60 MG Oral Cap DR Particles Take 1 capsule (60 mg total) by mouth daily. 90 capsule 1    pantoprazole 40 MG Oral Tab EC Take 1 tablet (40 mg total) by mouth every morning before breakfast. 90 tablet 3    levothyroxine 125 MCG Oral Tab Take 1 tablet (125 mcg total) by mouth before breakfast. 90 tablet 3    traZODone 50 MG Oral Tab Take 1 tablet (50 mg total) by mouth nightly.      Levocetirizine Dihydrochloride (XYZAL ALLERGY 24HR OR) Take by mouth.       Allergies:   Allergies   Allergen Reactions     Bactrim [Sulfamethoxazole W/Trimethoprim] HIVES    Iodides (Topical) HIVES     IVP DYE    Radiology Contrast Iodinated Dyes HIVES     Other reaction(s): Hives/Skin Rash    Sulfa Antibiotics HIVES    Benadryl [Diphenhydramine] NAUSEA AND VOMITING    Trintellix [Vortioxetine] ITCHING       Past Medical History:    Back problem    CKD (chronic kidney disease)    Colon polyp    Depression    Disorder of thyroid    hypothyroidism    Diverticulosis    Essential hypertension    High blood pressure    History of blood transfusion    Good Robert    Internal hemorrhoids    Neuropathy    kike feet    Osteoarthritis    Renal disorder    CKD stage 3    Tremors of nervous system    Unspecified sleep apnea    CPAP    Visual impairment    glasses     Past Surgical History:   Procedure Laterality Date    Abdominoplasty Bilateral     Anesth,lower arm surgery Left 03/08/2021    radius head replacement    Colonoscopy N/A 12/15/2017    Procedure: COLONOSCOPY;  Surgeon: Denisa Stroud MD;  Location: [facility] ENDOSCOPY    Colonoscopy  01/01/2007    Colonoscopy N/A 6/26/2023    Procedure: COLONOSCOPY / ESOPHAGOGASTRODUODENOSCOPY;  Surgeon: Azalea Olsen MD;  Location: [facility] ENDOSCOPY    Elbow surgery Left     Hc supracervical abdominal hysterectomy      supracervical hysterectomy for fibroids    Lianne localization wire 1 site left (cpt=19281)      1998    Needle biopsy left      Reduction left      2000 Bilateral    Reduction right      Spine surgery procedure unlisted      Tonsillectomy      Total hip replacement Right 10/14/16    Upper gi endoscopy,exam       Social History     Socioeconomic History    Marital status:      Spouse name: Not on file    Number of children: Not on file    Years of education: Not on file    Highest education level: Not on file   Occupational History    Not on file   Tobacco Use    Smoking status: Never    Smokeless tobacco: Never   Vaping Use    Vaping status: Never Used   Substance and Sexual Activity     Alcohol use: Yes     Alcohol/week: 2.0 standard drinks of alcohol     Types: 1 Cans of beer, 1 Standard drinks or equivalent per week     Comment: [facility]    Drug use: Not Currently     Frequency: 1.0 times per week     Types: Cannabis     Comment: edibls for sleep    Sexual activity: Not on file   Other Topics Concern     Service Not Asked    Blood Transfusions Not Asked    Caffeine Concern Yes     Comment: 3 cups tea/coffee daily    Occupational Exposure Not Asked    Hobby Hazards Not Asked    Sleep Concern Not Asked    Stress Concern Not Asked    Weight Concern Not Asked    Special Diet Not Asked    Back Care Not Asked    Exercise No    Bike Helmet Not Asked    Seat Belt Not Asked    Self-Exams Not Asked    Grew up on a farm Not Asked    History of tanning Yes    Outdoor occupation Not Asked    Pt has a pacemaker No    Pt has a defibrillator No    Breast feeding Not Asked    Reaction to local anesthetic No   Social History Narrative    Not on file     Social Determinants of Health     Financial Resource Strain: Not on file   Food Insecurity: Not on file   Transportation Needs: Not on file   Physical Activity: Not on file   Stress: Not on file   Social Connections: Not on file   Housing Stability: Not on file     Family History   Problem Relation Age of Onset    Cancer Father         prostate    Dementia Father     Heart Disease Mother         CVA    Depression Mother     Hypertension Mother     Other (Colonic polyps) Mother     Stroke Mother     Depression Sister     Hypertension Sister     Other (acromegaly from a pituitary tumor) Sister     Depression Sister     Diabetes Sister     Hypertension Sister        There were no vitals filed for this visit.    HPI:  Chief Complaint   Patient presents with    Full Skin Exam     LOV 11/27/21. Patient present for FBSE. Has questions regarding raised lesion on her R scalp. Patient states \" lesion was caused by her C-Pap equipment\". Denies pain. Denies personal or  family Hx of skin cnacer.     Now retired  Careful with sun protection  no personal or family history of skin cancer  Patient presents with concerns above.    Patient has been in their usual state of health.     Past notes/ records and appropriate/relevant lab results including pathology and past body maps reviewed. Including outside notes/ PCP notes as appropriate. Updated and new information noted in current visit.     ROS:  Denies other relevant systemic complaints.      History, medications, allergies reviewed as noted.       Physical Examination:     Well-developed well-nourished patient alert oriented in no acute distress.  Exam performed, including scalp, head, neck, face,nails, hair, external eyes, including conjunctival mucosa, eyelids, lips external ears , arms, digits,palms.     Multiple light to medium brown, well marginated, uniformly pigmented, macules and papules 6 mm and less scattered on exam. pigmented lesions examined with dermoscopy benign-appearing patterns.     Waxy tannish keratotic papules scattered, cherry-red vascular papules scattered.    See map today's date for lesions noted .  See assessment and plan below for specific lesions.    Otherwise remarkable for lesions as noted on map.    See A/P  below for additional information:    Assessment / plan:    No orders of the defined types were placed in this encounter.      Meds & Refills for this Visit:  Requested Prescriptions      No prescriptions requested or ordered in this encounter         Encounter Diagnoses   Name Primary?    Inflamed seborrheic keratosis Yes    SK (seborrheic keratosis)     Multiple nevi     Lentigo     Benign neoplasm of skin, unspecified location     Skin cancer screening      Lesion of concern waxy tan keratotic papule at right anterior scalp inflamed seborrheic keratosis trial cryo  Irritated along CPAP straps for improved mask.  Reassurance given    Waxy tan keratotic papules lesions in areas of concern as noted  reassurance given.  Benign nature discussed.  Possibility of cryo, alphahydroxy acids over-the-counter retinol's discussed.  Diabetic cream over-the-counter to more irritated areas    Tan papule 5 mm left antecubital fossa, pedunculated tan-brown papule 6 mm at right medial proximal thigh scattered other junctional nevi lentigines noted.  Benign-appearing pigmented lesions reassurance given.  No atypical features on dermoscopy    Flesh-colored dome-shaped papule right nasal dorsum, lentigo right nasal sidewall reassurance regarding benign fibrous papule, lentigo sunscreen sun protection encouraged.    No other susupicious lesions on todays  Exam.    Multiple benign-appearing nevi cherry numerous keratoses as noted previously    Dermatitis stable  Continue current management,  Moisturizers, good skin care    Please refer to map for specific lesions.  See additional diagnoses.  Pros cons of various therapies, risks benefits discussed.Pathophysiology discussed with patient.  Therapeutic options reviewed.  See  Medications in grid.  Instructions reviewed at length.    Benign nevi, seborrheic  keratoses, cherry angiomas:  Reassurance regarding other benign skin lesions.Signs and symptoms of skin cancer, ABCDE's of melanoma discussed with patient. Sunscreen use, sun protection, self exams reviewed.  Followup as noted RTC ---routine checkup    6 mos -one year or p.r.n.    Encounter Times   Including precharting, reviewing chart, prior notes obtaining history: 10 minutes, medical exam :10 minutes, notes on body map, plan, counseling 10minutes My total time spent caring for the patient on the day of the encounter: 30 minutes     The patient indicates understanding of these issues and agrees to the plan.  The patient is asked to return as noted in follow-up/ above.    This note was generated using Dragon voice recognition software.  Please contact me regarding any confusion resulting from errors in recognition..

## 2024-05-29 DIAGNOSIS — R89.9 ABNORMAL LABORATORY TEST: ICD-10-CM

## 2024-05-31 RX ORDER — DULOXETIN HYDROCHLORIDE 60 MG/1
60 CAPSULE, DELAYED RELEASE ORAL DAILY
Qty: 90 CAPSULE | Refills: 1 | Status: SHIPPED | OUTPATIENT
Start: 2024-05-31

## 2024-05-31 NOTE — TELEPHONE ENCOUNTER
Refill passed per [facility] protocol.    Please review pended refill request as unable to refill due to high/very high drug interaction warning copied here:     High  Drug-Drug: traZODone and DULoxetineSerotonergic effects of trazodone and serotonin/norepinephrine reuptake inhibitors (SNRIs) may be additive. The risk of serotonin syndrome/toxicity may be increased.    High  Drug-Drug: DULoxetine and Desipramine HClDuloxetine may increase the plasma concentrations and pharmacologic effects of tricyclic antidepressants. Additive serotonergic effects may also occur during coadministration of duloxetine and tricyclic antidepressants (TCAs), and the risk of developing serotonin syndrome may be increased.      Requested Prescriptions   Pending Prescriptions Disp Refills    DULOXETINE 60 MG Oral Cap DR Particles [Pharmacy Med Name: DULOXETINE HYDROCHLORIDE 60 MG Capsule Delayed Release Particles] 90 capsule 3     Sig: TAKE 1 CAPSULE EVERY DAY       Psychiatric Non-Scheduled (Anti-Anxiety) Passed - 5/29/2024  1:49 AM        Passed - In person appointment or virtual visit in the past 6 mos or appointment in next 3 mos     Recent Outpatient Visits              2 weeks ago Inflamed seborrheic keratosis    [facility] [address], [place] Samanta Mathur MD    Office Visit    3 weeks ago Skin cancer screening    [facility], [address], [place] Gloria Elliott MD    Office Visit    1 month ago Stage 3a chronic kidney disease (HCC)    [facility] Gen Chahal MD    Office Visit    3 months ago Iron deficiency anemia, unspecified iron deficiency anemia type    [facility]    Office Visit    4 months ago [facility]          Future Appointments         Provider Department Appt Notes    In 4  months Gen Chahal MD [facility] CKD follow-up                    Passed - Depression Screening completed within the past 12 months           Future Appointments         Provider Department Appt Notes    In 4 months Gen Chahal MD [facility] CKD follow-up          Recent Outpatient Visits              2 weeks ago Inflamed seborrheic keratosis    [facility], [address], [place] Samanta Mathur MD    Office Visit    3 weeks ago Skin cancer screening    [facility], [address], [place] Gloria Elliott MD    Office Visit    1 month ago Stage 3a chronic kidney disease (HCC)    [facility] Gen Chahal MD    Office Visit    3 months ago Iron deficiency anemia, unspecified iron deficiency anemia type    [facility] Azalea Olsen MD    Office Visit    4 months ago Tremor    [facility] Albert Vera MD    Office Visit

## 2024-06-04 NOTE — ASSESSMENT & PLAN NOTE
CPM/PAT Evaluation       Name: Enrico Raphael (Enrico Raphael)  /Age: 1945/ y.o.     In-Person       Chief Complaint: right shoulder shoulder ganglion cyst    HPI  This is a very pleasant 80 yo with PMHx of HTN, and ganglion on right shoulder.  Pt states he tore his right rotator cuff and ganglion formed after shoulder injury.  Ganglion has increased in size.   He denies pain in the shoulder or ganglion area.  Pt denies recent fever or chills.    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 No pertinent surgical history.    Patient  has no history on file for sexual activity.    Family History   Problem Relation Name Age of Onset    Ovarian cancer Mother         Allergies   Allergen Reactions    Penicillin Itching       Prior to Admission medications    Medication Sig Start Date End Date Taking? Authorizing Provider   amLODIPine (Norvasc) 10 mg tablet Take 1 tablet (10 mg) by mouth once daily. 4/4/24 10/1/24  Jose Alfredo Todd DO   lisinopriL-hydrochlorothiazide 20-25 mg tablet Take 1 tablet by mouth once daily. 4/4/24 10/1/24  Jose Alfredo Todd DO   spironolactone (Aldactone) 25 mg tablet Take 1 tablet (25 mg) by mouth once daily. 4/4/24 10/1/24  DO DOMINIC Velazquez ROS:   Constitutional:   neg    Neuro/Psych:   neg    Eyes:   neg    Ears:   neg    Nose:   neg    Mouth:   neg    Throat:   neg    Neck:   neg    Cardio:    Pt denies palpitations or SOB  Denies CP   Respiratory:   neg    Endocrine:   neg    GI:   neg    :   neg    Musculoskeletal:    See HPI  Hematologic:   neg    Skin:  neg        Physical Exam  Constitutional:       Appearance: Normal appearance.   HENT:      Head: Normocephalic and atraumatic.      Nose: Nose normal.      Mouth/Throat:      Mouth: Mucous membranes are moist.   Eyes:      Pupils: Pupils are equal, round, and reactive to light.  EGFR remains low at about 50. Blood pressures seem well controlled. Patient is advised to drink plenty of fluids and avoid over-the-counter ibuprofen like medications.   Recheck labs with the next blood draw in about 3-4 months   Cardiovascular:      Rate and Rhythm: Tachycardia present. Rhythm irregular.      Comments: Repeat   Pulmonary:      Effort: Pulmonary effort is normal.      Breath sounds: Normal breath sounds.   Abdominal:      General: Bowel sounds are normal.      Palpations: Abdomen is soft.   Musculoskeletal:      Cervical back: Normal range of motion.      Comments: No LE edema  Large mass right shoulder   Skin:     General: Skin is warm and dry.   Neurological:      General: No focal deficit present.      Mental Status: He is alert and oriented to person, place, and time.   Psychiatric:         Mood and Affect: Mood normal.         Behavior: Behavior normal.        Airway full ROM  Anesthesia:  pt denies adult anesthesia  Teeth: intact  ECG: new onset afib rate of 92    Lab Results   Component Value Date    GLUCOSE 104 (H) 06/05/2024    CALCIUM 9.4 06/05/2024     06/05/2024    K 4.4 06/05/2024    CO2 26 06/05/2024     06/05/2024    BUN 29 (H) 06/05/2024    CREATININE 1.03 06/05/2024     Magnesium 1.94  TSH 1.55    Visit Vitals  /82   Pulse 90   Temp 36.4 °C (97.5 °F) (Temporal)   Resp 18       DASI Risk Score      Flowsheet Row Most Recent Value   DASI SCORE 36.7   METS Score (Will be calculated only when all the questions are answered) 7.3          Caprini DVT Assessment      Flowsheet Row Most Recent Value   DVT Score 6   Current Status Major surgery planned, including arthroscopic and laproscopic (1-2 hours)   Age Over 75 years   BMI 30 or less          Modified Frailty Index      Flowsheet Row Most Recent Value   Modified Frailty Index Calculator .0909          CHADS2 Stroke Risk  Current as of 41 minutes ago        N/A 3 to 100%: High Risk   2 to < 3%: Medium Risk   0 to < 2%: Low Risk     Last Change: N/A          This score determines the patient's risk of having a stroke if the patient has atrial fibrillation.        This score is not applicable to this patient. Components are not  calculated.          Revised Cardiac Risk Index    No data to display       Apfel Simplified Score    No data to display       Risk Analysis Index Results This Encounter         6/5/2024  0921             GOODWIN Cancer History: Patient does not indicate history of cancer    Total Risk Analysis Index Score Without Cancer: 27    Total Risk Analysis Index Score: 27          Stop Bang Score      Flowsheet Row Most Recent Value   Do you snore loudly? 0   Do you often feel tired or fatigued after your sleep? 0   Has anyone ever observed you stop breathing in your sleep? 0   Do you have or are you being treated for high blood pressure? 1   Recent BMI (Calculated) 29.6   Is BMI greater than 35 kg/m2? 0=No   Age older than 50 years old? 1=Yes   Is your neck circumference greater than 17 inches (Male) or 16 inches (Female)? 0   Gender - Male 1=Yes   STOP-BANG Total Score 3            Assessment and Plan:     Plan for OR on 6/19 for Excision mass right shoulder [58118 (CPT®)]     BMP  New onset afib- cardiology appt made for today 1:30 pm CHADS2 score of 2  Mag TSH

## 2024-07-02 ENCOUNTER — OFFICE VISIT (OUTPATIENT)
Dept: OTHER | Facility: HOSPITAL | Age: 69
End: 2024-07-02
Attending: PREVENTIVE MEDICINE

## 2024-07-02 ENCOUNTER — HOSPITAL ENCOUNTER (OUTPATIENT)
Dept: GENERAL RADIOLOGY | Facility: HOSPITAL | Age: 69
Discharge: HOME | End: 2024-07-02
Attending: PREVENTIVE MEDICINE

## 2024-07-02 DIAGNOSIS — Z02.89 VISIT FOR OCCUPATIONAL HEALTH EXAMINATION: Primary | ICD-10-CM

## 2024-07-02 DIAGNOSIS — Z02.89 VISIT FOR OCCUPATIONAL HEALTH EXAMINATION: ICD-10-CM

## 2024-07-03 ENCOUNTER — LAB ENCOUNTER (OUTPATIENT)
Dept: LAB | Age: 69
End: 2024-07-03
Attending: INTERNAL MEDICINE
Payer: MEDICARE

## 2024-07-03 DIAGNOSIS — I10 PRIMARY HYPERTENSION: ICD-10-CM

## 2024-07-03 DIAGNOSIS — E03.8 OTHER SPECIFIED HYPOTHYROIDISM: ICD-10-CM

## 2024-07-03 DIAGNOSIS — N18.31 STAGE 3A CHRONIC KIDNEY DISEASE (HCC): ICD-10-CM

## 2024-07-03 LAB
ALBUMIN SERPL-MCNC: 3.5 G/DL (ref 3.4–5)
ALBUMIN SERPL-MCNC: 3.5 G/DL (ref 3.4–5)
ALP LIVER SERPL-CCNC: 104 U/L
ALT SERPL-CCNC: 32 U/L
ANION GAP SERPL CALC-SCNC: 6 MMOL/L (ref 0–18)
AST SERPL-CCNC: 14 U/L (ref 15–37)
BASOPHILS # BLD AUTO: 0.06 X10(3) UL (ref 0–0.2)
BASOPHILS NFR BLD AUTO: 0.8 %
BILIRUB DIRECT SERPL-MCNC: 0.1 MG/DL (ref 0–0.2)
BILIRUB SERPL-MCNC: 0.4 MG/DL (ref 0.1–2)
BUN BLD-MCNC: 15 MG/DL (ref 9–23)
CALCIUM BLD-MCNC: 8.8 MG/DL (ref 8.5–10.1)
CHLORIDE SERPL-SCNC: 105 MMOL/L (ref 98–112)
CO2 SERPL-SCNC: 27 MMOL/L (ref 21–32)
CREAT BLD-MCNC: 1.19 MG/DL
EGFRCR SERPLBLD CKD-EPI 2021: 50 ML/MIN/1.73M2 (ref 60–?)
EOSINOPHIL # BLD AUTO: 0.24 X10(3) UL (ref 0–0.7)
EOSINOPHIL NFR BLD AUTO: 3.3 %
ERYTHROCYTE [DISTWIDTH] IN BLOOD BY AUTOMATED COUNT: 15.8 %
GLUCOSE BLD-MCNC: 116 MG/DL (ref 70–99)
HCT VFR BLD AUTO: 35.4 %
HGB BLD-MCNC: 11.3 G/DL
IMM GRANULOCYTES # BLD AUTO: 0.05 X10(3) UL (ref 0–1)
IMM GRANULOCYTES NFR BLD: 0.7 %
LYMPHOCYTES # BLD AUTO: 1.93 X10(3) UL (ref 1–4)
LYMPHOCYTES NFR BLD AUTO: 26.8 %
MCH RBC QN AUTO: 27.4 PG (ref 26–34)
MCHC RBC AUTO-ENTMCNC: 31.9 G/DL (ref 31–37)
MCV RBC AUTO: 85.7 FL
MONOCYTES # BLD AUTO: 0.66 X10(3) UL (ref 0.1–1)
MONOCYTES NFR BLD AUTO: 9.2 %
NEUTROPHILS # BLD AUTO: 4.27 X10 (3) UL (ref 1.5–7.7)
NEUTROPHILS # BLD AUTO: 4.27 X10(3) UL (ref 1.5–7.7)
NEUTROPHILS NFR BLD AUTO: 59.2 %
OSMOLALITY SERPL CALC.SUM OF ELEC: 288 MOSM/KG (ref 275–295)
PHOSPHATE SERPL-MCNC: 3.5 MG/DL (ref 2.5–4.9)
PLATELET # BLD AUTO: 461 10(3)UL (ref 150–450)
POTASSIUM SERPL-SCNC: 4.8 MMOL/L (ref 3.5–5.1)
PROT SERPL-MCNC: 7.4 G/DL (ref 6.4–8.2)
RBC # BLD AUTO: 4.13 X10(6)UL
SODIUM SERPL-SCNC: 138 MMOL/L (ref 136–145)
TSI SER-ACNC: 0.59 MIU/ML (ref 0.36–3.74)
WBC # BLD AUTO: 7.2 X10(3) UL (ref 4–11)

## 2024-07-03 PROCEDURE — 80069 RENAL FUNCTION PANEL: CPT

## 2024-07-03 PROCEDURE — 80076 HEPATIC FUNCTION PANEL: CPT

## 2024-07-03 PROCEDURE — 85025 COMPLETE CBC W/AUTO DIFF WBC: CPT

## 2024-07-03 PROCEDURE — 84443 ASSAY THYROID STIM HORMONE: CPT

## 2024-07-03 PROCEDURE — 36415 COLL VENOUS BLD VENIPUNCTURE: CPT

## 2024-07-15 ENCOUNTER — PATIENT MESSAGE (OUTPATIENT)
Dept: INTERNAL MEDICINE CLINIC | Facility: CLINIC | Age: 69
End: 2024-07-15

## 2024-07-15 DIAGNOSIS — G47.33 OSA ON CPAP: Primary | ICD-10-CM

## 2024-09-26 ENCOUNTER — IMMUNIZATION (OUTPATIENT)
Dept: LAB | Age: 69
End: 2024-09-26
Attending: EMERGENCY MEDICINE
Payer: MEDICARE

## 2024-09-26 DIAGNOSIS — Z23 NEED FOR VACCINATION: Primary | ICD-10-CM

## 2024-09-26 PROCEDURE — 90662 IIV NO PRSV INCREASED AG IM: CPT

## 2024-09-26 PROCEDURE — 90480 ADMN SARSCOV2 VAC 1/ONLY CMP: CPT

## 2024-09-26 PROCEDURE — 90471 IMMUNIZATION ADMIN: CPT

## 2024-10-02 ENCOUNTER — LAB ENCOUNTER (OUTPATIENT)
Dept: LAB | Age: 69
End: 2024-10-02
Attending: INTERNAL MEDICINE
Payer: MEDICARE

## 2024-10-02 DIAGNOSIS — N18.31 STAGE 3A CHRONIC KIDNEY DISEASE (HCC): ICD-10-CM

## 2024-10-02 LAB
ALBUMIN SERPL-MCNC: 4.4 G/DL (ref 3.2–4.8)
ANION GAP SERPL CALC-SCNC: 3 MMOL/L (ref 0–18)
BASOPHILS # BLD AUTO: 0.08 X10(3) UL (ref 0–0.2)
BASOPHILS NFR BLD AUTO: 0.9 %
BUN BLD-MCNC: 18 MG/DL (ref 9–23)
CALCIUM BLD-MCNC: 9.8 MG/DL (ref 8.7–10.4)
CHLORIDE SERPL-SCNC: 102 MMOL/L (ref 98–112)
CO2 SERPL-SCNC: 26 MMOL/L (ref 21–32)
CREAT BLD-MCNC: 1.16 MG/DL
EGFRCR SERPLBLD CKD-EPI 2021: 52 ML/MIN/1.73M2 (ref 60–?)
EOSINOPHIL # BLD AUTO: 0.23 X10(3) UL (ref 0–0.7)
EOSINOPHIL NFR BLD AUTO: 2.7 %
ERYTHROCYTE [DISTWIDTH] IN BLOOD BY AUTOMATED COUNT: 15.5 %
GLUCOSE BLD-MCNC: 99 MG/DL (ref 70–99)
HCT VFR BLD AUTO: 36.5 %
HGB BLD-MCNC: 11.8 G/DL
IMM GRANULOCYTES # BLD AUTO: 0.07 X10(3) UL (ref 0–1)
IMM GRANULOCYTES NFR BLD: 0.8 %
LYMPHOCYTES # BLD AUTO: 1.54 X10(3) UL (ref 1–4)
LYMPHOCYTES NFR BLD AUTO: 17.8 %
MCH RBC QN AUTO: 28.8 PG (ref 26–34)
MCHC RBC AUTO-ENTMCNC: 32.3 G/DL (ref 31–37)
MCV RBC AUTO: 89 FL
MONOCYTES # BLD AUTO: 0.81 X10(3) UL (ref 0.1–1)
MONOCYTES NFR BLD AUTO: 9.4 %
NEUTROPHILS # BLD AUTO: 5.93 X10 (3) UL (ref 1.5–7.7)
NEUTROPHILS # BLD AUTO: 5.93 X10(3) UL (ref 1.5–7.7)
NEUTROPHILS NFR BLD AUTO: 68.4 %
OSMOLALITY SERPL CALC.SUM OF ELEC: 274 MOSM/KG (ref 275–295)
PHOSPHATE SERPL-MCNC: 3.6 MG/DL (ref 2.4–5.1)
PLATELET # BLD AUTO: 449 10(3)UL (ref 150–450)
POTASSIUM SERPL-SCNC: 4.7 MMOL/L (ref 3.5–5.1)
RBC # BLD AUTO: 4.1 X10(6)UL
SODIUM SERPL-SCNC: 131 MMOL/L (ref 136–145)
WBC # BLD AUTO: 8.7 X10(3) UL (ref 4–11)

## 2024-10-02 PROCEDURE — 85025 COMPLETE CBC W/AUTO DIFF WBC: CPT

## 2024-10-02 PROCEDURE — 36415 COLL VENOUS BLD VENIPUNCTURE: CPT

## 2024-10-02 PROCEDURE — 80069 RENAL FUNCTION PANEL: CPT

## 2024-10-15 ENCOUNTER — OFFICE VISIT (OUTPATIENT)
Dept: NEPHROLOGY | Facility: CLINIC | Age: 69
End: 2024-10-15
Payer: MEDICARE

## 2024-10-15 VITALS
HEIGHT: 66 IN | BODY MASS INDEX: 47.09 KG/M2 | HEART RATE: 88 BPM | SYSTOLIC BLOOD PRESSURE: 118 MMHG | WEIGHT: 293 LBS | DIASTOLIC BLOOD PRESSURE: 70 MMHG

## 2024-10-15 DIAGNOSIS — N18.31 STAGE 3A CHRONIC KIDNEY DISEASE (HCC): ICD-10-CM

## 2024-10-15 DIAGNOSIS — E87.1 HYPONATREMIA: Primary | ICD-10-CM

## 2024-10-15 PROCEDURE — 99214 OFFICE O/P EST MOD 30 MIN: CPT | Performed by: INTERNAL MEDICINE

## 2024-10-16 NOTE — PROGRESS NOTES
10/16/24        Patient: Helena Ramsey   YOB: 1955   Date of Visit: 10/15/2024       Dear  Dr. Lexi MD,      Thank you for referring Helena Ramsey to my practice.  Please find my assessment and plan below.      As you know she is a 69-year-old female with a history of hypertension, DJD, depression, GERD and hypothyroidism who I now had the pleasure of seeing for follow-up of hyponatremia and chronic kidney disease stage III.  Overall the patient states has been doing well without any chest pain, shortness of breath, GI or urinary tract symptoms.    On physical exam her blood pressure is 118/70 with a pulse of 88 and she weighed 306 pounds.  Her neck was supple without JVD.  Lungs are clear.  Heart revealed a regular rate and rhythm with an S4 but no gallops, murmurs or rubs.  Abdomen was soft, flat, nontender without organomegaly, masses or bruits.  Extremities revealed no edema.    I reviewed her most recent labs from October 2, 2024.  Creatinine actually is a bit better down to 1.16 with an estimated GFR of 52 cc/min.  Her sodium though did drop down to 131.  Patient is not as compliant with her fluid restriction as she should be.  TSH in July was good but she is on Cymbalta which predisposes her to hyponatremia.  Again advised her to restrict fluid intake to 48 ounces per day.  Continue to do CBC and renal panels quarterly.  I will see her again in 6 months for follow-up or sooner if clinically indicated.    Thank you again for allowing me to participate in the care of your patient.  If you have any questions please feel free to call.           Sincerely,   Gen Chahal MD   [facility], [facility], [place]  [address]  [place] [id number]    Document electronically generated by:  Gen Chahal MD

## 2024-10-16 NOTE — PATIENT INSTRUCTIONS
Remember to restrict your fluid intake to 48 ounces per day.  Repeat your kidney blood test in 3 months.  Orders are in the computer.

## 2024-10-27 DIAGNOSIS — R89.9 ABNORMAL LABORATORY TEST: ICD-10-CM

## 2024-10-28 NOTE — LETTER
Peggy Michel, [address] [facility],  [address] [place] [address] Rd       07/06/22        Patient: Janeht Melchor   YOB: 1955   Date of Visit: 7/6/2022       Dear  Dr. Winsome Cerrato MD,      Thank you for referring Janeth Melchor to my practice. Please find my assessment and plan below. As you know she is a 72-year-old female with a history of hypertension, DJD, depression, GERD and hypothyroidism who I now had the pleasure of seeing for hyponatremia and chronic kidney disease stage III. The patient's laboratory studies have been reviewed in epic. Her creatinines since 2020 have fluctuated up and down. Has been as good as 0.98 but as high as 1.37. More recently in December 2021 her creatinine was 1.01 but increased to 1.13 on April 25, 2022 and most recent was 1.17 on June 6, 2022 with an estimated GFR 49 cc/min. The patient also has had chronic problems with hyponatremia. For the most part they are in the low 130 range but at one time dropped to 128. Again most recently was 130 in April 2022 and 134 on June 6, 2022. Her past medical history is as stated above. She does have a history of depression which is under good control but is currently requiring Cymbalta, Wellbutrin, trazodone and brexpiprazole. She has had hypertension for at least 15 years. Under good control. Medications are as listed and reports that Dyazide was just discontinued. .  Did use nonsteroidals in the past but not in recent years. Social history she is a non-smoker. Works [employer]. She does have a sister who may have some mild chronic kidney disease but further details unclear. Review of system the patient otherwise that she is doing well without chest pain, shortness of breath, GI or urinary tract symptoms. She does complain of dry mouth secondary to her medications. As result she does drink a fair amount of water. 10 point review of systems is otherwise unremarkable.     And physical exam her blood pressure is 111/72 with a pulse of 69 she weighed 287 pounds. Her neck was supple without JVD. Lungs were clear. Heart revealed a regular rate and rhythm with an S4 but no gallops, murmurs or rubs. Abdomen was soft, flat, nontender without organomegaly, masses or bruits. Extremities revealed trace edema bilaterally. I therefore informed the patient that she does have some chronic kidney disease stage III. This may be secondary to hypertension and nephrosclerosis but other causes need to be excluded. She also has chronic hyponatremia. This most likely secondary to the fact that she is on Cymbalta and Wellbutrin. For completion sake a repeat CBC, renal panel, urinalysis, liver panel, urine for microalbumin, urine for Bence-Guevara protein, sed rate, connective tissue profile, TSH, a.m. cortisol, and random urine for sodium and osmolality have been ordered. She had a renal ultrasound done in June 2021 that was unremarkable. I encouraged her to follow-up modest fluid restriction but again she states it is difficult as she has a dry mouth a lot secondary to her medications. Avoid nonsteroidals. Further impressions and recommendations will be forthcoming after reviewing the above. Thank you very much for allowing me to participate in the care of your patient. If you have any questions please were free to call.            Sincerely,   Dorene Dawson MD   [address]  [address] [facility] [address]  [address] [id number]    Document electronically generated by:  Dorene Daswon MD good balance

## 2024-10-29 RX ORDER — DULOXETIN HYDROCHLORIDE 60 MG/1
60 CAPSULE, DELAYED RELEASE ORAL DAILY
Qty: 90 CAPSULE | Refills: 3 | Status: SHIPPED | OUTPATIENT
Start: 2024-10-29

## 2024-10-29 NOTE — TELEPHONE ENCOUNTER
Refill passed per [facility] protocol.      Please review pended refill request as unable to refill due to high/very high interaction warning copied here:      High  Drug-Drug: traZODone and DULoxetineSerotonergic effects of trazodone and serotonin/norepinephrine reuptake inhibitors (SNRIs) may be additive. The risk of serotonin syndrome/toxicity may be increased.  Details  Override reason        DULoxetine 60 MG Oral Cap DR Particles [Pharmacy Med Name: DULoxetine HCl Oral Capsule Delayed Release Particles 60 MG]  Prescription. Reordered.  traZODone 50 MG Oral TabPatient reported medication. Active.  Discontinue  High  Drug-Drug: DULoxetine and Desipramine HClDuloxetine may increase the plasma concentrations and pharmacologic effects of tricyclic antidepressants. Additive serotonergic effects may also occur during coadministration of duloxetine and tricyclic antidepressants (TCAs), and the risk of developing serotonin syndrome may be increased.  Details  Override reason        DULoxetine 60 MG Oral Cap DR Particles [Pharmacy Med Name: DULoxetine HCl Oral Capsule Delayed Release Particles 60 MG]  Prescription. Reordered.  Desipramine HCl 50 MG Oral TabPatient reported medication. Active.  Requested Prescriptions   Pending Prescriptions Disp Refills    DULOXETINE 60 MG Oral Cap DR Particles [Pharmacy Med Name: DULoxetine HCl Oral Capsule Delayed Release Particles 60 MG] 90 capsule 3     Sig: TAKE 1 CAPSULE EVERY DAY       Psychiatric Non-Scheduled (Anti-Anxiety) Passed - 10/29/2024  2:05 PM        Passed - In person appointment or virtual visit in the past 6 mos or appointment in next 3 mos     Recent Outpatient Visits              2 weeks ago Hyponatremia    [facility] Gen Chahal MD    Office Visit    3 months ago Visit for occupational health examination    [facility]    Office Visit    5 months ago Inflamed seborrheic keratosis     [facility] [address], [place] Samanta Mathur MD    Office Visit    5 months ago Skin cancer screening    [facility] [address], [place] Gloria Elliott MD    Office Visit    6 months ago Stage 3a chronic kidney disease (HCC)    [facility] [place]    [place] Visit          Future Appointments         Provider Department Appt Notes    In 1 week Gloria Elliott MD [facility] [address], [place] Physical    In 1 month Samanta Mathur MD [facility] [place] check spots on head    In 2 months Azalea Olsen MD [facility] Acid reflux’s                    Passed - Depression Screening completed within the past 12 months           Recent Outpatient Visits              2 weeks ago Hyponatremia    [facility] Gen Huang MD    Office Visit    3 months ago Visit for occupational health examination    [facility]    Office Visit    5 months ago Inflamed seborrheic keratosis    [facility] [address], [place] Samanta Mathur MD    Office Visit    5 months ago Skin cancer screening    [facility] [address], [place] Gloria Elliott MD    Office Visit    6 months ago Stage 3a chronic kidney disease (HCC)    [facility] Gen Huang MD    Office Visit          Future Appointments         Provider Department Appt Notes    In 1 week Gloria Elliott MD [facility] [address], [place] Physical    In 1 month Samanta Mathur MD [facility] [place] check spots on head    In 2 months Azalea Olsen MD [facility] Acid reflux’s

## 2024-11-08 ENCOUNTER — LAB ENCOUNTER (OUTPATIENT)
Dept: LAB | Age: 69
End: 2024-11-08
Attending: INTERNAL MEDICINE
Payer: MEDICARE

## 2024-11-08 DIAGNOSIS — E87.1 HYPONATREMIA: ICD-10-CM

## 2024-11-08 DIAGNOSIS — I10 PRIMARY HYPERTENSION: ICD-10-CM

## 2024-11-08 LAB
ALBUMIN SERPL-MCNC: 4.6 G/DL (ref 3.2–4.8)
ALBUMIN/GLOB SERPL: 1.9 {RATIO} (ref 1–2)
ALP LIVER SERPL-CCNC: 97 U/L
ALT SERPL-CCNC: 25 U/L
ANION GAP SERPL CALC-SCNC: 3 MMOL/L (ref 0–18)
AST SERPL-CCNC: 23 U/L (ref ?–34)
BILIRUB SERPL-MCNC: 0.3 MG/DL (ref 0.2–1.1)
BUN BLD-MCNC: 15 MG/DL (ref 9–23)
CALCIUM BLD-MCNC: 9.3 MG/DL (ref 8.7–10.4)
CHLORIDE SERPL-SCNC: 104 MMOL/L (ref 98–112)
CO2 SERPL-SCNC: 30 MMOL/L (ref 21–32)
CREAT BLD-MCNC: 1.25 MG/DL
EGFRCR SERPLBLD CKD-EPI 2021: 47 ML/MIN/1.73M2 (ref 60–?)
FASTING STATUS PATIENT QL REPORTED: NO
GLOBULIN PLAS-MCNC: 2.4 G/DL (ref 2–3.5)
GLUCOSE BLD-MCNC: 131 MG/DL (ref 70–99)
OSMOLALITY SERPL CALC.SUM OF ELEC: 287 MOSM/KG (ref 275–295)
POTASSIUM SERPL-SCNC: 4.5 MMOL/L (ref 3.5–5.1)
PROT SERPL-MCNC: 7 G/DL (ref 5.7–8.2)
SODIUM SERPL-SCNC: 137 MMOL/L (ref 136–145)

## 2024-11-08 PROCEDURE — 80053 COMPREHEN METABOLIC PANEL: CPT

## 2024-11-08 PROCEDURE — 36415 COLL VENOUS BLD VENIPUNCTURE: CPT

## 2024-11-11 ENCOUNTER — OFFICE VISIT (OUTPATIENT)
Dept: INTERNAL MEDICINE CLINIC | Facility: CLINIC | Age: 69
End: 2024-11-11
Payer: MEDICARE

## 2024-11-11 VITALS
DIASTOLIC BLOOD PRESSURE: 78 MMHG | BODY MASS INDEX: 47.09 KG/M2 | WEIGHT: 293 LBS | HEIGHT: 66 IN | SYSTOLIC BLOOD PRESSURE: 129 MMHG | HEART RATE: 79 BPM

## 2024-11-11 DIAGNOSIS — E66.813 CLASS 3 SEVERE OBESITY DUE TO EXCESS CALORIES WITH SERIOUS COMORBIDITY AND BODY MASS INDEX (BMI) OF 50.0 TO 59.9 IN ADULT (HCC): ICD-10-CM

## 2024-11-11 DIAGNOSIS — N18.31 STAGE 3A CHRONIC KIDNEY DISEASE (HCC): ICD-10-CM

## 2024-11-11 DIAGNOSIS — E03.9 ACQUIRED HYPOTHYROIDISM: ICD-10-CM

## 2024-11-11 DIAGNOSIS — I10 ESSENTIAL HYPERTENSION: ICD-10-CM

## 2024-11-11 DIAGNOSIS — F33.41 RECURRENT MAJOR DEPRESSIVE DISORDER, IN PARTIAL REMISSION (HCC): ICD-10-CM

## 2024-11-11 DIAGNOSIS — Z78.0 ASYMPTOMATIC MENOPAUSE: ICD-10-CM

## 2024-11-11 DIAGNOSIS — E66.01 CLASS 3 SEVERE OBESITY DUE TO EXCESS CALORIES WITH SERIOUS COMORBIDITY AND BODY MASS INDEX (BMI) OF 50.0 TO 59.9 IN ADULT (HCC): ICD-10-CM

## 2024-11-11 DIAGNOSIS — G47.33 OSA (OBSTRUCTIVE SLEEP APNEA): Primary | ICD-10-CM

## 2024-11-11 DIAGNOSIS — M48.061 SPINAL STENOSIS OF LUMBAR REGION WITHOUT NEUROGENIC CLAUDICATION: ICD-10-CM

## 2024-11-11 DIAGNOSIS — Z12.31 SCREENING MAMMOGRAM FOR BREAST CANCER: ICD-10-CM

## 2024-11-11 DIAGNOSIS — D50.8 OTHER IRON DEFICIENCY ANEMIA: ICD-10-CM

## 2024-11-11 DIAGNOSIS — K21.9 GERD WITHOUT ESOPHAGITIS: ICD-10-CM

## 2024-11-11 DIAGNOSIS — Z96.641 STATUS POST TOTAL REPLACEMENT OF RIGHT HIP: ICD-10-CM

## 2024-11-11 PROCEDURE — 99214 OFFICE O/P EST MOD 30 MIN: CPT | Performed by: INTERNAL MEDICINE

## 2024-11-11 NOTE — PROGRESS NOTES
Subjective:     Patient ID: Helena Ramsey is a 69 year old female.    HPI  Patient reports that she is bothered by bilateral knee pain right worse than left especially when she walks a lot, moderate osteoarthritis of the knees gel shots were helpful.  She retired, works with a  on strengthening her knees.  Takes daily desipramine prescribed by psychiatrist to treat depression, medication is helpful but she has increased appetite.    Patient donated blood before last blood test, she has been taking iron supplement to treat iron deficiency.  She just started donating blood recently.  History/Other:   Review of Systems  Current Outpatient Medications   Medication Sig Dispense Refill    DULoxetine 60 MG Oral Cap DR Particles Take 1 capsule (60 mg total) by mouth daily. 90 capsule 3    bisoprolol 5 MG Oral Tab Take 1 tablet (5 mg total) by mouth daily. 90 tablet 3    Zaleplon 10 MG Oral Cap Take 1 capsule (10 mg total) by mouth nightly.      Desipramine HCl 50 MG Oral Tab take 1 tablet by mouth at bedtime for 1 week, then 2 tabs at bedtime for 1 week, then 3 tabs at bedtime.      amLODIPine 2.5 MG Oral Tab TAKE 1 TABLET BY MOUTH ONE TIME A DAY 90 tablet 3    pantoprazole 40 MG Oral Tab EC Take 1 tablet (40 mg total) by mouth every morning before breakfast. 90 tablet 3    levothyroxine 125 MCG Oral Tab Take 1 tablet (125 mcg total) by mouth before breakfast. 90 tablet 3    traZODone 50 MG Oral Tab Take 1 tablet (50 mg total) by mouth nightly.      Levocetirizine Dihydrochloride (XYZAL ALLERGY 24HR OR) Take by mouth.       Allergies:Allergies[1]    Past Medical History:    Back problem    CKD (chronic kidney disease)    Colon polyp    Depression    Disorder of thyroid    hypothyroidism    Diverticulosis    Essential hypertension    High blood pressure    History of blood transfusion    Good Robert    Internal hemorrhoids    Neuropathy    kike feet    Osteoarthritis    Renal disorder    CKD stage 3    Tremors of  nervous system    Unspecified sleep apnea    CPAP    Visual impairment    glasses      Past Surgical History:   Procedure Laterality Date    Abdominoplasty Bilateral     Anesth,lower arm surgery Left 03/08/2021    radius head replacement    Colonoscopy N/A 12/15/2017    Procedure: COLONOSCOPY;  Surgeon: Denisa Stroud MD;  Location: [facility] ENDOSCOPY    Colonoscopy  01/01/2007    Colonoscopy N/A 6/26/2023    Procedure: COLONOSCOPY / ESOPHAGOGASTRODUODENOSCOPY;  Surgeon: Azalea Olsen MD;  Location: [facility] ENDOSCOPY    Elbow surgery Left     Hc supracervical abdominal hysterectomy      supracervical hysterectomy for fibroids    Lianne localization wire 1 site left (cpt=19281)      1998    Needle biopsy left      Reduction left      2000 Bilateral    Reduction right      Spine surgery procedure unlisted      Tonsillectomy      Total hip replacement Right 10/14/16    Upper gi endoscopy,exam        Family History   Problem Relation Age of Onset    Cancer Father         prostate    Dementia Father     Heart Disease Mother         CVA    Depression Mother     Hypertension Mother     Other (Colonic polyps) Mother     Stroke Mother     Depression Sister     Hypertension Sister     Other (acromegaly from a pituitary tumor) Sister     Depression Sister     Diabetes Sister     Hypertension Sister       Social History:   Social History     Socioeconomic History    Marital status:    Tobacco Use    Smoking status: Never    Smokeless tobacco: Never   Vaping Use    Vaping status: Never Used   Substance and Sexual Activity    Alcohol use: Yes     Alcohol/week: 2.0 standard drinks of alcohol     Types: 1 Cans of beer, 1 Standard drinks or equivalent per week     Comment: [facility]    Drug use: Not Currently     Frequency: 1.0 times per week     Types: Cannabis     Comment: edibls for sleep   Other Topics Concern    Caffeine Concern Yes     Comment: 3 cups tea/coffee daily    Exercise No    History of tanning Yes    Pt has a  pacemaker No    Pt has a defibrillator No    Reaction to local anesthetic No        Objective:   Physical Exam    Assessment & Plan:   1. Asymptomatic menopause    2. Screening mammogram for breast cancer    3. Other iron deficiency anemia        Orders Placed This Encounter   Procedures    Ferritin    Iron And Tibc       Meds This Visit:  Requested Prescriptions      No prescriptions requested or ordered in this encounter       Imaging & Referrals:  XR DEXA BONE DENSITOMETRY (CPT=77080)  YOLI ANDREW 2D+3D SCREENING BILAT (CPT=77067/24868)            [1]   Allergies  Allergen Reactions    Bactrim [Sulfamethoxazole W/Trimethoprim] HIVES    Iodides (Topical) HIVES     IVP DYE    Radiology Contrast Iodinated Dyes HIVES     Other reaction(s): Hives/Skin Rash    Sulfa Antibiotics HIVES    Benadryl [Diphenhydramine] NAUSEA AND VOMITING    Trintellix [Vortioxetine] ITCHING

## 2024-11-16 NOTE — PROGRESS NOTES
Subjective:   Helena Ramsey is a 69 year old female who presents for a Medicare Subsequent Annual Wellness visit (Pt already had Initial Annual Wellness) and scheduled follow up of multiple significant but stable problems    Patient reports that she is bothered by bilateral knee pain right worse than left especially when she walks a lot, moderate osteoarthritis of the knees gel shots were helpful.  She retired, works with a  on strengthening her knees.  Takes daily desipramine prescribed by psychiatrist to treat depression, medication is helpful but she has increased appetite.    Patient donated blood before last blood test, she has been taking iron supplement to treat iron deficiency.  She just started donating blood recently.  History/Other:   History/Other:   Fall Risk Assessment: {Tip  Fall Risk Assessment:8307}  *** (Incomplete)  {Tip  Fall risk assessment incomplete. Refresh to ensure screening pulls in; if not, click link above to assess, then refresh:8307}      Cognitive Assessment: {Tip  Cognitive Assessment:8307}  *** (Incomplete)  Tip  Cognitive assessment incomplete. Refresh to ensure screening pulls in; if not,click link above to assess, then refresh:8307}      Functional Ability/Status: {Tip  Functional Status:8307}  *** (Incomplete)  {Tip  Functional status incomplete. Refresh to ensure screening pulls in; if not, click link above to assess, then refresh:8307}      Depression Screening (PHQ):{Tip  Depression Screenin}  PHQ-2/9 not done in last week, please ask questions. Last done 2023 *** {Tip  Refresh link after completin}     {Option to record time spent screening & counseling patient for depression (5+ minutes = ):22496::\" \"}    Advanced Directives: {Tip  Advance Care Plannin}  She does NOT have a Living Will. [ ]  She does NOT have a Power of  for Health Care. [ ]  {Advanced Directive Status:7286::\"Discussed Advance Care Planning with patient  (and family/surrogate if present). Standard forms made available to patient in After Visit Summary.\"}    {Tip ResultsPMHPSHFHProbListImagingCardioLabAllC[facility] :8307}  Patient Active Problem List   Diagnosis    Depressive disorder    Essential hypertension    Hypothyroidism    Insomnia with sleep apnea    Morbid obesity (HCC)    EDWAR (obstructive sleep apnea)    Pharyngoesophageal dysphagia    CKD (chronic kidney disease) stage 3, GFR 30-59 ml/min ([facility])    Pain of right hip joint    S/P total hip arthroplasty    Diverticulosis    Tremor    Chronic bilateral low back pain    Sensory peripheral neuropathy    Deformity of right foot    Spinal stenosis of lumbar region without neurogenic claudication    Chronic pain of right ankle    Status post left elbow joint replacement    Primary osteoarthritis of right knee     Allergies:  She is allergic to bactrim [sulfamethoxazole w/trimethoprim], iodides (topical), radiology contrast iodinated dyes, sulfa antibiotics, benadryl [diphenhydramine], and trintellix [vortioxetine].    Current Medications:  Outpatient Medications Marked as Taking for the 11/11/24 encounter (Office Visit) with Gloria Elliott MD   Medication Sig    DULoxetine 60 MG Oral Cap DR Particles Take 1 capsule (60 mg total) by mouth daily.    bisoprolol 5 MG Oral Tab Take 1 tablet (5 mg total) by mouth daily.    Zaleplon 10 MG Oral Cap Take 1 capsule (10 mg total) by mouth nightly.    Desipramine HCl 50 MG Oral Tab take 1 tablet by mouth at bedtime for 1 week, then 2 tabs at bedtime for 1 week, then 3 tabs at bedtime.    amLODIPine 2.5 MG Oral Tab TAKE 1 TABLET BY MOUTH ONE TIME A DAY    pantoprazole 40 MG Oral Tab EC Take 1 tablet (40 mg total) by mouth every morning before breakfast.    levothyroxine 125 MCG Oral Tab Take 1 tablet (125 mcg total) by mouth before breakfast.    traZODone 50 MG Oral Tab Take 1 tablet (50 mg total) by mouth nightly.    Levocetirizine Dihydrochloride (XYZAL ALLERGY  24HR OR) Take by mouth.       Medical History:  She  has a past medical history of Back problem, CKD (chronic kidney disease), Colon polyp (12/2017), Depression, Disorder of thyroid, Diverticulosis (12/15/2017), Essential hypertension, High blood pressure, History of blood transfusion (2000), Internal hemorrhoids (12/15/2017), Neuropathy, Osteoarthritis, Renal disorder, Tremors of nervous system, Unspecified sleep apnea, and Visual impairment.  Surgical History:  She  has a past surgical history that includes reduction right; Abdominoplasty (Bilateral); total hip replacement (Right, 10/14/16); spine surgery procedure unlisted; reduction left; anton localization wire 1 site left (cpt=19281); colonoscopy (N/A, 12/15/2017); colonoscopy (01/01/2007); upper gi endoscopy,exam; anesth,lower arm surgery (Left, 03/08/2021); needle biopsy left; tonsillectomy; elbow surgery (Left); hc supracervical abdominal hysterectomy; and colonoscopy (N/A, 6/26/2023).   Family History:  Her family history includes Cancer in her father; Colonic polyps in her mother; Dementia in her father; Depression in her mother, sister, and sister; Diabetes in her sister; Heart Disease in her mother; Hypertension in her mother, sister, and sister; Stroke in her mother; acromegaly from a pituitary tumor in her sister.  Social History:  She  reports that she has never smoked. She has never used smokeless tobacco. She reports current alcohol use of about 2.0 standard drinks of alcohol per week. She reports that she does not currently use drugs after having used the following drugs: Cannabis. Frequency: 1.00 time per week.    Tobacco:  She has never smoked tobacco.    CAGE Alcohol Screen: {Tip  CAGE Alcohol Screen:8307}  *** Cage NOT Performed in the last 6 months, please do assessment! Last Cage score was 0 on 11/6/2023.    {Tip   Care Team:8307}  Patient Care Team:  Gloria Elliott MD as PCP - General (Internal Medicine)  Ken Santos, PT as Physical  Therapist  Albert Vera MD (NEUROLOGY)  Enrrique Hebert, PT, DPT, OCS, Cert MDT  as Physical Therapist (Physical Therapy)  Sara Chu MD as Consulting Physician (NEUROLOGY)  Hi Viera DO as Consulting Physician (NEUROLOGY)  Caryl Hanson MD (OBSTETRICS & GYNECOLOGY)    Review of Systems  {Female Review of Systems (Optional):7352}    Objective:   Physical Exam  {Use this to document a Female Complete Physical Exam (pelvic deferred) - Defaults to Blank -DEL to delete as it is not needed for AWV but is needed for CPX or Medicare Supervisit:6118}    /78 (BP Location: Left arm, Patient Position: Sitting, Cuff Size: adult)   Pulse 79   Ht 5' 6\" (1.676 m)   Wt (!) 312 lb 11.2 oz (141.8 kg)   BMI 50.47 kg/m²  Estimated body mass index is 50.47 kg/m² as calculated from the following:    Height as of this encounter: 5' 6\" (1.676 m).    Weight as of this encounter: 312 lb 11.2 oz (141.8 kg).    Medicare Hearing Assessment: {Tip (Required for AWV/SWV) Hearing Assessment:8307}  *** (Incomplete)  {Tip  Hearing Screening incomplete. Refresh to ensure screening pulls in; if not, click link above to assess, then refresh:830    {Tip  Vision Screenin}  {Tip  Vision Screening incomplete. Required for IPPE/first MA Supervisit. Click link above to assess, then refresh. If vision screening not recorded, this link will disappear upon signing note/encounter:8307}    Assessment & Plan:   Helena Ramsey is a 69 year old female who presents for a Medicare Assessment.     1. Medicare annual wellness visit, subsequent (Primary)  2. EDWAR (obstructive sleep apnea)  3. Asymptomatic menopause  -     XR DEXA BONE DENSITOMETRY (CPT=77080); Future; Expected date: 2024  4. Screening mammogram for breast cancer  -     [facility] ANDREW 2D+3D SCREENING BILAT (CPT=77067/98139); Future; Expected date: 2024  5. Other iron deficiency anemia  -     Ferritin; Future; Expected date: 2024  -     Iron And  Tibc; Future; Expected date: 11/11/2024  6. Status post total replacement of right hip  7. Essential hypertension  Overview:  Wt Readings from Last 6 Encounters:  08/17/20 : (!) 301 lb (136.5 kg)  07/07/20 : 300 lb (136.1 kg)  07/03/20 : (!) 301 lb 14.4 oz (136.9 kg)  05/22/20 : 280 lb (127 kg)  03/05/20 : 283 lb (128.4 kg)  02/06/20 : 283 lb (128.4 kg)  09/19/18 : 245 lb (111.1 kg)  08/16/18 : 245 lb (111.1 kg)  05/15/18 : 243 lb (110.2 kg)   8. Stage 3a chronic kidney disease (HCC)  Overview:  Blood pressure 125/83, pulse 64, resp. rate 16, height 5' 7\" (1.702 m), weight 247 lb (112 kg).     9. Acquired hypothyroidism  10. Recurrent major depressive disorder, in partial remission (HCC)  11. GERD without esophagitis  12. Class 3 severe obesity due to excess calories with serious comorbidity and body mass index (BMI) of 50.0 to 59.9 in adult (HCC)  13. Spinal stenosis of lumbar region without neurogenic claudication    The patient indicates understanding of these issues and agrees to the plan.  {lifestyle and A/P options:5845::\"Reinforced healthy diet, lifestyle, and exercise.\"}    {Tip  Follow Up:8307}  No follow-ups on file.     Gloria Elliott MD, 11/16/2024     Supplementary Documentation:   General Health:       Health Maintenance   Topic Date Due    DEXA Scan  03/15/2022    Annual Physical  11/06/2024    Mammogram  05/24/2025    Colorectal Cancer Screening  06/26/2033    Influenza Vaccine  Completed    Annual Depression Screening  Completed    Fall Risk Screening (Annual)  Completed    Pneumococcal Vaccine: 65+ Years  Completed    Zoster Vaccines  Completed    COVID-19 Vaccine  Completed

## 2024-11-18 DIAGNOSIS — I10 ESSENTIAL HYPERTENSION: ICD-10-CM

## 2024-11-18 DIAGNOSIS — E03.9 HYPOTHYROIDISM, UNSPECIFIED TYPE: ICD-10-CM

## 2024-11-22 ENCOUNTER — HOSPITAL ENCOUNTER (OUTPATIENT)
Dept: BONE DENSITY | Age: 69
Discharge: HOME | End: 2024-11-22
Attending: INTERNAL MEDICINE
Payer: MEDICARE

## 2024-11-22 DIAGNOSIS — Z78.0 ASYMPTOMATIC MENOPAUSE: ICD-10-CM

## 2024-11-22 PROCEDURE — 77080 DXA BONE DENSITY AXIAL: CPT | Performed by: INTERNAL MEDICINE

## 2024-11-22 RX ORDER — PANTOPRAZOLE SODIUM 40 MG/1
40 TABLET, DELAYED RELEASE ORAL
Qty: 90 TABLET | Refills: 3 | Status: SHIPPED | OUTPATIENT
Start: 2024-11-22

## 2024-11-22 RX ORDER — AMLODIPINE BESYLATE 2.5 MG/1
2.5 TABLET ORAL DAILY
Qty: 90 TABLET | Refills: 3 | Status: SHIPPED | OUTPATIENT
Start: 2024-11-22

## 2024-11-22 RX ORDER — LEVOTHYROXINE SODIUM 125 UG/1
125 TABLET ORAL
Qty: 90 TABLET | Refills: 3 | Status: SHIPPED | OUTPATIENT
Start: 2024-11-22

## 2024-11-22 NOTE — TELEPHONE ENCOUNTER
Refill passed per [facility] protocol.  Requested Prescriptions   Pending Prescriptions Disp Refills    AMLODIPINE 2.5 MG Oral Tab [Pharmacy Med Name: amLODIPine Besylate Oral Tablet 2.5 MG] 90 tablet 3     Sig: TAKE 1 TABLET ONE TIME DAILY       Hypertension Medications Protocol Failed - 11/22/2024  9:25 AM        Failed - EGFRCR or GFRNAA > 50     GFR Evaluation  EGFRCR: 47 , resulted on 11/8/2024          Passed - CMP or BMP in past 12 months        Passed - Last BP reading less than 140/90     BP Readings from Last 1 Encounters:   11/11/24 129/78               Passed - In person appointment or virtual visit in the past 12 mos or appointment in next 3 mos     Recent Outpatient Visits              1 week ago Medicare annual wellness visit, subsequent    [facility] [address], [place] Gloria Elliott MD    Office Visit    1 month ago Hyponatremia    [facility] Gen Chahal MD    Office Visit    4 months ago Visit for occupational health examination    [facility] Occupational Health    Office Visit    6 months ago Inflamed seborrheic keratosis    [facility]    Office Visit    6 months ago Skin cancer screening    [facility]    [facility] Visit          Future Appointments         Provider Department Appt Notes    Today BK XR RM1; BK DEXA RM1 [facility] DEXA - [address] DEXA    In 2 weeks Samanta Mathur MD [facility] check spots on head    In 1 month Azalea Olsen MD [facility] Acid reflux’s                      PANTOPRAZOLE 40 MG Oral Tab EC [Pharmacy Med Name: Pantoprazole Sodium Oral Tablet Delayed Release 40 MG] 90 tablet 3     Sig: TAKE 1 TABLET EVERY MORNING BEFORE BREAKFAST       Gastrointestional Medication Protocol Passed -  11/22/2024  9:25 AM        Passed - In person appointment or virtual visit in the past 12 mos or appointment in next 3 mos     Recent Outpatient Visits              1 week ago Medicare annual wellness visit, subsequent    [facility], [facility] [place] Kandel, Ninel, MD    Office Visit    1 month ago Hyponatremia    [facility], [facility] Gen Chahal MD    Office Visit    4 months ago Visit for occupational health examination    [facility]    Office Visit    6 months ago Inflamed seborrheic keratosis    [facility] [place] [facility]    Office Visit    6 months ago [facility] cancer [facility]          Future Appointments         Provider Department Appt Notes    Today BK XR RM1; BK DEXA RM1 [facility] DEXA - [address] DEXA    In 2 weeks Samanta Mathur MD [facility] check spots on head    In 1 month Azalea Olsen MD [facility] Acid reflux’s                      LEVOTHYROXINE 125 MCG Oral Tab [Pharmacy Med Name: Levothyroxine Sodium Oral Tablet 125 MCG] 90 tablet 3     Sig: Take 1 tablet (125 mcg total) by mouth before breakfast.       Thyroid Medication Protocol Passed - 11/22/2024  9:25 AM        Passed - TSH in past 12 months        Passed - Last TSH value is normal     Lab Results   Component Value Date    TSH 0.586 07/03/2024    THYROIDFUNC 0.21 (L) 02/04/2016                 Passed - In person appointment or virtual visit in the past 12 mos or appointment in next 3 mos     Recent Outpatient Visits              1 week ago Medicare annual wellness visit, subsequent    [facility]    Office Visit    1 month ago Hyponatremia    [facility],  [facility] [place] Gen Chahal MD    Office Visit    4 months ago Visit for occupational health examination    [facility] Occupational Health    Office Visit    6 months ago Inflamed seborrheic keratosis    [facility]    [facility] Visit    6 months ago [facility] cancer screening    [facility]    [facility]          Future Appointments         Provider Department Appt Notes    Today BK XR RM1; BK DEXA RM1 [facility] DEXA - [address] DEXA    In 2 weeks Samanta Mathur MD [facility] [place] check spots on head    In 1 month [facility]alea Olsen MD [facility] Acid reflux’s                       Future Appointments         Provider Department Appt Notes    Today BK XR RM1; BK DEXA RM1 [facility] DEXA - [address] DEXA    In 2 weeks Samanta Mathur MD [facility] [place] check spots on head    In 1 month [facility]          Recent Outpatient Visits              1 week ago Medicare annual wellness visit, subsequent    [facility]    Office Visit    1 month ago Hyponatremia    [facility], [facility], Gen Huang MD    Office Visit    4 months ago Visit for occupational health examination    [facility] Occupational Health    Office Visit    6 months ago Inflamed seborrheic keratosis    [facility] [place] Thomas, Kathryn, MD    Office Visit    6 months ago Skin cancer screening    [facility]    [facility] Visit

## 2024-11-22 NOTE — TELEPHONE ENCOUNTER
Please review; protocol failed/No Protocol    Last Office Visit: 11/11/2024    Requested Prescriptions   Pending Prescriptions Disp Refills    amLODIPine 2.5 MG Oral Tab [Pharmacy Med Name: amLODIPine Besylate Oral Tablet 2.5 MG] 90 tablet 3     Sig: TAKE 1 TABLET ONE TIME DAILY       Hypertension Medications Protocol Failed - 11/22/2024  9:26 AM        Failed - EGFRCR or GFRNAA > 50     GFR Evaluation  EGFRCR: 47 , resulted on 11/8/2024          Passed - CMP or BMP in past 12 months        Passed - Last BP reading less than 140/90     BP Readings from Last 1 Encounters:   11/11/24 129/78               Passed - In person appointment or virtual visit in the past 12 mos or appointment in next 3 mos     Recent Outpatient Visits              1 week ago Medicare annual wellness visit, subsequent    [facility], [address], [place] Gloria Elliott MD    Office Visit    1 month ago Hyponatremia    [facility] Gen Chahal MD    Office Visit    4 months ago Visit for occupational health examination    [facility]    Office Visit    6 months ago Inflamed seborrheic keratosis    [facility] [address], [place] Samanta Mathur MD    Office Visit    6 months ago Skin cancer screening    [facility] [address], [place] Gloria Elliott MD    Office Visit          Future Appointments         Provider Department Appt Notes    Today BK XR RM1; BK DEXA RM1 [facility] DEXA - [address] DEXA    In 2 weeks Samanta Mathur MD [facility] check spots on head    In 1 month Azalea Olsen MD [facility] Acid reflux’s                     Signed Prescriptions Disp Refills    pantoprazole 40 MG Oral Tab EC 90 tablet 3     Sig: Take 1 tablet (40 mg total) by mouth before breakfast.       Gastrointestional Medication Protocol  Passed - 11/22/2024  9:26 AM        Passed - In person appointment or virtual visit in the past 12 mos or appointment in next 3 mos     Recent Outpatient Visits              1 week ago Medicare annual wellness visit, subsequent    [facility]    Office Visit    1 month ago Hyponatremia    [facility], [facility] Gen Huang MD    Office Visit    4 months ago Visit for occupational health examination    [facility] Occupational Health    Office Visit    6 months ago Inflamed seborrheic keratosis    [facility]    Office Visit    6 months ago Skin cancer screening    [facility]    [facility] Visit          Future Appointments         Provider Department Appt Notes    Today BK XR RM1; BK DEXA RM1 [facility] DEXA - [address] DEXA    In 2 weeks Samanta Mathur MD [facility] check spots on head    In 1 month Azalea Olsen MD [facility] Acid reflux’s                      levothyroxine 125 MCG Oral Tab 90 tablet 3     Sig: Take 1 tablet (125 mcg total) by mouth before breakfast.       Thyroid Medication Protocol Passed - 11/22/2024  9:26 AM        Passed - TSH in past 12 months        Passed - Last TSH value is normal     Lab Results   Component Value Date    TSH 0.586 07/03/2024    THYROIDFUNC 0.21 (L) 02/04/2016                 Passed - In person appointment or virtual visit in the past 12 mos or appointment in next 3 mos     Recent Outpatient Visits              1 week ago Medicare annual wellness visit, subsequent    [facility]    Office Visit    1 month ago Hyponatremia    [facility], [facility], Gen Huang MD    Office  Visit    4 months ago Visit for occupational health examination    [facility]    Office Visit    6 months ago Inflamed seborrheic keratosis    [facility]    Office Visit    6 months ago Skin cancer screening    [facility] [place] Kandel, Ninel, MD    Office Visit          Future Appointments         Provider Department Appt Notes    Today BK XR RM1; BK DEXA RM1 [facility] DEXA - [address] DEXA    In 2 weeks Samanta Mathur MD [facility] [place] check spots on head    In 1 month [facility] reflux[facility]                       Future Appointments         Provider Department Appt Notes    Today BK XR RM1; BK DEXA RM1 [facility] DEXA - [address] DEXA    In 2 weeks Samanta Mathur MD [facility] [place] check spots on head    In 1 month [facility] Acid reflux[facility]          Recent Outpatient Visits              1 week ago Medicare annual wellness visit, subsequent    [facility] [address], [place] Gloria Elliott MD    Office Visit    1 month ago Hyponatremia    [facility] Gen Chahal MD    Office Visit    4 months ago Visit for occupational health examination    [facility]    Office Visit    6 months ago Inflamed seborrheic keratosis    [facility]    Office Visit    6 months ago Skin cancer screening    [facility] [place] Kandel, Ninel, MD    Office Visit

## 2024-11-23 NOTE — PROGRESS NOTES
Subjective:     Patient ID: Helena Ramsey is a 69 year old female.  Presents for physical exam but insurance changed.  HPI  Patient reports that she has been doing well working out with , having hard time losing weight,  She sees psychiatrist who helps her getting medication adjusted depressive symptoms are improving on current regimen  Patient uses CPAP to treat obstructive sleep apnea  Review of Systems       Constitutional:  Negative for decreased activity, fever, irritability and lethrgy  Cardiovascular:  Negative for chest pain and irregular heartbeat/palpitations  Respiratory:  Negative for cough, dyspnea and wheezing.  Eyes:  Negative for eye discharge and vision loss  Endocrine:  Negative for polydipsia and polyphagia  Integumentary:  Negative for pruritus and rash  Neurological:  Negative for gait disturbance, paresthesias.   Psychiatric:  Negative for inappropriate interaction and psychiatric symptoms  Current Outpatient Medications   Medication Sig Dispense Refill    DULoxetine 60 MG Oral Cap DR Particles Take 1 capsule (60 mg total) by mouth daily. 90 capsule 3    bisoprolol 5 MG Oral Tab Take 1 tablet (5 mg total) by mouth daily. 90 tablet 3    Zaleplon 10 MG Oral Cap Take 1 capsule (10 mg total) by mouth nightly.      Desipramine HCl 50 MG Oral Tab take 1 tablet by mouth at bedtime for 1 week, then 2 tabs at bedtime for 1 week, then 3 tabs at bedtime.      traZODone 50 MG Oral Tab Take 1 tablet (50 mg total) by mouth nightly.      Levocetirizine Dihydrochloride (XYZAL ALLERGY 24HR OR) Take by mouth.      amLODIPine 2.5 MG Oral Tab Take 1 tablet (2.5 mg total) by mouth daily. 90 tablet 3    pantoprazole 40 MG Oral Tab EC Take 1 tablet (40 mg total) by mouth before breakfast. 90 tablet 3    levothyroxine 125 MCG Oral Tab Take 1 tablet (125 mcg total) by mouth before breakfast. 90 tablet 3     Allergies:Allergies[1]    Past Medical History:    Back problem    CKD (chronic kidney  disease)    Colon polyp    Depression    Disorder of thyroid    hypothyroidism    Diverticulosis    Essential hypertension    High blood pressure    History of blood transfusion    Good Robert    Internal hemorrhoids    Neuropathy    kike feet    Osteoarthritis    Renal disorder    CKD stage 3    Tremors of nervous system    Unspecified sleep apnea    CPAP    Visual impairment    glasses      Past Surgical History:   Procedure Laterality Date    Abdominoplasty Bilateral     Anesth,lower arm surgery Left 03/08/2021    radius head replacement    Colonoscopy N/A 12/15/2017    Procedure: COLONOSCOPY;  Surgeon: Denisa Stroud MD;  Location: [facility] ENDOSCOPY    Colonoscopy  01/01/2007    Colonoscopy N/A 6/26/2023    Procedure: COLONOSCOPY / ESOPHAGOGASTRODUODENOSCOPY;  Surgeon: Azalea Olsen MD;  Location: [facility] ENDOSCOPY    Elbow surgery Left     Hc supracervical abdominal hysterectomy      supracervical hysterectomy for fibroids    Lianne localization wire 1 site left (cpt=[id number])      1998    Needle biopsy left      Reduction left      2000 Bilateral    Reduction right      Spine surgery procedure unlisted      Tonsillectomy      Total hip replacement Right 10/14/16    Upper gi endoscopy,exam        Family History   Problem Relation Age of Onset    Cancer Father         prostate    Dementia Father     Heart Disease Mother         CVA    Depression Mother     Hypertension Mother     Other (Colonic polyps) Mother     Stroke Mother     Depression Sister     Hypertension Sister     Other (acromegaly from a pituitary tumor) Sister     Depression Sister     Diabetes Sister     Hypertension Sister       Social History:   Social History     Socioeconomic History    Marital status:    Tobacco Use    Smoking status: Never    Smokeless tobacco: Never   Vaping Use    Vaping status: Never Used   Substance and Sexual Activity    Alcohol use: Yes     Alcohol/week: 2.0 standard drinks of alcohol     Types: 1 Cans of beer, 1  Standard drinks or equivalent per week     Comment: [facility]    Drug use: Not Currently     Frequency: 1.0 times per week     Types: Cannabis     Comment: edibls for sleep   Other Topics Concern    Caffeine Concern Yes     Comment: 3 cups tea/coffee daily    Exercise No    History of tanning Yes    Pt has a pacemaker No    Pt has a defibrillator No    Reaction to local anesthetic No        /78 (BP Location: Left arm, Patient Position: Sitting, Cuff Size: adult)   Pulse 79   Ht 5' 6\" (1.676 m)   Wt (!) 312 lb 11.2 oz (141.8 kg)   BMI 50.47 kg/m²    Physical Exam  Constitutional:       Appearance: Normal appearance. She is obese.   HENT:      Head: Normocephalic and atraumatic.   Eyes:      General: No scleral icterus.     Extraocular Movements: Extraocular movements intact.      Conjunctiva/sclera: Conjunctivae normal.      Pupils: Pupils are equal, round, and reactive to light.   Neck:      Vascular: No carotid bruit.   Cardiovascular:      Rate and Rhythm: Normal rate and regular rhythm.      Heart sounds: No murmur heard.  Pulmonary:      Effort: Pulmonary effort is normal.      Breath sounds: Normal breath sounds. No wheezing.   Abdominal:      General: Bowel sounds are normal.      Palpations: Abdomen is soft. There is no mass.      Tenderness: There is no abdominal tenderness. There is no guarding or rebound.   Musculoskeletal:         General: Normal range of motion.      Cervical back: Normal range of motion and neck supple.      Right lower leg: No edema.      Left lower leg: No edema.   Lymphadenopathy:      Cervical: No cervical adenopathy.   Skin:     General: Skin is warm.      Coloration: Skin is not jaundiced.   Neurological:      General: No focal deficit present.      Mental Status: She is alert and oriented to person, place, and time. Mental status is at baseline.   Psychiatric:         Mood and Affect: Mood normal.         Behavior: Behavior normal.         Thought Content: Thought content  normal.         Assessment & Plan:   1. Medicare annual wellness visit, subsequent    2. EDWAR (obstructive sleep apnea) stable on CPAP   3. Asymptomatic menopause DEXA scan   4. Screening mammogram for breast cancer    5. Other iron deficiency anemia continue iron supplement will check CBC and iron studies   6. Status post total replacement of right hip stable, controlled on current medications   7. Essential hypertension controlled on current medication   8. Stage 3a chronic kidney disease (HCC) stable monitor function periodically follow-up with nephrology as planned   9. Acquired hypothyroidism controlled on current dose of levothyroxine   10. Recurrent major depressive disorder, in partial remission ([facility]) improving under psychiatric care   11. GERD without esophagitis stable on omeprazole   12. Class 3 severe obesity due to excess calories with serious comorbidity and body mass index (BMI) of 50.0 to 59.9 in adult ([facility]) continue low carbohydrate calorie restricted diet, regular physical activities as tolerated   13. Spinal stenosis of lumbar region without neurogenic claudication        Orders Placed This Encounter   Procedures    Ferritin    Iron And Tibc       Meds This Visit:  Requested Prescriptions      No prescriptions requested or ordered in this encounter       Imaging & Referrals:  [facility] ANDREW 2D+3D SCREENING BILAT (CPT=77067/15188)   Follow-up in 6 months         [1]   Allergies  Allergen Reactions    Bactrim [Sulfamethoxazole W/Trimethoprim] HIVES    Iodides (Topical) HIVES     IVP DYE    Radiology Contrast Iodinated Dyes HIVES     Other reaction(s): Hives/Skin Rash    Sulfa Antibiotics HIVES    Benadryl [Diphenhydramine] NAUSEA AND VOMITING    Trintellix [Vortioxetine] ITCHING

## 2024-12-11 ENCOUNTER — OFFICE VISIT (OUTPATIENT)
Dept: DERMATOLOGY CLINIC | Facility: CLINIC | Age: 69
End: 2024-12-11
Payer: MEDICARE

## 2024-12-11 DIAGNOSIS — L82.1 SK (SEBORRHEIC KERATOSIS): ICD-10-CM

## 2024-12-11 DIAGNOSIS — D23.9 BENIGN NEOPLASM OF SKIN, UNSPECIFIED LOCATION: ICD-10-CM

## 2024-12-11 DIAGNOSIS — L81.4 LENTIGO: ICD-10-CM

## 2024-12-11 DIAGNOSIS — D48.5 NEOPLASM OF UNCERTAIN BEHAVIOR OF SKIN: Primary | ICD-10-CM

## 2024-12-11 DIAGNOSIS — D22.9 MULTIPLE NEVI: ICD-10-CM

## 2024-12-11 PROCEDURE — 11102 TANGNTL BX SKIN SINGLE LES: CPT | Performed by: DERMATOLOGY

## 2024-12-11 PROCEDURE — 99213 OFFICE O/P EST LOW 20 MIN: CPT | Performed by: DERMATOLOGY

## 2024-12-11 PROCEDURE — 88305 TISSUE EXAM BY PATHOLOGIST: CPT | Performed by: DERMATOLOGY

## 2024-12-11 PROCEDURE — 11103 TANGNTL BX SKIN EA SEP/ADDL: CPT | Performed by: DERMATOLOGY

## 2024-12-11 RX ORDER — AMOXICILLIN 500 MG/1
CAPSULE ORAL
COMMUNITY
Start: 2024-08-05

## 2024-12-11 RX ORDER — DESIPRAMINE HYDROCHLORIDE 150 MG/1
TABLET ORAL
COMMUNITY
Start: 2024-07-11

## 2024-12-14 NOTE — PROGRESS NOTES
The pathology report from last visit showed    A.  Skin, right posterior parietal scalp, shave biopsy:  -Verrucous keratosis, irritated.    B.  Skin, right nasal tip, shave biopsy:  -Angiofibroma (fibrous papule).    Please log in test results, send biopsy results letter.  Pt to rtc 1 year or prn.

## 2024-12-25 NOTE — PROGRESS NOTES
Operative Report                     Shave/  Tangential biopsy     Clinical diagnosis:    Size of lesion:    Location:   Patient with A: right posterior parietal scalp:keratotic crusted papule 1.2cm larger post cryo r/o Scc B: right nasal tip: 4mm papule changing color r/o BCC vs other,   Procedure:    With patient in appropriate position the skin of the above was scrubbed with alcohol.  Anesthesia was obtained with 1% Xylocaine with epinephrine.  The skin surrounding the lesion was placed under tension and the lesion was incised using a #15 scalpel blade.  The specimen was sent for histopathologic exam.    Hemostasis was obtained with electrocautery/aluminum chloride.  Estimated blood loss less than 2 cc.    Biopsy dressed with Polysporin, bandage.    Pressure dressing:   No    Complications: None    Written instructions given and reviewed with patient    Await pathology    Contact information reviewed.    Procedural physician:  Samanta Mathur MD

## 2024-12-25 NOTE — PROGRESS NOTES
Helena Ramsey is a 69 year old female.  HPI:     CC:    Chief Complaint   Patient presents with    Derm Problem     LOV 05/16/24. Pt presents for spot F/U on top rt of scalp; previously was treated with cryotherapy; spot never went away and has gotten bigger.     Pt also has new spot  on crown area of scalp that was noticed 3 mths ago. Sore to touch.    Pt has hx of SK  Pt denies personal/family hx of Skin Ca         Allergies:  Bactrim [sulfamethoxazole w/trimethoprim], Iodides (topical), Radiology contrast iodinated dyes, Sulfa antibiotics, Benadryl [diphenhydramine], and Trintellix [vortioxetine]    HISTORY:    Past Medical History:    Back problem    CKD (chronic kidney disease)    Colon polyp    Depression    Disorder of thyroid    hypothyroidism    Diverticulosis    Essential hypertension    Fibrous papule of nose    Right nasal tip    High blood pressure    History of blood transfusion    Good Robert    Internal hemorrhoids    Neuropathy    kike feet    Osteoarthritis    Renal disorder    CKD stage 3    Tremors of nervous system    Unspecified sleep apnea    CPAP    Verrucous keratosis    Right posterior parietal scalp    Visual impairment    glasses      Past Surgical History:   Procedure Laterality Date    Abdominoplasty Bilateral     Anesth,lower arm surgery Left 03/08/2021    radius head replacement    Colonoscopy N/A 12/15/2017    Procedure: COLONOSCOPY;  Surgeon: Denisa Stroud MD;  Location: [facility] ENDOSCOPY    Colonoscopy  01/01/2007    Colonoscopy N/A 6/26/2023    Procedure: COLONOSCOPY / ESOPHAGOGASTRODUODENOSCOPY;  Surgeon: Azalea Olsen MD;  Location: [facility] ENDOSCOPY    Elbow surgery Left     Hc supracervical abdominal hysterectomy      supracervical hysterectomy for fibroids    Lianne localization wire 1 site left (cpt=19281)      1998    Needle biopsy left      Reduction left      2000 Bilateral    Reduction right      Spine surgery procedure unlisted      Tonsillectomy      Total hip  replacement Right 10/14/16    Upper gi endoscopy,exam        Family History   Problem Relation Age of Onset    Cancer Father         prostate    Dementia Father     Heart Disease Mother         CVA    Depression Mother     Hypertension Mother     Other (Colonic polyps) Mother     Stroke Mother     Depression Sister     Hypertension Sister     Other (acromegaly from a pituitary tumor) Sister     Depression Sister     Diabetes Sister     Hypertension Sister       Social History     Socioeconomic History    Marital status:    Tobacco Use    Smoking status: Never    Smokeless tobacco: Never   Vaping Use    Vaping status: Never Used   Substance and Sexual Activity    Alcohol use: Yes     Alcohol/week: 2.0 standard drinks of alcohol     Types: 1 Cans of beer, 1 Standard drinks or equivalent per week     Comment: [facility]    Drug use: Not Currently     Frequency: 1.0 times per week     Types: Cannabis     Comment: edibls for sleep   Other Topics Concern    Caffeine Concern Yes     Comment: 3 cups tea/coffee daily    Exercise No    History of tanning Yes    Pt has a pacemaker No    Pt has a defibrillator No    Reaction to local anesthetic No        Current Outpatient Medications   Medication Sig Dispense Refill    amoxicillin 500 MG Oral Cap TAKE 4 CAPSULES BY MOUTH ONE HOUR PRIOR TO PROCEDURE      Desipramine HCl 150 MG Oral Tab       amLODIPine 2.5 MG Oral Tab Take 1 tablet (2.5 mg total) by mouth daily. 90 tablet 3    pantoprazole 40 MG Oral Tab EC Take 1 tablet (40 mg total) by mouth before breakfast. 90 tablet 3    levothyroxine 125 MCG Oral Tab Take 1 tablet (125 mcg total) by mouth before breakfast. 90 tablet 3    DULoxetine 60 MG Oral Cap DR Particles Take 1 capsule (60 mg total) by mouth daily. 90 capsule 3    bisoprolol 5 MG Oral Tab Take 1 tablet (5 mg total) by mouth daily. 90 tablet 3    Zaleplon 10 MG Oral Cap Take 1 capsule (10 mg total) by mouth nightly.      Desipramine HCl 50 MG Oral Tab take 1 tablet  by mouth at bedtime for 1 week, then 2 tabs at bedtime for 1 week, then 3 tabs at bedtime.      traZODone 50 MG Oral Tab Take 1 tablet (50 mg total) by mouth nightly.      Levocetirizine Dihydrochloride (XYZAL ALLERGY 24HR OR) Take by mouth.       Allergies:   Allergies   Allergen Reactions    Bactrim [Sulfamethoxazole W/Trimethoprim] HIVES    Iodides (Topical) HIVES     IVP DYE    Radiology Contrast Iodinated Dyes HIVES     Other reaction(s): Hives/Skin Rash    Sulfa Antibiotics HIVES    Benadryl [Diphenhydramine] NAUSEA AND VOMITING    Trintellix [Vortioxetine] ITCHING       Past Medical History:    Back problem    CKD (chronic kidney disease)    Colon polyp    Depression    Disorder of thyroid    hypothyroidism    Diverticulosis    Essential hypertension    Fibrous papule of nose    Right nasal tip    High blood pressure    History of blood transfusion    Good Robert    Internal hemorrhoids    Neuropathy    kike feet    Osteoarthritis    Renal disorder    CKD stage 3    Tremors of nervous system    Unspecified sleep apnea    CPAP    Verrucous keratosis    Right posterior parietal scalp    Visual impairment    glasses     Past Surgical History:   Procedure Laterality Date    Abdominoplasty Bilateral     Anesth,lower arm surgery Left 03/08/2021    radius head replacement    Colonoscopy N/A 12/15/2017    Procedure: COLONOSCOPY;  Surgeon: Denisa Stroud MD;  Location: [facility] ENDOSCOPY    Colonoscopy  01/01/2007    Colonoscopy N/A 6/26/2023    Procedure: COLONOSCOPY / ESOPHAGOGASTRODUODENOSCOPY;  Surgeon: Azalea Olsen MD;  Location: [facility] ENDOSCOPY    Elbow surgery Left     Hc supracervical abdominal hysterectomy      supracervical hysterectomy for fibroids    Lianne localization wire 1 site left (cpt=19281)      1998    Needle biopsy left      Reduction left      2000 Bilateral    Reduction right      Spine surgery procedure unlisted      Tonsillectomy      Total hip replacement Right 10/14/16    Upper gi  endoscopy,exam       Social History     Socioeconomic History    Marital status:      Spouse name: Not on file    Number of children: Not on file    Years of education: Not on file    Highest education level: Not on file   Occupational History    Not on file   Tobacco Use    Smoking status: Never    Smokeless tobacco: Never   Vaping Use    Vaping status: Never Used   Substance and Sexual Activity    Alcohol use: Yes     Alcohol/week: 2.0 standard drinks of alcohol     Types: 1 Cans of beer, 1 Standard drinks or equivalent per week     Comment: [facility]    Drug use: Not Currently     Frequency: 1.0 times per week     Types: Cannabis     Comment: edibls for sleep    Sexual activity: Not on file   Other Topics Concern     Service Not Asked    Blood Transfusions Not Asked    Caffeine Concern Yes     Comment: 3 cups tea/coffee daily    Occupational Exposure Not Asked    Hobby Hazards Not Asked    Sleep Concern Not Asked    Stress Concern Not Asked    Weight Concern Not Asked    Special Diet Not Asked    Back Care Not Asked    Exercise No    Bike Helmet Not Asked    Seat Belt Not Asked    Self-Exams Not Asked    Grew up on a farm Not Asked    History of tanning Yes    Outdoor occupation Not Asked    Pt has a pacemaker No    Pt has a defibrillator No    Breast feeding Not Asked    Reaction to local anesthetic No   Social History Narrative    Not on file     Social Drivers of Health     Financial Resource Strain: Not on file   Food Insecurity: Not on file   Transportation Needs: Not on file   Physical Activity: Not on file   Stress: Not on file   Social Connections: Not on file   Housing Stability: Not on file     Family History   Problem Relation Age of Onset    Cancer Father         prostate    Dementia Father     Heart Disease Mother         CVA    Depression Mother     Hypertension Mother     Other (Colonic polyps) Mother     Stroke Mother     Depression Sister     Hypertension Sister     Other (acromegaly  from a pituitary tumor) Sister     Depression Sister     Diabetes Sister     Hypertension Sister        There were no vitals filed for this visit.    HPI:  Chief Complaint   Patient presents with    Derm Problem     LOV 05/16/24. Pt presents for spot F/U on top rt of scalp; previously was treated with cryotherapy; spot never went away and has gotten bigger.     Pt also has new spot  on crown area of scalp that was noticed 3 mths ago. Sore to touch.    Pt has hx of SK  Pt denies personal/family hx of Skin Ca     Now retired  Careful with sun protection  no personal or family history of skin cancer  Patient presents with concerns above.    Patient has been in their usual state of health.     Past notes/ records and appropriate/relevant lab results including pathology and past body maps reviewed. Including outside notes/ PCP notes as appropriate. Updated and new information noted in current visit.     ROS:  Denies other relevant systemic complaints.      History, medications, allergies reviewed as noted.       Physical Examination:     Well-developed well-nourished patient alert oriented in no acute distress.  Exam performed, including scalp, head, neck, face,nails, hair, external eyes, including conjunctival mucosa, eyelids, lips external ears , arms, digits,palms.     Multiple light to medium brown, well marginated, uniformly pigmented, macules and papules 6 mm and less scattered on exam. pigmented lesions examined with dermoscopy benign-appearing patterns.     Waxy tannish keratotic papules scattered, cherry-red vascular papules scattered.    See map today's date for lesions noted .  See assessment and plan below for specific lesions.    Otherwise remarkable for lesions as noted on map.    See A/P  below for additional information:    Assessment / plan:    Orders Placed This Encounter   Procedures    Specimen to Pathology, Tissue [[facility] Pt to [facility]]       Meds & Refills for this Visit:  Requested Prescriptions       No prescriptions requested or ordered in this encounter         Encounter Diagnoses   Name Primary?    Neoplasm of uncertain behavior of skin Yes    SK (seborrheic keratosis)     Multiple nevi     Lentigo     Benign neoplasm of skin, unspecified location           Patient with A: right posterior parietal scalp:keratotic crusted papule 1.2cm larger post cryo r/o Scc B: right nasal tip: 4mm papule changing color r/o BCC vs other,   Shave/ tangential biopsy performed, operative note and consent in chart further plans pending pathology    Irritated SKs at anterior scalp monitor.  Trial of cryo  Irritated along CPAP straps for improved mask.  Reassurance given    Waxy tan keratotic papules lesions in areas of concern as noted reassurance given.  Benign nature discussed.  Possibility of cryo, alphahydroxy acids over-the-counter retinol's discussed.  Diabetic cream over-the-counter to more irritated areas    Tan papule 5 mm left antecubital fossa, pedunculated tan-brown papule 6 mm at right medial proximal thigh scattered other junctional nevi lentigines noted.  Benign-appearing pigmented lesions reassurance given.  No atypical features on dermoscopy    Extensive lesions -lentigo sunscreen sun protection encouraged.    No other susupicious lesions on todays  Exam.    Multiple benign-appearing nevi cherry numerous keratoses as noted previously    Dermatitis stable  Continue current management,  Moisturizers, good skin care    Please refer to map for specific lesions.  See additional diagnoses.  Pros cons of various therapies, risks benefits discussed.Pathophysiology discussed with patient.  Therapeutic options reviewed.  See  Medications in grid.  Instructions reviewed at length.    Benign nevi, seborrheic  keratoses, cherry angiomas:  Reassurance regarding other benign skin lesions.Signs and symptoms of skin cancer, ABCDE's of melanoma discussed with patient. Sunscreen use, sun protection, self exams reviewed.   Followup as noted RTC ---routine checkup    6 mos -one year or p.r.n.    Encounter Times   Including precharting, reviewing chart, prior notes obtaining history: 10 minutes, medical exam :10 minutes, notes on body map, plan, counseling 10minutes My total time spent caring for the patient on the day of the encounter: 30 minutes     The patient indicates understanding of these issues and agrees to the plan.  The patient is asked to return as noted in follow-up/ above.    This note was generated using Dragon voice recognition software.  Please contact me regarding any confusion resulting from errors in recognition..

## 2025-01-06 ENCOUNTER — LAB ENCOUNTER (OUTPATIENT)
Dept: LAB | Age: 70
End: 2025-01-06
Attending: INTERNAL MEDICINE
Payer: MEDICARE

## 2025-01-06 DIAGNOSIS — D50.8 OTHER IRON DEFICIENCY ANEMIA: ICD-10-CM

## 2025-01-06 DIAGNOSIS — N18.31 STAGE 3A CHRONIC KIDNEY DISEASE (HCC): ICD-10-CM

## 2025-01-06 LAB
ALBUMIN SERPL-MCNC: 4.5 G/DL (ref 3.2–4.8)
ANION GAP SERPL CALC-SCNC: 4 MMOL/L (ref 0–18)
BASOPHILS # BLD AUTO: 0.07 X10(3) UL (ref 0–0.2)
BASOPHILS NFR BLD AUTO: 0.8 %
BUN BLD-MCNC: 19 MG/DL (ref 9–23)
CALCIUM BLD-MCNC: 9.3 MG/DL (ref 8.7–10.4)
CHLORIDE SERPL-SCNC: 102 MMOL/L (ref 98–112)
CO2 SERPL-SCNC: 29 MMOL/L (ref 21–32)
CREAT BLD-MCNC: 1.18 MG/DL
DEPRECATED HBV CORE AB SER IA-ACNC: 11 NG/ML
EGFRCR SERPLBLD CKD-EPI 2021: 51 ML/MIN/1.73M2 (ref 60–?)
EOSINOPHIL # BLD AUTO: 0.42 X10(3) UL (ref 0–0.7)
EOSINOPHIL NFR BLD AUTO: 5 %
ERYTHROCYTE [DISTWIDTH] IN BLOOD BY AUTOMATED COUNT: 15.4 %
GLUCOSE BLD-MCNC: 84 MG/DL (ref 70–99)
HCT VFR BLD AUTO: 35.4 %
HGB BLD-MCNC: 11.1 G/DL
IMM GRANULOCYTES # BLD AUTO: 0.04 X10(3) UL (ref 0–1)
IMM GRANULOCYTES NFR BLD: 0.5 %
IRON SATN MFR SERPL: 20 %
IRON SERPL-MCNC: 71 UG/DL
LYMPHOCYTES # BLD AUTO: 2.09 X10(3) UL (ref 1–4)
LYMPHOCYTES NFR BLD AUTO: 24.8 %
MCH RBC QN AUTO: 29 PG (ref 26–34)
MCHC RBC AUTO-ENTMCNC: 31.4 G/DL (ref 31–37)
MCV RBC AUTO: 92.4 FL
MONOCYTES # BLD AUTO: 0.76 X10(3) UL (ref 0.1–1)
MONOCYTES NFR BLD AUTO: 9 %
NEUTROPHILS # BLD AUTO: 5.06 X10 (3) UL (ref 1.5–7.7)
NEUTROPHILS # BLD AUTO: 5.06 X10(3) UL (ref 1.5–7.7)
NEUTROPHILS NFR BLD AUTO: 59.9 %
OSMOLALITY SERPL CALC.SUM OF ELEC: 281 MOSM/KG (ref 275–295)
PHOSPHATE SERPL-MCNC: 3.9 MG/DL (ref 2.4–5.1)
PLATELET # BLD AUTO: 425 10(3)UL (ref 150–450)
POTASSIUM SERPL-SCNC: 4.6 MMOL/L (ref 3.5–5.1)
RBC # BLD AUTO: 3.83 X10(6)UL
SODIUM SERPL-SCNC: 135 MMOL/L (ref 136–145)
TOTAL IRON BINDING CAPACITY: 347 UG/DL (ref 250–425)
TRANSFERRIN SERPL-MCNC: 256 MG/DL (ref 250–380)
WBC # BLD AUTO: 8.4 X10(3) UL (ref 4–11)

## 2025-01-06 PROCEDURE — 83550 IRON BINDING TEST: CPT

## 2025-01-06 PROCEDURE — 85025 COMPLETE CBC W/AUTO DIFF WBC: CPT

## 2025-01-06 PROCEDURE — 80069 RENAL FUNCTION PANEL: CPT

## 2025-01-06 PROCEDURE — 82728 ASSAY OF FERRITIN: CPT

## 2025-01-06 PROCEDURE — 83540 ASSAY OF IRON: CPT

## 2025-01-06 PROCEDURE — 36415 COLL VENOUS BLD VENIPUNCTURE: CPT

## 2025-01-08 ENCOUNTER — OFFICE VISIT (OUTPATIENT)
Dept: GASTROENTEROLOGY | Facility: CLINIC | Age: 70
End: 2025-01-08
Payer: MEDICARE

## 2025-01-08 VITALS
BODY MASS INDEX: 47.09 KG/M2 | DIASTOLIC BLOOD PRESSURE: 80 MMHG | WEIGHT: 293 LBS | HEIGHT: 66 IN | SYSTOLIC BLOOD PRESSURE: 128 MMHG

## 2025-01-08 DIAGNOSIS — R11.0 NAUSEA: ICD-10-CM

## 2025-01-08 DIAGNOSIS — K57.90 DIVERTICULOSIS: ICD-10-CM

## 2025-01-08 DIAGNOSIS — R14.0 BLOATING: ICD-10-CM

## 2025-01-08 DIAGNOSIS — R13.14 PHARYNGOESOPHAGEAL DYSPHAGIA: Primary | ICD-10-CM

## 2025-01-08 PROCEDURE — 99214 OFFICE O/P EST MOD 30 MIN: CPT | Performed by: INTERNAL MEDICINE

## 2025-01-08 NOTE — PATIENT INSTRUCTIONS
Helena Ramsey is a 66 year old year-old female with history of diverticulosis, sleep apnea, depression, hypothyroidism presenting for RIDDHI and acid reflux    # gerd  # change in bowel habits  # RIDDHI, improving  # IBS- mixed    Recommend:  Double the pantoprazole for 2 weeks-- 40 mg in the morning and 40 mg at bedtime  Here are some reflux precautions:   - Avoid trigger foods  - Anti-reflux measures: raising the head of the bed at night, avoiding tight clothing or belts, avoiding eating late at night and not lying down shortly after mealtime and achieving weight loss   - Avoid NSAID's, caffeine, peppermints, alcohol, tobacco and foods that incite symptoms   - ok to continue pantoprazole daily before breakfast  - improved Hgb, stable now -- has donated 3 pints   - Could be from blood donation since dropped in 2021 and started donating in January 2022, June 2022, August 2022. No donation in last year and has been trending up  - low FODMAP diet    If no improvement in 2-3 weeks will schedule EGD    -Schedule EGD w/ possible biopsy/dilation w/reflux and dysphagia    ** If MAC @ [facility]:    - NO alcohol, recreational drugs nor erectile dysfunction mediations 24 hours before procedure(s)   - NO herbal supplements or weight loss medications x 7 days prior to the procedure(s)    ** If MAC @ [facility] or  twilit - continue all medications as prescribed

## 2025-01-08 NOTE — PROGRESS NOTES
[facility] - Gastroenterology                                                                                                               Reason for consult: follow-up    Requesting physician or provider: Gloria Elliott MD    Chief Complaint   Patient presents with    Follow - Up     Getting full quickly; nausea with eating       HPI:   Helena Ramsey is a 69 year old year-old female with history of diverticulosis, sleep apnea, depression, hypothyroidism presenting for new anemia    Last saw Tatyana Jerome NP 5/2021  she is here today for f/u  Known to Dr. Olsen  egd 12/2019 c/w LA grade b-c esophagitis  Started ppi  Path (-) h.pylori    Had repeat egd 5/2020  Reflux improved and no barretts or pre-cancerous change    she denies acid reflux while on pantoprazole 40 mg/daily. she has had fullness in epigastric area after eating followed by belching. No bloating.  Reported early satiety at initial consult with gi. CT A/P 2020 w/o significant finding.  No dysphagia, odynophagia and/or globus.  she denies nausea and/or vomiting.  she denies recent change in appetite and/or unintentional weight loss.    she moves her bowels 2 times per day.Stool consistency variable. Has increased gas. Stools soft. No diarrhea. gen abd pain prior to bm-crampy type. she denies brbpr and/or melena.      05/17/23-- had anemia  Has been anemic despite not giving blood and on iron  No overt bleeding  Last colonoscopy 2017-- normal  Did have EGD with esophagitis healed with PPI now worse    10/04/23 presents for follow up  Anemia improved but kidney function worse  Controlling BP  Weight has gone up since cannot drink as much so eating more  Still on pantoprazole and has belching and full feeling  Has had hysterectomy and abdominoplasty  Did have appendix with hysterectomy    02/07/24  Eating more iron foods-- pistachios  Still has LLQ pain randomly, mild but comes and goes  NO bleeding  Hgb stable-- able to donate blood   Does it  3-4 times a day  Capsule done and no blood, just little red spots    01/08/25 presents for follow up  Hgb stable  Ferritin a little lower  More reflux and a little dysphagia  Gets nausea  Still having LLQ pain where diverticulosis is and comes and goes  Taking iron and eating healthy  No bleeding noted  Weight up 19 lbs in about a year        Wt Readings from Last 10 Encounters:   01/08/25 (!) 319 lb (144.7 kg)   11/11/24 (!) 312 lb 11.2 oz (141.8 kg)   10/15/24 (!) 306 lb (138.8 kg)   05/06/24 (!) 302 lb (137 kg)   04/16/24 (!) 304 lb (137.9 kg)   02/07/24 300 lb (136.1 kg)   01/16/24 298 lb (135.2 kg)   11/06/23 298 lb 4.8 oz (135.3 kg)   10/13/23 296 lb (134.3 kg)   10/04/23 298 lb (135.2 kg)       NSAIDS: no  Tobacco: no  Alcohol: rare  Illicit drugs: no    No FH GI malignancy      Labs/Imaging/Procedures:     Component      Latest Ref Rng 1/2/2024 4/1/2024 7/3/2024 10/2/2024 11/8/2024   Hemoglobin      12.0 - 16.0 g/dL  11.5 (L)  11.3 (L)  11.8 (L)     Hematocrit      35.0 - 48.0 %  35.9  35.4  36.5     Platelet Count      150.0 - 450.0 10(3)uL  435.0  461.0 (H)  449.0     MCV      80.0 - 100.0 fL  90.2  85.7  89.0     MCH      26.0 - 34.0 pg  28.9  27.4  28.8     MCHC      31.0 - 37.0 g/dL  32.0  31.9  32.3     RDW      %  13.3  15.8  15.5     Prelim Neutrophil Abs      1.50 - 7.70 x10 (3) uL  5.48  4.27  5.93     Neutrophils Absolute      1.50 - 7.70 x10(3) uL  5.48  4.27  5.93     Lymphocytes Absolute      1.00 - 4.00 x10(3) uL  1.86  1.93  1.54     Monocytes Absolute      0.10 - 1.00 x10(3) uL  0.66  0.66  0.81     Eosinophils Absolute      0.00 - 0.70 x10(3) uL  0.12  0.24  0.23     Basophils Absolute      0.00 - 0.20 x10(3) uL  0.06  0.06  0.08     Immature Granulocyte Absolute      0.00 - 1.00 x10(3) uL  0.05  0.05  0.07     Neutrophils %      %  66.6  59.2  68.4     Lymphocytes %      %  22.6  26.8  17.8     Monocytes %      %  8.0  9.2  9.4     Eosinophils %      %  1.5  3.3  2.7     Basophils %      %   0.7  0.8  0.9     Immature Granulocyte %      %  0.6  0.7  0.8     Glucose      70 - 99 mg/dL    99  131 (H)    Sodium      136 - 145 mmol/L    131 (L)  137    Potassium      3.5 - 5.1 mmol/L    4.7  4.5    Chloride      98 - 112 mmol/L    102  104    Carbon Dioxide, Total      21.0 - 32.0 mmol/L    26.0  30.0    ANION GAP      0 - 18 mmol/L    3  3    BUN      9 - 23 mg/dL    18  15    CREATININE      0.55 - 1.02 mg/dL    1.16 (H)  1.25 (H)    CALCIUM      8.7 - 10.4 mg/dL    9.8  9.3    CALCULATED OSMOLALITY      275 - 295 mOsm/kg    274 (L)  287    EGFR      >=60 mL/min/1.73m2    52 (L)  47 (L)    AST (SGOT)      <34 U/L     23    ALT (SGPT)      10 - 49 U/L     25    ALKALINE PHOSPHATASE      55 - 142 U/L     97    Total Bilirubin      0.2 - 1.1 mg/dL     0.3    PROTEIN, TOTAL      5.7 - 8.2 g/dL     7.0    Albumin      3.2 - 4.8 g/dL    4.4  4.6    Globulin      2.0 - 3.5 g/dL     2.4    A/G Ratio      1.0 - 2.0      1.9    Patient Fasting for CMP?     No    PHOSPHORUS      2.4 - 5.1 mg/dL    3.6     Iron, Serum      50 - 170 ug/dL 102        Transferrin      250 - 380 mg/dL 250        Iron Bind.Cap.(TIBC)      250 - 425 ug/dL 373        Iron Saturation      15 - 50 % 27        FERRITIN      50 - 306 ng/mL 22.5          Component      Latest Ref [facility] 1/6/2025   Hemoglobin      12.0 - 16.0 g/dL 11.1 (L)    Hematocrit      35.0 - 48.0 % 35.4    Platelet Count      150.0 - 450.0 10(3)uL 425.0    MCV      80.0 - 100.0 fL 92.4    MCH      26.0 - 34.0 pg 29.0    MCHC      31.0 - 37.0 g/dL 31.4    RDW      % 15.4    Prelim Neutrophil Abs      1.50 - 7.70 x10 (3) uL 5.06    Neutrophils Absolute      1.50 - 7.70 x10(3) uL 5.06    Lymphocytes Absolute      1.00 - 4.00 x10(3) uL 2.09    Monocytes Absolute      0.10 - 1.00 x10(3) uL 0.76    Eosinophils Absolute      0.00 - 0.70 x10(3) uL 0.42    Basophils Absolute      0.00 - 0.20 x10(3) uL 0.07    Immature Granulocyte Absolute      0.00 - 1.00 x10(3) uL 0.04     Neutrophils %      % 59.9    Lymphocytes %      % 24.8    Monocytes %      % 9.0    Eosinophils %      % 5.0    Basophils %      % 0.8    Immature Granulocyte %      % 0.5    Glucose      70 - 99 mg/dL    Sodium      136 - 145 mmol/L    Potassium      3.5 - 5.1 mmol/L    Chloride      98 - 112 mmol/L    Carbon Dioxide, Total      21.0 - 32.0 mmol/L    ANION GAP      0 - 18 mmol/L    BUN      9 - 23 mg/dL    CREATININE      0.55 - 1.02 mg/dL    CALCIUM      8.7 - 10.4 mg/dL    CALCULATED OSMOLALITY      275 - 295 mOsm/kg    EGFR      >=60 mL/min/1.73m2    AST (SGOT)      <34 U/L    ALT (SGPT)      10 - 49 U/L    ALKALINE PHOSPHATASE      55 - 142 U/L    Total Bilirubin      0.2 - 1.1 mg/dL    PROTEIN, TOTAL      5.7 - 8.2 g/dL    Albumin      3.2 - 4.8 g/dL    Globulin      2.0 - 3.5 g/dL    A/G Ratio      1.0 - 2.0     Patient Fasting for CMP?    PHOSPHORUS      2.4 - 5.1 mg/dL    Iron, Serum      50 - 170 ug/dL 71    Transferrin      250 - 380 mg/dL 256    Iron Bind.Cap.(TIBC)      250 - 425 ug/dL 347    Iron Saturation      15 - 50 % 20    FERRITIN      50 - 306 ng/mL 11 (L)       Legend:  (L) Low  (H) High    Colonoscopy (2017) negative-- repeat in 10 year    EGD 12/2019:  Impression:  1. LA grade B-C esophagitis     Recommend:  1. Start pantoprazole 40 mg BID 30 min before breakfast and 30 min before dinner  2. Await biopsies  3. Repeat EGD in 6-8 weeks  Final Diagnosis:      Gastric biopsy:   Fragments of gastric mucosa with lamina propria vascular ectasia, and focal mild inactive chronic inflammation.  Diff Quik stain (with appropriate control reactivity) is negative for H pylori microorganisms.     EGD 5/2020:  Impression:  1. Rule out barretts     Recommend:  1. Ok to do pantoprazole 40 mg daily 30 min before breakfast  2. Await biopsies     Distal esophagus; biopsy:   Gastroesophageal junction mucosa with reactive changes suggestive of reflux.   No evidence of dysplasia or metaplasia identified.      CT  A/P 9/2020:      Impression   CONCLUSION:         1. No acute intra-abdominal/pelvic process identified.       2. Uncomplicated colonic diverticulosis.       3. Small hiatal hernia.       Please see above for further details.          Impression   CONCLUSION:  Acute diverticulitis in the left anterior pelvis involving distal descending and proximal sigmoid colon in an area of numerous diverticula.  There is surrounding pericolonic inflammatory stranding and a tiny adjacent contained perforation.    Trace amount of free fluid.       EGD/colonoscopy 2023  EGD findings:       1. Esophagus: The squamocolumnar junction was noted at 40 cm and appeared irregular with one column extending to 39 cm. The GE junction was noted at 40 cm from the incisors. Sliding 1-2 cm hiatal hernia. The esophageal mucosa appeared normal. There was no evidence of esophagitis, stricture. Small column again biopsied to rule out barretts  2. Stomach: The stomach distended normally. Normal rugal folds were seen. The pylorus was patent. The gastric mucosa appeared normal. Retroflexion revealed a normal fundus and a no cardia.   3. Duodenum: The duodenal mucosa appeared normal in the 1st and 2nd portion of the duodenum. Biopsied due to anemia     Colonoscopy findings:     1. ARTHUR: normal rectal tone, no masses palpated.   2. NO polyp(s) noted   3. Terminal ileum: the visualized mucosa appeared normal.  4. The colonic mucosa throughout the colon showed normal vascular pattern, without evidence of angioectasias or inflammation.   5. Diverticulosis: pandiverticulosis.  6. A retroflexed view of the rectum revealed hemorrhoids.        Recommend:  Repeat colonoscopy in 10 years pending pathology. If new signs or symptoms develop, colonoscopy may need to be repeated sooner.   High fiber diet.  Monitor for blood in the stool. If having more than just tinge of blood, call office or go to the ER.  If continued anemia can consider capsule     >>>If tissue was  obtained and you have not received your pathology results either by phone or letter within 2 weeks, please call our office at [phone].     Specimens: duodenal biopsies, distal esophagus    Final Diagnosis:      A. Duodenum; biopsy:  Duodenal mucosa with no significant pathologic change.   Preserved villous architecture.     B. Distal esophagus; biopsy:   Squamocolumnar mucosa with mild reflux changes.  Negative for intestinal metaplasia.       Capsule endoscopy 10/18/2023  Findings:   1. Stomach entry (1st gastric image): 34 seconds  2. Small bowel (1st duodenal image): 6 minutes 42 seconds  3. Colon (1st cecal image): 4 hours 58 minutes  4. Red spots/bleedin hour 9 minutes     Impression:  1. No active bleeding noted, some red spots     Recommendations:  1. Avoid NSAIDs as possible  2. Follow up in office   3. Repeat labs in 3-6 months    Wt Readings from Last 6 Encounters:   25 (!) 319 lb (144.7 kg)   24 (!) 312 lb 11.2 oz (141.8 kg)   10/15/24 (!) 306 lb (138.8 kg)   24 (!) 302 lb (137 kg)   24 (!) 304 lb (137.9 kg)   24 300 lb (136.1 kg)        History, Medications, Allergies, ROS:      Past Medical History:    Back problem    CKD (chronic kidney disease)    Colon polyp    Depression    Disorder of thyroid    hypothyroidism    Diverticulosis    Essential hypertension    Fibrous papule of nose    Right nasal tip    High blood pressure    History of blood transfusion    Good Robert    Internal hemorrhoids    Neuropathy    kike feet    Osteoarthritis    Renal disorder    CKD stage 3    Tremors of nervous system    Unspecified sleep apnea    CPAP    Verrucous keratosis    Right posterior parietal scalp    Visual impairment    glasses      Past Surgical History:   Procedure Laterality Date    Abdominoplasty Bilateral     Anesth,lower arm surgery Left 2021    radius head replacement    Colonoscopy N/A 12/15/2017    Procedure: COLONOSCOPY;  Surgeon: Denisa Stroud  MD;  Location: [facility] ENDOSCOPY    Colonoscopy  01/01/2007    Colonoscopy N/A 6/26/2023    Procedure: COLONOSCOPY / ESOPHAGOGASTRODUODENOSCOPY;  Surgeon: Azalea Olsen MD;  Location: [facility] ENDOSCOPY    Elbow surgery Left     Hc supracervical abdominal hysterectomy      supracervical hysterectomy for fibroids    Lianne localization wire 1 site left (cpt=19281)      1998    Needle biopsy left      Reduction left      2000 Bilateral    Reduction right      Spine surgery procedure unlisted      Tonsillectomy      Total hip replacement Right 10/14/16    Upper gi endoscopy,exam        Family Hx:   Family History   Problem Relation Age of Onset    Cancer Father         prostate    Dementia Father     Heart Disease Mother         CVA    Depression Mother     Hypertension Mother     Other (Colonic polyps) Mother     Stroke Mother     Depression Sister     Hypertension Sister     Other (acromegaly from a pituitary tumor) Sister     Depression Sister     Diabetes Sister     Hypertension Sister       Social History:   Social History     Socioeconomic History    Marital status:    Tobacco Use    Smoking status: Never    Smokeless tobacco: Never   Vaping Use    Vaping status: Never Used   Substance and Sexual Activity    Alcohol use: Yes     Alcohol/week: 2.0 standard drinks of alcohol     Types: 1 Cans of beer, 1 Standard drinks or equivalent per week     Comment: [facility]    Drug use: Not Currently     Frequency: 1.0 times per week     Types: Cannabis     Comment: edibls for sleep   Other Topics Concern    Caffeine Concern Yes     Comment: 3 cups tea/coffee daily    Exercise No    History of tanning Yes    Pt has a pacemaker No    Pt has a defibrillator No    Reaction to local anesthetic No        Medications (Active prior to today's visit):  Current Outpatient Medications   Medication Sig Dispense Refill    amLODIPine 2.5 MG Oral Tab Take 1 tablet (2.5 mg total) by mouth daily. 90 tablet 3    pantoprazole 40 MG Oral Tab EC Take 1  tablet (40 mg total) by mouth before breakfast. 90 tablet 3    levothyroxine 125 MCG Oral Tab Take 1 tablet (125 mcg total) by mouth before breakfast. 90 tablet 3    DULoxetine 60 MG Oral Cap DR Particles Take 1 capsule (60 mg total) by mouth daily. 90 capsule 3    bisoprolol 5 MG Oral Tab Take 1 tablet (5 mg total) by mouth daily. 90 tablet 3    Zaleplon 10 MG Oral Cap Take 1 capsule (10 mg total) by mouth nightly.      Desipramine HCl 50 MG Oral Tab take 1 tablet by mouth at bedtime for 1 week, then 2 tabs at bedtime for 1 week, then 3 tabs at bedtime.      traZODone 50 MG Oral Tab Take 1 tablet (50 mg total) by mouth nightly.      Levocetirizine Dihydrochloride (XYZAL ALLERGY 24HR OR) Take by mouth.      amoxicillin 500 MG Oral Cap TAKE 4 CAPSULES BY MOUTH ONE HOUR PRIOR TO PROCEDURE (Patient not taking: Reported on 1/8/2025)      Desipramine HCl 150 MG Oral Tab          Allergies:  Allergies   Allergen Reactions    Bactrim [Sulfamethoxazole W/Trimethoprim] HIVES    Iodides (Topical) HIVES     IVP DYE    Radiology Contrast Iodinated Dyes HIVES     Other reaction(s): Hives/Skin Rash    Sulfa Antibiotics HIVES    Benadryl [Diphenhydramine] NAUSEA AND VOMITING    Trintellix [Vortioxetine] ITCHING       ROS:   CONSTITUTIONAL: negative for fevers, chills, sweats and weight loss  EYES Negative for red eyes, yellow eyes, changes in vision  HEENT: Negative for dysphagia and hoarseness  RESPIRATORY: Negative for cough and shortness of breath  CARDIOVASCULAR: Negative for chest pain, palpitations  GASTROINTESTINAL: See HPI  GENITOURINARY: Negative for dysuria and frequency  MUSCULOSKELETAL: Negative for arthralgias and myalgias  NEUROLOGICAL: Negative for dizziness and headaches  BEHAVIOR/PSYCH: Negative for anxiety and poor appetite    PHYSICAL EXAM:   Blood pressure 128/80, height 5' 6\" (1.676 m), weight (!) 319 lb (144.7 kg), not currently breastfeeding.    GEN: WD/WN, NAD  HEENT: Supple symmetrical, trachea  midline  CV: RRR, the extremities are warm and well perfused   LUNGS: No increased work of breathing  ABDOMEN: No scars, normal bowel sounds, soft, non-tender, non-distended no rebound or guarding, no masses, no hepatomegaly  MSK: No redness, no warmth, no swelling of joints  SKIN: No jaundice, no erythema, no rashes  HEMATOLOGIC: No bleeding, no bruising  NEURO: Alert and interactive, normal gait  .  ASSESSMENT/PLAN:   Helena Ramsey is a 66 year old year-old female with history of diverticulosis, sleep apnea, depression, hypothyroidism presenting for RIDDHI and acid reflux    # gerd  # change in bowel habits  #  RIDDHI, improving  # IBS- mixed    Recommend:  Double the pantoprazole for 2 weeks-- 40 mg in the morning and 40 mg at bedtime  Here are some reflux precautions:   - Avoid trigger foods  - Anti-reflux measures: raising the head of the bed at night, avoiding tight clothing or belts, avoiding eating late at night and not lying down shortly after mealtime and achieving weight loss   - Avoid NSAID's, caffeine, peppermints, alcohol, tobacco and foods that incite symptoms   - ok to continue pantoprazole daily before breakfast  - improved Hgb, stable now -- has donated 3 pints   - Could be from blood donation since dropped in 2021 and started donating in January 2022, June 2022, August 2022. No donation in last year and has been trending up  - low FODMAP diet    If no improvement in 2-3 weeks will schedule EGD    -Schedule EGD w/ possible biopsy/dilation w/reflux and dysphagia    ** If MAC @ [facility]:    - NO alcohol, recreational drugs nor erectile dysfunction mediations 24 hours before procedure(s)   - NO herbal supplements or weight loss medications x 7 days prior to the procedure(s)    ** If MAC @ [facility] or  twilight - continue all medications as prescribed      EGD consent: I have discussed the risks (including risk of delayed/missed diagnosis), benefits, and alternatives to upper endoscopy/enteroscopy with the  patient [who demonstrated understanding], including but not limited to the risks of bleeding, infection, pain, as well as the risks of anesthesia and perforation all leading to prolonged hospitalization or surgical intervention. I also specifically mentioned the miss rate of upper endoscopy of 5-10% in the best of all circumstances. It is the patient's responsibility to contact his/her insurance company regarding questions about out-of-pocket cost/benefits and was provided the appropriate diagnostic information/codes.  All questions were answered to the patient’s satisfaction. The patient has agreed to sign an informed consent and elected to proceed with upper endoscopy/enteroscopy with possible intervention [i.e. polypectomy, ablation, stent placement, etc.] as indicated.        Orders This Visit:  No orders of the defined types were placed in this encounter.      Meds This Visit:  Requested Prescriptions      No prescriptions requested or ordered in this encounter       Imaging & Referrals:  None

## 2025-02-07 ENCOUNTER — PATIENT MESSAGE (OUTPATIENT)
Dept: GASTROENTEROLOGY | Facility: CLINIC | Age: 70
End: 2025-02-07

## 2025-02-12 NOTE — TELEPHONE ENCOUNTER
Tatyana,   LOV with Dr. Olsen on 1/8/25, recommended increasing pantoprazole to 40 mg BID x 2 weeks. Pt states symptoms have improved and is asking for med refill to take BID.     Per chart, it seems plan was to go back down to once daily and if still symptomatic after 2-3 weeks soul schedule an EGD. Can you confirm or advise?  Thanks,  Sally

## 2025-02-20 RX ORDER — BISOPROLOL FUMARATE 5 MG/1
5 TABLET, FILM COATED ORAL DAILY
Qty: 90 TABLET | Refills: 3 | Status: SHIPPED | OUTPATIENT
Start: 2025-02-20

## 2025-02-20 NOTE — TELEPHONE ENCOUNTER
Refill passed per [facility] protocol.    Requested Prescriptions   Pending Prescriptions Disp Refills    BISOPROLOL 5 MG Oral Tab [Pharmacy Med Name: Bisoprolol Fumarate Oral Tablet 5 MG] 90 tablet 3     Sig: TAKE 1 TABLET EVERY DAY       Hypertension Medications Protocol Passed - 2/20/2025  5:32 PM        Passed - CMP or BMP in past 12 months        Passed - Last BP reading less than 140/90     BP Readings from Last 1 Encounters:   01/08/25 128/80               Passed - In person appointment or virtual visit in the past 12 mos or appointment in next 3 mos     Recent Outpatient Visits              1 month ago Pharyngoesophageal dysphagia    [facility] [place] Azalea Olsen MD    Office Visit    2 months ago [facility]    [facility]    3 months ago EDWAR (obstructive sleep apnea)    [facility] [place] Kandel, Ninel, MD    Office Visit    4 months ago Hyponatremia    [facility] Gen Chahal MD    Office Visit    7 months ago Visit for occupational health examination    [facility]    Office Visit          Future Appointments         Provider Department Appt Notes    In 3 weeks Caryl Hanson MD [facility] - OB/GYN Physical    In 3 months YK XR RM1; YK YOLI RM1 [facility]                     Passed - EGFRCR or GFRNAA > 50     GFR Evaluation  EGFRCR: 51 , resulted on 1/6/2025          Passed - Medication is active on med list           Future Appointments         Provider Department Appt Notes    In 3 weeks Caryl Hanson MD [facility] - OB/GYN Physical    In 3 months YK XR RM1; YK YOLI RM1 [facility]           Recent  Outpatient Visits              1 month ago Pharyngoesophageal dysphagia    [facility], [facility], Azalea Vazquez MD    Office Visit    2 months ago Neoplasm of uncertain behavior of skin    [facility], [facility], Samanta Tyson MD    Office Visit    3 months ago EDWAR (obstructive sleep apnea)    [facility], [facility], [place] Kandel, Ninel, MD    Office Visit    4 months ago Hyponatremia    [facility], [facility] [place] Gen Chahal MD    Office Visit    7 months ago Visit for occupational health examination    [facility] Occupational [facility]    Office Visit

## 2025-02-26 ENCOUNTER — NURSE TRIAGE (OUTPATIENT)
Dept: INTERNAL MEDICINE CLINIC | Facility: CLINIC | Age: 70
End: 2025-02-26

## 2025-02-26 DIAGNOSIS — H43.391 VITREOUS FLOATERS OF RIGHT EYE: Primary | ICD-10-CM

## 2025-02-26 NOTE — TELEPHONE ENCOUNTER
Action Requested: Summary for Provider     []  Critical Lab, Recommendations Needed  [x] Need Additional Advice  []   FYI    [x]   Need Orders  [] Need Medications Sent to Pharmacy  []  Other     SUMMARY: Please advise further . Referral pended for ophthalmologist. (Patient lives in [place], [place].)    Patient states she was at the optometrist's office on Monday and vision was fine. Yesterday she saw a flash of light in her right eye. Today patient has a floater in her right eye that moves with her when she moves her eye. No flashes of light today, no pain or vision loss.     Patient called her optometrist and awaiting for call back. Patient also asking for further advice.     is out of the office, routed to pod mate.    Reason for call: Eye Problem  Onset: yesterday  Reason for Disposition   Single floater (i.e., small speck seems to float across the eye)  (Exception: Floater(s) are a chronic symptom and this is unchanged from patient's baseline pattern.)    Protocols used: Vision Loss or Change-A-OH

## 2025-02-26 NOTE — TELEPHONE ENCOUNTER
Patient was called and inform Dr. Parikh message below and she was given the information to Dr. Huitron    Referred to Provider Information:  Provider Address Phone   Makenzie Huitron MD [address]  [address]  [place] [address] [phone]

## 2025-03-14 ENCOUNTER — OFFICE VISIT (OUTPATIENT)
Dept: OBGYN CLINIC | Facility: CLINIC | Age: 70
End: 2025-03-14
Payer: MEDICARE

## 2025-03-14 VITALS
HEIGHT: 66 IN | DIASTOLIC BLOOD PRESSURE: 82 MMHG | SYSTOLIC BLOOD PRESSURE: 138 MMHG | HEART RATE: 80 BPM | BODY MASS INDEX: 47.09 KG/M2 | WEIGHT: 293 LBS

## 2025-03-14 DIAGNOSIS — N64.4 BREAST PAIN: ICD-10-CM

## 2025-03-14 DIAGNOSIS — Z01.419 ENCOUNTER FOR GYNECOLOGICAL EXAMINATION: Primary | ICD-10-CM

## 2025-03-14 PROCEDURE — 99214 OFFICE O/P EST MOD 30 MIN: CPT | Performed by: OBSTETRICS & GYNECOLOGY

## 2025-03-16 NOTE — PROGRESS NOTES
Helena Ramsey is a 70 year old female  No LMP recorded. (Menstrual status: Partial Hysterectomy). here for annual exam.       Last seen 2023.    Last pap 2022 normal with negative HPV  Last mammogram 2024.    History of supracervical hysterectomy    Since , having intermittent left breast pain.  No lumps felt.      OBSTETRICS HISTORY:  OB History    Para Term  AB Living   0 0 0 0 0 0   SAB IAB Ectopic Multiple Live Births   0 0 0 0 0       GYNE HISTORY   Pap Date: 22  Pap Result Notes: Neg Pap/HPV // Mammo 24 BILAT NEG    MEDICAL HISTORY:  Past Medical History:    Back problem    CKD (chronic kidney disease)    Colon polyp    Depression    Disorder of thyroid    hypothyroidism    Diverticulosis    Essential hypertension    Fibrous papule of nose    Right nasal tip    History of blood transfusion    Good Robert    Internal hemorrhoids    Neuropathy    kike feet    Osteoarthritis    Renal disorder    CKD stage 3    Tremors of nervous system    Unspecified sleep apnea    CPAP    Verrucous keratosis    Right posterior parietal scalp    Visual impairment    glasses     Past Surgical History:   Procedure Laterality Date    Abdominoplasty Bilateral     Anesth,lower arm surgery Left 2021    radius head replacement    Colonoscopy N/A 12/15/2017    Procedure: COLONOSCOPY;  Surgeon: Denisa Stroud MD;  Location: [facility] ENDOSCOPY    Colonoscopy  2007    Colonoscopy N/A 2023    Procedure: COLONOSCOPY / ESOPHAGOGASTRODUODENOSCOPY;  Surgeon: Azalea Olsen MD;  Location: [facility] ENDOSCOPY    Elbow surgery Left     Hc supracervical abdominal hysterectomy      supracervical hysterectomy for fibroids    Lianne localization wire 1 site left (cpt=19281)          Needle biopsy left      Reduction left       Bilateral    Reduction right      Spine surgery procedure unlisted      Tonsillectomy      Total hip replacement Right 10/14/16    Upper gi endoscopy,exam          SOCIAL HISTORY:  Social History     Socioeconomic History    Marital status:    Tobacco Use    Smoking status: Never    Smokeless tobacco: Never   Vaping Use    Vaping status: Never Used   Substance and Sexual Activity    Alcohol use: Yes     Alcohol/week: 2.0 standard drinks of alcohol     Types: 1 Cans of beer, 1 Standard drinks or equivalent per week     Comment: [facility]    Drug use: Not Currently     Frequency: 1.0 times per week     Types: Cannabis     Comment: edibls for sleep    Sexual activity: Not Currently   Other Topics Concern    Caffeine Concern Yes     Comment: 3 cups tea/coffee daily    Exercise No    History of tanning Yes    Pt has a pacemaker No    Pt has a defibrillator No    Reaction to local anesthetic No     Social Drivers of Health     Food Insecurity: No Food Insecurity (3/14/2025)    NCSS - Food Insecurity     Worried About Running Out of Food in the Last Year: No     Ran Out of Food in the Last Year: No   Transportation Needs: No Transportation Needs (3/14/2025)    NCSS - Transportation     Lack of Transportation: No   Housing Stability: Not At Risk (3/14/2025)    NCSS - Housing/Utilities     Has Housing: Yes     Worried About Losing Housing: No     Unable to Get Utilities: No       FAMILY HISTORY:  Family History   Problem Relation Age of Onset    Cancer Father         prostate    Dementia Father     Heart Disease Mother         CVA    Depression Mother     Hypertension Mother     Other (Colonic polyps) Mother     Stroke Mother     Depression Sister     Hypertension Sister     Other (acromegaly from a pituitary tumor) Sister     Depression Sister     Diabetes Sister     Hypertension Sister        MEDICATIONS:  Current Outpatient Medications   Medication Sig Dispense Refill    bisoprolol 5 MG Oral Tab Take 1 tablet (5 mg total) by mouth daily. 90 tablet 3    amLODIPine 2.5 MG Oral Tab Take 1 tablet (2.5 mg total) by mouth daily. 90 tablet 3    pantoprazole 40 MG Oral Tab EC Take 1  tablet (40 mg total) by mouth before breakfast. 90 tablet 3    levothyroxine 125 MCG Oral Tab Take 1 tablet (125 mcg total) by mouth before breakfast. 90 tablet 3    DULoxetine 60 MG Oral Cap DR Particles Take 1 capsule (60 mg total) by mouth daily. 90 capsule 3    Zaleplon 10 MG Oral Cap Take 1 capsule (10 mg total) by mouth nightly.      Desipramine HCl 50 MG Oral Tab take 1 tablet by mouth at bedtime for 1 week, then 2 tabs at bedtime for 1 week, then 3 tabs at bedtime.      traZODone 50 MG Oral Tab Take 1 tablet (50 mg total) by mouth nightly.      Levocetirizine Dihydrochloride (XYZAL ALLERGY 24HR OR) Take by mouth.      amoxicillin 500 MG Oral Cap TAKE 4 CAPSULES BY MOUTH ONE HOUR PRIOR TO PROCEDURE (Patient not taking: Reported on 3/14/2025)         ALLERGIES:  Allergies[1]      Depression Screening (PHQ-2/PHQ-9): Over the LAST 2 WEEKS   Little interest or pleasure in doing things (over the last two weeks)?: Not at all    Feeling down, depressed, or hopeless (over the last two weeks)?: Not at all    PHQ-2 SCORE: 0           Review of Systems:  Constitutional:  Denies fatigue, night sweats, hot flashes  Eyes:  denies blurred or double vision  Cardiovascular:  denies chest pain or palpitations  Respiratory:  denies shortness of breath  Gastrointestinal:  denies heartburn, abdominal pain, diarrhea or constipation  Genitourinary:  denies dysuria, incontinence, abnormal vaginal discharge, vaginal itching  Musculoskeletal:  denies back pain.  Skin/Breast:  Denies any breast pain, lumps, or discharge.   Neurological:  denies headaches, extremity weakness or numbness.  Psychiatric: denies depression or anxiety.  Endocrine:   denies excessive thirst or urination.  Heme/Lymph:  easy bruising or bleeding.    PHYSICAL EXAM:   /82   Pulse 80   Ht 5' 6\" (1.676 m)   Wt (!) 311 lb (141.1 kg)   BMI 50.20 kg/m²   Wt Readings from Last 2 Encounters:   03/14/25 (!) 311 lb (141.1 kg)   01/08/25 (!) 319 lb (144.7 kg)      Body mass index is 50.2 kg/m².    Constitutional: well developed, well nourished  Neck/Thyroid: thyroid symmetric, no nodules  Heart:  Regular rate and rhythm  Lungs:  Clear to asculation  Breast: normal without palpable masses, tenderness, asymmetry, nipple discharge, nipple retraction or skin changes  Abdomen:  soft, nontender, nondistended, no masses  Psychiatric:  Oriented to time, place, person and situation. Appropriate mood and affect    Pelvic Exam:  External Genitalia: normal appearance, hair distribution, and no lesions  Urethral Meatus:  normal in size, location, without lesions and prolapse  Bladder:  No fullness, masses or tenderness  Vagina:  Normal appearance without lesions, no abnormal discharge  Cervix:  Normal without tenderness on motion  Uterus surgically removed.      Assessment & Plan:    Helena Ramsey is a 70 year old female who presents for an annual physical exam.    1. Encounter for gynecological examination  Pap not done.  ASCCP guidelines reviewed.   Encouraged annual exam.   Order for mammogram.   RTC 1 year or prn     2. Breast pain  - YOLI ANDREW 2D+3D DIAGNOSTIC [facility]  BILAT (CPT=77066/99761); Future        Requested Prescriptions      No prescriptions requested or ordered in this encounter         Caryl Hanson MD  3/15/2025  9:56 PM         [1]   Allergies  Allergen Reactions    Bactrim [Sulfamethoxazole W/Trimethoprim] HIVES    Iodides (Topical) HIVES     IVP DYE    Radiology Contrast Iodinated Dyes HIVES     Other reaction(s): Hives/Skin Rash    Sulfa Antibiotics HIVES    Benadryl [Diphenhydramine] NAUSEA AND VOMITING    Trintellix [Vortioxetine] ITCHING

## 2025-04-10 ENCOUNTER — HOSPITAL ENCOUNTER (OUTPATIENT)
Dept: MAMMOGRAPHY | Facility: HOSPITAL | Age: 70
Discharge: HOME | End: 2025-04-10
Attending: OBSTETRICS & GYNECOLOGY
Payer: MEDICARE

## 2025-04-10 DIAGNOSIS — N64.4 BREAST PAIN: ICD-10-CM

## 2025-04-10 PROCEDURE — 77062 BREAST TOMOSYNTHESIS BI: CPT | Performed by: OBSTETRICS & GYNECOLOGY

## 2025-04-10 PROCEDURE — 76642 ULTRASOUND BREAST LIMITED: CPT | Performed by: OBSTETRICS & GYNECOLOGY

## 2025-04-10 PROCEDURE — 77066 DX MAMMO INCL CAD BI: CPT | Performed by: OBSTETRICS & GYNECOLOGY

## 2025-04-11 ENCOUNTER — LAB ENCOUNTER (OUTPATIENT)
Dept: LAB | Age: 70
End: 2025-04-11
Attending: INTERNAL MEDICINE
Payer: MEDICARE

## 2025-04-11 DIAGNOSIS — N18.31 STAGE 3A CHRONIC KIDNEY DISEASE (HCC): ICD-10-CM

## 2025-04-11 DIAGNOSIS — D50.8 OTHER IRON DEFICIENCY ANEMIA: ICD-10-CM

## 2025-04-11 LAB
ALBUMIN SERPL-MCNC: 4.6 G/DL (ref 3.2–4.8)
ANION GAP SERPL CALC-SCNC: 7 MMOL/L (ref 0–18)
BASOPHILS # BLD AUTO: 0.07 X10(3) UL (ref 0–0.2)
BASOPHILS NFR BLD AUTO: 0.8 %
BUN BLD-MCNC: 16 MG/DL (ref 9–23)
CALCIUM BLD-MCNC: 9.7 MG/DL (ref 8.7–10.6)
CHLORIDE SERPL-SCNC: 103 MMOL/L (ref 98–112)
CO2 SERPL-SCNC: 29 MMOL/L (ref 21–32)
CREAT BLD-MCNC: 1.21 MG/DL (ref 0.55–1.02)
DEPRECATED HBV CORE AB SER IA-ACNC: 21 NG/ML (ref 50–306)
EGFRCR SERPLBLD CKD-EPI 2021: 49 ML/MIN/1.73M2 (ref 60–?)
EOSINOPHIL # BLD AUTO: 0.25 X10(3) UL (ref 0–0.7)
EOSINOPHIL NFR BLD AUTO: 2.9 %
ERYTHROCYTE [DISTWIDTH] IN BLOOD BY AUTOMATED COUNT: 15.3 %
GLUCOSE BLD-MCNC: 112 MG/DL (ref 70–99)
HCT VFR BLD AUTO: 40.2 % (ref 35–48)
HGB BLD-MCNC: 13 G/DL (ref 12–16)
IMM GRANULOCYTES # BLD AUTO: 0.04 X10(3) UL (ref 0–1)
IMM GRANULOCYTES NFR BLD: 0.5 %
IRON SATN MFR SERPL: 34 % (ref 15–50)
IRON SERPL-MCNC: 98 UG/DL (ref 50–170)
LYMPHOCYTES # BLD AUTO: 2.06 X10(3) UL (ref 1–4)
LYMPHOCYTES NFR BLD AUTO: 23.7 %
MCH RBC QN AUTO: 29.6 PG (ref 26–34)
MCHC RBC AUTO-ENTMCNC: 32.3 G/DL (ref 31–37)
MCV RBC AUTO: 91.6 FL (ref 80–100)
MONOCYTES # BLD AUTO: 0.72 X10(3) UL (ref 0.1–1)
MONOCYTES NFR BLD AUTO: 8.3 %
NEUTROPHILS # BLD AUTO: 5.57 X10 (3) UL (ref 1.5–7.7)
NEUTROPHILS # BLD AUTO: 5.57 X10(3) UL (ref 1.5–7.7)
NEUTROPHILS NFR BLD AUTO: 63.8 %
OSMOLALITY SERPL CALC.SUM OF ELEC: 290 MOSM/KG (ref 275–295)
PHOSPHATE SERPL-MCNC: 3.8 MG/DL (ref 2.4–5.1)
PLATELET # BLD AUTO: 448 10(3)UL (ref 150–450)
POTASSIUM SERPL-SCNC: 4.6 MMOL/L (ref 3.5–5.1)
RBC # BLD AUTO: 4.39 X10(6)UL (ref 3.8–5.3)
SODIUM SERPL-SCNC: 139 MMOL/L (ref 136–145)
TOTAL IRON BINDING CAPACITY: 286 UG/DL (ref 250–425)
TRANSFERRIN SERPL-MCNC: 223 MG/DL (ref 250–380)
WBC # BLD AUTO: 8.7 X10(3) UL (ref 4–11)

## 2025-04-11 PROCEDURE — 36415 COLL VENOUS BLD VENIPUNCTURE: CPT

## 2025-04-11 PROCEDURE — 85025 COMPLETE CBC W/AUTO DIFF WBC: CPT

## 2025-04-11 PROCEDURE — 83550 IRON BINDING TEST: CPT

## 2025-04-11 PROCEDURE — 80069 RENAL FUNCTION PANEL: CPT

## 2025-04-11 PROCEDURE — 82728 ASSAY OF FERRITIN: CPT

## 2025-04-11 PROCEDURE — 83540 ASSAY OF IRON: CPT

## 2025-04-12 ENCOUNTER — TELEPHONE (OUTPATIENT)
Dept: NEPHROLOGY | Facility: CLINIC | Age: 70
End: 2025-04-12

## 2025-04-12 DIAGNOSIS — N18.31 STAGE 3A CHRONIC KIDNEY DISEASE (HCC): Primary | ICD-10-CM

## 2025-05-13 ENCOUNTER — OFFICE VISIT (OUTPATIENT)
Dept: INTERNAL MEDICINE CLINIC | Facility: CLINIC | Age: 70
End: 2025-05-13
Payer: MEDICARE

## 2025-05-13 VITALS
SYSTOLIC BLOOD PRESSURE: 135 MMHG | DIASTOLIC BLOOD PRESSURE: 80 MMHG | HEIGHT: 66 IN | WEIGHT: 293 LBS | HEART RATE: 81 BPM | BODY MASS INDEX: 47.09 KG/M2

## 2025-05-13 DIAGNOSIS — E66.813 CLASS 3 SEVERE OBESITY DUE TO EXCESS CALORIES WITH SERIOUS COMORBIDITY AND BODY MASS INDEX (BMI) OF 50.0 TO 59.9 IN ADULT: ICD-10-CM

## 2025-05-13 DIAGNOSIS — N18.32 STAGE 3B CHRONIC KIDNEY DISEASE (HCC): ICD-10-CM

## 2025-05-13 DIAGNOSIS — E61.1 IRON DEFICIENCY: Primary | ICD-10-CM

## 2025-05-13 PROCEDURE — 99213 OFFICE O/P EST LOW 20 MIN: CPT | Performed by: INTERNAL MEDICINE

## 2025-05-13 PROCEDURE — G2211 COMPLEX E/M VISIT ADD ON: HCPCS | Performed by: INTERNAL MEDICINE

## 2025-05-13 NOTE — PROGRESS NOTES
Subjective:     Patient ID: Helena Ramsey is a 70 year old female.  Presents for follow-up on iron deficiency, kidney disease    HPI  Presents for follow-up on iron deficiency anemia, in April 2020 for hemoglobin was 11.5, low ferritin and iron level, patient used to be a blood donor, since that time she has been taking ferrous sulfate twice a day anemia resolved recent hemoglobin 13.4, ferritin 24 up from 11 3 months ago iron level is low normal.  She is being followed by nephrologist for chronic kidney disea, Recent creatinine 1.21 GFR 49 she does not take slightly worsening from previous level 3 months ago.  Patient states that she stays hydrated, does not take nonsteroidal anti-inflammatory medications.  She is under the care of psychiatrist and has been taking trazodone, duloxetine taken for neuropathy.  Patient is working on weight loss try to follow healthy diet and exercise      Current Medications[1]  Allergies:Allergies[2]    Past Medical History[3]   Past Surgical History[4]   Family History[5]   Social History: Short Social Hx on File[6]     /80 (BP Location: Left arm, Patient Position: Sitting, Cuff Size: large)   Pulse 81   Ht 5' 6\" (1.676 m)   Wt (!) 315 lb (142.9 kg)   BMI 50.84 kg/m²    Physical Exam  Constitutional:       Appearance: Normal appearance. She is obese.   HENT:      Head: Normocephalic and atraumatic.   Eyes:      General: No scleral icterus.     Extraocular Movements: Extraocular movements intact.      Conjunctiva/sclera: Conjunctivae normal.      Pupils: Pupils are equal, round, and reactive to light.   Cardiovascular:      Rate and Rhythm: Normal rate and regular rhythm.   Pulmonary:      Effort: Pulmonary effort is normal. No respiratory distress.      Breath sounds: No wheezing.   Musculoskeletal:         General: Normal range of motion.      Cervical back: Normal range of motion and neck supple.      Right lower leg: No edema.      Left lower leg: No edema.   Skin:      General: Skin is warm.      Coloration: Skin is not jaundiced.   Neurological:      General: No focal deficit present.      Mental Status: She is alert and oriented to person, place, and time. Mental status is at baseline.   Psychiatric:         Mood and Affect: Mood normal.         Behavior: Behavior normal.         Thought Content: Thought content normal.         Judgment: Judgment normal.         Assessment & Plan:   1. Iron deficiency improving slowly, advised patient continue iron supplementation for another 6 months will check labs in 6 months   2. Stage 3b chronic kidney disease (HCC) stable, continue adequate hydration, periodic check of renal function, can follow-up nephrology as planned   3.      Class III obesity due to excess calories with serious comorbidity and body mass index 50.84 continue low carbohydrate diet, increase regular physical activity 5 days a week at least 40 minutes a day    Orders Placed This Encounter   Procedures    Ferritin    Iron And Tibc       Meds This Visit:  Requested Prescriptions      No prescriptions requested or ordered in this encounter     Follow-up in 6 months  Imaging & Referrals:  None            [1]   Current Outpatient Medications   Medication Sig Dispense Refill    bisoprolol 5 MG Oral Tab Take 1 tablet (5 mg total) by mouth daily. 90 tablet 3    amLODIPine 2.5 MG Oral Tab Take 1 tablet (2.5 mg total) by mouth daily. 90 tablet 3    pantoprazole 40 MG Oral Tab EC Take 1 tablet (40 mg total) by mouth before breakfast. 90 tablet 3    levothyroxine 125 MCG Oral Tab Take 1 tablet (125 mcg total) by mouth before breakfast. 90 tablet 3    DULoxetine 60 MG Oral Cap DR Particles Take 1 capsule (60 mg total) by mouth daily. 90 capsule 3    traZODone 50 MG Oral Tab Take 1 tablet (50 mg total) by mouth nightly.      Levocetirizine Dihydrochloride (XYZAL ALLERGY 24HR OR) Take by mouth.      amoxicillin 500 MG Oral Cap TAKE 4 CAPSULES BY MOUTH ONE HOUR PRIOR TO PROCEDURE  (Patient not taking: Reported on 5/13/2025)     [2]   Allergies  Allergen Reactions    Bactrim [Sulfamethoxazole W/Trimethoprim] HIVES    Iodides (Topical) HIVES     IVP DYE    Radiology Contrast Iodinated Dyes HIVES     Other reaction(s): Hives/Skin Rash    Sulfa Antibiotics HIVES    Benadryl [Diphenhydramine] NAUSEA AND VOMITING    Trintellix [Vortioxetine] ITCHING   [3]   Past Medical History:   Back problem    CKD (chronic kidney disease)    Colon polyp    Depression    Disorder of thyroid    hypothyroidism    Diverticulosis    Essential hypertension    Fibrous papule of nose    Right nasal tip    History of blood transfusion    Good Robert    Internal hemorrhoids    Neuropathy    kike feet    Osteoarthritis    Renal disorder    CKD stage 3    Tremors of nervous system    Unspecified sleep apnea    CPAP    Verrucous keratosis    Right posterior parietal scalp    Visual impairment    glasses   [4]   Past Surgical History:  Procedure Laterality Date    Abdominoplasty Bilateral     Anesth,lower arm surgery Left 03/08/2021    radius head replacement    Colonoscopy N/A 12/15/2017    Procedure: COLONOSCOPY;  Surgeon: Denisa Stroud MD;  Location: [facility] ENDOSCOPY    Colonoscopy  01/01/2007    Colonoscopy N/A 6/26/2023    Procedure: COLONOSCOPY / ESOPHAGOGASTRODUODENOSCOPY;  Surgeon: Azalea Olsen MD;  Location: [facility] ENDOSCOPY    Elbow surgery Left     Hc supracervical abdominal hysterectomy      supracervical hysterectomy for fibroids    Lianne localization wire 1 site left (cpt=19281)      1998    Needle biopsy left      Reduction left      2000 Bilateral    Reduction right      Spine surgery procedure unlisted      Tonsillectomy      Total hip replacement Right 10/14/16    Upper gi endoscopy,exam     [5]   Family History  Problem Relation Age of Onset    Cancer Father         prostate    Dementia Father     Heart Disease Mother         CVA    Depression Mother     Hypertension Mother     Other (Colonic polyps)  Mother     Stroke Mother     Depression Sister     Hypertension Sister     Other (acromegaly from a pituitary tumor) Sister     Depression Sister     Diabetes Sister     Hypertension Sister    [6]   Social History  Socioeconomic History    Marital status:    Tobacco Use    Smoking status: Never     Passive exposure: Never    Smokeless tobacco: Never   Vaping Use    Vaping status: Never Used   Substance and Sexual Activity    Alcohol use: Yes     Alcohol/week: 2.0 standard drinks of alcohol     Types: 1 Cans of beer, 1 Standard drinks or equivalent per week     Comment: soc    Drug use: Not Currently     Frequency: 1.0 times per week     Types: Cannabis     Comment: edibls for sleep    Sexual activity: Not Currently   Other Topics Concern    Caffeine Concern Yes     Comment: 3 cups tea/coffee daily    Exercise No    History of tanning Yes    Pt has a pacemaker No    Pt has a defibrillator No    Reaction to local anesthetic No     Social Drivers of Health     Food Insecurity: No Food Insecurity (3/14/2025)    NCSS - Food Insecurity     Worried About Running Out of Food in the Last Year: No     Ran Out of Food in the Last Year: No   Transportation Needs: No Transportation Needs (3/14/2025)    NCSS - Transportation     Lack of Transportation: No   Housing Stability: Not At Risk (3/14/2025)    NCSS - Housing/Utilities     Has Housing: Yes     Worried About Losing Housing: No     Unable to Get Utilities: No

## 2025-07-07 ENCOUNTER — LAB ENCOUNTER (OUTPATIENT)
Dept: LAB | Age: 70
End: 2025-07-07
Payer: MEDICARE

## 2025-07-07 DIAGNOSIS — E61.1 IRON DEFICIENCY: ICD-10-CM

## 2025-07-07 DIAGNOSIS — N18.31 STAGE 3A CHRONIC KIDNEY DISEASE (HCC): ICD-10-CM

## 2025-07-07 LAB
ALBUMIN SERPL-MCNC: 4.6 G/DL (ref 3.2–4.8)
ANION GAP SERPL CALC-SCNC: 6 MMOL/L (ref 0–18)
BASOPHILS # BLD AUTO: 0.06 X10(3) UL (ref 0–0.2)
BASOPHILS NFR BLD AUTO: 0.8 %
BUN BLD-MCNC: 17 MG/DL (ref 9–23)
CALCIUM BLD-MCNC: 9.5 MG/DL (ref 8.7–10.6)
CHLORIDE SERPL-SCNC: 103 MMOL/L (ref 98–112)
CO2 SERPL-SCNC: 27 MMOL/L (ref 21–32)
CREAT BLD-MCNC: 1.36 MG/DL (ref 0.55–1.02)
DEPRECATED HBV CORE AB SER IA-ACNC: 25 NG/ML (ref 50–306)
EGFRCR SERPLBLD CKD-EPI 2021: 42 ML/MIN/1.73M2 (ref 60–?)
EOSINOPHIL # BLD AUTO: 0.26 X10(3) UL (ref 0–0.7)
EOSINOPHIL NFR BLD AUTO: 3.5 %
ERYTHROCYTE [DISTWIDTH] IN BLOOD BY AUTOMATED COUNT: 14.4 %
GLUCOSE BLD-MCNC: 112 MG/DL (ref 70–99)
HCT VFR BLD AUTO: 39.3 % (ref 35–48)
HGB BLD-MCNC: 12.9 G/DL (ref 12–16)
IMM GRANULOCYTES # BLD AUTO: 0.03 X10(3) UL (ref 0–1)
IMM GRANULOCYTES NFR BLD: 0.4 %
IRON SATN MFR SERPL: 29 % (ref 15–50)
IRON SERPL-MCNC: 90 UG/DL (ref 50–170)
LYMPHOCYTES # BLD AUTO: 1.73 X10(3) UL (ref 1–4)
LYMPHOCYTES NFR BLD AUTO: 23 %
MCH RBC QN AUTO: 31.5 PG (ref 26–34)
MCHC RBC AUTO-ENTMCNC: 32.8 G/DL (ref 31–37)
MCV RBC AUTO: 96.1 FL (ref 80–100)
MONOCYTES # BLD AUTO: 0.65 X10(3) UL (ref 0.1–1)
MONOCYTES NFR BLD AUTO: 8.6 %
NEUTROPHILS # BLD AUTO: 4.79 X10 (3) UL (ref 1.5–7.7)
NEUTROPHILS # BLD AUTO: 4.79 X10(3) UL (ref 1.5–7.7)
NEUTROPHILS NFR BLD AUTO: 63.7 %
OSMOLALITY SERPL CALC.SUM OF ELEC: 284 MOSM/KG (ref 275–295)
PHOSPHATE SERPL-MCNC: 4.3 MG/DL (ref 2.4–5.1)
PLATELET # BLD AUTO: 463 10(3)UL (ref 150–450)
POTASSIUM SERPL-SCNC: 4.5 MMOL/L (ref 3.5–5.1)
RBC # BLD AUTO: 4.09 X10(6)UL (ref 3.8–5.3)
SODIUM SERPL-SCNC: 136 MMOL/L (ref 136–145)
TOTAL IRON BINDING CAPACITY: 314 UG/DL (ref 250–425)
TRANSFERRIN SERPL-MCNC: 257 MG/DL (ref 250–380)
WBC # BLD AUTO: 7.5 X10(3) UL (ref 4–11)

## 2025-07-07 PROCEDURE — 83540 ASSAY OF IRON: CPT

## 2025-07-07 PROCEDURE — 85025 COMPLETE CBC W/AUTO DIFF WBC: CPT

## 2025-07-07 PROCEDURE — 80069 RENAL FUNCTION PANEL: CPT

## 2025-07-07 PROCEDURE — 36415 COLL VENOUS BLD VENIPUNCTURE: CPT

## 2025-07-07 PROCEDURE — 82728 ASSAY OF FERRITIN: CPT

## 2025-07-07 PROCEDURE — 83550 IRON BINDING TEST: CPT

## 2025-07-14 NOTE — TELEPHONE ENCOUNTER
Entered into EPIC:Recall colon in 10 years per . Last Colon done 12/15/2017, next due 12/15/2027. Snapshot updated. No

## 2025-07-23 ENCOUNTER — OFFICE VISIT (OUTPATIENT)
Dept: NEPHROLOGY | Facility: CLINIC | Age: 70
End: 2025-07-23
Payer: MEDICARE

## 2025-07-23 VITALS
WEIGHT: 293 LBS | HEIGHT: 66 IN | HEART RATE: 68 BPM | DIASTOLIC BLOOD PRESSURE: 68 MMHG | BODY MASS INDEX: 47.09 KG/M2 | SYSTOLIC BLOOD PRESSURE: 133 MMHG

## 2025-07-23 DIAGNOSIS — R73.9 HYPERGLYCEMIA: ICD-10-CM

## 2025-07-23 DIAGNOSIS — N18.32 STAGE 3B CHRONIC KIDNEY DISEASE (HCC): Primary | ICD-10-CM

## 2025-07-23 PROCEDURE — 99214 OFFICE O/P EST MOD 30 MIN: CPT | Performed by: INTERNAL MEDICINE

## 2025-07-23 NOTE — PROGRESS NOTES
07/23/25        Patient: Helena Ramsey   YOB: 1955   Date of Visit: 7/23/2025       Dear  Dr. Lexi MD,      Thank you for referring Helena Ramsey to my practice.  Please find my assessment and plan below.      As you know she is a 70-year-old female with a history of hypertension, DJD, depression, GERD, hypothyroidism who I now had the pleasure of seeing for follow-up of chronic kidney disease stage III and mild hyponatremia.  Overall the patient states she has been doing well without any chest pain, shortness of breath, GI or urinary tract symptoms.  Does not check her blood pressure readings at home.    On physical exam her blood pressure was 133/68 with a pulse of 68 and she weighed 315 pounds.  Weight 306 pounds when I last saw her in October 2024.  Her neck was supple without JVD.  Lungs were clear.  Heart revealed regular rate and rhythm with an S4 but no gallops, murmurs or rubs.  Abdomen was soft, flat, nontender without organomegaly, masses or bruits.  Extremities reveal trace edema bilaterally.    Recent laboratory studies have been reviewed.  When I last saw her in October 2024 her creatinine is 1.16.  Creatinine though is now starting to creep up.  Went up to 1.21 in April 2025 and now on July 7, 2025 her creatinine is up to 1.36 with an estimated GFR of 42 cc/min.  Electrolytes are good.  Fasting blood sugar 112.  Hemoglobin 12.9.    I therefore informed the patient there has been some gradual deterioration in her renal function over the last 6 months.  Reemphasized the importance of maintaining adequate hydration.  Avoid nonsteroidals.  She will check her blood pressures daily and call me in 1 week to make sure her blood pressures are under good control.  In 1 month we will have him repeat a CBC, renal panel and urine studies.  Also repeat a renal ultrasound as that has been several years.  Further impressions and recommendations will be forthcoming after reviewing the  above.    Thank you again for allowing me to participate in the care of your patient.  If you have any questions please are free to call.           Sincerely,   Gen Chahal MD   [facility], [facility], [place]  [address]  [facility] [address]    Document electronically generated by:  Gen Chahal MD

## 2025-07-23 NOTE — PATIENT INSTRUCTIONS
Check your blood pressures daily and call me in 1 week.  Do the kidney ultrasound now.  Repeat your labs in 1 month.  Orders are in the computer.

## 2025-08-10 ENCOUNTER — HOSPITAL ENCOUNTER (OUTPATIENT)
Dept: ULTRASOUND IMAGING | Age: 70
Discharge: HOME | End: 2025-08-10
Attending: INTERNAL MEDICINE

## 2025-08-10 ENCOUNTER — HOSPITAL ENCOUNTER (OUTPATIENT)
Dept: ULTRASOUND IMAGING | Age: 70
End: 2025-08-10
Attending: INTERNAL MEDICINE

## 2025-08-10 DIAGNOSIS — N18.32 STAGE 3B CHRONIC KIDNEY DISEASE (HCC): ICD-10-CM

## 2025-08-10 PROCEDURE — 76770 US EXAM ABDO BACK WALL COMP: CPT | Performed by: INTERNAL MEDICINE

## 2025-08-14 ENCOUNTER — RESULTS FOLLOW-UP (OUTPATIENT)
Dept: NEPHROLOGY | Facility: CLINIC | Age: 70
End: 2025-08-14

## 2025-08-14 DIAGNOSIS — N18.32 STAGE 3B CHRONIC KIDNEY DISEASE (HCC): Primary | ICD-10-CM

## 2025-08-20 ENCOUNTER — LAB ENCOUNTER (OUTPATIENT)
Dept: LAB | Age: 70
End: 2025-08-20
Attending: INTERNAL MEDICINE

## 2025-08-20 DIAGNOSIS — N18.32 STAGE 3B CHRONIC KIDNEY DISEASE (HCC): ICD-10-CM

## 2025-08-20 DIAGNOSIS — R73.9 HYPERGLYCEMIA: ICD-10-CM

## 2025-08-20 LAB
ALBUMIN SERPL-MCNC: 4.6 G/DL (ref 3.2–4.8)
ANION GAP SERPL CALC-SCNC: 14 MMOL/L (ref 0–18)
BASOPHILS # BLD AUTO: 0.07 X10(3) UL (ref 0–0.2)
BASOPHILS NFR BLD AUTO: 0.9 %
BILIRUB UR QL STRIP.AUTO: NEGATIVE
BUN BLD-MCNC: 15 MG/DL (ref 9–23)
CALCIUM BLD-MCNC: 9.6 MG/DL (ref 8.7–10.6)
CHLORIDE SERPL-SCNC: 102 MMOL/L (ref 98–112)
CLARITY UR REFRACT.AUTO: CLEAR
CO2 SERPL-SCNC: 24 MMOL/L (ref 21–32)
CREAT BLD-MCNC: 1.17 MG/DL (ref 0.55–1.02)
CREAT UR-SCNC: 40.9 MG/DL
EGFRCR SERPLBLD CKD-EPI 2021: 51 ML/MIN/1.73M2 (ref 60–?)
EOSINOPHIL # BLD AUTO: 0.29 X10(3) UL (ref 0–0.7)
EOSINOPHIL NFR BLD AUTO: 3.9 %
ERYTHROCYTE [DISTWIDTH] IN BLOOD BY AUTOMATED COUNT: 13.2 %
ERYTHROCYTE [SEDIMENTATION RATE] IN BLOOD: 21 MM/HR (ref 0–30)
EST. AVERAGE GLUCOSE BLD GHB EST-MCNC: 117 MG/DL (ref 68–126)
GLUCOSE BLD-MCNC: 116 MG/DL (ref 70–99)
GLUCOSE UR STRIP.AUTO-MCNC: NORMAL MG/DL
HBA1C MFR BLD: 5.7 % (ref ?–5.7)
HCT VFR BLD AUTO: 39.2 % (ref 35–48)
HGB BLD-MCNC: 13.1 G/DL (ref 12–16)
IMM GRANULOCYTES # BLD AUTO: 0.03 X10(3) UL (ref 0–1)
IMM GRANULOCYTES NFR BLD: 0.4 %
KETONES UR STRIP.AUTO-MCNC: NEGATIVE MG/DL
LEUKOCYTE ESTERASE UR QL STRIP.AUTO: NEGATIVE
LYMPHOCYTES # BLD AUTO: 1.46 X10(3) UL (ref 1–4)
LYMPHOCYTES NFR BLD AUTO: 19.6 %
MCH RBC QN AUTO: 31.2 PG (ref 26–34)
MCHC RBC AUTO-ENTMCNC: 33.4 G/DL (ref 31–37)
MCV RBC AUTO: 93.3 FL (ref 80–100)
MICROALBUMIN UR-MCNC: <0.3 MG/DL
MONOCYTES # BLD AUTO: 0.62 X10(3) UL (ref 0.1–1)
MONOCYTES NFR BLD AUTO: 8.3 %
NEUTROPHILS # BLD AUTO: 4.98 X10 (3) UL (ref 1.5–7.7)
NEUTROPHILS # BLD AUTO: 4.98 X10(3) UL (ref 1.5–7.7)
NEUTROPHILS NFR BLD AUTO: 66.9 %
NITRITE UR QL STRIP.AUTO: NEGATIVE
OSMOLALITY SERPL CALC.SUM OF ELEC: 292 MOSM/KG (ref 275–295)
PH UR STRIP.AUTO: 6.5 (ref 5–8)
PHOSPHATE SERPL-MCNC: 3.7 MG/DL (ref 2.4–5.1)
PLATELET # BLD AUTO: 448 10(3)UL (ref 150–450)
POTASSIUM SERPL-SCNC: 4.4 MMOL/L (ref 3.5–5.1)
PROT UR STRIP.AUTO-MCNC: NEGATIVE MG/DL
RBC # BLD AUTO: 4.2 X10(6)UL (ref 3.8–5.3)
RBC UR QL AUTO: NEGATIVE
SODIUM SERPL-SCNC: 140 MMOL/L (ref 136–145)
SP GR UR STRIP.AUTO: 1.01 (ref 1–1.03)
UROBILINOGEN UR STRIP.AUTO-MCNC: NORMAL MG/DL
WBC # BLD AUTO: 7.5 X10(3) UL (ref 4–11)

## 2025-08-20 PROCEDURE — 36415 COLL VENOUS BLD VENIPUNCTURE: CPT

## 2025-08-20 PROCEDURE — 82570 ASSAY OF URINE CREATININE: CPT

## 2025-08-20 PROCEDURE — 85025 COMPLETE CBC W/AUTO DIFF WBC: CPT

## 2025-08-20 PROCEDURE — 82043 UR ALBUMIN QUANTITATIVE: CPT

## 2025-08-20 PROCEDURE — 83036 HEMOGLOBIN GLYCOSYLATED A1C: CPT

## 2025-08-20 PROCEDURE — 86038 ANTINUCLEAR ANTIBODIES: CPT

## 2025-08-20 PROCEDURE — 80069 RENAL FUNCTION PANEL: CPT

## 2025-08-20 PROCEDURE — 81003 URINALYSIS AUTO W/O SCOPE: CPT

## 2025-08-20 PROCEDURE — 85652 RBC SED RATE AUTOMATED: CPT

## 2025-08-22 LAB — NUCLEAR IGG TITR SER IF: NEGATIVE

## (undated) DIAGNOSIS — G47.30 SLEEP APNEA, UNSPECIFIED TYPE: Primary | ICD-10-CM

## (undated) DIAGNOSIS — F32.A DEPRESSIVE DISORDER: Primary | ICD-10-CM

## (undated) DEVICE — SUTURE VICRYL 2-0 CT-1

## (undated) DEVICE — UPPER EXTREMITY: Brand: MEDLINE INDUSTRIES, INC.

## (undated) DEVICE — WEBRIL COTTON UNDERCAST PADDING: Brand: WEBRIL

## (undated) DEVICE — .054" X 6" GUIDE WIRE: Brand: ACUMED

## (undated) DEVICE — SUTURE VICRYL 2-0 FS-1

## (undated) DEVICE — SUTURE PROLENE 3-0 8687H

## (undated) DEVICE — PROXIMATE SKIN STAPLERS (35 WIDE) CONTAINS 35 STAINLESS STEEL STAPLES (FIXED HEAD): Brand: PROXIMATE

## (undated) DEVICE — SUCTION CANISTER, 3000CC,SAFELINER: Brand: DEROYAL

## (undated) DEVICE — SUT FIBERWIRE 2 T-5 AR-7200

## (undated) DEVICE — DRAPE SHEET LARGE 76X55

## (undated) DEVICE — DRAPE SRG 70X60IN SPLT U IMPRV

## (undated) DEVICE — UNDYED MONOFILAMENT (POLYDIOXANONE), ABSORBABLE SURGICAL SUTURE: Brand: PDS

## (undated) DEVICE — ABDOMINAL PAD: Brand: CURITY

## (undated) DEVICE — SUT VICRYL 2-0 FS-1 J443H

## (undated) DEVICE — AVANOS* TUOHY EPIDURAL NEEDLE: Brand: AVANOS

## (undated) DEVICE — Device: Brand: CUSTOM PROCEDURE KIT

## (undated) DEVICE — 3 ML SYRINGE LUER-LOCK TIP: Brand: MONOJECT

## (undated) DEVICE — CHLORAPREP 26ML APPLICATOR

## (undated) DEVICE — .062" X 6" GUIDE WIRE: Brand: ACUMED

## (undated) DEVICE — TOURNIQUET CUFF

## (undated) DEVICE — SPLINT PLASTER 4

## (undated) DEVICE — 1010 S-DRAPE TOWEL DRAPE 10/BX: Brand: STERI-DRAPE™

## (undated) DEVICE — Device

## (undated) DEVICE — 35 ML SYRINGE REGULAR TIP: Brand: MONOJECT

## (undated) DEVICE — OCCLUSIVE GAUZE STRIP,3% BISMUTH TRIBROMOPHENATE IN PETROLATUM BLEND: Brand: XEROFORM

## (undated) DEVICE — PLATE TACK: Brand: ACUMED

## (undated) DEVICE — C-ARM: Brand: UNBRANDED

## (undated) DEVICE — INTENT TO BE USED WITH SUTURE MATERIAL FOR TISSUE CLOSURE: Brand: RICHARD-ALLAN® NEEDLE 3/8 CIRCLE TROCAR

## (undated) DEVICE — SNARE CAPTI HEX STIFF MEDIUM

## (undated) DEVICE — SOL H2O 1000ML BTL

## (undated) DEVICE — 6 ML SYRINGE LUER-LOCK TIP: Brand: MONOJECT

## (undated) DEVICE — 2.8MM QUICK RELEASE DRILL: Brand: ACUMED

## (undated) DEVICE — .035" X 5.75" ST GUIDE WIRE: Brand: ACUMED

## (undated) DEVICE — IMPERVIOUS STOCKINETTE: Brand: DEROYAL

## (undated) DEVICE — PREP SCRUB EZ HIBICLENS

## (undated) DEVICE — ZIMMER® STERILE DISPOSABLE TOURNIQUET CUFF WITH PLC, DUAL PORT, SINGLE BLADDER, 24 IN. (61 CM)

## (undated) DEVICE — GAMMEX® PI HYBRID SIZE 6.5, STERILE POWDER-FREE SURGICAL GLOVE, POLYISOPRENE AND NEOPRENE BLEND: Brand: GAMMEX

## (undated) DEVICE — GAUZE SPONGES,12 PLY: Brand: CURITY

## (undated) DEVICE — CONMED SCOPE SAVER BITE BLOCK, 20X27 MM: Brand: SCOPE SAVER

## (undated) DEVICE — MARKER SKIN 2 TIP

## (undated) DEVICE — MEDI-VAC NON-CONDUCTIVE SUCTION TUBING 6MM X 1.8M (6FT.) L: Brand: CARDINAL HEALTH

## (undated) DEVICE — 2.0MM QUICK RELEASE DRILL: Brand: ACUMED

## (undated) DEVICE — YANKAUER SUCTION INSTRUMENT NO CONTROL VENT, BULB TIP, CLEAR: Brand: YANKAUER

## (undated) DEVICE — SPECIALTY ARM SLING: Brand: DEROYAL

## (undated) DEVICE — SUT ETHILON 2-0 FS 664H

## (undated) DEVICE — STANDARD STEM REAMER 8MM: Brand: ACUMED

## (undated) DEVICE — STERILE TETRA-FLEX CF, ELASTIC BANDAGE, 4" X 5.5YD: Brand: TETRA-FLEX™CF

## (undated) DEVICE — IMMOBILIZER SLING: Brand: DEROYAL

## (undated) DEVICE — TOWEL SURG OR 17X30IN BLUE

## (undated) DEVICE — 60 ML SYRINGE REGULAR TIP: Brand: MONOJECT

## (undated) DEVICE — PRECISION (9.0 X 0.51 X 31.0MM)

## (undated) DEVICE — PAIN TRAY: Brand: MEDLINE INDUSTRIES, INC.

## (undated) DEVICE — 90.0MM TENSION BAND PIN: Brand: ACUMED

## (undated) DEVICE — FORCEP RADIAL JAW 4

## (undated) DEVICE — INTENDED FOR TISSUE SEPARATION, AND OTHER PROCEDURES THAT REQUIRE A SHARP SURGICAL BLADE TO PUNCTURE OR CUT.: Brand: BARD-PARKER ® STAINLESS STEEL BLADES

## (undated) DEVICE — SUTURE VICRYL 0 J340H

## (undated) DEVICE — ENDOSCOPY PACK - LOWER: Brand: MEDLINE INDUSTRIES, INC.

## (undated) DEVICE — GAMMEX® PI HYBRID SIZE 8, STERILE POWDER-FREE SURGICAL GLOVE, POLYISOPRENE AND NEOPRENE BLEND: Brand: GAMMEX

## (undated) DEVICE — REMOVER DURAPREP 3M

## (undated) DEVICE — MICRO-ACUTRAK 2® DRILL, LONG: Brand: ACUMED

## (undated) DEVICE — BANDAID COVERLET 1X3

## (undated) DEVICE — COTTON UNDERCAST PADDING,REGULAR FINISH: Brand: WEBRIL

## (undated) DEVICE — ENCORE® LATEX MICRO SIZE 8, STERILE LATEX POWDER-FREE SURGICAL GLOVE: Brand: ENCORE

## (undated) DEVICE — KIT ENDO ORCAPOD 160/180/190

## (undated) DEVICE — CAPSULE ENDOSCP L26.2MM DIA11.4MM

## (undated) DEVICE — FLEXIBLE YANKAUER,MEDIUM TIP, NO VACUUM CONTROL: Brand: ARGYLE

## (undated) DEVICE — SPLINT PRECUT SYNTH 4X30

## (undated) DEVICE — DRAIN SILICONE ROUND 1/8X49

## (undated) DEVICE — STANDARD STEM REAMER 7MM: Brand: ACUMED

## (undated) DEVICE — SUTURE MONOCRYL 3-0 Y936H

## (undated) DEVICE — SUTURE ETHILON 4-0 1667G

## (undated) DEVICE — LINE MNTR ADLT SET O2 INTMD

## (undated) DEVICE — DISPOSABLE TOURNIQUET CUFF SINGLE BLADDER, DUAL PORT AND QUICK CONNECT CONNECTOR: Brand: COLOR CUFF

## (undated) DEVICE — MICRO ACUTRAK 2® DRILL: Brand: ACUMED

## (undated) DEVICE — SUTURE ETHILON 2-0 FS

## (undated) DEVICE — GLOVE SURG SENSICARE SZ 6-1/2

## (undated) DEVICE — SPLINT PLASTER 5

## (undated) DEVICE — SUT ETHIBOND 0 CT-1 X424H

## (undated) DEVICE — PRECISION OFFSET (9.0 X 0.51 X 25.0MM)

## (undated) DEVICE — ENCORE® LATEX ACCLAIM SIZE 8, STERILE LATEX POWDER-FREE SURGICAL GLOVE: Brand: ENCORE

## (undated) DEVICE — STERILE TETRA-FLEX CF, ELASTIC BANDAGEE, 3" X 5.5YD: Brand: TETRA-FLEX™CF

## (undated) DEVICE — 3M™ IOBAN™ 2 ANTIMICROBIAL INCISE DRAPE 6640EZ: Brand: IOBAN™ 2

## (undated) DEVICE — MEDI-VAC NON-CONDUCTIVE SUCTION TUBING: Brand: CARDINAL HEALTH

## (undated) DEVICE — Device: Brand: DEFENDO AIR/WATER/SUCTION AND BIOPSY VALVE

## (undated) DEVICE — GLOVE SURG SENSICARE SZ 7

## (undated) DEVICE — SPONGE LAP 18X18 XRAY STRL

## (undated) DEVICE — UNDYED BRAIDED (POLYGLACTIN 910), SYNTHETIC ABSORBABLE SUTURE: Brand: COATED VICRYL

## (undated) DEVICE — APPLICATOR CHLORAPREP 26ML

## (undated) DEVICE — SUTURE VICRYL 0 CP-1

## (undated) DEVICE — DRESSING AQUACEL AG 3.5 X 10

## (undated) DEVICE — 3M™ STERI-STRIP™ REINFORCED ADHESIVE SKIN CLOSURES, R1547, 1/2 IN X 4 IN (12 MM X 100 MM), 6 STRIPS/ENVELOPE: Brand: 3M™ STERI-STRIP™

## (undated) DEVICE — ESMARK: Brand: DEROYAL

## (undated) DEVICE — Device: Brand: DUAL NARE NASAL CANNULAE FEMALE LUER CON 7FT O2 TUBE

## (undated) DEVICE — SUT VICRYL 0 CP-1 J267H

## (undated) DEVICE — .045" X 6" ST GUIDE WIRE: Brand: ACUMED

## (undated) DEVICE — GLOVE SURG SENSICARE SZ 7-1/2

## (undated) DEVICE — GOWN SURG AERO BLUE PERF XLG

## (undated) DEVICE — CASED DISP BIPOLAR CORD

## (undated) DEVICE — HOVERMATT 34IN SINGLE USE

## (undated) DEVICE — SOLUTION  .9 1000ML BTL

## (undated) DEVICE — SPLINT PRECUT SYNTH 5X30

## (undated) DEVICE — VIOLET BRAIDED (POLYGLACTIN 910), SYNTHETIC ABSORBABLE SUTURE: Brand: COATED VICRYL

## (undated) DEVICE — 2.8 MM X 5 IN QUICK RELEASE DRILL: Brand: ACUMED

## (undated) DEVICE — 1.5MM CANN. QUICK RELEASE DRIVER TIP: Brand: ACUMED

## (undated) DEVICE — SOL  .9 1000ML BTL

## (undated) DEVICE — STERILE TETRA-FLEX CF, ELASTIC BANDAGE LATEX FREE 6IN X5.5 YD: Brand: TETRA-FLEX™CF

## (undated) DEVICE — STANDARD STEM REAMER 9MM: Brand: ACUMED

## (undated) DEVICE — KIT CLEAN ENDOKIT 1.1OZ GOWNX2

## (undated) DEVICE — 3M™ IOBAN™ 2 ANTIMICROBIAL INCISE DRAPE 6650EZ: Brand: IOBAN™ 2

## (undated) DEVICE — LKG RADIAL HEAD PLATE TARGETING GUIDE: Brand: ACUMED

## (undated) DEVICE — GAMMEX® PI HYBRID SIZE 7.5, STERILE POWDER-FREE SURGICAL GLOVE, POLYISOPRENE AND NEOPRENE BLEND: Brand: GAMMEX

## (undated) NOTE — LETTER
AUTHORIZATION FOR SURGICAL OPERATION OR OTHER PROCEDURE    1.  I hereby authorize Dr. Nataliya Chavis, and [facility] staff assigned to my case to perform the following operation and/or procedure at the [facility]:    ________________________________ ________ A. M.  P.M.        Patient Name:  ______________________________________________________  (please print)      Patient signature:  ___________________________________________________             Relationship to Patient:           []  Parent    Respon

## (undated) NOTE — LETTER
No referring provider defined for this encounter.       07/23/25        Patient: Helena Ramsey   YOB: 1955   Date of Visit: 7/23/2025       Dear  Dr. Lexi MD,      Thank you for referring Helena Ramsey to my practice.  Please find my assessment and plan below.      As you know she is a 70-year-old female with a history of hypertension, DJD, depression, GERD, hypothyroidism who I now had the pleasure of seeing for follow-up of chronic kidney disease stage III and mild hyponatremia.  Overall the patient states she has been doing well without any chest pain, shortness of breath, GI or urinary tract symptoms.  Does not check her blood pressure readings at home.    On physical exam her blood pressure was 133/68 with a pulse of 68 and she weighed 315 pounds.  Weight 306 pounds when I last saw her in October 2024.  Her neck was supple without JVD.  Lungs were clear.  Heart revealed regular rate and rhythm with an S4 but no gallops, murmurs or rubs.  Abdomen was soft, flat, nontender without organomegaly, masses or bruits.  Extremities reveal trace edema bilaterally.    Recent laboratory studies have been reviewed.  When I last saw her in October 2024 her creatinine is 1.16.  Creatinine though is now starting to creep up.  Went up to 1.21 in April 2025 and now on July 7, 2025 her creatinine is up to 1.36 with an estimated GFR of 42 cc/min.  Electrolytes are good.  Fasting blood sugar 112.  Hemoglobin 12.9.    I therefore informed the patient there has been some gradual deterioration in her renal function over the last 6 months.  Reemphasized the importance of maintaining adequate hydration.  Avoid nonsteroidals.  She will check her blood pressures daily and call me in 1 week to make sure her blood pressures are under good control.  In 1 month we will have him repeat a CBC, renal panel and urine studies.  Also repeat a renal ultrasound as that has been several years.  Further impressions and  recommendations will be forthcoming after reviewing the above.    Thank you again for allowing me to participate in the care of your patient.  If you have any questions please are free to call.           Sincerely,   Gen Chahal MD   [facility], [facility], [place]  [address]  [facility] [address]    Document electronically generated by:  Gen Chahal MD

## (undated) NOTE — LETTER
AUTHORIZATION FOR SURGICAL OPERATION OR OTHER PROCEDURE    1. I hereby authorize Dr. Anupama Pearson, and [facility] staff assigned to my case to perform the following operation and/or procedure at the [facility], [facility]:    _______________________________________________________________________________________________    Cortisone injection Right knee  _______________________________________________________________________________________________    2. My physician has explained the nature and purpose of the operation or other procedure, possible alternative methods of treatment, the risks involved, and the possibility of complication to me. I acknowledge that no guarantee has been made as to the result that may be obtained. 3.  I recognize that, during the course of this operation, or other procedure, unforseen conditions may necessitate additional or different procedure than those listed above. I, therefore, further authorize and request that the above named physician, his/her physician assistants or designees perform such procedures as are, in his/her professional opinion, necessary and desirable. 4.  Any tissue or organs removed in the operation or other procedure may be disposed of by and at the discretion of the [facility], [facility] and [facility]. 5.  I understand that in the event of a medical emergency, I will be transported by local paramedics to [facility] or other hospital emergency department. 6.  I certify that I have read and fully understand the above consent to operation and/or other procedure. 7.  I acknowledge that my physician has explained sedation/analgesia administration to me including the risks and benefits. I consent to the administration of sedation/analgesia as may be necessary or desirable in the judgement of my physician.     Witness signature: ___________________________________________________ Date:  ______/______/_____                    Time: ________ A. M.  P.M. Patient Name:  ______________________________________________________  (please print)      Patient signature:  ___________________________________________________             Relationship to Patient:           []  Parent    Responsible person                          []  Spouse  In case of minor or                    [] Other  _____________   Incompetent name:  __________________________________________________                               (please print)      _____________      Responsible person  In case of minor or  Incompetent signature:  _______________________________________________    Statement of Physician  My signature below affirms that prior to the time of the procedure, I have explained to the patient and/or his/her guardian, the risks and benefits involved in the proposed treatment and any reasonable alternative to the proposed treatment. I have also explained the risks and benefits involved in the refusal of the proposed treatment and have answered the patient's questions.                         Date:  ______/______/_______  Provider                      Signature:  __________________________________________________________       Time:  ___________ A.M    P.M.

## (undated) NOTE — LETTER
No referring provider defined for this encounter.       10/16/24        Patient: Helena Ramsey   YOB: 1955   Date of Visit: 10/15/2024       Dear  Dr. Lexi MD,      Thank you for referring Helena Ramsey to my practice.  Please find my assessment and plan below.      As you know she is a 69-year-old female with a history of hypertension, DJD, depression, GERD and hypothyroidism who I now had the pleasure of seeing for follow-up of hyponatremia and chronic kidney disease stage III.  Overall the patient states has been doing well without any chest pain, shortness of breath, GI or urinary tract symptoms.    On physical exam her blood pressure is 118/70 with a pulse of 88 and she weighed 306 pounds.  Her neck was supple without JVD.  Lungs are clear.  Heart revealed a regular rate and rhythm with an S4 but no gallops, murmurs or rubs.  Abdomen was soft, flat, nontender without organomegaly, masses or bruits.  Extremities revealed no edema.    I reviewed her most recent labs from October 2, 2024.  Creatinine actually is a bit better down to 1.16 with an estimated GFR of 52 cc/min.  Her sodium though did drop down to 131.  Patient is not as compliant with her fluid restriction as she should be.  TSH in July was good but she is on Cymbalta which predisposes her to hyponatremia.  Again advised her to restrict fluid intake to 48 ounces per day.  Continue to do CBC and renal panels quarterly.  I will see her again in 6 months for follow-up or sooner if clinically indicated.    Thank you again for allowing me to participate in the care of your patient.  If you have any questions please feel free to call.           Sincerely,   Gen Chahal MD   [facility], [facility], [place]  [address]  [facility] [address]    Document electronically generated by:  Gen Chahal MD

## (undated) NOTE — LETTER
11/25/2020          To Whom It May Concern:    Bia Rosenberg is currently under my medical care. Please excuse her from work until 12/7/202. If you require additional information please contact our office.         Sincerely,    Justin Napier MD

## (undated) NOTE — LETTER
[address] [facility], [place] [address] [place], [place]  Authorization for Surgical Operation and Procedure                                                                                           I hereby authorize Annie Parsons MD, my physician and his/her assistants (if applicable), which may include medical students, residents, and/or fellows, to perform the following surgical operation/ procedure and administer such anesthesia as may be determined necessary by my physician: Operation/Procedure name (s) CAPSULE ENDOSCOPY on [facility]   2. I recognize that during the surgical operation/procedure, unforeseen conditions may necessitate additional or different procedures than those listed above. I, therefore, further authorize and request that the above-named surgeon, assistants, or designees perform such procedures as are, in their judgment, necessary and desirable. 3.   My surgeon/physician has discussed prior to my surgery the potential benefits, risks and side effects of this procedure; the likelihood of achieving goals; and potential problems that might occur during recuperation. They also discussed reasonable alternatives to the procedure, including risks, benefits, and side effects related to the alternatives and risks related to not receiving this procedure. I have had all my questions answered and I acknowledge that no guarantee has been made as to the result that may be obtained. 4.   Should the need arise during my operation/procedure, which includes change of level of care prior to discharge, I also consent to the administration of blood and/or blood products. Further, I understand that despite careful testing and screening of blood or blood products by collecting agencies, I may still be subject to ill effects as a result of receiving a blood transfusion and/or blood products.   The following are some, but not all, of the potential risks that can occur: fever and allergic reactions, hemolytic reactions, transmission of diseases such as Hepatitis, AIDS and Cytomegalovirus (CMV) and fluid overload. In the event that I wish to have an autologous transfusion of my own blood, or a directed donor transfusion, I will discuss this with my physician. Check only if Refusing Blood or Blood Products  I understand refusal of blood or blood products as deemed necessary by my physician may have serious consequences to my condition to include possible death. I hereby assume responsibility for my refusal and release the hospital, its personnel, and my physicians from any responsibility for the consequences of my refusal.    o  Refuse   5. I authorize the use of any specimen, organs, tissues, body parts or foreign objects that may be removed from my body during the operation/procedure for diagnosis, research or teaching purposes and their subsequent disposal by hospital authorities. I also authorize the release of specimen test results and/or written reports to my treating physician on the hospital medical staff or other referring or consulting physicians involved in my care, at the discretion of the Pathologist or my treating physician. 6.   I consent to the photographing or videotaping of the operations or procedures to be performed, including appropriate portions of my body for medical, scientific, or educational purposes, provided my identity is not revealed by the pictures or by descriptive texts accompanying them. If the procedure has been photographed/videotaped, the surgeon will obtain the original picture, image, videotape or CD. The hospital will not be responsible for storage, release or maintenance of the picture, image, tape or CD.    7.   I consent to the presence of a  or observers in the operating room as deemed necessary by my physician or their designees.     8.   I recognize that in the event my procedure results in extended X-Ray/fluoroscopy time, I may develop a skin reaction. 9. If I have a Do Not Attempt Resuscitation (DNAR) order in place, that status will be suspended while in the operating room, procedural suite, and during the recovery period unless otherwise explicitly stated by me (or a person authorized to consent on my behalf). The surgeon or my attending physician will determine when the applicable recovery period ends for purposes of reinstating the DNAR order. 10. Patients having a sterilization procedure: I understand that if the procedure is successful the results will be permanent and it will therefore be impossible for me to inseminate, conceive, or bear children. I also understand that the procedure is intended to result in sterility, although the result has not been guaranteed. 11. I acknowledge that my physician has explained sedation/analgesia administration to me including the risk and benefits I consent to the administration of sedation/analgesia as may be necessary or desirable in the judgment of my physician. I CERTIFY THAT I HAVE READ AND FULLY UNDERSTAND THE ABOVE CONSENT TO OPERATION and/or OTHER PROCEDURE.     _________________________________________ _________________________________     ___________________________________  Signature of Patient     Signature of Responsible Person                   Printed Name of Responsible Person                              _________________________________________ ______________________________        ___________________________________  Signature of Witness         Date  Time         Relationship to Patient    STATEMENT OF PHYSICIAN My signature below affirms that prior to the time of the procedure; I have explained to the patient and/or his/her legal representative, the risks and benefits involved in the proposed treatment and any reasonable alternative to the proposed treatment.  I have also explained the risks and benefits involved in refusal of the proposed treatment and alternatives to the proposed treatment and have answered the patient's questions.  If I have a significant financial interest in a co-management agreement or a significant financial interest in any product or implant, or other significant relationship used in this procedure/surgery, I have disclosed this and had a discussion with my patient.     _______________________________________________________________ _____________________________  (Signature of Physician)                                                                                         (Date)                                   (Time)  Patient Name: Shelia Arora    : 3/15/1955   Printed: 10/16/2023      Medical Record #: [id number]                                              Page 1 of 1

## (undated) NOTE — LETTER
6/11/2021          To Whom It May Concern:    Ama Calderon is currently under my medical care.    Activity is restricted as follows: no lifting, pushing, pullling more than 5lbs with left upper extremity for 4 weeks.      If you require additional infor

## (undated) NOTE — MR AVS SNAPSHOT
[address]  [phone]               Thank you for choosing us for your health care visit with Ken Santos, PT. We are glad to serve you and happy to provide you with this summary of your visit.   Please Follow Up Visit with Lois Borja MD   [facility]  ([place])    [address] [address]   [phone]            Jan 25, 2017  4:15 PM   PT VISIT BY THERAPIST with Joseph Posada, Levothyroxine Sodium 150 MCG Tabs   Take 1 tablet (150 mcg total) by mouth once daily.    Commonly known as:  SYNTHROID           Sulfamethoxazole-TMP -160 MG Tabs per tablet   Commonly known as:  BACTRIM DS           TiZANidine HCl 2 MG Tabs   Take

## (undated) NOTE — LETTER
AUTHORIZATION FOR SURGICAL OPERATION OR OTHER PROCEDURE    1. I hereby authorize Dr. Marietta Devi, and [place] [employer] staff assigned to my case to perform the following operation and/or procedure at the [place] [employer]:    _______________________________________________________________________________________________      _______________________________________________________________________________________________    2. My physician has explained the nature and purpose of the operation or other procedure, possible alternative methods of treatment, the risks involved, and the possibility of complication to me. I acknowledge that no guarantee has been made as to the result that may be obtained. 3.  I recognize that, during the course of this operation, or other procedure, unforseen conditions may necessitate additional or different procedure than those listed above. I, therefore, further authorize and request that the above named physician, his/her physician assistants or designees perform such procedures as are, in his/her professional opinion, necessary and desirable. 4.  Any tissue or organs removed in the operation or other procedure may be disposed of by and at the discretion of the [facility], [facility] and [facility]. 5.  I understand that in the event of a medical emergency, I will be transported by local paramedics to [facility] or other [facility] emergency department. 6.  I certify that I have read and fully understand the above consent to operation and/or other procedure. 7.  I acknowledge that my physician has explained sedation/analgesia administration to me including the risks and benefits. I consent to the administration of sedation/analgesia as may be necessary or desirable in the judgement of my physician. Witness signature: ___________________________________________________ Date:  ______/______/_____                    Time:  ________ A. M. P.M.       Patient Name:  ______________________________________________________  (please print)      Patient signature:  ___________________________________________________             Relationship to Patient:           []  Parent    Responsible person                          []  Spouse  In case of minor or                    [] Other  _____________   Incompetent name:  __________________________________________________                               (please print)      _____________      Responsible person  In case of minor or  Incompetent signature:  _______________________________________________    Statement of Physician  My signature below affirms that prior to the time of the procedure, I have explained to the patient and/or his/her guardian, the risks and benefits involved in the proposed treatment and any reasonable alternative to the proposed treatment. I have also explained the risks and benefits involved in the refusal of the proposed treatment and have answered the patient's questions.                         Date:  ______/______/_______  Provider                      Signature:  __________________________________________________________       Time:  ___________ A.M    P.M.

## (undated) NOTE — LETTER
No referring provider defined for this encounter.       04/16/24        Patient: Helena Ramsey   YOB: 1955   Date of Visit: 4/16/2024       Dear  Dr. Lexi MD,      Thank you for referring Helena Ramsey to my practice.  Please find my assessment and plan below.       As you know she is a 69-year-old female with a history of hypertension, DJD, depression, GERD and hypothyroidism who I now had the pleasure of seeing for follow-up of hyponatremia and chronic kidney disease stage III.  Overall the patient states she has been doing well without any chest pain, shortness of breath, GI or urinary tract symptoms.  Does not check her blood pressure readings at home.    On physical exam her blood pressure 122/72 with a pulse of 75 and she weighed 304 pounds.  Her neck was supple without JVD.  Lungs are clear.  Heart revealed a regular rate and rhythm and S4 but no gallops, murmurs or rubs.  Abdomen was soft, flat, nontender without organomegaly, masses or bruits.  Extremities revealed no clubbing, cyanosis or edema.    I reviewed her most recent labs done on April 1, 2024.  Creatinine fluctuates a bit but overall stable at 1.28 with an estimated GFR of 46 cc/min.  Electrolytes were good.  Sodium 137.    I therefore reassured the patient that her renal function is overall stable.  Hyponatremia under control.  Continue a quart and 1/2/day fluid restriction.  Avoid nonsteroidals.  Encouraged her to check blood pressures periodically to make sure they are under adequate control.  She will continue to do CBC and renal panels quarterly.  I will see her again in 6 months for follow-up or sooner if clinically indicated.    Thank you again for allowing me to participate in the care of your patient.  If you have any questions please feel free to call.          Gen Chahal MD   [facility], [facility], [place]  [address]  [place]  [address]    Document electronically generated by:  Gen Chahal MD

## (undated) NOTE — LETTER
Ernie Champion, [address] [facility],  [address] [place] [address] Rd       08/16/22        Patient: Seth Ghosh   YOB: 1955   Date of Visit: 8/16/2022       Dear  Dr. Brionna Knight MD,      Thank you for referring Seth Ghosh to my practice. Please find my assessment and plan below. As you know she is a 60-year-old female with a history of hypertension, DJD, depression, GERD and hypothyroidism who I now had the pleasure of seeing for follow-up of hyponatremia and chronic kidney disease stage III. Patient just underwent a recent renal evaluation. Of note is that his sed rate, connective tissue profile, and random urine for Bence-Guevara protein was nonrevealing. Urinalysis was normal and a random urine sodium was 83 with a urine osmolality of 316. TSH and cortisol levels were normal as well. Finally a repeat set of laboratory studies on July 29, 2022 shows that her sodium has normalized at 138. Creatinine is slightly better 1.05 with an estimated GFR of 56 cc/min. I therefore informed the patient that she does have chronic kidney disease stage III secondary to hypertension and nephrosclerosis. She also has a history of hyponatremia. Her 2 medications Cymbalta and Wellbutrin can cause this and her hyponatremia was exacerbated by Dyazide. Sodium and creatinine improved off Dyazide. Would avoid any further use of thiazide diuretics. Would continue modest fluid restriction. Patient really seems to drink a lot of fluids. Her blood pressure was 170/73 off Dyazide. Avoid nonsteroidals. She will repeat a renal panel in 3 months to ensure stability. We will see yearly or as needed. Thank you again for allowing me to participate in the care of your patient. If you have any questions please feel free to call.            Sincerely,   Devi Fu MD   [address]  [address] [facility] [address]  [address] [address]    Document electronically generated by:  Nelly Rey MD

## (undated) NOTE — LETTER
Safia Reyonso, [address] [facility],  [address]       08/11/23        Patient: Newton Terry   YOB: 1955   Date of Visit: 8/11/2023       Dear  Dr. Annalisa Nickerson MD,      Thank you for referring Newton Terry to my practice. Please find my assessment and plan below. As you know she is a 72-year-old female with a history of hypertension, DJD, depression, GERD and hypothyroidism who I now the pleasure of seeing for follow-up of hyponatremia and chronic kidney disease stage III. Overall patient states she is doing well without any chest pain, shortness of breath, GI or urinary tract symptoms. She has not been checking her blood pressure readings at home. On physical exam her blood pressure was 117/81 with a pulse of 72 she weighed 297 pounds. Her neck was supple without JVD. Lungs were clear. Heart revealed a regular rate and rhythm and S4 but no gallops, murmurs or rubs. Abdomen was soft, flat, nontender without organomegaly, masses or bruits. Extremities revealed no edema. I reviewed her most recent labs done on July 25, 2023. Creatinine has crept up to 1.39 now with an estimated GFR of 42 cc/min. Sodium just borderline low at 135. I therefore informed the patient that her renal function has deteriorated. Creatinine was 1.09 back in November 2022. She states she is drinking at least 64 ounces of fluid per day. That should be adequate to maintain adequate hydration. Do not want her to increase this to avoid hyponatremia. She sometimes does have orthostasis. She will check her blood pressures daily and call me in 1 week just to make sure blood pressures are not trending too low. Otherwise not on any thing nephrotoxic. She knows to avoid nonsteroidals. We will have her repeat a CBC and renal panel in 1 month to ensure stability. If stable will continue quarterly. I will see her again in 6 months for follow-up or sooner if clinically indicated.     Thank you again for allowing me to participate in the care of your patient. If you have any questions please feel free to call.                Sincerely,   Ashley Rizzo MD   [facility], [address] [place] [address]  [address] [place] [address]  [address] [facility] [address]    Document electronically generated by:  Ashley Rizzo MD

## (undated) NOTE — LETTER
[facility] [address] , [place]   Date:   3/3/2023     Name:   Seth Ghosh    YOB: 1955   MRN:   [id number]       WHERE IS YOUR PAIN NOW? Romie the areas on your body where you feel the described sensations. Use the appropriate symbol. Sunshine Felixin the areas of radiation. Include all affected areas. Just to complete the picture, please draw in the face. ACHE:  ^ ^ ^   NUMBNESS:  0000   PINS & NEEDLES:  = = = =                              ^ ^ ^                       0000              = = = =                                    ^ ^ ^                       0000            = = = =      BURNING:  XXXX   STABBING: ////                  XXXX                ////                         XXXX          ////     Please romie the line below indicating your degree of pain right now  with 0 being no pain 10 being the worst pain possible.                                          0             1             2              3             4              5              6              7             8             9             10         Patient Signature:

## (undated) NOTE — Clinical Note
Patient Name: Celestine Lilly  YOB: 1955          MRN number:  [id number]  Date:  1/16/2017  Referring Physician:  No ref.  provider found    Discharge Summary  Initial Functional Outcome Score 39/100  Final Functional Outcome Score 71/100  Numb ascend/descend 1 flight of stairs reciprocally with use of handrail (met)  · Pt will be independent and compliant with comprehensive HEP to maintain progress achieved in PT (met)    Plan:d/c to HEP.     Thank you for your referral. If you have any questions

## (undated) NOTE — MR AVS SNAPSHOT
[address] [address]  [phone]               Thank you for choosing us for your health care visit with Ruth Ann Zendejas MD.  We are glad to serve you and happy to provide you with this summary May use Tylenol 500 mg 2-3 times a day if necessary.            Other Visit Diagnoses     Essential hypertension with goal blood pressure less than 130/80    -  Primary     Relevant Medications     Triamterene-HCTZ 37.5-25 MG Oral Cap     Other Relevant Ord These medications were sent to [address] [place] [address]  [address], [address] [facility]  [address] [phone], [phone] [address], [place] [address]     Phone:  [phone]    - Triamterene-HCTZ 37.5-25 MG Caps            Today's Orders     BEENA DE LUNA D

## (undated) NOTE — LETTER
1/19/2022              [address] [address]         To Whom It May Concern,  Stuart Hubbard is under my medical care.   This patient has been using and benefiting from the use of the CPAP machine at its curren

## (undated) NOTE — LETTER
3/25/2021          To Whom It May Concern:    Alfonso Benedicto is currently under my medical care.    She may work with the following restrictions:  No lifting, pushing, or pulling with left arm    If you require additional information please contact our of

## (undated) NOTE — LETTER
3/25/2021          To Whom It May Concern:    Kenneth Vargas is currently under my medical care.   She may return to work on 03/29/21 with the following restrictions:  No lifting, pushing, or pulling with left arm     If you require additional information

## (undated) NOTE — LETTER
11/13/2023              [address]        [address]        [place] [address]         Dear Domonique Ron,    [facility] records indicate that the tests ordered for you by Eve Anderson MD  have not been done. Iron And Tibc (Order #[id number]) on 10/4/23     Ferritin (Order #[id number]) on 10/4/23     CBC, Platelet; No Differential (Order #[id number]) on 10/4/23        If you have, in fact, already completed the tests or you do not wish to have the tests done, please contact our office at [address] [place] [address]. Otherwise, please proceed with the testing.           Sincerely,    Eve Anderson MD  [facility], [address] [address] [phone]

## (undated) NOTE — LETTER
12/17/18        [address]  [address] [address]      Dear Hao Sorensen,    [address] [facility] records indicate that you have outstanding lab work and or testing that was ordered for you and has not yet been completed:  Orders Placed This Encounter      C

## (undated) NOTE — MR AVS SNAPSHOT
[facility] [address]  [phone]               Thank you for choosing us for your health care visit with Ken Santos, PT. We are glad to serve you and happy to provide you with this summary of your visit.   Please Sulfamethoxazole-TMP -160 MG Tabs per tablet   Commonly known as:  BACTRIM DS           TiZANidine HCl 2 MG Tabs   Take 1 tablet (2 mg total) by mouth nightly.    Commonly known as:  ZANAFLEX           Triamterene-HCTZ 37.5-25 MG Caps   1 capsule 2 t

## (undated) NOTE — LETTER
5/14/2021          To Whom It May Concern:    Esdras Rueda is currently under my medical care. Activity is restricted as follows: no lifting, pushing, pullling more than 5lbs with left upper extremity for 4 weeks.      If you require additional inform

## (undated) NOTE — LETTER
Tiffanie Childs, [address] [facility],  [address] [place] [address] Rd       05/03/23        Patient: Brennon Reaves   YOB: 1955   Date of Visit: 5/3/2023       Dear  Dr. Boni Hurt MD,      Thank you for referring Brennon Reaves to my practice. Please find my assessment and plan below. As you know she is a 72-year-old female with a history of hypertension, DJD, depression, GERD and hypothyroidism who I now had the pleasure of seeing for follow-up of hyponatremia and chronic kidney disease stage III. Overall the patient states has been doing well without any chest pain, shortness of breath, GI or urinary tract symptoms. On physical exam her blood pressure was 115/72 with a pulse of 71 and she weighed 296 pounds. Her neck was supple without JVD. Lungs were clear. Heart revealed a regular rate and rhythm with an S4 but no gallops, murmurs or rubs. Abdomen was soft, flat, nontender without organomegaly, masses or bruits. Extremities revealed no edema. I reviewed her most recent labs which were done in April 28, 2023. Creatinine has crept up a bit to 1.26 now with an estimated GFR of 47 cc/min. Sodium remains normal at 137 with a potassium of 5.1. I therefore informed the patient that there has been some mild deterioration in her renal function. Blood pressures appear to be under good control. Maintain adequate hydration. Avoid nonsteroidals. We will repeat a CBC and renal panel quarterly. I will see her again in 6 months for follow-up or sooner if clinically indicated. Thank you again for allowing me to participate in the care of your patient. If you have any questions please feel free to call.            Sincerely,   Dolly Baires MD   [facility] MEDICAL [facility], [address] [place] [address]  [address] [facility] [address]  [place] [id number]    Document electronically generated by:  Dolly Baires MD

## (undated) NOTE — LETTER
4/16/2021          To Whom It May Concern:    Madonna Garay is currently under my medical care. She has the following restrictions:  -- No pushing, pulling, or lifting with left arm   If you require additional information please contact our office.

## (undated) NOTE — LETTER
AUTHORIZATION FOR SURGICAL OPERATION OR OTHER PROCEDURE    1.  I hereby authorize Dr. Wilian Neff, and [facility] staff assigned to my case to perform the following operation and/or procedure at the [facility], [facility]:    ________________________________ Time:  ________ A. M.  P.M.        Patient Name:  ______________________________________________________  (please print)      Patient signature:  ___________________________________________________             Relationship to Patient:

## (undated) NOTE — MR AVS SNAPSHOT
[place]  [address] [facility] [id number] [phone]               Thank you for choosing us for your health care visit with Imelda Carty MD.  We are glad to serve you and happy to provide you with this summary She is currently on triamterene hydrochlorothiazide 1 capsule 2 times a week. She has tolerated medications well. No lower extremity edema, renal functions look normal per recent labs.   Advised to see me for a follow-up evaluation in about 4-6 months for Take 600 mg by mouth daily. take 2 tablets by oral route once in a.m. Commonly known as:  WELLBUTRIN XL           Levothyroxine Sodium 150 MCG Tabs   Take 1 tablet (150 mcg total) by mouth once daily.    Commonly known as:  SYNTHROID           TiZANidine

## (undated) NOTE — LETTER
3/19/2021          To Whom It May Concern:    Calvin Larose is currently under my medical care. Please excuse her from work for 3 weeks. If you require additional information please contact our office.         Sincerely,    Madhu Ramos MD